# Patient Record
Sex: FEMALE | Race: WHITE | NOT HISPANIC OR LATINO | Employment: OTHER | URBAN - METROPOLITAN AREA
[De-identification: names, ages, dates, MRNs, and addresses within clinical notes are randomized per-mention and may not be internally consistent; named-entity substitution may affect disease eponyms.]

---

## 2021-10-29 ENCOUNTER — TELEPHONE (OUTPATIENT)
Dept: HEMATOLOGY/ONCOLOGY | Facility: CLINIC | Age: 66
End: 2021-10-29
Payer: COMMERCIAL

## 2021-11-02 ENCOUNTER — TELEPHONE (OUTPATIENT)
Dept: HEMATOLOGY/ONCOLOGY | Facility: CLINIC | Age: 66
End: 2021-11-02
Payer: COMMERCIAL

## 2021-11-03 ENCOUNTER — TELEPHONE (OUTPATIENT)
Dept: HEMATOLOGY/ONCOLOGY | Facility: CLINIC | Age: 66
End: 2021-11-03
Payer: COMMERCIAL

## 2021-11-30 ENCOUNTER — TELEPHONE (OUTPATIENT)
Dept: HEMATOLOGY/ONCOLOGY | Facility: CLINIC | Age: 66
End: 2021-11-30
Payer: COMMERCIAL

## 2021-11-30 DIAGNOSIS — C90.00 MULTIPLE MYELOMA, REMISSION STATUS UNSPECIFIED: Primary | ICD-10-CM

## 2021-12-02 ENCOUNTER — EDUCATION (OUTPATIENT)
Dept: HEMATOLOGY/ONCOLOGY | Facility: CLINIC | Age: 66
End: 2021-12-02

## 2021-12-02 ENCOUNTER — OFFICE VISIT (OUTPATIENT)
Dept: HEMATOLOGY/ONCOLOGY | Facility: CLINIC | Age: 66
End: 2021-12-02
Payer: COMMERCIAL

## 2021-12-02 VITALS
HEART RATE: 66 BPM | OXYGEN SATURATION: 99 % | RESPIRATION RATE: 16 BRPM | DIASTOLIC BLOOD PRESSURE: 69 MMHG | SYSTOLIC BLOOD PRESSURE: 144 MMHG | HEIGHT: 67 IN | WEIGHT: 135.69 LBS | BODY MASS INDEX: 21.3 KG/M2

## 2021-12-02 DIAGNOSIS — D63.0 ANEMIA IN NEOPLASTIC DISEASE: ICD-10-CM

## 2021-12-02 DIAGNOSIS — R53.82 CHRONIC FATIGUE: ICD-10-CM

## 2021-12-02 DIAGNOSIS — Z76.82 STEM CELL TRANSPLANT CANDIDATE: ICD-10-CM

## 2021-12-02 PROCEDURE — 99205 PR OFFICE/OUTPT VISIT, NEW, LEVL V, 60-74 MIN: ICD-10-PCS | Mod: S$GLB,,, | Performed by: INTERNAL MEDICINE

## 2021-12-02 PROCEDURE — 99205 OFFICE O/P NEW HI 60 MIN: CPT | Mod: S$GLB,,, | Performed by: INTERNAL MEDICINE

## 2021-12-02 PROCEDURE — 99999 PR PBB SHADOW E&M-EST. PATIENT-LVL III: CPT | Mod: PBBFAC,,, | Performed by: INTERNAL MEDICINE

## 2021-12-02 PROCEDURE — 99999 PR PBB SHADOW E&M-EST. PATIENT-LVL III: ICD-10-PCS | Mod: PBBFAC,,, | Performed by: INTERNAL MEDICINE

## 2021-12-02 RX ORDER — ONDANSETRON HYDROCHLORIDE 8 MG/1
8 TABLET, FILM COATED ORAL EVERY 8 HOURS PRN
COMMUNITY
Start: 2021-10-27

## 2021-12-02 RX ORDER — BUTALBITAL, ASPIRIN, CAFFEINE AND CODEINE PHOSPHATE 50; 325; 40; 30 MG/1; MG/1; MG/1; MG/1
1 CAPSULE ORAL EVERY 4 HOURS PRN
Status: ON HOLD | COMMUNITY
Start: 2021-09-07 | End: 2022-05-08 | Stop reason: HOSPADM

## 2021-12-02 RX ORDER — MULTIVITAMIN
1 TABLET ORAL DAILY
COMMUNITY

## 2021-12-02 RX ORDER — PROMETHAZINE HYDROCHLORIDE 25 MG/1
25 TABLET ORAL EVERY 6 HOURS PRN
COMMUNITY
Start: 2021-10-11

## 2021-12-02 RX ORDER — ASPIRIN 81 MG/1
81 TABLET ORAL DAILY
COMMUNITY
Start: 2021-10-27 | End: 2022-04-22 | Stop reason: ALTCHOICE

## 2021-12-02 RX ORDER — CLONAZEPAM 0.5 MG/1
0.5 TABLET ORAL 2 TIMES DAILY PRN
COMMUNITY
Start: 2021-11-16

## 2021-12-02 RX ORDER — ACYCLOVIR 400 MG/1
400 TABLET ORAL 2 TIMES DAILY
Status: ON HOLD | COMMUNITY
Start: 2021-11-04 | End: 2022-05-08 | Stop reason: HOSPADM

## 2021-12-02 RX ORDER — CITALOPRAM 10 MG/1
10 TABLET ORAL EVERY MORNING
COMMUNITY
Start: 2021-11-07

## 2021-12-02 RX ORDER — DOCUSATE SODIUM 100 MG/1
100 CAPSULE, LIQUID FILLED ORAL 2 TIMES DAILY
Status: ON HOLD | COMMUNITY
Start: 2021-07-15 | End: 2022-05-08 | Stop reason: HOSPADM

## 2021-12-02 RX ORDER — DEXAMETHASONE 4 MG/1
20 TABLET ORAL DAILY
COMMUNITY
Start: 2021-10-27 | End: 2022-04-22 | Stop reason: ALTCHOICE

## 2021-12-02 RX ORDER — ZINC GLUCONATE 50 MG
50 TABLET ORAL DAILY
COMMUNITY
Start: 2021-10-06

## 2021-12-02 RX ORDER — PANTOPRAZOLE SODIUM 40 MG/1
40 TABLET, DELAYED RELEASE ORAL NIGHTLY
Status: ON HOLD | COMMUNITY
Start: 2021-11-24 | End: 2022-05-08 | Stop reason: HOSPADM

## 2021-12-02 RX ORDER — LENALIDOMIDE 25 MG/1
25 CAPSULE ORAL DAILY
Status: ON HOLD | COMMUNITY
Start: 2021-11-29 | End: 2022-05-08 | Stop reason: HOSPADM

## 2021-12-06 ENCOUNTER — LAB VISIT (OUTPATIENT)
Dept: LAB | Facility: HOSPITAL | Age: 66
End: 2021-12-06
Attending: INTERNAL MEDICINE
Payer: COMMERCIAL

## 2021-12-06 DIAGNOSIS — C90.00 MULTIPLE MYELOMA, REMISSION STATUS UNSPECIFIED: ICD-10-CM

## 2021-12-06 PROCEDURE — 88325 PR  COMPREHENSIVE REVIEW OF DATA: ICD-10-PCS | Mod: ,,, | Performed by: PATHOLOGY

## 2021-12-06 PROCEDURE — 88325 CONSLTJ COMPRE RVW REC REPRT: CPT | Performed by: PATHOLOGY

## 2021-12-06 PROCEDURE — 88325 CONSLTJ COMPRE RVW REC REPRT: CPT | Mod: ,,, | Performed by: PATHOLOGY

## 2021-12-06 PROCEDURE — 88321 CONSLTJ&REPRT SLD PREP ELSWR: CPT | Performed by: PATHOLOGY

## 2021-12-10 LAB
FINAL PATHOLOGIC DIAGNOSIS: NORMAL
GROSS: NORMAL
Lab: NORMAL
MICROSCOPIC EXAM: NORMAL

## 2022-02-08 DIAGNOSIS — C90.00 MULTIPLE MYELOMA, REMISSION STATUS UNSPECIFIED: ICD-10-CM

## 2022-02-08 DIAGNOSIS — Z76.82 STEM CELL TRANSPLANT CANDIDATE: Primary | ICD-10-CM

## 2022-02-25 DIAGNOSIS — Z76.82 STEM CELL TRANSPLANT CANDIDATE: Primary | ICD-10-CM

## 2022-02-25 DIAGNOSIS — C90.00 MULTIPLE MYELOMA, REMISSION STATUS UNSPECIFIED: ICD-10-CM

## 2022-03-14 ENCOUNTER — OFFICE VISIT (OUTPATIENT)
Dept: HEMATOLOGY/ONCOLOGY | Facility: CLINIC | Age: 67
End: 2022-03-14
Payer: COMMERCIAL

## 2022-03-14 ENCOUNTER — LAB VISIT (OUTPATIENT)
Dept: LAB | Facility: HOSPITAL | Age: 67
End: 2022-03-14
Payer: COMMERCIAL

## 2022-03-14 VITALS
OXYGEN SATURATION: 99 % | WEIGHT: 149.5 LBS | RESPIRATION RATE: 16 BRPM | DIASTOLIC BLOOD PRESSURE: 70 MMHG | TEMPERATURE: 98 F | BODY MASS INDEX: 23.46 KG/M2 | HEART RATE: 73 BPM | HEIGHT: 67 IN | SYSTOLIC BLOOD PRESSURE: 160 MMHG

## 2022-03-14 DIAGNOSIS — C90.00 MULTIPLE MYELOMA, REMISSION STATUS UNSPECIFIED: ICD-10-CM

## 2022-03-14 DIAGNOSIS — Z76.82 STEM CELL TRANSPLANT CANDIDATE: Primary | ICD-10-CM

## 2022-03-14 DIAGNOSIS — D63.0 ANEMIA IN NEOPLASTIC DISEASE: ICD-10-CM

## 2022-03-14 DIAGNOSIS — R53.82 CHRONIC FATIGUE: ICD-10-CM

## 2022-03-14 DIAGNOSIS — Z76.82 STEM CELL TRANSPLANT CANDIDATE: ICD-10-CM

## 2022-03-14 DIAGNOSIS — F41.9 ANXIETY: ICD-10-CM

## 2022-03-14 LAB
ABO + RH BLD: NORMAL
ALBUMIN SERPL BCP-MCNC: 3.5 G/DL (ref 3.5–5.2)
ALP SERPL-CCNC: 55 U/L (ref 55–135)
ALT SERPL W/O P-5'-P-CCNC: 45 U/L (ref 10–44)
ANION GAP SERPL CALC-SCNC: 9 MMOL/L (ref 8–16)
AST SERPL-CCNC: 24 U/L (ref 10–40)
B2 MICROGLOB SERPL-MCNC: 2.1 UG/ML (ref 0–2.5)
BASOPHILS # BLD AUTO: 0.04 K/UL (ref 0–0.2)
BASOPHILS NFR BLD: 0.9 % (ref 0–1.9)
BILIRUB SERPL-MCNC: 0.5 MG/DL (ref 0.1–1)
BLD GP AB SCN CELLS X3 SERPL QL: NORMAL
BUN SERPL-MCNC: 12 MG/DL (ref 8–23)
CALCIUM SERPL-MCNC: 9.3 MG/DL (ref 8.7–10.5)
CHLORIDE SERPL-SCNC: 105 MMOL/L (ref 95–110)
CO2 SERPL-SCNC: 26 MMOL/L (ref 23–29)
CREAT SERPL-MCNC: 0.8 MG/DL (ref 0.5–1.4)
DIFFERENTIAL METHOD: ABNORMAL
EOSINOPHIL # BLD AUTO: 0.3 K/UL (ref 0–0.5)
EOSINOPHIL NFR BLD: 6.9 % (ref 0–8)
ERYTHROCYTE [DISTWIDTH] IN BLOOD BY AUTOMATED COUNT: 13.5 % (ref 11.5–14.5)
EST. GFR  (AFRICAN AMERICAN): >60 ML/MIN/1.73 M^2
EST. GFR  (NON AFRICAN AMERICAN): >60 ML/MIN/1.73 M^2
GLUCOSE SERPL-MCNC: 80 MG/DL (ref 70–110)
HCT VFR BLD AUTO: 38.1 % (ref 37–48.5)
HGB BLD-MCNC: 12.1 G/DL (ref 12–16)
IGA SERPL-MCNC: 96 MG/DL (ref 40–350)
IGG SERPL-MCNC: 1083 MG/DL (ref 650–1600)
IGM SERPL-MCNC: 30 MG/DL (ref 50–300)
IMM GRANULOCYTES # BLD AUTO: 0.01 K/UL (ref 0–0.04)
IMM GRANULOCYTES NFR BLD AUTO: 0.2 % (ref 0–0.5)
LYMPHOCYTES # BLD AUTO: 1.2 K/UL (ref 1–4.8)
LYMPHOCYTES NFR BLD: 28.4 % (ref 18–48)
MAGNESIUM SERPL-MCNC: 2 MG/DL (ref 1.6–2.6)
MCH RBC QN AUTO: 31.8 PG (ref 27–31)
MCHC RBC AUTO-ENTMCNC: 31.8 G/DL (ref 32–36)
MCV RBC AUTO: 100 FL (ref 82–98)
MONOCYTES # BLD AUTO: 0.6 K/UL (ref 0.3–1)
MONOCYTES NFR BLD: 14.9 % (ref 4–15)
NEUTROPHILS # BLD AUTO: 2.1 K/UL (ref 1.8–7.7)
NEUTROPHILS NFR BLD: 48.7 % (ref 38–73)
NRBC BLD-RTO: 0 /100 WBC
PHOSPHATE SERPL-MCNC: 2.8 MG/DL (ref 2.7–4.5)
PLATELET # BLD AUTO: 118 K/UL (ref 150–450)
PMV BLD AUTO: 12.3 FL (ref 9.2–12.9)
POTASSIUM SERPL-SCNC: 3.8 MMOL/L (ref 3.5–5.1)
PROT SERPL-MCNC: 6.6 G/DL (ref 6–8.4)
RBC # BLD AUTO: 3.8 M/UL (ref 4–5.4)
SODIUM SERPL-SCNC: 140 MMOL/L (ref 136–145)
WBC # BLD AUTO: 4.23 K/UL (ref 3.9–12.7)

## 2022-03-14 PROCEDURE — 99215 OFFICE O/P EST HI 40 MIN: CPT | Mod: S$GLB,,, | Performed by: INTERNAL MEDICINE

## 2022-03-14 PROCEDURE — 83520 IMMUNOASSAY QUANT NOS NONAB: CPT | Performed by: INTERNAL MEDICINE

## 2022-03-14 PROCEDURE — 80053 COMPREHEN METABOLIC PANEL: CPT | Performed by: INTERNAL MEDICINE

## 2022-03-14 PROCEDURE — 86334 IMMUNOFIX E-PHORESIS SERUM: CPT | Mod: 26,,, | Performed by: PATHOLOGY

## 2022-03-14 PROCEDURE — 99999 PR PBB SHADOW E&M-EST. PATIENT-LVL IV: ICD-10-PCS | Mod: PBBFAC,,, | Performed by: INTERNAL MEDICINE

## 2022-03-14 PROCEDURE — 86850 RBC ANTIBODY SCREEN: CPT | Performed by: INTERNAL MEDICINE

## 2022-03-14 PROCEDURE — 86334 IMMUNOFIX E-PHORESIS SERUM: CPT | Performed by: INTERNAL MEDICINE

## 2022-03-14 PROCEDURE — 84165 PROTEIN E-PHORESIS SERUM: CPT | Mod: 26,,, | Performed by: PATHOLOGY

## 2022-03-14 PROCEDURE — 84165 PATHOLOGIST INTERPRETATION SPE: ICD-10-PCS | Mod: 26,,, | Performed by: PATHOLOGY

## 2022-03-14 PROCEDURE — 86334 PATHOLOGIST INTERPRETATION IFE: ICD-10-PCS | Mod: 26,,, | Performed by: PATHOLOGY

## 2022-03-14 PROCEDURE — 99999 PR PBB SHADOW E&M-EST. PATIENT-LVL IV: CPT | Mod: PBBFAC,,, | Performed by: INTERNAL MEDICINE

## 2022-03-14 PROCEDURE — 99215 PR OFFICE/OUTPT VISIT, EST, LEVL V, 40-54 MIN: ICD-10-PCS | Mod: S$GLB,,, | Performed by: INTERNAL MEDICINE

## 2022-03-14 PROCEDURE — 84100 ASSAY OF PHOSPHORUS: CPT | Performed by: INTERNAL MEDICINE

## 2022-03-14 PROCEDURE — 82232 ASSAY OF BETA-2 PROTEIN: CPT | Performed by: INTERNAL MEDICINE

## 2022-03-14 PROCEDURE — 83735 ASSAY OF MAGNESIUM: CPT | Performed by: INTERNAL MEDICINE

## 2022-03-14 PROCEDURE — 84165 PROTEIN E-PHORESIS SERUM: CPT | Performed by: INTERNAL MEDICINE

## 2022-03-14 PROCEDURE — 85025 COMPLETE CBC W/AUTO DIFF WBC: CPT | Performed by: INTERNAL MEDICINE

## 2022-03-14 PROCEDURE — 36415 COLL VENOUS BLD VENIPUNCTURE: CPT | Performed by: INTERNAL MEDICINE

## 2022-03-14 PROCEDURE — 82784 ASSAY IGA/IGD/IGG/IGM EACH: CPT | Mod: 59 | Performed by: INTERNAL MEDICINE

## 2022-03-14 RX ORDER — LORAZEPAM 1 MG/1
1 TABLET ORAL ONCE
Qty: 6 TABLET | Refills: 0 | Status: SHIPPED | OUTPATIENT
Start: 2022-03-14 | End: 2022-03-31 | Stop reason: ALTCHOICE

## 2022-03-14 NOTE — PROGRESS NOTES
CC: IgG lambda multiple myeloma, stem cell transplant consultation     HPI : , 66, is here for hematology/ stem cell transplant follow up . She is a stem cell transplant candidate. She has IgG lambda multiple myeloma. Multiple myeloma was diagnosed after work up for anemia showed a paraprotein  She has anemia, GERD, esophageal erosions. On 10/8/21 she had a bone marrow biopsy. It showed nearly 50%  positive plasma cells on core biopsy by IHC.  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  [place] red on bone marrow negative.She has chronic anemia. Cytogenetics was normal. FISH showed increase in 1q copies, t(4,14) and monosomy 13.  She had no lytic lesions on skeletal survey. She has been started on RVd.         Review of patient's allergies indicates:   Allergen Reactions    Garlic     Milk Hives, Itching and Nausea Only       Current Outpatient Medications   Medication Sig    acyclovir (ZOVIRAX) 400 MG tablet Take 400 mg by mouth 2 (two) times daily.    aspirin (ECOTRIN) 81 MG EC tablet Take 81 mg by mouth.    citalopram (CELEXA) 10 MG tablet Take 10 mg by mouth once daily.    clonazePAM (KLONOPIN) 0.5 MG tablet Take 0.5 mg by mouth 2 (two) times daily as needed.    codeine-butalbital-ASA-caffeine (BUTALBITAL COMPOUND W/CODEINE) 53--40 mg Cap Take 1 capsule by mouth every 4 (four) hours as needed.    dexAMETHasone (DECADRON) 4 MG Tab Take 40 mg by mouth.    docusate sodium (COLACE) 100 MG capsule Take 100 mg by mouth.    multivitamin (THERAGRAN) per tablet Take 1 tablet by mouth once daily.    ondansetron (ZOFRAN) 8 MG tablet Take 8 mg by mouth every 8 (eight) hours as needed.    pantoprazole (PROTONIX) 40 MG tablet Take 40 mg by mouth once daily.    promethazine (PHENERGAN) 12.5 MG Tab Take 12.5 mg by mouth.    REVLIMID 25 mg Cap Take by mouth.    zinc gluconate 50 mg tablet Take 50 mg by mouth.     No current facility-administered medications for this visit.         Review of  Systems   Constitutional: Positive for malaise/fatigue. Negative for chills, fever and weight loss.   HENT: Negative for congestion, ear discharge, ear pain, hearing loss and nosebleeds.    Eyes: Negative for blurred vision, double vision, photophobia and pain.   Respiratory: Negative for hemoptysis, sputum production and shortness of breath.    Cardiovascular: Negative for orthopnea, leg swelling and PND.   Gastrointestinal: Negative for abdominal pain, blood in stool, diarrhea, heartburn, melena and vomiting.   Genitourinary: Negative for dysuria and frequency.   Musculoskeletal: Negative for back pain, myalgias and neck pain.   Neurological: Negative for dizziness, tingling, tremors, sensory change, focal weakness, weakness and headaches.   Endo/Heme/Allergies: Negative for environmental allergies. Does not bruise/bleed easily.   Psychiatric/Behavioral: Negative for depression, hallucinations and substance abuse.       Vitals:    03/14/22 0953   BP: (!) 160/70   Pulse: 73   Resp: 16   Temp: 98.3 °F (36.8 °C)     PS: ECOG 1    Physical Exam  HENT:      Head: Normocephalic and atraumatic.   Eyes:      General: No scleral icterus.  Cardiovascular:      Rate and Rhythm: Normal rate and regular rhythm.      Pulses: Normal pulses.      Heart sounds: Normal heart sounds. No murmur heard.  Pulmonary:      Effort: Pulmonary effort is normal. No respiratory distress.      Breath sounds: Normal breath sounds.   Abdominal:      General: There is no distension.   Musculoskeletal:         General: No swelling or deformity.   Skin:     Coloration: Skin is not jaundiced.   Neurological:      General: No focal deficit present.      Mental Status: She is alert and oriented to person, place, and time.       Component      Latest Ref Rng & Units 3/14/2022   WBC      3.90 - 12.70 K/uL 4.23   RBC      4.00 - 5.40 M/uL 3.80 (L)   Hemoglobin      12.0 - 16.0 g/dL 12.1   Hematocrit      37.0 - 48.5 % 38.1   MCV      82 - 98 fL 100 (H)    MCH      27.0 - 31.0 pg 31.8 (H)   MCHC      32.0 - 36.0 g/dL 31.8 (L)   RDW      11.5 - 14.5 % 13.5   Platelets      150 - 450 K/uL 118 (L)   MPV      9.2 - 12.9 fL 12.3   Immature Granulocytes      0.0 - 0.5 % 0.2   Gran # (ANC)      1.8 - 7.7 K/uL 2.1   Immature Grans (Abs)      0.00 - 0.04 K/uL 0.01   Lymph #      1.0 - 4.8 K/uL 1.2   Mono #      0.3 - 1.0 K/uL 0.6   Eos #      0.0 - 0.5 K/uL 0.3   Baso #      0.00 - 0.20 K/uL 0.04   nRBC      0 /100 WBC 0   Gran %      38.0 - 73.0 % 48.7   Lymph %      18.0 - 48.0 % 28.4   Mono %      4.0 - 15.0 % 14.9   Eosinophil %      0.0 - 8.0 % 6.9   Basophil %      0.0 - 1.9 % 0.9   Differential Method       Automated   Sodium      136 - 145 mmol/L 140   Potassium      3.5 - 5.1 mmol/L 3.8   Chloride      95 - 110 mmol/L 105   CO2      23 - 29 mmol/L 26   Glucose      70 - 110 mg/dL 80   BUN      8 - 23 mg/dL 12   Creatinine      0.5 - 1.4 mg/dL 0.8   Calcium      8.7 - 10.5 mg/dL 9.3   PROTEIN TOTAL      6.0 - 8.4 g/dL 6.6   Albumin      3.5 - 5.2 g/dL 3.5   BILIRUBIN TOTAL      0.1 - 1.0 mg/dL 0.5   Alkaline Phosphatase      55 - 135 U/L 55   AST      10 - 40 U/L 24   ALT      10 - 44 U/L 45 (H)   Anion Gap      8 - 16 mmol/L 9   eGFR if African American      >60 mL/min/1.73 m:2 >60.0   eGFR if non African American      >60 mL/min/1.73 m:2 >60.0   IgG      650 - 1600 mg/dL 1083   IgA      40 - 350 mg/dL 96   IgM      50 - 300 mg/dL 30 (L)   Phosphorus      2.7 - 4.5 mg/dL 2.8   Magnesium      1.6 - 2.6 mg/dL 2.0   Beta-2 Microglobulin      0.0 - 2.5 ug/mL 2.1       Assessment:    1. IgG lambda multiple myeloma not in remission  2. Stem cell transplant candidate  3. 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 immunization    Plan:    1. R-ISS II IgG lambda multiple myeloma, currently on RVd, cycle 5. Estimated median PFS is 42 months.    She had increased 1q21 copy number and has t(4,14), both high risk markers in multiple myeloma. She also has monosomy 13 on FISH. She  has not had PET /MRI and recommend either of these imaging to evaluate for skeletal lesions.   She is currently on RVd , cycle 5. She has tolerated the treatments well. She has responded very well so far. Prior to C5 her M spike on SPEP was 0.52g/dl( was 5.36g/sdl at diagnosis). She is in [facility]. She has met Vidya Cote RN, transplant co- ordinator. She will be re-staged after C6.        3. She is on PPI    5. She has received COVID 19 vaccine.

## 2022-03-15 LAB
ALBUMIN SERPL ELPH-MCNC: 3.72 G/DL (ref 3.35–5.55)
ALPHA1 GLOB SERPL ELPH-MCNC: 0.27 G/DL (ref 0.17–0.41)
ALPHA2 GLOB SERPL ELPH-MCNC: 0.62 G/DL (ref 0.43–0.99)
B-GLOBULIN SERPL ELPH-MCNC: 0.64 G/DL (ref 0.5–1.1)
GAMMA GLOB SERPL ELPH-MCNC: 1.05 G/DL (ref 0.67–1.58)
INTERPRETATION SERPL IFE-IMP: NORMAL
KAPPA LC SER QL IA: 1.27 MG/DL (ref 0.33–1.94)
KAPPA LC/LAMBDA SER IA: 0.93 (ref 0.26–1.65)
LAMBDA LC SER QL IA: 1.36 MG/DL (ref 0.57–2.63)
PATHOLOGIST INTERPRETATION IFE: NORMAL
PATHOLOGIST INTERPRETATION SPE: NORMAL
PROT SERPL-MCNC: 6.3 G/DL (ref 6–8.4)

## 2022-03-18 DIAGNOSIS — C90.00 MULTIPLE MYELOMA, REMISSION STATUS UNSPECIFIED: ICD-10-CM

## 2022-03-18 DIAGNOSIS — Z76.82 STEM CELL TRANSPLANT CANDIDATE: Primary | ICD-10-CM

## 2022-03-21 ENCOUNTER — CLINICAL SUPPORT (OUTPATIENT)
Dept: HEMATOLOGY/ONCOLOGY | Facility: CLINIC | Age: 67
End: 2022-03-21
Payer: COMMERCIAL

## 2022-03-21 ENCOUNTER — HOSPITAL ENCOUNTER (OUTPATIENT)
Dept: RADIOLOGY | Facility: HOSPITAL | Age: 67
Discharge: HOME | End: 2022-03-21
Attending: INTERNAL MEDICINE
Payer: COMMERCIAL

## 2022-03-21 ENCOUNTER — PROCEDURE VISIT (OUTPATIENT)
Dept: HEMATOLOGY/ONCOLOGY | Facility: CLINIC | Age: 67
End: 2022-03-21
Payer: COMMERCIAL

## 2022-03-21 VITALS
SYSTOLIC BLOOD PRESSURE: 127 MMHG | HEART RATE: 86 BPM | DIASTOLIC BLOOD PRESSURE: 62 MMHG | TEMPERATURE: 98 F | RESPIRATION RATE: 16 BRPM | OXYGEN SATURATION: 99 %

## 2022-03-21 DIAGNOSIS — C90.00 MULTIPLE MYELOMA, REMISSION STATUS UNSPECIFIED: ICD-10-CM

## 2022-03-21 DIAGNOSIS — Z76.82 STEM CELL TRANSPLANT CANDIDATE: ICD-10-CM

## 2022-03-21 DIAGNOSIS — C90.00 MULTIPLE MYELOMA, REMISSION STATUS UNSPECIFIED: Primary | ICD-10-CM

## 2022-03-21 DIAGNOSIS — Z76.82 STEM CELL TRANSPLANT CANDIDATE: Primary | ICD-10-CM

## 2022-03-21 DIAGNOSIS — Z11.52 ENCOUNTER FOR SCREENING FOR COVID-19: ICD-10-CM

## 2022-03-21 LAB
POCT GLUCOSE: 108 MG/DL (ref 70–110)
SARS-COV-2 RDRP RESP QL NAA+PROBE: NEGATIVE

## 2022-03-21 PROCEDURE — 88305 TISSUE EXAM BY PATHOLOGIST: CPT | Mod: 59 | Performed by: PATHOLOGY

## 2022-03-21 PROCEDURE — 78816 PET IMAGE W/CT FULL BODY: CPT | Mod: 26,PS,, | Performed by: RADIOLOGY

## 2022-03-21 PROCEDURE — 88185 FLOWCYTOMETRY/TC ADD-ON: CPT | Mod: 59 | Performed by: PATHOLOGY

## 2022-03-21 PROCEDURE — 88184 FLOWCYTOMETRY/ TC 1 MARKER: CPT | Mod: 59 | Performed by: PATHOLOGY

## 2022-03-21 PROCEDURE — 88313 SPECIAL STAINS GROUP 2: CPT | Mod: 59 | Performed by: PATHOLOGY

## 2022-03-21 PROCEDURE — 88342 IMHCHEM/IMCYTCHM 1ST ANTB: CPT | Mod: 26,59,, | Performed by: PATHOLOGY

## 2022-03-21 PROCEDURE — 88313 PR  SPECIAL STAINS,GROUP II: ICD-10-PCS | Mod: 26,,, | Performed by: PATHOLOGY

## 2022-03-21 PROCEDURE — 88311 DECALCIFY TISSUE: CPT | Performed by: PATHOLOGY

## 2022-03-21 PROCEDURE — 88274 CYTOGENETICS 25-99: CPT | Performed by: PHYSICIAN ASSISTANT

## 2022-03-21 PROCEDURE — 88185 FLOWCYTOMETRY/TC ADD-ON: CPT | Mod: 91 | Performed by: PHYSICIAN ASSISTANT

## 2022-03-21 PROCEDURE — 88189 PR  FLOWCYTOMETRY/READ, 16 & > MARKERS: ICD-10-PCS | Mod: ,,, | Performed by: PATHOLOGY

## 2022-03-21 PROCEDURE — 88305 TISSUE EXAM BY PATHOLOGIST: CPT | Mod: 26,,, | Performed by: PATHOLOGY

## 2022-03-21 PROCEDURE — 85097 BONE MARROW INTERPRETATION: CPT | Mod: ,,, | Performed by: PATHOLOGY

## 2022-03-21 PROCEDURE — 88311 DECALCIFY TISSUE: CPT | Mod: 26,,, | Performed by: PATHOLOGY

## 2022-03-21 PROCEDURE — 88189 FLOWCYTOMETRY/READ 16 & >: CPT | Mod: ,,, | Performed by: PATHOLOGY

## 2022-03-21 PROCEDURE — 25500020 PHARM REV CODE 255: Performed by: INTERNAL MEDICINE

## 2022-03-21 PROCEDURE — 85097 PR  BONE MARROW,SMEAR INTERPRETATION: ICD-10-PCS | Mod: ,,, | Performed by: PATHOLOGY

## 2022-03-21 PROCEDURE — U0002 COVID-19 LAB TEST NON-CDC: HCPCS | Performed by: INTERNAL MEDICINE

## 2022-03-21 PROCEDURE — 88311 PR  DECALCIFY TISSUE: ICD-10-PCS | Mod: 26,,, | Performed by: PATHOLOGY

## 2022-03-21 PROCEDURE — 88313 SPECIAL STAINS GROUP 2: CPT | Mod: 26,,, | Performed by: PATHOLOGY

## 2022-03-21 PROCEDURE — 78816 NM PET CT WHOLE BODY: ICD-10-PCS | Mod: 26,PS,, | Performed by: RADIOLOGY

## 2022-03-21 PROCEDURE — A9698 NON-RAD CONTRAST MATERIALNOC: HCPCS | Performed by: INTERNAL MEDICINE

## 2022-03-21 PROCEDURE — 88305 TISSUE EXAM BY PATHOLOGIST: ICD-10-PCS | Mod: 26,,, | Performed by: PATHOLOGY

## 2022-03-21 PROCEDURE — 78816 PET IMAGE W/CT FULL BODY: CPT | Mod: PI,TC

## 2022-03-21 PROCEDURE — 88342 CHG IMMUNOCYTOCHEMISTRY: ICD-10-PCS | Mod: 26,59,, | Performed by: PATHOLOGY

## 2022-03-21 PROCEDURE — 38222 DX BONE MARROW BX & ASPIR: CPT | Mod: LT,S$GLB,, | Performed by: PHYSICIAN ASSISTANT

## 2022-03-21 PROCEDURE — 88237 TISSUE CULTURE BONE MARROW: CPT | Performed by: PHYSICIAN ASSISTANT

## 2022-03-21 PROCEDURE — 88342 IMHCHEM/IMCYTCHM 1ST ANTB: CPT | Performed by: PATHOLOGY

## 2022-03-21 PROCEDURE — 38222 PR BONE MARROW BIOPSY(IES) W/ASPIRATION(S); DIAGNOSTIC: ICD-10-PCS | Mod: LT,S$GLB,, | Performed by: PHYSICIAN ASSISTANT

## 2022-03-21 PROCEDURE — 88264 CHROMOSOME ANALYSIS 20-25: CPT | Performed by: PHYSICIAN ASSISTANT

## 2022-03-21 RX ORDER — LIDOCAINE HYDROCHLORIDE 20 MG/ML
10 INJECTION, SOLUTION EPIDURAL; INFILTRATION; INTRACAUDAL; PERINEURAL ONCE
Status: COMPLETED | OUTPATIENT
Start: 2022-03-21 | End: 2022-03-21

## 2022-03-21 RX ADMIN — LIDOCAINE HYDROCHLORIDE 200 MG: 20 INJECTION, SOLUTION EPIDURAL; INFILTRATION; INTRACAUDAL; PERINEURAL at 01:03

## 2022-03-21 RX ADMIN — IOHEXOL 1000 ML: 9 SOLUTION ORAL at 11:03

## 2022-03-21 NOTE — PROGRESS NOTES
Caty Diaz is a 66 y.o. female with history of IgG Lambda MM undergoing transplant evaluation who presented to clinic for bone marrow biopsy and aspiration. See Dr. Tabor's recent clinic note for full history. I have reviewed medications, allergies and labs. Procedure explained and informed consent obtained. The patient tolerated procedure well and hemostasis was achieved, no signs of distress upon completion. Patient instructed to keep bandaid intact and dry for 24 hours. Please see procedure note for full details.    Angelito Freedman, PA-C  Malignant Hematology & Bone Marrow Transplant

## 2022-03-22 LAB
CHROM BANDING METHOD: NORMAL
CHROMOSOME ANALYSIS BM ADDITIONAL INFORMATION: NORMAL
CHROMOSOME ANALYSIS BM RELEASED BY: NORMAL
CHROMOSOME ANALYSIS BM RESULT SUMMARY: NORMAL
CLINICAL CYTOGENETICIST REVIEW: NORMAL
KARYOTYP MAR: NORMAL
REASON FOR REFERRAL (NARRATIVE): NORMAL
REF LAB TEST METHOD: NORMAL
SPECIMEN SOURCE: NORMAL
SPECIMEN: NORMAL

## 2022-03-23 LAB
BODY SITE - BONE MARROW: NORMAL
CLINICAL DIAGNOSIS - BONE MARROW: NORMAL
FLOW CYTOMETRY ANTIBODIES ANALYZED - BONE MARROW: NORMAL
FLOW CYTOMETRY COMMENT - BONE MARROW: NORMAL
FLOW CYTOMETRY INTERPRETATION - BONE MARROW: NORMAL
PCPDS FINAL DIAGNOSIS: NORMAL
PCPDS PRE-ANALYSIS PRE-SORT: NORMAL

## 2022-03-31 ENCOUNTER — LAB VISIT (OUTPATIENT)
Dept: LAB | Facility: HOSPITAL | Age: 67
End: 2022-03-31
Payer: COMMERCIAL

## 2022-03-31 ENCOUNTER — OFFICE VISIT (OUTPATIENT)
Dept: PSYCHIATRY | Facility: CLINIC | Age: 67
End: 2022-03-31
Payer: COMMERCIAL

## 2022-03-31 ENCOUNTER — CLINICAL SUPPORT (OUTPATIENT)
Dept: HEMATOLOGY/ONCOLOGY | Facility: CLINIC | Age: 67
End: 2022-03-31
Payer: COMMERCIAL

## 2022-03-31 ENCOUNTER — SOCIAL WORK (OUTPATIENT)
Dept: HEMATOLOGY/ONCOLOGY | Facility: CLINIC | Age: 67
End: 2022-03-31

## 2022-03-31 DIAGNOSIS — Z76.82 STEM CELL TRANSPLANT CANDIDATE: ICD-10-CM

## 2022-03-31 DIAGNOSIS — Z01.818 PRE-TRANSPLANT EVALUATION FOR STEM CELL TRANSPLANT: Primary | ICD-10-CM

## 2022-03-31 DIAGNOSIS — C90.00 MULTIPLE MYELOMA, REMISSION STATUS UNSPECIFIED: ICD-10-CM

## 2022-03-31 DIAGNOSIS — C90.00 MULTIPLE MYELOMA, REMISSION STATUS UNSPECIFIED: Primary | ICD-10-CM

## 2022-03-31 DIAGNOSIS — Z71.3 NUTRITIONAL COUNSELING: ICD-10-CM

## 2022-03-31 DIAGNOSIS — Z76.82 STEM CELL TRANSPLANT CANDIDATE: Primary | ICD-10-CM

## 2022-03-31 LAB
ABO + RH BLD: NORMAL
ALBUMIN SERPL BCP-MCNC: 3.8 G/DL (ref 3.5–5.2)
ALP SERPL-CCNC: 54 U/L (ref 55–135)
ALT SERPL W/O P-5'-P-CCNC: 53 U/L (ref 10–44)
ANION GAP SERPL CALC-SCNC: 7 MMOL/L (ref 8–16)
APTT BLDCRRT: 21.3 SEC (ref 21–32)
AST SERPL-CCNC: 29 U/L (ref 10–40)
B2 MICROGLOB SERPL-MCNC: 1.7 UG/ML (ref 0–2.5)
BASOPHILS # BLD AUTO: 0.03 K/UL (ref 0–0.2)
BASOPHILS NFR BLD: 0.6 % (ref 0–1.9)
BILIRUB SERPL-MCNC: 0.5 MG/DL (ref 0.1–1)
BLD GP AB SCN CELLS X3 SERPL QL: NORMAL
BUN SERPL-MCNC: 13 MG/DL (ref 8–23)
CALCIUM SERPL-MCNC: 9.5 MG/DL (ref 8.7–10.5)
CHLORIDE SERPL-SCNC: 102 MMOL/L (ref 95–110)
CO2 SERPL-SCNC: 30 MMOL/L (ref 23–29)
CREAT SERPL-MCNC: 0.9 MG/DL (ref 0.5–1.4)
DIFFERENTIAL METHOD: ABNORMAL
EOSINOPHIL # BLD AUTO: 0 K/UL (ref 0–0.5)
EOSINOPHIL NFR BLD: 0.8 % (ref 0–8)
ERYTHROCYTE [DISTWIDTH] IN BLOOD BY AUTOMATED COUNT: 14.1 % (ref 11.5–14.5)
EST. GFR  (AFRICAN AMERICAN): >60 ML/MIN/1.73 M^2
EST. GFR  (NON AFRICAN AMERICAN): >60 ML/MIN/1.73 M^2
GLUCOSE SERPL-MCNC: 77 MG/DL (ref 70–110)
HCT VFR BLD AUTO: 38.1 % (ref 37–48.5)
HGB BLD-MCNC: 12.3 G/DL (ref 12–16)
HGB S BLD QL SOLY: NEGATIVE
IGA SERPL-MCNC: 109 MG/DL (ref 40–350)
IGG SERPL-MCNC: 1170 MG/DL (ref 650–1600)
IGM SERPL-MCNC: 25 MG/DL (ref 50–300)
IMM GRANULOCYTES # BLD AUTO: 0.02 K/UL (ref 0–0.04)
IMM GRANULOCYTES NFR BLD AUTO: 0.4 % (ref 0–0.5)
INR PPP: 1 (ref 0.8–1.2)
LYMPHOCYTES # BLD AUTO: 1.5 K/UL (ref 1–4.8)
LYMPHOCYTES NFR BLD: 31.3 % (ref 18–48)
MAGNESIUM SERPL-MCNC: 1.9 MG/DL (ref 1.6–2.6)
MCH RBC QN AUTO: 31.5 PG (ref 27–31)
MCHC RBC AUTO-ENTMCNC: 32.3 G/DL (ref 32–36)
MCV RBC AUTO: 97 FL (ref 82–98)
MONOCYTES # BLD AUTO: 0.9 K/UL (ref 0.3–1)
MONOCYTES NFR BLD: 18.6 % (ref 4–15)
NEUTROPHILS # BLD AUTO: 2.4 K/UL (ref 1.8–7.7)
NEUTROPHILS NFR BLD: 48.3 % (ref 38–73)
NRBC BLD-RTO: 0 /100 WBC
PHOSPHATE SERPL-MCNC: 2.1 MG/DL (ref 2.7–4.5)
PLATELET # BLD AUTO: 133 K/UL (ref 150–450)
PMV BLD AUTO: 11.9 FL (ref 9.2–12.9)
POTASSIUM SERPL-SCNC: 3.2 MMOL/L (ref 3.5–5.1)
PROT SERPL-MCNC: 7.2 G/DL (ref 6–8.4)
PROTHROMBIN TIME: 10 SEC (ref 9–12.5)
RBC # BLD AUTO: 3.91 M/UL (ref 4–5.4)
SODIUM SERPL-SCNC: 139 MMOL/L (ref 136–145)
WBC # BLD AUTO: 4.89 K/UL (ref 3.9–12.7)

## 2022-03-31 PROCEDURE — 86696 HERPES SIMPLEX TYPE 2 TEST: CPT | Performed by: INTERNAL MEDICINE

## 2022-03-31 PROCEDURE — 83520 IMMUNOASSAY QUANT NOS NONAB: CPT | Mod: 59 | Performed by: INTERNAL MEDICINE

## 2022-03-31 PROCEDURE — 86704 HEP B CORE ANTIBODY TOTAL: CPT | Performed by: INTERNAL MEDICINE

## 2022-03-31 PROCEDURE — 99999 PR PBB SHADOW E&M-EST. PATIENT-LVL III: ICD-10-PCS | Mod: PBBFAC,,,

## 2022-03-31 PROCEDURE — 80307 DRUG TEST PRSMV CHEM ANLYZR: CPT | Performed by: INTERNAL MEDICINE

## 2022-03-31 PROCEDURE — 82784 ASSAY IGA/IGD/IGG/IGM EACH: CPT | Performed by: INTERNAL MEDICINE

## 2022-03-31 PROCEDURE — 86695 HERPES SIMPLEX TYPE 1 TEST: CPT | Performed by: INTERNAL MEDICINE

## 2022-03-31 PROCEDURE — 86592 SYPHILIS TEST NON-TREP QUAL: CPT | Performed by: INTERNAL MEDICINE

## 2022-03-31 PROCEDURE — 85610 PROTHROMBIN TIME: CPT | Performed by: INTERNAL MEDICINE

## 2022-03-31 PROCEDURE — 86803 HEPATITIS C AB TEST: CPT | Performed by: INTERNAL MEDICINE

## 2022-03-31 PROCEDURE — 86644 CMV ANTIBODY: CPT | Performed by: INTERNAL MEDICINE

## 2022-03-31 PROCEDURE — 86334 IMMUNOFIX E-PHORESIS SERUM: CPT | Performed by: INTERNAL MEDICINE

## 2022-03-31 PROCEDURE — 87340 HEPATITIS B SURFACE AG IA: CPT | Performed by: INTERNAL MEDICINE

## 2022-03-31 PROCEDURE — 82955 ASSAY OF G6PD ENZYME: CPT | Performed by: INTERNAL MEDICINE

## 2022-03-31 PROCEDURE — 84165 PATHOLOGIST INTERPRETATION SPE: ICD-10-PCS | Mod: 26,,, | Performed by: PATHOLOGY

## 2022-03-31 PROCEDURE — 98968 PH1 ASSMT&MGMT NQHP 21-30: CPT | Mod: 95,,, | Performed by: DIETITIAN, REGISTERED

## 2022-03-31 PROCEDURE — 96146 PSYCL/NRPSYC TST AUTO RESULT: CPT | Mod: S$GLB,,, | Performed by: PSYCHOLOGIST

## 2022-03-31 PROCEDURE — 85660 RBC SICKLE CELL TEST: CPT | Performed by: INTERNAL MEDICINE

## 2022-03-31 PROCEDURE — 86334 IMMUNOFIX E-PHORESIS SERUM: CPT | Mod: 26,,, | Performed by: PATHOLOGY

## 2022-03-31 PROCEDURE — 99999 PR PBB SHADOW E&M-EST. PATIENT-LVL III: CPT | Mod: PBBFAC,,,

## 2022-03-31 PROCEDURE — 80053 COMPREHEN METABOLIC PANEL: CPT | Performed by: INTERNAL MEDICINE

## 2022-03-31 PROCEDURE — 86703 HIV-1/HIV-2 1 RESULT ANTBDY: CPT | Performed by: INTERNAL MEDICINE

## 2022-03-31 PROCEDURE — 98968 PR NONPHYSICIAN TELEPHONE ASSESSMENT 21-30 MIN: ICD-10-PCS | Mod: 95,,, | Performed by: DIETITIAN, REGISTERED

## 2022-03-31 PROCEDURE — 86901 BLOOD TYPING SEROLOGIC RH(D): CPT | Performed by: INTERNAL MEDICINE

## 2022-03-31 PROCEDURE — 85730 THROMBOPLASTIN TIME PARTIAL: CPT | Performed by: INTERNAL MEDICINE

## 2022-03-31 PROCEDURE — 84165 PROTEIN E-PHORESIS SERUM: CPT | Mod: 26,,, | Performed by: PATHOLOGY

## 2022-03-31 PROCEDURE — 83735 ASSAY OF MAGNESIUM: CPT | Performed by: INTERNAL MEDICINE

## 2022-03-31 PROCEDURE — 82232 ASSAY OF BETA-2 PROTEIN: CPT | Performed by: INTERNAL MEDICINE

## 2022-03-31 PROCEDURE — 84165 PROTEIN E-PHORESIS SERUM: CPT | Performed by: INTERNAL MEDICINE

## 2022-03-31 PROCEDURE — 86787 VARICELLA-ZOSTER ANTIBODY: CPT | Performed by: INTERNAL MEDICINE

## 2022-03-31 PROCEDURE — 86753 PROTOZOA ANTIBODY NOS: CPT | Performed by: INTERNAL MEDICINE

## 2022-03-31 PROCEDURE — 87521 HEPATITIS C PROBE&RVRS TRNSC: CPT | Performed by: INTERNAL MEDICINE

## 2022-03-31 PROCEDURE — 96146 PR PSYCH/NEUROPSYCH TEST ADMIN, ELEC PLATFORM, AUTO RESULT ONLY: ICD-10-PCS | Mod: S$GLB,,, | Performed by: PSYCHOLOGIST

## 2022-03-31 PROCEDURE — 86687 HTLV-I ANTIBODY: CPT | Performed by: INTERNAL MEDICINE

## 2022-03-31 PROCEDURE — 84100 ASSAY OF PHOSPHORUS: CPT | Performed by: INTERNAL MEDICINE

## 2022-03-31 PROCEDURE — 86334 PATHOLOGIST INTERPRETATION IFE: ICD-10-PCS | Mod: 26,,, | Performed by: PATHOLOGY

## 2022-03-31 PROCEDURE — 85025 COMPLETE CBC W/AUTO DIFF WBC: CPT | Performed by: INTERNAL MEDICINE

## 2022-03-31 RX ORDER — ACETAMINOPHEN 500 MG
500 TABLET ORAL EVERY 6 HOURS PRN
COMMUNITY
End: 2022-04-22

## 2022-03-31 RX ORDER — POLYETHYLENE GLYCOL 3350 17 G/17G
17 POWDER, FOR SOLUTION ORAL DAILY
Status: ON HOLD | COMMUNITY
End: 2022-05-08 | Stop reason: HOSPADM

## 2022-03-31 RX ORDER — ASCORBIC ACID 250 MG
250 TABLET ORAL DAILY
Status: ON HOLD | COMMUNITY
End: 2022-05-08 | Stop reason: HOSPADM

## 2022-03-31 RX ORDER — FLUTICASONE PROPIONATE 50 MCG
1 SPRAY, SUSPENSION (ML) NASAL NIGHTLY
Status: ON HOLD | COMMUNITY
End: 2022-05-06 | Stop reason: SDUPTHER

## 2022-03-31 NOTE — PROGRESS NOTES
Oncology Nutrition Assessment for Medical Nutrition Therapy  Initial Visit    Caty Diaz   1955    Referring Provider: No ref. provider found      Reason for Visit: nutrition counseling and education    The patient location is: [facility]  The chief complaint leading to consultation is: MM, SCT eval    Visit type: audio only    Face to Face time with patient: 28 minutes  35 minutes of total time spent on the encounter, which includes face to face time and non-face to face time preparing to see the patient (eg, review of tests), Obtaining and/or reviewing separately obtained history, Documenting clinical information in the electronic or other health record, Independently interpreting results (not separately reported) and communicating results to the patient/family/caregiver, or Care coordination (not separately reported).     Each patient to whom he or she provides medical services by telemedicine is:  (1) informed of the relationship between the physician and patient and the respective role of any other health care provider with respect to management of the patient; and (2) notified that he or she may decline to receive medical services by telemedicine and may withdraw from such care at any time.    PMHx:   Past Medical History:   Diagnosis Date    Multiple myeloma        Nutrition Assessment    This is a 66 y.o.female with a medical diagnosis of multiple myeloma, plan for auto SCT. She reports her appetite is good, eating 3 meals/day and snacks. She reports that she had previously lost a lot of weight prior to diagnosis but has since gained it back. She is eating protein with all meals, seafood, fish, chicken, turkey, pork, and some beef. She likes to snack on fruit and sometimes potato chips. She is allergic to all dairy products and even some dairy alternatives, so she makes her own rice milk. She drinks plenty of water and also likes Gatorade.    Weight: 67.8 kg (149 lb 7.6 oz) -  "3/14 clinic weight  Height: 5' 7" (1.702 m)  BMI: 23.41    Usual BW: 140lb  Weight Change: recent weight gain    Allergies: Garlic and Milk    Current Medications:  Current Outpatient Medications:     acyclovir (ZOVIRAX) 400 MG tablet, Take 400 mg by mouth 2 (two) times daily., Disp: , Rfl:     aspirin (ECOTRIN) 81 MG EC tablet, Take 81 mg by mouth., Disp: , Rfl:     citalopram (CELEXA) 10 MG tablet, Take 10 mg by mouth once daily., Disp: , Rfl:     clonazePAM (KLONOPIN) 0.5 MG tablet, Take 0.5 mg by mouth 2 (two) times daily as needed., Disp: , Rfl:     codeine-butalbital-ASA-caffeine (BUTALBITAL COMPOUND W/CODEINE) 43--40 mg Cap, Take 1 capsule by mouth every 4 (four) hours as needed., Disp: , Rfl:     dexAMETHasone (DECADRON) 4 MG Tab, Take 40 mg by mouth., Disp: , Rfl:     docusate sodium (COLACE) 100 MG capsule, Take 100 mg by mouth., Disp: , Rfl:     LORazepam (ATIVAN) 1 MG tablet, Take 1 tablet (1 mg total) by mouth once. for 1 dose, Disp: 6 tablet, Rfl: 0    multivitamin (THERAGRAN) per tablet, Take 1 tablet by mouth once daily., Disp: , Rfl:     ondansetron (ZOFRAN) 8 MG tablet, Take 8 mg by mouth every 8 (eight) hours as needed., Disp: , Rfl:     pantoprazole (PROTONIX) 40 MG tablet, Take 40 mg by mouth once daily., Disp: , Rfl:     promethazine (PHENERGAN) 12.5 MG Tab, Take 12.5 mg by mouth., Disp: , Rfl:     REVLIMID 25 mg Cap, Take by mouth., Disp: , Rfl:     zinc gluconate 50 mg tablet, Take 50 mg by mouth., Disp: , Rfl:     Food/medication interactions noted: avoid grapefruit    Vitamins/Supplements: MVI, calcium-D3    Labs: Reviewed    Nutrition Diagnosis    Problem: nutrition-related knowledge deficit  Etiology (related to): lack of prior need for nutrition education  Signs/Symptoms (as evidenced by): referral for SCT evaluation    Nutrition Intervention    Nutrition Prescription   1695 kcals (25kcal/kg)  68g protein (1g/kg)   1695mL fluid (25mL/kg)    Recommendations:  Continue " to eat 3 meals/day and snacks  Include protein at all meals/snacks  Consider trying dairy free protein drinks/powders for after transplant if appetite decreases - Orgain and Owyn discussed  Continue to drink at least 64oz fluid/day  Educated patient on food safety and healthy diet pre- and post-transplant. Answered all nutrition related questions.    Materials Provided/Reviewed: Nutrition Therapy for Bone Marrow Transplant Patients booklet (emailed)    Nutrition Monitoring and Evaluation    Monitor: diet education needs, energy intake and weight status    Goals: weight maintenance    Follow up: Patient provided with dietitian contact information and advised to call/message with questions or to make future appointment if further intervention is needed.    Communication to referring provider/care team: note available in chart    Roxy Hester MS, RD, LDN  [phone]

## 2022-03-31 NOTE — PROGRESS NOTES
BMT Pharmacist Evaluation      Current Outpatient Medications:     acetaminophen (TYLENOL) 500 MG tablet, Take 500 mg by mouth every 6 (six) hours as needed for Pain., Disp: , Rfl:     acyclovir (ZOVIRAX) 400 MG tablet, Take 400 mg by mouth 2 (two) times daily., Disp: , Rfl:     ascorbic acid, vitamin C, (VITAMIN C) 250 MG tablet, Take 250 mg by mouth once daily., Disp: , Rfl:     aspirin (ECOTRIN) 81 MG EC tablet, Take 81 mg by mouth once daily., Disp: , Rfl:     calcium carbonate (TUMS ULTRA ORAL), Take 500 mg by mouth daily as needed (heart burn)., Disp: , Rfl:     citalopram (CELEXA) 10 MG tablet, Take 10 mg by mouth once daily., Disp: , Rfl:     clonazePAM (KLONOPIN) 0.5 MG tablet, Take 0.5 mg by mouth 2 (two) times daily as needed for Anxiety., Disp: , Rfl:     codeine-butalbital-ASA-caffeine (BUTALBITAL COMPOUND W/CODEINE) 55--40 mg Cap, Take 1 capsule by mouth every 4 (four) hours as needed (migraine headaches)., Disp: , Rfl:     dexAMETHasone (DECADRON) 4 MG Tab, Take 20 mg by mouth once daily. On Tuesdays and Wednesdays, Disp: , Rfl:     docusate sodium (COLACE) 100 MG capsule, Take 100 mg by mouth 2 (two) times daily., Disp: , Rfl:     fluticasone propionate (FLONASE) 50 mcg/actuation nasal spray, 1 spray by Each Nostril route every evening., Disp: , Rfl:     GI cocktail antac/dicyc/lidoc, Take 5 mLs by mouth daily as needed (nausea)., Disp: , Rfl:     multivitamin (THERAGRAN) per tablet, Take 1 tablet by mouth once daily., Disp: , Rfl:     ondansetron (ZOFRAN) 8 MG tablet, Take 8 mg by mouth every 8 (eight) hours as needed for Nausea., Disp: , Rfl:     pantoprazole (PROTONIX) 40 MG tablet, Take 40 mg by mouth once daily., Disp: , Rfl:     polyethylene glycol (GLYCOLAX) 17 gram PwPk, Take 17 g by mouth once daily., Disp: , Rfl:     promethazine (PHENERGAN) 25 MG tablet, Take 25 mg by mouth every 6 (six) hours as needed (nuasea)., Disp: , Rfl:     REVLIMID 25 mg Cap, Take 25 mg by  mouth once daily. For 21 days on and 7 days off, Disp: , Rfl:     zinc gluconate 50 mg tablet, Take 50 mg by mouth once daily., Disp: , Rfl:       Review of patient's allergies indicates:   Allergen Reactions    Garlic     Milk Hives, Itching and Nausea Only         Estimated Creatinine Clearance: 60.6 mL/min (based on SCr of 0.9 mg/dL).       Medication adherence: uses a log with date/time/amount and she checks it off as she takes them  Medication-related problems: dexamethasone makes her stay up at night.2     Planned conditioning regimen:  Melphalan on Day -1    Antimicrobial Prophylaxis:  Acyclovir starting on Day -1  Levofloxacin starting on Day -1  Fluconazole starting on Day -1    Growth Factor Support:  Neupogen starting on Day +7      Caregiver:   Post-transplant discharge plans: [facility]     Notes:  Reviewed and reconciled the medication list with the patient and her . Patient was able to confirm all medications she currently takes including schedule and indication. Patient demonstrates good medication adherence and understands the importance of this through the transplant process.     Reviewed the planned high-dose chemotherapy regimen, including schedule and possible side effects. Provided the patient with drug information handouts for chemotherapy. Reviewed possible side effects of transplant including: neutropenia, thrombocytopenia, anemia, infection, infusion reactions, nausea/vomiting, mucositis, loss of appetite, taste changes, diarrhea,hair loss, liver and/or renal dysfunction. Reviewed prophylactic antimicrobials, as well as prophylactic and as needed antiemetics. Encouraged the patient to report all possible side effects/new symptoms and to ask for supportive care medications if needed. Patient verbalized understanding and all questions were answered.     Proposed recommendations:  - She uses Florinol for migrane headaches and states that she only takes it 2-3 times per  year  - She was started on aspirin with her revlimid therapy. This can be discontinued on admission if no other indication is identified.  - The patient states that she has a lot of stomach issues and heartburn. She should continue protonix during admission and at discharge.  - We discussed that supplements and vitamins are often held during admission. She states that she is ok with that but would like to restart supplements at discharge; specifically the zinc as that was started by her home physician.  - The patient states that she takes half of a clonazepam pill at night to help her sleep but would like to consider stopping this medication. Would recommend ordering prn while admitted.    The patient demonstrates good medication adherence and understanding of the chemotherapy and transplant plan. BMT/Hematology Oncology PharmD will continue to follow the patient while admitted to the inpatient unit.     Brianna Mccord, PharmD, BCPS, BCOP  Clinical Pharmacy Specialist   BMT/Hematology Oncology  SpectraLink: [id number]

## 2022-04-01 ENCOUNTER — OFFICE VISIT (OUTPATIENT)
Dept: PSYCHIATRY | Facility: CLINIC | Age: 67
End: 2022-04-01
Payer: COMMERCIAL

## 2022-04-01 ENCOUNTER — HOSPITAL ENCOUNTER (OUTPATIENT)
Dept: PULMONOLOGY | Facility: CLINIC | Age: 67
Discharge: HOME | End: 2022-04-01
Payer: COMMERCIAL

## 2022-04-01 ENCOUNTER — HOSPITAL ENCOUNTER (OUTPATIENT)
Dept: RADIOLOGY | Facility: HOSPITAL | Age: 67
Discharge: HOME | End: 2022-04-01
Attending: INTERNAL MEDICINE
Payer: COMMERCIAL

## 2022-04-01 ENCOUNTER — HOSPITAL ENCOUNTER (OUTPATIENT)
Dept: CARDIOLOGY | Facility: CLINIC | Age: 67
Discharge: HOME | End: 2022-04-01
Payer: COMMERCIAL

## 2022-04-01 ENCOUNTER — HOSPITAL ENCOUNTER (OUTPATIENT)
Dept: CARDIOLOGY | Facility: HOSPITAL | Age: 67
Discharge: HOME | End: 2022-04-01
Attending: INTERNAL MEDICINE
Payer: COMMERCIAL

## 2022-04-01 VITALS
BODY MASS INDEX: 23.39 KG/M2 | HEART RATE: 66 BPM | WEIGHT: 149 LBS | HEIGHT: 67 IN | DIASTOLIC BLOOD PRESSURE: 80 MMHG | SYSTOLIC BLOOD PRESSURE: 116 MMHG

## 2022-04-01 DIAGNOSIS — C90.00 MULTIPLE MYELOMA, REMISSION STATUS UNSPECIFIED: ICD-10-CM

## 2022-04-01 DIAGNOSIS — Z76.82 STEM CELL TRANSPLANT CANDIDATE: ICD-10-CM

## 2022-04-01 DIAGNOSIS — F41.9 ANXIETY: ICD-10-CM

## 2022-04-01 DIAGNOSIS — Z76.82 STEM CELL TRANSPLANT CANDIDATE: Primary | ICD-10-CM

## 2022-04-01 DIAGNOSIS — Z01.818 PRE-TRANSPLANT EVALUATION FOR STEM CELL TRANSPLANT: Primary | ICD-10-CM

## 2022-04-01 DIAGNOSIS — D63.0 ANEMIA IN NEOPLASTIC DISEASE: ICD-10-CM

## 2022-04-01 LAB
ALBUMIN SERPL ELPH-MCNC: 4.14 G/DL (ref 3.35–5.55)
ALPHA1 GLOB SERPL ELPH-MCNC: 0.34 G/DL (ref 0.17–0.41)
ALPHA2 GLOB SERPL ELPH-MCNC: 0.69 G/DL (ref 0.43–0.99)
ASCENDING AORTA: 3.16 CM
AV INDEX (PROSTH): 1.02
AV MEAN GRADIENT: 2 MMHG
AV PEAK GRADIENT: 5 MMHG
AV VALVE AREA: 3.09 CM2
AV VELOCITY RATIO: 1.03
B-GLOBULIN SERPL ELPH-MCNC: 0.73 G/DL (ref 0.5–1.1)
BSA FOR ECHO PROCEDURE: 1.79 M2
CV ECHO LV RWT: 0.42 CM
DLCO ADJ PRE: 16.68 ML/(MIN*MMHG) (ref 18.14–29.61)
DLCO SINGLE BREATH LLN: 18.14
DLCO SINGLE BREATH PRE REF: 67.4 %
DLCO SINGLE BREATH REF: 23.88
DLCOC SBVA LLN: 3.07
DLCOC SBVA PRE REF: 76.6 %
DLCOC SBVA REF: 4.39
DLCOC SINGLE BREATH LLN: 18.14
DLCOC SINGLE BREATH PRE REF: 69.9 %
DLCOC SINGLE BREATH REF: 23.88
DLCOCSBVAULN: 5.71
DLCOCSINGLEBREATHULN: 29.61
DLCOSINGLEBREATHULN: 29.61
DLCOVA LLN: 3.07
DLCOVA PRE REF: 73.8 %
DLCOVA PRE: 3.24 ML/(MIN*MMHG*L) (ref 3.07–5.71)
DLCOVA REF: 4.39
DLCOVAULN: 5.71
DLVAADJ PRE: 3.36 ML/(MIN*MMHG*L) (ref 3.07–5.71)
DOP CALC AO PEAK VEL: 1.11 M/S
DOP CALC AO VTI: 23.29 CM
DOP CALC LVOT AREA: 3 CM2
DOP CALC LVOT DIAMETER: 1.97 CM
DOP CALC LVOT PEAK VEL: 1.14 M/S
DOP CALC LVOT STROKE VOLUME: 72.08 CM3
DOP CALCLVOT PEAK VEL VTI: 23.66 CM
E WAVE DECELERATION TIME: 166.84 MSEC
E/A RATIO: 1.31
E/E' RATIO: 10.5 M/S
ECHO LV POSTERIOR WALL: 0.98 CM (ref 0.6–1.1)
EJECTION FRACTION: 60 %
FEF 25 75 LLN: 1.05
FEF 25 75 PRE REF: 150.2 %
FEF 25 75 REF: 2.17
FEV05 LLN: 1.06
FEV05 REF: 1.92
FEV1 FVC LLN: 66
FEV1 FVC PRE REF: 107.7 %
FEV1 FVC REF: 78
FEV1 LLN: 1.91
FEV1 PRE REF: 115.2 %
FEV1 REF: 2.57
FRACTIONAL SHORTENING: 45 % (ref 28–44)
FVC LLN: 2.46
FVC PRE REF: 106.1 %
FVC REF: 3.32
G6PD RBC-CCNT: 8.8 U/G HB (ref 8–11.9)
GAMMA GLOB SERPL ELPH-MCNC: 1.11 G/DL (ref 0.67–1.58)
INTERPRETATION SERPL IFE-IMP: NORMAL
INTERVENTRICULAR SEPTUM: 0.9 CM (ref 0.6–1.1)
IVC PRE: 3.48 L (ref 2.46–4.21)
IVC SINGLE BREATH LLN: 2.46
IVC SINGLE BREATH PRE REF: 104.9 %
IVC SINGLE BREATH REF: 3.32
IVCSINGLEBREATHULN: 4.21
KAPPA LC SER QL IA: 0.93 MG/DL (ref 0.33–1.94)
KAPPA LC/LAMBDA SER IA: 0.84 (ref 0.26–1.65)
LA MAJOR: 4.75 CM
LA MINOR: 4.87 CM
LA WIDTH: 4.75 CM
LAMBDA LC SER QL IA: 1.11 MG/DL (ref 0.57–2.63)
LEFT ATRIUM SIZE: 4.06 CM
LEFT ATRIUM VOLUME INDEX MOD: 44.7 ML/M2
LEFT ATRIUM VOLUME INDEX: 44.3 ML/M2
LEFT ATRIUM VOLUME MOD: 79.48 CM3
LEFT ATRIUM VOLUME: 78.83 CM3
LEFT INTERNAL DIMENSION IN SYSTOLE: 2.58 CM (ref 2.1–4)
LEFT VENTRICLE DIASTOLIC VOLUME INDEX: 69.47 ML/M2
LEFT VENTRICLE DIASTOLIC VOLUME: 123.66 ML
LEFT VENTRICLE MASS INDEX: 85 G/M2
LEFT VENTRICLE SYSTOLIC VOLUME INDEX: 13.6 ML/M2
LEFT VENTRICLE SYSTOLIC VOLUME: 24.25 ML
LEFT VENTRICULAR INTERNAL DIMENSION IN DIASTOLE: 4.7 CM (ref 3.5–6)
LEFT VENTRICULAR MASS: 151.25 G
LV LATERAL E/E' RATIO: 10.5 M/S
LV SEPTAL E/E' RATIO: 10.5 M/S
MV PEAK A VEL: 0.48 M/S
MV PEAK E VEL: 0.63 M/S
MV STENOSIS PRESSURE HALF TIME: 48.38 MS
MV VALVE AREA P 1/2 METHOD: 4.55 CM2
PEF LLN: 4.59
PEF PRE REF: 119.8 %
PEF REF: 6.47
PHYSICIAN COMMENT: ABNORMAL
PISA TR MAX VEL: 2.49 M/S
PRE DLCO: 16.09 ML/(MIN*MMHG) (ref 18.14–29.61)
PRE FEF 25 75: 3.26 L/S (ref 1.05–3.72)
PRE FET 100: 7.6 SEC
PRE FEV05 REF: 125.9 %
PRE FEV1 FVC: 84.28 % (ref 65.64–89.11)
PRE FEV1: 2.96 L (ref 1.91–3.21)
PRE FEV5: 2.41 L (ref 1.06–2.77)
PRE FVC: 3.52 L (ref 2.46–4.21)
PRE PEF: 7.75 L/S (ref 4.59–8.34)
PROT SERPL-MCNC: 7 G/DL (ref 6–8.4)
RA MAJOR: 4.35 CM
RA PRESSURE: 3 MMHG
RA WIDTH: 3.46 CM
RIGHT VENTRICULAR END-DIASTOLIC DIMENSION: 3.46 CM
RV TISSUE DOPPLER FREE WALL SYSTOLIC VELOCITY 1 (APICAL 4 CHAMBER VIEW): 14.71 CM/S
SINUS: 3.04 CM
STJ: 2.56 CM
TDI LATERAL: 0.06 M/S
TDI SEPTAL: 0.06 M/S
TDI: 0.06 M/S
TR MAX PG: 25 MMHG
TRICUSPID ANNULAR PLANE SYSTOLIC EXCURSION: 1.87 CM
TV REST PULMONARY ARTERY PRESSURE: 28 MMHG
VA PRE: 4.97 L (ref 5.29–5.29)
VA SINGLE BREATH PRE REF: 93.9 %
VA SINGLE BREATH REF: 5.29

## 2022-04-01 PROCEDURE — 96130 PR PSYCHOLOGIC TEST EVAL SVCS, 1ST HR: ICD-10-PCS | Mod: S$GLB,,, | Performed by: PSYCHOLOGIST

## 2022-04-01 PROCEDURE — 93010 ELECTROCARDIOGRAM REPORT: CPT | Mod: S$GLB,,, | Performed by: INTERNAL MEDICINE

## 2022-04-01 PROCEDURE — 93005 ELECTROCARDIOGRAM TRACING: CPT | Mod: S$GLB,,, | Performed by: INTERNAL MEDICINE

## 2022-04-01 PROCEDURE — 96137 PR PSYCH/NEUROPSYCH TEST ADMIN/SCORING, 2+ TESTS, EA ADDTL 30 MIN: ICD-10-PCS | Mod: S$GLB,,, | Performed by: PSYCHOLOGIST

## 2022-04-01 PROCEDURE — 99999 PR PBB SHADOW E&M-EST. PATIENT-LVL II: CPT | Mod: PBBFAC,,, | Performed by: PSYCHOLOGIST

## 2022-04-01 PROCEDURE — 96130 PSYCL TST EVAL PHYS/QHP 1ST: CPT | Mod: S$GLB,,, | Performed by: PSYCHOLOGIST

## 2022-04-01 PROCEDURE — 94729 PR C02/MEMBANE DIFFUSE CAPACITY: ICD-10-PCS | Mod: S$GLB,,, | Performed by: INTERNAL MEDICINE

## 2022-04-01 PROCEDURE — 93306 TTE W/DOPPLER COMPLETE: CPT | Mod: 26,,, | Performed by: INTERNAL MEDICINE

## 2022-04-01 PROCEDURE — 71046 XR CHEST PA AND LATERAL: ICD-10-PCS | Mod: 26,,, | Performed by: RADIOLOGY

## 2022-04-01 PROCEDURE — 94010 BREATHING CAPACITY TEST: ICD-10-PCS | Mod: S$GLB,,, | Performed by: INTERNAL MEDICINE

## 2022-04-01 PROCEDURE — 96137 PSYCL/NRPSYC TST PHY/QHP EA: CPT | Mod: S$GLB,,, | Performed by: PSYCHOLOGIST

## 2022-04-01 PROCEDURE — 71046 X-RAY EXAM CHEST 2 VIEWS: CPT | Mod: 26,,, | Performed by: RADIOLOGY

## 2022-04-01 PROCEDURE — 93005 EKG 12-LEAD: ICD-10-PCS | Mod: S$GLB,,, | Performed by: INTERNAL MEDICINE

## 2022-04-01 PROCEDURE — 94729 DIFFUSING CAPACITY: CPT | Mod: S$GLB,,, | Performed by: INTERNAL MEDICINE

## 2022-04-01 PROCEDURE — 99999 PR PBB SHADOW E&M-EST. PATIENT-LVL II: ICD-10-PCS | Mod: PBBFAC,,, | Performed by: PSYCHOLOGIST

## 2022-04-01 PROCEDURE — 93306 TTE W/DOPPLER COMPLETE: CPT

## 2022-04-01 PROCEDURE — 93010 EKG 12-LEAD: ICD-10-PCS | Mod: S$GLB,,, | Performed by: INTERNAL MEDICINE

## 2022-04-01 PROCEDURE — 93306 ECHO (CUPID ONLY): ICD-10-PCS | Mod: 26,,, | Performed by: INTERNAL MEDICINE

## 2022-04-01 PROCEDURE — 94010 BREATHING CAPACITY TEST: CPT | Mod: S$GLB,,, | Performed by: INTERNAL MEDICINE

## 2022-04-01 PROCEDURE — 71046 X-RAY EXAM CHEST 2 VIEWS: CPT | Mod: TC,FY

## 2022-04-01 PROCEDURE — 90791 PR PSYCHIATRIC DIAGNOSTIC EVALUATION: ICD-10-PCS | Mod: S$GLB,,, | Performed by: PSYCHOLOGIST

## 2022-04-01 PROCEDURE — 96136 PSYCL/NRPSYC TST PHY/QHP 1ST: CPT | Mod: S$GLB,,, | Performed by: PSYCHOLOGIST

## 2022-04-01 PROCEDURE — 96136 PR PSYCH/NEUROPSYCH TEST ADMIN/SCORING, 2+ TESTS, 1ST 30 MIN: ICD-10-PCS | Mod: S$GLB,,, | Performed by: PSYCHOLOGIST

## 2022-04-01 PROCEDURE — 90791 PSYCH DIAGNOSTIC EVALUATION: CPT | Mod: S$GLB,,, | Performed by: PSYCHOLOGIST

## 2022-04-01 NOTE — PROGRESS NOTES
INFORMED CONSENT/ LIMITS of CONFIDENTIALITY: Prior to beginning the interview, the patient's identification was confirmed via name and date of birth. Caty Diaz  was informed of the possible risks and benefits of psychological interventions (e.g., counseling, psychotherapy, testing) and provided information regarding the handling of protected health records and   the limits of confidentiality, including the importance of reporting any suicidal or homicidal ideation to ensure safety of all parties. This provider explained the purpose of today's appointment and the patient was provided with time to ask questions regarding this information.  Acceptance and understanding of these conditions was expressed, and Caty Diaz freely consented to this evaluation.     Psycho-Oncology Pre-Transplant Evaluation  Psychiatry Initial Visit (PhD)  Psychological Intake and Assessment    Date:  4/1/2022     CPT Code: 88044 (1hr) , 92221 (1hr), 95807 (1hr) Evaluation Length (direct face-to-face time):  1.5 hours   Total Time including report writing, chart review, integration of data and feedback: 3 hours       Referred by:  BMT Team/ Oncologist: TRAMAINE Tabor MD.     Chief complaint/reason for encounter:  Psychological Evaluation prior to stem cell transplantation    Clinical status of patient: Outpatient    Caty Diaz, a 66 y.o. female, was seen for initial evaluation visit.  Met with patient and spouse. Her primary care physician is Ming Beck.       Psychological Intake  Medical/Surgical History:   Patient Active Problem List   Diagnosis    Stem cell transplant candidate    Chronic fatigue    Anemia in neoplastic disease    Anxiety        Health Behaviors:       ETOH Use: No (rare)      Tobacco Use: No   Illicit Drug Use:  No     Prescription Misuse:No   Caffeine: 1 cup.    Exercise:The patient maintains a regular, healthy exercise program. The patient engaged in regular activity prior to  illness   Firearms:  Yes, Stored Safely   Advanced directives:No Discussed    Family History:   Psychiatric illness: No     Alcohol/Drug Abuse: No     Suicide: No      Past Psychiatric History:   Inpatient treatment: No     Outpatient treatment: No     Prior substance abuse treatment: No     Suicide Attempts: No      Psychotropic Medications:  Current: Celexa and Klonopin       Past: Ativan    Current medications as per below, allergies reviewed in chart.  Current Outpatient Medications   Medication    acetaminophen (TYLENOL) 500 MG tablet    acyclovir (ZOVIRAX) 400 MG tablet    ascorbic acid, vitamin C, (VITAMIN C) 250 MG tablet    aspirin (ECOTRIN) 81 MG EC tablet    calcium carbonate (TUMS ULTRA ORAL)    citalopram (CELEXA) 10 MG tablet    clonazePAM (KLONOPIN) 0.5 MG tablet    codeine-butalbital-ASA-caffeine (BUTALBITAL COMPOUND W/CODEINE) 92--40 mg Cap    dexAMETHasone (DECADRON) 4 MG Tab    docusate sodium (COLACE) 100 MG capsule    fluticasone propionate (FLONASE) 50 mcg/actuation nasal spray    GI cocktail antac/dicyc/lidoc    multivitamin (THERAGRAN) per tablet    ondansetron (ZOFRAN) 8 MG tablet    pantoprazole (PROTONIX) 40 MG tablet    polyethylene glycol (GLYCOLAX) 17 gram PwPk    promethazine (PHENERGAN) 25 MG tablet    REVLIMID 25 mg Cap    zinc gluconate 50 mg tablet     No current facility-administered medications for this visit.         Social situation/Stressors:Caty Diaz is an 66 y.o. female referred by TRAMAINE Tabor MD for pre-transplant evaluation.  Caty Diaz lives with  in [place], [place]. She is retired.  She has been a business owner previous. Her prior work history is stable.  The patient reports that she does have adequate availability of time off for the procedure and recovery.  Caty Diaz has been  49 and has 3 children.  Her spouse is retired.   The patient reports good social support.  Junior will be present and available to assist  the patient during her recovery period.   Caty Diaz is an active member of a [employer]. Caty Diaz's hobbies include gardening, grandchildren.   The patient has no  history.      Additional stressors:  None   Strengths: Able to vocalize needs, Values and traditions, Motivation, readiness for change, Setting and pursuing goals, hopes, dreams, aspirations, Resources - social, interpersonal, monetary, Vocational interests, hobbies and/or talents, Interpersonal relationships and supports available - family, relatives, friends and Cultural/spiritual/Anglican and community involvement   Liabilities: Complicated medical illness    History of present illness:   Patient with MM, Adjusting well, was shocked before. Moderate     Caty Diaz has adjusted to illness fairly well primarily through active coping strategies, focus on alternative activities, focus on family, prayer and exercise. She has engaged in appropriate information gathering.  The patient has good family support.  Her family is coping well with the diagnosis/treatment/prognosis.    Caty Diaz reports using exercise and time with family and friends  as her primary methods of coping with general stressors.  Illness-related psychosocial stressors include difficulty meeting family responsibilities, changes in ability to engage in leisure activities and absence from home. These stressors will not prevent patient from adhering to post-transplant requirements.  The patient has an good partnership with her [facility] oncology treatment team. The patient reports the following barriers to cancer care:distance from the hospital.    Stem Cell Transplantation (SCT):  Caty Diaz possesses a good level of knowledge about SCT gleaned from materials provided by her clinicians, discussions with her clinical team and the internet .  Caty Diaz is knowledgeable about the possible costs, risks, and complications of the  procedure and the behavioral changes which will be required of her.  She has anticipated her recovery needs and has planned adequate time away from work, assistance from , and residence at a nearby hotel to facilitate healing. Caty Diaz is aware of the requirement that HSCT patients must stay within 1 hour of the hospital for their first 30 days post-transplant.  Caty Diaz knows she must commit to careful monitoring of symptoms, the possibility of a complex long-term multiple drug treatment regimen, and long term follow-up visits with her oncologist (as required) following the procedure.  She is aware of the following necessary behavioral changes:changes in food selection, preparation, and storage, increased vigilance with home cleanliness , careful personal and dental hygiene , changes to modes of pet care  and rapid return to physical activity. The patient reports good compliance with medical treatment in the past, which is supported by review of her medical chart.  Caty Diaz has realistic expectations of health and illness possibilities following SCT. She is aware of possible medical side effects including infection, hair loss, GI difficulties , loss of appetite, fatigue  and mucositis  during/following treatment. She is aware of the risk of mortality. She also anticipates social strains including decreased ability to participate in some leisure and social activities for some time and the strains of care-giving demands on friends/family.      Collateral Information:    Current Symptoms:  · Mood: denied;  no prior and no SI/HI;   · Viviana: Denies   · Psychosis: Denies  · Anxiety: Feeling nervous, anxious, or on edge, Uncontrollable worry (about helath, general things), Excessive worry (interfering with nothing) and denied; prior anxiety:in adulthood;   · Generalized anxiety: Denies       · Panic Disorder: Denies  · Social/specific phobia: Denies   · OCD: Denies  · Substance abuse:  denied  · Is the patient willing to submit to a random drug screen?  Yes  · Cognitive functioning: denied  · Health behaviors: noncontributory  · Can the patient identify own medications and describe purpose and proper dosing? Yes  · Does the patient appropriately manage chronic conditions which need close monitoring (such as diabetes)? Yes  · Does the patient use complementary or alternative medications, remedies, procedures, or interventions? No   · Sleep: Some insomnia with steroids No concerns otherwise.   · Pain: Ms. Diaz reports pain.   Symptoms interfere with daily activity, sleeping and work.   · Trauma: Denies  · Sexual Dysfunction:  Denies   · Head Injury History: Denies  · Personality Functioning: The patient does not display any personality characteristics which would be an impediment to receiving BMT.    Patient Reported Cancer Treatment Symptoms:  fatigue    Psychological Assessment/Testing:     Distress thermometer:   Distress Score    Distress Score: 0        Practical Problems Physical Problems   : No Appearance: No   Housing: No Bathing / Dressing: No   Insurance / Financial: No Breathing: No    Transportation: No  Changes in Urination: No    Work / School: No  Constipation: No   Treatment Decisions: No  Diarrhea: No     Eating: No    Family Problems Fatigue: No    Dealing with Children: No Feeling Swollen: No    Dealing with Partner: No Fevers: No    Ability to Have Children: No  Getting Around: No       Indigestion: No     Memory / Concentration: No   Emotional Problems Mouth Sores: No    Depression: No  Nausea: No    Fears: No  Nose Dry / Congested: No    Nervousness: No  Pain: No    Sadness: No Sexual: No    Worry: No Skin Dry / Itchy: No    Loss of Interest in Usual Activities: No Sleep: No     Substance Abuse: No    Spiritual/Religions Concerns Tingling in Hands / Feet: No   Spritual / Islam Concerns: No         Other Problems              PHQ ANSWERS    Q1. Little interest  or pleasure in doing things: (P) Not at all (03/29/22 0945)  Q2. Feeling down, depressed, or hopeless: (P) Not at all (03/29/22 0945)  Q3. Trouble falling or staying asleep, or sleeping too much: (P) Not at all (03/29/22 0945)  Q4. Feeling tired or having little energy: (P) Not at all (03/29/22 0945)  Q5. Poor appetite or overeating: (P) Not at all (03/29/22 0945)  Q6. Feeling bad about yourself - or that you are a failure or have let yourself or your family down: (P) Not at all (03/29/22 0945)  Q7. Trouble concentrating on things, such as reading the newspaper or watching television: (P) Not at all (03/29/22 0945)  Q8. Moving or speaking so slowly that other people could have noticed. Or the opposite - being so fidgety or restless that you have been moving around a lot more than usual: (P) Not at all (03/29/22 0945)  Q9.   No SI    PHQ8 Score : (P) 0 (03/29/22 0945)  PHQ-9 Total Score: (P) 0 (03/29/22 0945)     The patient completed the Patient Health Questionnaire-9 (PHQ-9) Depression Screen and received a score of 0, indicating none depression symptoms presently impacting current functioning.       SUDHA-7 Answers    GAD7 3/29/2022   1. Feeling nervous, anxious, or on edge? 0   2. Not being able to stop or control worrying? 0   3. Worrying too much about different things? 0   4. Trouble relaxing? 0   5. Being so restless that it is hard to sit still? 0   6. Becoming easily annoyed or irritable? 0   7. Feeling afraid as if something awful might happen? 0   SUDHA-7 Score 0     SUDHA-7 Score: (P) 0  Interpretation: (P) Normal     The patient completed the General Anxiety Disorder, 7 Items (GAD7) and received a score of 0, indicating none anxiety symptoms presently impacting current functioning.    AUDIT-C  The AUDIT-C is a 3-item alcohol screen that can help identify individuals who are hazardous drinkers or who have alcohol use disorders (including alcohol abuse or dependence). Negative; Score:  0    PCS: Pain  catastrophizing:There were no elevations     SLUMS  The [employer] Mental Status Examination (SLUMS), a cognitive screener, was administered to assess the Patient's current mental status and cognitive capacity. Patient obtained a score of 28/30. This score does fall within normal limits as compared to those within similar age and level of education, and suggests no impairments in neurocognitive functioning.   REALM-R:   The Rapid Estimate of Adult Literacy in Medicine, Revised (REALM-R) is a brief screening instrument used to assess an adult patient's ability to read common medical words. It is designed to assist medical professionals in identifying patients at risk for poor literacy skills.  Results:  Sufficient health literacy      [facility] BEHAVIORAL MEDICINE DIAGNOSTIC (MBMD)                                    The MBMD provides an assessment of the potential role of psychiatric factors in a patient's disease and treatment. Caty Diaz produced a valid MBMD profile. Validity indices suggested some concerns in the areas of desirability  though not enough to invalidate results. The elevation of the desirability scale suggests that Caty Diaz may be reporting to ensure that the patient is not presented in a negative light. Elevation in the area of disclosure may indicate that the patient is hesitant to share some information.       The patient's profile suggests the presence of minimal depression,  minimal anxiety, minimal cognitive issues, minimal difficulty with moodiness or mood swings, and mild apprehension to being open with others about these issues.      In regard to COPING STYLES, several key scales show significant elevations that would be expected to POSITIVELY influence kurt-procedural course (Sociable, Confident, and Respectful). Catyjeri Conners responses indicate that  She is outgoing and cooperative following treatment, which suggests that  She will be compliant with  recommendations by providers.      Caty Diaz obtained no elevations on the STRESS MODERATORS  Scales. The Stress Moderators scales assess factors which have the potential to influence patient responses to treatment.    Scores on the TREATMENT PROGNOSTIC scales reveal elevations in informational discomfort, which indicates patient may not be open to specific and an abundance of details about her diagnosis, treatment procedures, and outcome as this may exacerbate her anxiety.         Mental Status Exam:    General appearance:  appears stated age, neatly dressed, well groomed  Level of cooperation:  cooperative  Thought processes:  logical, goal-directed   Speech: normal in rate, volume, and tone    Mood: anxious  Affect: mood congruent  Thought content:  no illusions, no visual hallucinations, no auditory hallucinations, no delusions, no active or passive homicidal thoughts, no active or passive suicidal ideation, no obsessions, no compulsions, no violence  Orientation:  oriented to person, place, and time  Memory:  Recent memory:  3 of 5 objects after brief delay.    Remote memory - intact  Attention span and concentration:  spelled WORLD forwards and backwards; SAVEAHAART without difficulty  Abstract reasoning:    Similarities: abstract.    Proverbs: abstract.  Judgment and insight: good   Language:  Intact      SUMMARY:  Caty Diaz is a  66 y.o. female referred by Dr. TRAMAINE Tabor MD. for psychological evaluation prior to stem cell transplantation.   The patient appears absent of disabling psychopathology or disabilities which would prevent understanding and compliance with medical treatment.  There is no evidence of suicidality.    The patient has good knowledge about HSCT, appropriate expectations for health and illness following transplantation, adequate  understanding of the possible risks and complications of this treatment option, and a high willingness to sustain effort for lifestyle changes  and health adaptations which will be required of her. She is aware of the 30 day 1 hour residence requirement.   She reports adequate compliance with previous medical treatment.   Caty Diaz has good social support from family. Caregivers are engaged and aware of post-HSCT demands.   The patient exhibits a high degree of social stability.   The patient has experience using coping mechanisms to deal with stress. and The patient acknowledges no stressors expected to limit her ability to cope with the demands of HSCT and recovery.   The patient reports limited caffien consumption, no tobacco use, no alcohol use and no illicit drug use   She demonstrates adequate health literacy.          IMPRESSIONS AND RECOMMENDATIONS  Caty Diaz is an acceptable HSCT candidate from a psychological perspective, with minimal risk. Team should consider inquiring about patient openness to receive specifics about procedures, outcomes, and current medical status (specifically during rounding), as increased information may overwhelm and distress patient.   There are no overt psychological contraindications for proceeding with the procedure.Her psychological symptoms are well-managed on his current psychotropic regimen.  The patient has reasonable expectations for the procedure, good social support, and has already begun making appropriate life plans in anticipation of the procedure. The patient has verbalized appropriate awareness and commitment to the necessary behavioral changes associated with HSCTand appears willing to adjust to long-term lifestyle challenges and medical follow-up. The patient will be seen by me while inpatient to facilitate adjustment as needed      ICD-10-CM ICD-9-CM   1. Pre-transplant evaluation for stem cell transplant  Z01.818 V72.83   2. Multiple myeloma, remission status unspecified  C90.00 203.00   3. Stem cell transplant candidate  Z76.82 V49.83   4. Anemia in neoplastic disease  D63.0 285.22    5. Anxiety  F41.9 300.00     Shila Kern, PhD  Clinical Psychologist  [place] License #[id number]  [place] License #[id number]

## 2022-04-01 NOTE — LETTER
April 8, 2022        Chad Tabor MD  [address] [facility] [address] [address]             [facility] - Psychiatry  [address] [facility] [address]  Phone: [phone]  Fax: [phone]   Patient: Caty Diaz   MR Number: [id number]   YOB: 1955   Date of Visit: 4/1/2022       Dear Dr. Tabor:    Thank you for referring Caty Diaz to me for evaluation. Below are the relevant portions of my assessment and plan of care.            If you have questions, please do not hesitate to call me. I look forward to following Caty along with you.    Sincerely,      Shila Kern, PhD           CC  No Recipients

## 2022-04-01 NOTE — LETTER
April 8, 2022        Chad Tabor MD  [address] [facility] [address]             [facility] - Psychiatry  [address] [facility] [address]  Phone: [phone]  Fax: [phone]   Patient: aCty Diaz   MR Number: [id number]   YOB: 1955   Date of Visit: 4/1/2022       Dear Dr. Tabor:    Thank you for referring Caty Diaz to me for evaluation. Below are the relevant portions of my assessment and plan of care.    SUMMARY:  Caty Diaz is a  66 y.o. female referred by Dr. TRAMAINE Tabor MD. for psychological evaluation prior to stem cell transplantation.   The patient appears absent of disabling psychopathology or disabilities which would prevent understanding and compliance with medical treatment.  There is no evidence of suicidality.    The patient has good knowledge about HSCT, appropriate expectations for health and illness following transplantation, adequate  understanding of the possible risks and complications of this treatment option, and a high willingness to sustain effort for lifestyle changes and health adaptations which will be required of her. She is aware of the 30 day 1 hour residence requirement.   She reports adequate compliance with previous medical treatment.   Caty Diaz has good social support from family. Caregivers are engaged and aware of post-HSCT demands.   The patient exhibits a high degree of social stability.   The patient has experience using coping mechanisms to deal with stress. and The patient acknowledges no stressors expected to limit her ability to cope with the demands of HSCT and recovery.   The patient reports limited caffien consumption, no tobacco use, no alcohol use and no illicit drug use   She demonstrates adequate health literacy.          IMPRESSIONS AND RECOMMENDATIONS  Caty Diaz is an acceptable HSCT candidate from a psychological perspective, with minimal risk. Team  should consider inquiring about patient openness to receive specifics about procedures, outcomes, and current medical status (specifically during rounding), as increased information may overwhelm and distress patient.   There are no overt psychological contraindications for proceeding with the procedure.Her psychological symptoms are well-managed on his current psychotropic regimen.  The patient has reasonable expectations for the procedure, good social support, and has already begun making appropriate life plans in anticipation of the procedure. The patient has verbalized appropriate awareness and commitment to the necessary behavioral changes associated with HSCTand appears willing to adjust to long-term lifestyle challenges and medical follow-up. The patient will be seen by me while inpatient to facilitate adjustment as needed          If you have questions, please do not hesitate to call me. I look forward to following Caty along with you.    Sincerely,      Shila Kern, PhD           CC  Vidya Cote RN

## 2022-04-04 ENCOUNTER — LAB VISIT (OUTPATIENT)
Dept: LAB | Facility: HOSPITAL | Age: 67
End: 2022-04-04
Payer: COMMERCIAL

## 2022-04-04 DIAGNOSIS — Z76.82 STEM CELL TRANSPLANT CANDIDATE: ICD-10-CM

## 2022-04-04 DIAGNOSIS — C90.00 MULTIPLE MYELOMA, REMISSION STATUS UNSPECIFIED: ICD-10-CM

## 2022-04-04 LAB
ALBUMIN SERPL BCP-MCNC: 3.6 G/DL (ref 3.5–5.2)
ALP SERPL-CCNC: 58 U/L (ref 55–135)
ALT SERPL W/O P-5'-P-CCNC: 31 U/L (ref 10–44)
AMPHETAMINES SERPL QL: NEGATIVE
ANION GAP SERPL CALC-SCNC: 9 MMOL/L (ref 8–16)
AST SERPL-CCNC: 20 U/L (ref 10–40)
BARBITURATES SERPL QL SCN: NEGATIVE
BENZODIAZ SERPL QL SCN: NEGATIVE
BILIRUB SERPL-MCNC: 0.6 MG/DL (ref 0.1–1)
BUN SERPL-MCNC: 14 MG/DL (ref 8–23)
BZE SERPL QL: NEGATIVE
CALCIUM SERPL-MCNC: 9.2 MG/DL (ref 8.7–10.5)
CARBOXYTHC SERPL QL SCN: NEGATIVE
CHAGAS AB, DONOR EVAL (BLOOD CENTER): NORMAL
CHLORIDE SERPL-SCNC: 100 MMOL/L (ref 95–110)
CMV AB TOTAL, DONOR EVAL (BLOOD CENTER): REACTIVE
CO2 SERPL-SCNC: 28 MMOL/L (ref 23–29)
CREAT SERPL-MCNC: 0.9 MG/DL (ref 0.5–1.4)
EST. GFR  (AFRICAN AMERICAN): >60 ML/MIN/1.73 M^2
EST. GFR  (NON AFRICAN AMERICAN): >60 ML/MIN/1.73 M^2
ETHANOL SERPL QL SCN: NEGATIVE
GLUCOSE SERPL-MCNC: 97 MG/DL (ref 70–110)
HEPATITIS B CORE  AB, DONOR EVAL, (BLOOD CENTER): NORMAL
HEPATITIS B SURFACE AG, DONOR EVAL, (BLOOD CENTER): NORMAL
HEPATITIS C ANTIBODY,DONOR EVAL (BLOOD CENTER): NORMAL
HIV1/2 AG/AB, DONOR EVAL (BLOOD CENTER): NORMAL
HTLV I/II ANTIBODY, DONOR EVAL (BLOOD EVAL): NORMAL
METHADONE SERPL QL SCN: NEGATIVE
OPIATES SERPL QL SCN: NEGATIVE
PATHOLOGIST INTERPRETATION IFE: NORMAL
PATHOLOGIST INTERPRETATION SPE: NORMAL
PCP SERPL QL SCN: NEGATIVE
POTASSIUM SERPL-SCNC: 4.3 MMOL/L (ref 3.5–5.1)
PROPOXYPH SERPL QL: NEGATIVE
PROT SERPL-MCNC: 6.9 G/DL (ref 6–8.4)
RPR, DONOR EVAL (BLOOD CENTER): NORMAL
SODIUM SERPL-SCNC: 137 MMOL/L (ref 136–145)

## 2022-04-04 PROCEDURE — 80053 COMPREHEN METABOLIC PANEL: CPT | Performed by: INTERNAL MEDICINE

## 2022-04-04 PROCEDURE — 36415 COLL VENOUS BLD VENIPUNCTURE: CPT | Performed by: INTERNAL MEDICINE

## 2022-04-05 ENCOUNTER — TELEPHONE (OUTPATIENT)
Dept: HEMATOLOGY/ONCOLOGY | Facility: CLINIC | Age: 67
End: 2022-04-05
Payer: COMMERCIAL

## 2022-04-05 LAB
NAT PANEL, DONOR EVAL (BLOOD CENTER): NORMAL
VARICELLA INTERPRETATION: NEGATIVE
VARICELLA ZOSTER IGG: 0.27 ISR (ref 0–0.9)

## 2022-04-05 NOTE — PROGRESS NOTES
"  [facility]   Bone Marrow Transplant Psychosocial Assessment   Date: 2022       Demographic Information     Name: Caty Diaz    : 1955    Age: 66 y.o.    Sex: female    Race: White    Marital Status:    SS #:     Phone Number(s): [phone] (home)     Home Address: [address] [place], [place] [address]  [place] [address]    Mailing Address: [address] [place], [place] [address]  [place] [id number]    Are you a [place] Citizen? Yes   If no, please explain:        Contact Information     Next of Kin: Junior Diaz   Relationship: Spouse   Phone Number(s): [phone]   Emergency Contact: Extended Emergency Contact Information  Primary Emergency Contact: Junior Diaz  Mobile Phone: [phone]  Relation: Spouse        Living Arrangements   Household Composition:  Patient currently resides with: Spouse   If patient resides with spouse, please explain the marital relationship: [place], supportive.   Does the patient currently own his/her own home? Yes   Does the patient currently rent home/apartment: No   Are current living arrangements permanent? Yes   If no, please explain:        Children's Names     Name Sex Age   1. Paola Goldman female 47   2. Nancy Mendoza female 44   3. Doyle Diaz male 40   4.     6.       Who will be the primary caregiver for the children when patient is admitted to the hospital? N/A   Name:    Phone Number:         Support System   Primary Caregiver:     Fiona: Junior   Relationship: Spouse   Cell #: [phone]   Address: [address] [place], [place] [address]   Home #: [phone]   City: [place]   Street: [place]   ZIP: [address]       Secondary Caregiver:     Name:    Relationship: Daughter-in-law   Cell #:    Address:    Home #:    City:    Street:    ZIP:      Will patient's caregiver be available full-time? Yes   What is the patient's Congregational? Congregation "non-Amish"   Is patient currently practicing or non-practicing? No      Does patient have any other sources for support? " Yes      If yes, please explain: Friends, children, grandchildren       Post BMT Plans      Does patient have full understanding of recovery from BMT? Yes   Does patient understand risk associated with BMT? Yes   What are patient's housing plans post BMT? [place]   Does patient have a Living Will? No   Does patient have a Power of ?  No   If yes, please give name of POA:  Name:     Phone #:        Employment Information     Is patient currently employed? No   Employer: [employer] reference to record EEP 1000[Retired   Phone #: Data Unavailable   Position: Retired 2019   Full Time/Part Time?    YRS:        Secondary Employment Information     Employer:    Phone #:    Position:    Full Time/Part Time?    YRS:        Significant Other Employment Information     Employer:    Phone #:    Position: Retired 2017   Full Time/Part Time?    YRS:          Financial Information     Monthly Income: $2,354 (his SS $1817 + her SS $537)   Yearly Income: $28,248   Source of Income:  social security   Do you have any financial concerns? No   If yes, please explain:         Insurance Information      Do you have health insurance?  Yes   Insurance [employer]   Policy #: [id number]   Group #:  [id number]   Policy [place]: self   Medicare: No   Medicare Part D: No   Medicaid: No   Do you have Disability Insurance? No      Do you have a Cancer Policy? No   Do you have medication/prescription coverage? Yes   Are you a ? No       Medical Information     Diagnosis: No diagnosis found.   Date of Diagnosis: 10/2021   Is this a new diagnosis? Yes         If no, please explain:    Past Medical History: Past Medical History:   Diagnosis Date    Anxiety     Fatigue     Multiple myeloma       Infusion Services: none   Home Health: none   Durable Medical Equipment: none   Activities of Daily Living: independent   Patient's Family Cancer History: Cancer-related family history is not on file. None  "      Cognitive Functioning     Cognitive State: alert   Does patient have any concerns that may affect medical follow up and full understanding of treatment? No   Does patient have any concerns that may impact medication compliance? No   Education Level: high school diploma/GED   Does the patient have any learning disabilities? No   If yes, please explain:    Can the patient read English? Yes   Can the patient write in English? Yes      Is the patient Literate? Yes   What is the patient's primary language? English   Does the patient need interpretation services? No        Psychosocial History     Does patient have any emotional issues? Yes   If, yes please explain:  Anxiety/adjustment to diagnosis   Does patient have a psychiatric history? No   If, yes please explain:  Managed by oncologist and PCP   Is patient currently taking any psychiatric medication? Yes   If yes, please list medications: Celexa 10mg daily, Clonazepam 0.25 HS PRN   Is patient currently in therapy or attending support groups? No   Where is the patient currently in therapy?    Therapist/Counselor Name:    Therapist/Counselor Phone #:        Alcohol/Drug Use/Abuse History     Alcohol Use: Social History     Substance and Sexual Activity   Alcohol Use Not Currently      Tobacco Use: Social History     Tobacco Use   Smoking Status Never Smoker   Smokeless Tobacco Never Used      Drug Use: Social History     Substance and Sexual Activity   Drug Use Never          Coping Skills     How is the patient currently coping with their diagnosis? Staying active by gardening, walking, cleaning   Is the patient open/receptive to psychosocial intervention? Yes   Has the patient experienced any significant losses in his/her life? No      If yes, please explain:    What are the patient's identified needs? Assistance with food costs through [employer] fund   Goals: "get through, get better" resume tennis walking, fishing   Interview Behavior: Open, cooperative "   Suitability for Transplant: Suitable   Additional Comments:  And Mrs. Diaz were open and cooperative throughout assessment for auto SCT on 3/31.  He will be her primary caregiver at the the [facility], with daughter-in-law available to assist if needed.  Her insurance benefit includes lodging and gas expenses, but they requested assistance through the [employer] fund to help with food costs; reimbursement for $500 of utilities expenses was submitted to [employer] for check processing.  Her anxiety and adjustment issues are currently well managed, but Psych Onc may be helpful during admission, when usual coping skills are unavailable.  They both had a good understanding of risks and benefits of transplant and the recovery process.  Both signed the caregiver contract without reservation.  They were provided a packet of information on advanced directives.

## 2022-04-05 NOTE — PROGRESS NOTES
INFORMED CONSENT/ LIMITS of CONFIDENTIALITY: Prior to beginning the interview, the patient's identification was confirmed via name and date of birth. Caty Diaz  was informed of the possible risks and benefits of psychological interventions (e.g., counseling, psychotherapy, testing) and provided information regarding the handling of protected health records and   the limits of confidentiality, including the importance of reporting any suicidal or homicidal ideation to ensure safety of all parties. This provider explained the purpose of today's appointment and the patient was provided with time to ask questions regarding this information.  Acceptance and understanding of these conditions was expressed, and Caty Diaz freely consented to this evaluation.     Computer Administered Psychological Testing (30765)  Date:  3/31/2022     CPT Code: 00153 Evaluation Length:  1 hour      Referred by:  BMT Team/ Oncologist: TRAMAINE Tabor MD.     Chief complaint/reason for encounter:  Psychological Evaluation prior to stem cell transplantation      Caty Diaz is an 66 y.o. female referred by TRAMAINE Tabor MD for pre-transplant evaluation.     Caty Diaz arrived promptly for the scheduled appointment during which computer-administered psychological testing of the [facility] Diagnostic (MBMD) was conducted. Patient informed of the risks and benefits of testing, was instructed how to navigate the computer program, and was informed of the importance of accurate responding.  Patient expressed understanding and willingly engaged in the assessment.       Caty Diaz 's complete assessment report will be included in the medical record with any additional testing instruments  compiled with appropriate   interview data, summary, and recommendations.  Patient will be provided feedback on the assessment results at BMT Evaluation visit with the psychologist.       ICD-10-CM ICD-9-CM   1. Pre-transplant  evaluation for stem cell transplant  Z01.818 V72.83   2. Stem cell transplant candidate  Z76.82 V49.83   3. Multiple myeloma, remission status unspecified  C90.00 203.00       Shila Kern, PhD  Clinical Psychologist  [place] License #[id number]  [place] License #[id number]

## 2022-04-06 ENCOUNTER — OFFICE VISIT (OUTPATIENT)
Dept: HEMATOLOGY/ONCOLOGY | Facility: CLINIC | Age: 67
End: 2022-04-06
Payer: COMMERCIAL

## 2022-04-06 DIAGNOSIS — Z76.82 STEM CELL TRANSPLANT CANDIDATE: ICD-10-CM

## 2022-04-06 DIAGNOSIS — R53.82 CHRONIC FATIGUE: ICD-10-CM

## 2022-04-06 DIAGNOSIS — C90.01 MULTIPLE MYELOMA IN REMISSION: Primary | ICD-10-CM

## 2022-04-06 LAB
GENETICIST REVIEW: NORMAL
PLASMA CELL PROLIF RELEASED BY: NORMAL
PLASMA CELL PROLIF RESULT SUMMARY: NORMAL
PLASMA CELL PROLIF RESULT TABLE: NORMAL
REASON FOR REFERRAL, PLASMA CELL PROLIF (PCPD), FISH: NORMAL
REF LAB TEST METHOD: NORMAL
RESULTS, PLASMA CELL PROLIF (PCPD), FISH: NORMAL
SERVICE CMNT-IMP: NORMAL
SERVICE CMNT-IMP: NORMAL
SPECIMEN SOURCE: NORMAL
SPECIMEN, PLASMA CELL PROLIF (PCPD), FISH: NORMAL

## 2022-04-06 PROCEDURE — 99214 PR OFFICE/OUTPT VISIT, EST, LEVL IV, 30-39 MIN: ICD-10-PCS | Mod: 95,,, | Performed by: INTERNAL MEDICINE

## 2022-04-06 PROCEDURE — 99214 OFFICE O/P EST MOD 30 MIN: CPT | Mod: 95,,, | Performed by: INTERNAL MEDICINE

## 2022-04-06 NOTE — PROGRESS NOTES
The patient location is: home   The chief complaint leading to consultation is: multiple myeloma, follow up    Visit type: audiovisual    Face to Face time with patient:20 minutes  30 minutes of total time spent on the encounter, which includes face to face time and non-face to face time preparing to see the patient (eg, review of tests), Obtaining and/or reviewing separately obtained history, Documenting clinical information in the electronic or other health record, Independently interpreting results (not separately reported) and communicating results to the patient/family/caregiver, or Care coordination (not separately reported).         Each patient to whom he or she provides medical services by telemedicine is:  (1) informed of the relationship between the physician and patient and the respective role of any other health care provider with respect to management of the patient; and (2) notified that he or she may decline to receive medical services by telemedicine and may withdraw from such care at any time.    Notes:        CC: IgG lambda multiple myeloma, stem cell transplant candidate, follow up    HPI : , 66, is here for hematology/ stem cell transplant follow up . She is a stem cell transplant candidate. She has IgG lambda multiple myeloma. Multiple myeloma was diagnosed after work up for anemia showed a paraprotein  She has anemia, GERD, esophageal erosions. On 10/8/21 she had a bone marrow biopsy. It showed nearly 50%  positive plasma cells on core biopsy by IHC.  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  [place] red on bone marrow negative.She has chronic anemia. Cytogenetics was normal. FISH showed increase in 1q copies, t(4,14) and monosomy 13.  She had no lytic lesions on skeletal survey. She was treated with RVd.         Review of patient's allergies indicates:   Allergen Reactions    Garlic     Milk Hives, Itching and Nausea Only       Current Outpatient Medications   Medication Sig     acetaminophen (TYLENOL) 500 MG tablet Take 500 mg by mouth every 6 (six) hours as needed for Pain.    acyclovir (ZOVIRAX) 400 MG tablet Take 400 mg by mouth 2 (two) times daily.    ascorbic acid, vitamin C, (VITAMIN C) 250 MG tablet Take 250 mg by mouth once daily.    aspirin (ECOTRIN) 81 MG EC tablet Take 81 mg by mouth once daily.    calcium carbonate (TUMS ULTRA ORAL) Take 500 mg by mouth daily as needed (heart burn).    citalopram (CELEXA) 10 MG tablet Take 10 mg by mouth once daily.    clonazePAM (KLONOPIN) 0.5 MG tablet Take 0.5 mg by mouth 2 (two) times daily as needed for Anxiety.    codeine-butalbital-ASA-caffeine (BUTALBITAL COMPOUND W/CODEINE) 05--40 mg Cap Take 1 capsule by mouth every 4 (four) hours as needed (migraine headaches).    dexAMETHasone (DECADRON) 4 MG Tab Take 20 mg by mouth once daily. On Tuesdays and Wednesdays    docusate sodium (COLACE) 100 MG capsule Take 100 mg by mouth 2 (two) times daily.    fluticasone propionate (FLONASE) 50 mcg/actuation nasal spray 1 spray by Each Nostril route every evening.    GI cocktail antac/dicyc/lidoc Take 5 mLs by mouth daily as needed (nausea).    multivitamin (THERAGRAN) per tablet Take 1 tablet by mouth once daily.    ondansetron (ZOFRAN) 8 MG tablet Take 8 mg by mouth every 8 (eight) hours as needed for Nausea.    pantoprazole (PROTONIX) 40 MG tablet Take 40 mg by mouth once daily.    polyethylene glycol (GLYCOLAX) 17 gram PwPk Take 17 g by mouth once daily.    promethazine (PHENERGAN) 25 MG tablet Take 25 mg by mouth every 6 (six) hours as needed (nuasea).    REVLIMID 25 mg Cap Take 25 mg by mouth once daily. For 21 days on and 7 days off    zinc gluconate 50 mg tablet Take 50 mg by mouth once daily.     No current facility-administered medications for this visit.         Review of Systems   Constitutional: Positive for malaise/fatigue. Negative for chills, fever and weight loss.   HENT: Negative for congestion, ear discharge,  ear pain, hearing loss and nosebleeds.    Eyes: Negative for blurred vision, double vision, photophobia and pain.   Respiratory: Negative for hemoptysis, sputum production and shortness of breath.    Cardiovascular: Negative for orthopnea, leg swelling and PND.   Gastrointestinal: Negative for abdominal pain, blood in stool, diarrhea, heartburn, melena and vomiting.   Genitourinary: Negative for dysuria and frequency.   Musculoskeletal: Negative for back pain, myalgias and neck pain.   Neurological: Negative for dizziness, tingling, tremors, sensory change, focal weakness, weakness and headaches.   Endo/Heme/Allergies: Negative for environmental allergies. Does not bruise/bleed easily.   Psychiatric/Behavioral: Negative for depression, hallucinations and substance abuse.       There were no vitals filed for this visit.  PS: ECOG 1        3/21/22 BONE MARROW ASPIRATE, TOUCH PREP, CLOT, AND DECALCIFIED NEEDLE CORE BIOPSY: LEFT POSTEROSUPERIOR ILIAC CREST     -tab PERSISTENT LAMDBA LIGHT CHAIN RESTRICTED PLASMA CELL NEOPLASM     -tab Normocellular marrow (30-35% total cellularity) with 10% involvement by plasma cell neoplasm     -tab Background trilineage hematopoiesis with mild megakaryocytic hypoplasia     -tab Rare well-defined, small lymphoid aggregate (favor benign/reactive)     -tab No stainable histiocytic iron stores    3/21/22 PET CT     COMPARISON:  None     FINDINGS:  Quality of the study: Adequate.     In the head and neck, there are no hypermetabolic lesions worrisome for malignancy. There are no hypermetabolic mucosal lesions, and there are no pathologically enlarged or hypermetabolic lymph nodes.     In the chest, there are no hypermetabolic lesions worrisome for malignancy.  There are no concerning pulmonary nodules or masses, and there are no pathologically enlarged or hypermetabolic lymph nodes.     In the abdomen and pelvis, there is physiologic tracer distribution within the abdominal organs and  excretion into the genitourinary system.     In the bones, there are no hypermetabolic lesions worrisome for malignancy.  There are no hypermetabolic lytic/lucent osseous lesions.     In the extremities, there are no hypermetabolic lesions worrisome for malignancy.     CT findings: Bilateral breast implants are present.  Gallbladder is surgically absent.  Scattered diverticuli without findings of acute diverticulitis.  Significant stool burden throughout the colon.  Scattered calcifications abdominal aorta and iliacs.  Degenerative changes of the spine.     Impression:     No findings of hypermetabolic foci to indicate malignancy.    Component      Latest Ref Rng & Units 3/21/2022 3/14/2022   WBC      3.90 - 12.70 K/uL 3.36 (L)    RBC      4.00 - 5.40 M/uL 4.09    Hemoglobin      12.0 - 16.0 g/dL 13.0    Hematocrit      37.0 - 48.5 % 40.3    MCV      82 - 98 fL 99 (H)    MCH      27.0 - 31.0 pg 31.8 (H)    MCHC      32.0 - 36.0 g/dL 32.3    RDW      11.5 - 14.5 % 13.7    Platelets      150 - 450 K/uL 104 (L)    MPV      9.2 - 12.9 fL 12.8    Immature Granulocytes      0.0 - 0.5 % 0.3    Gran # (ANC)      1.8 - 7.7 K/uL 1.4 (L)    Immature Grans (Abs)      0.00 - 0.04 K/uL 0.01    Lymph #      1.0 - 4.8 K/uL 1.1    Mono #      0.3 - 1.0 K/uL 0.5    Eos #      0.0 - 0.5 K/uL 0.4    Baso #      0.00 - 0.20 K/uL 0.04    nRBC      0 /100 WBC 0    Gran %      38.0 - 73.0 % 41.3    Lymph %      18.0 - 48.0 % 31.3    Mono %      4.0 - 15.0 % 14.0    Eosinophil %      0.0 - 8.0 % 11.9 (H)    Basophil %      0.0 - 1.9 % 1.2    Differential Method       Automated    Sodium      136 - 145 mmol/L 141    Potassium      3.5 - 5.1 mmol/L 3.8    Chloride      95 - 110 mmol/L 104    CO2      23 - 29 mmol/L 29    Glucose      70 - 110 mg/dL 95    BUN      8 - 23 mg/dL 16    Creatinine      0.5 - 1.4 mg/dL 1.0    Calcium      8.7 - 10.5 mg/dL 9.6    PROTEIN TOTAL      6.0 - 8.4 g/dL 7.0    Albumin      3.5 - 5.2 g/dL 3.8    BILIRUBIN  TOTAL      0.1 - 1.0 mg/dL 0.6    Alkaline Phosphatase      55 - 135 U/L 56    AST      10 - 40 U/L 22    ALT      10 - 44 U/L 42    Anion Gap      8 - 16 mmol/L 8    eGFR if African American      >60 mL/min/1.73 m:2 >60.0    eGFR if non African American      >60 mL/min/1.73 m:2 59.3 (A)    PCPDS Pre-Analysis Pre-Sort       Performed    PCPDS Final Diagnosis       Test Not Performed    Phosphorus      2.7 - 4.5 mg/dL  2.8   Magnesium      1.6 - 2.6 mg/dL  2.0   Beta-2 Microglobulin      0.0 - 2.5 ug/mL 2.2      Component      Latest Ref Rng & Units 3/31/2022   G6PD Quant      8.0 - 11.9 U/g Hb 8.8   Sickle Cell Screen      Negative Negative   SAMI 24, Ur            Component      Latest Ref Rng & Units 3/14/2022   [place] Free Light Chains      0.33 - 1.94 mg/dL 1.27   Lambda Free Light Chains      0.57 - 2.63 mg/dL 1.36   Kappa/Lambda FLC Ratio      0.26 - 1.65 0.93     Component      Latest Ref Rng & Units 4/4/2022 3/31/2022   Urine Total Volume      mL 5300    Urine Collection Duration      Hr 24    CREATININE, URINE (SEND OUT)      15.0 - 325.0 mg/dL 20.0    Creatinine Clearance      70 - 110 mL/min 82    Creatinine, Urinr (mg/spec)      mg/Spec 1060.0    Creatinine      0.5 - 1.4 mg/dL 0.9    Kappa Free Light Chains      0.33 - 1.94 mg/dL  0.93   Lambda Free Light Chains      0.57 - 2.63 mg/dL  1.11   Kappa/Lambda FLC Ratio      0.26 - 1.65  0.84   Varicella IgG      0.00 - 0.90 ISR  0.27   Varicella Interpretation      Negative  Negative   Hepatitis B Surface Ag, Donor Eval ([facility])      Non-reactive  Non-reactive   Hepatitis B Core Antibody, Donor Eval ([facility])      Non reactive  Non-reactive   Hepatitis C Antibody, Donor Eval ([facility])      Non-reactive  Non-reactive   HIV  1/2 Ag/Ab, Donor Eval ([facility])      Non-reactive  Non-reactive   HTLV I/II Antibody, Donor Eval ([facility])      Non-reactive  Non-reactive   CATERINA Panel, Donor Eval ([facility])        See  image under hyperlink    [place] Ab, Donor Eval ([facility])      Non reactive  Non-reactive   RPR, Donor Eval ([facility])      Non-reactive  Non-reactive   CMV Ab Total, Donor Eval ([facility])      Non-reactive  Reactive (A)     Component      Latest Ref Rng & Units 3/14/2022   Urine Total Volume      mL    Urine Collection Duration      Hr    CREATININE, URINE (SEND OUT)      15.0 - 325.0 mg/dL    Creatinine Clearance      70 - 110 mL/min    Creatinine, Urinr (mg/spec)      mg/Spec    Creatinine      0.5 - 1.4 mg/dL    Kappa Free Light Chains      0.33 - 1.94 mg/dL 1.27   Lambda Free Light Chains      0.57 - 2.63 mg/dL 1.36   Kappa/Lambda FLC Ratio      0.26 - 1.65 0.93   Varicella IgG      0.00 - 0.90 ISR    Varicella Interpretation      Negative    Hepatitis B Surface Ag, Donor Eval ([facility])      Non-reactive    Hepatitis B Core Antibody, Donor Eval ([facility])      Non reactive    Hepatitis C Antibody, Donor Eval ([facility])      Non-reactive    HIV  1/2 Ag/Ab, Donor Eval ([facility])      Non-reactive    HTLV I/II Antibody, Donor Eval ([facility])      Non-reactive    CATERIAN Panel, Donor Eval ([facility])          [place] Ab, Donor Eval ([facility])      Non reactive    RPR, Donor Eval ([facility])      Non-reactive    CMV Ab Total, Donor Eval ([facility])      Non-reactive        Component      Latest Ref Rng & Units 3/31/2022   Specimen UA       Urine, Unspecified   Color, UA      Yellow, Straw, [place] Colorless (A)   Appearance, UA      Clear Clear   pH, UA      5.0 - 8.0 7.0   Specific Gravity, UA      1.005 - 1.030 1.005   Protein, UA      Negative Negative   Glucose, UA      Negative Negative   Ketones, UA      Negative Negative   Bilirubin (UA)      Negative Negative   Occult Blood UA      Negative Negative   NITRITE UA      Negative Negative   Leukocytes, UA      Negative Trace (A)   IgG      650 - 1600 mg/dL 1170   IgA      40 - 350 mg/dL 109   IgM      50 - 300 mg/dL 25 (L)   [place] Free Light  Chains      0.33 - 1.94 mg/dL 0.93   Lambda Free Light Chains      0.57 - 2.63 mg/dL 1.11   Kappa/Lambda FLC Ratio      0.26 - 1.65 0.84   Beta-2 Microglobulin      0.0 - 2.5 ug/mL 1.7       3/31/22 SPEP: Normal total protein. Normal gamma globulins are decreased. Paraprotein band in near-gamma = 0.85 g/dL, previously 0.76 g/dL.   3/31/22 serum SAMI: IgG lambda specific monoclonal band present    4/1/22 PFT    Spirometry is normal. DLCO is mildly decreased.     4/1/22 2D ECHO    ·  The left ventricle is normal in size with normal systolic function. The estimated ejection fraction is 60%.  · Normal right ventricular size with normal right ventricular systolic function.  · Indeterminate left ventricular diastolic function.  · Moderate left atrial enlargement.  · Mild mitral regurgitation.  · The estimated PA systolic pressure is 28 mmHg.  · Normal central venous pressure (3 mmHg).    4/1/22 EKG    Sinus rhythm with short MI   Otherwise normal ECG   No previous ECGs available     Assessment:    1. IgG lambda multiple myeloma not in remission  2. Stem cell transplant candidate  3. 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 immunization    Plan:    1. R-ISS II IgG lambda multiple myeloma, currently on RVd, cycle 5. Estimated median PFS is 42 months.    She had increased 1q21 copy number and has t(4,14), both high risk markers in multiple myeloma. She also has monosomy 13 on FISH. She did not have baseline PET CT.   Prior to C5 her M spike on SPEP was 0.52g/dl( was 5.36g/dl at diagnosis). She is currently on RVd , cycle 7. She has tolerated the treatments well.    PET CT on 3/31/22 did not demonstrate any hypermetabolic lytic or soft tissue lesions. BM biopsy on 3/21/22 showed 10% plasma cells ( versus 50% at diagnosis).  M spike on 3/3/122 SPEP was 0.85g/dl, suggesting she is in partial response. She will proceed to high dose chemotherapy and stem cell transplantation after clearance by the transplant group.           3.  She is on PPI    5. She has received COVID 19 vaccine.

## 2022-04-07 ENCOUNTER — TELEPHONE (OUTPATIENT)
Dept: SURGERY | Facility: CLINIC | Age: 67
End: 2022-04-07
Payer: COMMERCIAL

## 2022-04-07 ENCOUNTER — PATIENT MESSAGE (OUTPATIENT)
Dept: SURGERY | Facility: CLINIC | Age: 67
End: 2022-04-07
Payer: COMMERCIAL

## 2022-04-07 DIAGNOSIS — C90.00 MULTIPLE MYELOMA NOT HAVING ACHIEVED REMISSION: Primary | ICD-10-CM

## 2022-04-07 LAB
HSV1 IGG SERPL QL IA: POSITIVE
HSV2 IGG SERPL QL IA: NEGATIVE

## 2022-04-07 NOTE — TELEPHONE ENCOUNTER
----- Message from Vidya Cote RN sent at 4/7/2022  2:57 PM CDT -----  Regarding: BMT line  Sorry for the late notice! Physician recently decided patient is more urgent for transplant following bone marrow biopsy results.     Case Request    Case Request- (Auto Donor) Double-lumen Dialysis grade tunneled catheter without port    Requested Date- 4/19/2022 (this is preference) could also do 4/18  Requested Time- AM (please allow patient to be in our clinic for 5PM)    Diagnoses    Encounter for Autologous Stem Cell Transplant      Other considerations    Has patient had a prior iodine/contrast reaction? No    Is patient currently on a anticoagulant therapy? No    Is patient diabetic? no    Laterality  N/A- As per surgeon's discretion    Special Needs none    Any issues day of surgery [facility] can contact: BMT transplant coordinator: Vidya Cote Ext: [phone] and BMT transplant physician: Chad Tabor MD.    Thank you, Olivia!

## 2022-04-07 NOTE — TELEPHONE ENCOUNTER
Double Lumen BMT Line Placement scheduled for 4/18/22 with Dr. Plascencia.  Pre-Op instructions sent to patient via My Chart.  She verbalized understanding and is agreeable to this plan of care.

## 2022-04-08 DIAGNOSIS — C90.01 MULTIPLE MYELOMA IN REMISSION: ICD-10-CM

## 2022-04-08 DIAGNOSIS — Z76.82 STEM CELL TRANSPLANT CANDIDATE: Primary | ICD-10-CM

## 2022-04-08 NOTE — TELEPHONE ENCOUNTER
Spoke with patient to discuss alteration to transplant calendar; this is an expedited plan recommended by physician. Will follow up with calendar as well. Allowed time for questions. Instructed to reach out with concerns.

## 2022-04-12 LAB
COMMENT: NORMAL
FINAL PATHOLOGIC DIAGNOSIS: NORMAL
GROSS: NORMAL
Lab: NORMAL
MICROSCOPIC EXAM: NORMAL
SUPPLEMENTAL DIAGNOSIS: NORMAL

## 2022-04-13 ENCOUNTER — TELEPHONE (OUTPATIENT)
Dept: HEMATOLOGY/ONCOLOGY | Facility: CLINIC | Age: 67
End: 2022-04-13
Payer: COMMERCIAL

## 2022-04-13 ENCOUNTER — OFFICE VISIT (OUTPATIENT)
Dept: HEMATOLOGY/ONCOLOGY | Facility: CLINIC | Age: 67
End: 2022-04-13
Payer: COMMERCIAL

## 2022-04-13 VITALS
BODY MASS INDEX: 23.36 KG/M2 | TEMPERATURE: 98 F | HEIGHT: 67 IN | WEIGHT: 148.81 LBS | SYSTOLIC BLOOD PRESSURE: 138 MMHG | OXYGEN SATURATION: 98 % | RESPIRATION RATE: 16 BRPM | DIASTOLIC BLOOD PRESSURE: 67 MMHG | HEART RATE: 75 BPM

## 2022-04-13 DIAGNOSIS — C90.01 MULTIPLE MYELOMA IN REMISSION: ICD-10-CM

## 2022-04-13 DIAGNOSIS — Z76.82 STEM CELL TRANSPLANT CANDIDATE: ICD-10-CM

## 2022-04-13 DIAGNOSIS — R53.82 CHRONIC FATIGUE: ICD-10-CM

## 2022-04-13 DIAGNOSIS — D63.0 ANEMIA IN NEOPLASTIC DISEASE: Primary | ICD-10-CM

## 2022-04-13 PROCEDURE — 99999 PR PBB SHADOW E&M-EST. PATIENT-LVL IV: CPT | Mod: PBBFAC,,, | Performed by: INTERNAL MEDICINE

## 2022-04-13 PROCEDURE — 99215 PR OFFICE/OUTPT VISIT, EST, LEVL V, 40-54 MIN: ICD-10-PCS | Mod: S$GLB,,, | Performed by: INTERNAL MEDICINE

## 2022-04-13 PROCEDURE — 99215 OFFICE O/P EST HI 40 MIN: CPT | Mod: S$GLB,,, | Performed by: INTERNAL MEDICINE

## 2022-04-13 PROCEDURE — 99999 PR PBB SHADOW E&M-EST. PATIENT-LVL IV: ICD-10-PCS | Mod: PBBFAC,,, | Performed by: INTERNAL MEDICINE

## 2022-04-13 RX ORDER — ACETAMINOPHEN 325 MG/1
650 TABLET ORAL ONCE AS NEEDED
Status: CANCELLED | OUTPATIENT
Start: 2022-04-28

## 2022-04-13 RX ORDER — LANOLIN ALCOHOL/MO/W.PET/CERES
800 CREAM (GRAM) TOPICAL ONCE AS NEEDED
Status: CANCELLED | OUTPATIENT
Start: 2022-04-25

## 2022-04-13 RX ORDER — CALCIUM CARBONATE 600 MG
1 TABLET ORAL DAILY
COMMUNITY

## 2022-04-13 RX ORDER — SODIUM,POTASSIUM PHOSPHATES 280-250MG
2 POWDER IN PACKET (EA) ORAL ONCE AS NEEDED
Status: CANCELLED | OUTPATIENT
Start: 2022-04-26

## 2022-04-13 RX ORDER — ACETAMINOPHEN 325 MG/1
650 TABLET ORAL ONCE AS NEEDED
Status: CANCELLED | OUTPATIENT
Start: 2022-04-27

## 2022-04-13 RX ORDER — POTASSIUM CHLORIDE 20 MEQ/1
40 TABLET, EXTENDED RELEASE ORAL ONCE AS NEEDED
Status: CANCELLED | OUTPATIENT
Start: 2022-04-28

## 2022-04-13 RX ORDER — POTASSIUM CHLORIDE 20 MEQ/1
40 TABLET, EXTENDED RELEASE ORAL ONCE AS NEEDED
Status: CANCELLED | OUTPATIENT
Start: 2022-04-24

## 2022-04-13 RX ORDER — DIPHENHYDRAMINE HCL 25 MG
25 CAPSULE ORAL ONCE AS NEEDED
Status: CANCELLED | OUTPATIENT
Start: 2022-04-21

## 2022-04-13 RX ORDER — POTASSIUM CHLORIDE 20 MEQ/1
40 TABLET, EXTENDED RELEASE ORAL ONCE AS NEEDED
Status: CANCELLED | OUTPATIENT
Start: 2022-04-20

## 2022-04-13 RX ORDER — LANOLIN ALCOHOL/MO/W.PET/CERES
800 CREAM (GRAM) TOPICAL ONCE AS NEEDED
Status: CANCELLED | OUTPATIENT
Start: 2022-04-27

## 2022-04-13 RX ORDER — ACETAMINOPHEN 325 MG/1
650 TABLET ORAL ONCE AS NEEDED
Status: CANCELLED | OUTPATIENT
Start: 2022-04-25

## 2022-04-13 RX ORDER — SODIUM,POTASSIUM PHOSPHATES 280-250MG
2 POWDER IN PACKET (EA) ORAL ONCE AS NEEDED
Status: CANCELLED | OUTPATIENT
Start: 2022-04-28

## 2022-04-13 RX ORDER — PLERIXAFOR 24 MG/1.2ML
0.24 SOLUTION SUBCUTANEOUS ONCE AS NEEDED
Status: CANCELLED | OUTPATIENT
Start: 2022-04-30 | End: 2033-09-26

## 2022-04-13 RX ORDER — POTASSIUM CHLORIDE 20 MEQ/1
40 TABLET, EXTENDED RELEASE ORAL ONCE AS NEEDED
Status: CANCELLED | OUTPATIENT
Start: 2022-04-21

## 2022-04-13 RX ORDER — SODIUM,POTASSIUM PHOSPHATES 280-250MG
2 POWDER IN PACKET (EA) ORAL ONCE AS NEEDED
Status: CANCELLED | OUTPATIENT
Start: 2022-04-25

## 2022-04-13 RX ORDER — ACETAMINOPHEN 325 MG/1
650 TABLET ORAL ONCE AS NEEDED
Status: CANCELLED | OUTPATIENT
Start: 2022-04-20

## 2022-04-13 RX ORDER — DIPHENHYDRAMINE HCL 25 MG
25 CAPSULE ORAL ONCE AS NEEDED
Status: CANCELLED | OUTPATIENT
Start: 2022-04-25

## 2022-04-13 RX ORDER — DIPHENHYDRAMINE HCL 25 MG
25 CAPSULE ORAL ONCE AS NEEDED
Status: CANCELLED | OUTPATIENT
Start: 2022-04-24

## 2022-04-13 RX ORDER — DIPHENHYDRAMINE HCL 25 MG
25 CAPSULE ORAL ONCE AS NEEDED
Status: CANCELLED | OUTPATIENT
Start: 2022-04-28

## 2022-04-13 RX ORDER — SODIUM,POTASSIUM PHOSPHATES 280-250MG
2 POWDER IN PACKET (EA) ORAL ONCE AS NEEDED
Status: CANCELLED | OUTPATIENT
Start: 2022-04-20

## 2022-04-13 RX ORDER — POTASSIUM CHLORIDE 20 MEQ/1
40 TABLET, EXTENDED RELEASE ORAL ONCE AS NEEDED
Status: CANCELLED | OUTPATIENT
Start: 2022-04-27

## 2022-04-13 RX ORDER — LANOLIN ALCOHOL/MO/W.PET/CERES
800 CREAM (GRAM) TOPICAL ONCE AS NEEDED
Status: CANCELLED | OUTPATIENT
Start: 2022-04-21

## 2022-04-13 RX ORDER — SODIUM,POTASSIUM PHOSPHATES 280-250MG
2 POWDER IN PACKET (EA) ORAL ONCE AS NEEDED
Status: CANCELLED | OUTPATIENT
Start: 2022-04-24

## 2022-04-13 RX ORDER — DIPHENHYDRAMINE HCL 25 MG
25 CAPSULE ORAL ONCE AS NEEDED
Status: CANCELLED | OUTPATIENT
Start: 2022-04-20

## 2022-04-13 RX ORDER — DIPHENHYDRAMINE HCL 25 MG
25 CAPSULE ORAL ONCE AS NEEDED
Status: CANCELLED | OUTPATIENT
Start: 2022-04-26

## 2022-04-13 RX ORDER — ACETAMINOPHEN 325 MG/1
650 TABLET ORAL ONCE AS NEEDED
Status: CANCELLED | OUTPATIENT
Start: 2022-04-26

## 2022-04-13 RX ORDER — SODIUM,POTASSIUM PHOSPHATES 280-250MG
2 POWDER IN PACKET (EA) ORAL ONCE AS NEEDED
Status: CANCELLED | OUTPATIENT
Start: 2022-04-21

## 2022-04-13 RX ORDER — LANOLIN ALCOHOL/MO/W.PET/CERES
800 CREAM (GRAM) TOPICAL ONCE AS NEEDED
Status: CANCELLED | OUTPATIENT
Start: 2022-04-24

## 2022-04-13 RX ORDER — SODIUM,POTASSIUM PHOSPHATES 280-250MG
2 POWDER IN PACKET (EA) ORAL ONCE AS NEEDED
Status: CANCELLED | OUTPATIENT
Start: 2022-04-27

## 2022-04-13 RX ORDER — POTASSIUM CHLORIDE 20 MEQ/1
40 TABLET, EXTENDED RELEASE ORAL ONCE AS NEEDED
Status: CANCELLED | OUTPATIENT
Start: 2022-04-26

## 2022-04-13 RX ORDER — DIPHENHYDRAMINE HCL 25 MG
25 CAPSULE ORAL ONCE AS NEEDED
Status: CANCELLED | OUTPATIENT
Start: 2022-04-27

## 2022-04-13 RX ORDER — PLERIXAFOR 24 MG/1.2ML
0.24 SOLUTION SUBCUTANEOUS ONCE AS NEEDED
Status: CANCELLED | OUTPATIENT
Start: 2022-04-29 | End: 2033-09-25

## 2022-04-13 RX ORDER — PLERIXAFOR 24 MG/1.2ML
0.24 SOLUTION SUBCUTANEOUS ONCE AS NEEDED
Status: CANCELLED | OUTPATIENT
Start: 2022-04-27 | End: 2033-09-23

## 2022-04-13 RX ORDER — POTASSIUM CHLORIDE 20 MEQ/1
40 TABLET, EXTENDED RELEASE ORAL ONCE AS NEEDED
Status: CANCELLED | OUTPATIENT
Start: 2022-04-25

## 2022-04-13 RX ORDER — LANOLIN ALCOHOL/MO/W.PET/CERES
800 CREAM (GRAM) TOPICAL ONCE AS NEEDED
Status: CANCELLED | OUTPATIENT
Start: 2022-04-28

## 2022-04-13 RX ORDER — PLERIXAFOR 24 MG/1.2ML
0.24 SOLUTION SUBCUTANEOUS ONCE AS NEEDED
Status: CANCELLED | OUTPATIENT
Start: 2022-04-28 | End: 2033-09-24

## 2022-04-13 RX ORDER — ACETAMINOPHEN 325 MG/1
650 TABLET ORAL ONCE AS NEEDED
Status: CANCELLED | OUTPATIENT
Start: 2022-04-21

## 2022-04-13 RX ORDER — ACETAMINOPHEN 325 MG/1
650 TABLET ORAL ONCE AS NEEDED
Status: CANCELLED | OUTPATIENT
Start: 2022-04-24

## 2022-04-13 RX ORDER — LANOLIN ALCOHOL/MO/W.PET/CERES
800 CREAM (GRAM) TOPICAL ONCE AS NEEDED
Status: CANCELLED | OUTPATIENT
Start: 2022-04-20

## 2022-04-13 RX ORDER — LANOLIN ALCOHOL/MO/W.PET/CERES
800 CREAM (GRAM) TOPICAL ONCE AS NEEDED
Status: CANCELLED | OUTPATIENT
Start: 2022-04-26

## 2022-04-13 NOTE — PROGRESS NOTES
CC: IgG lambda multiple myeloma, stem cell transplant candidate, follow up    HPI : , 66, is here for hematology/ stem cell transplant follow up and signing consent . She is a stem cell transplant candidate. She has IgG lambda multiple myeloma. Multiple myeloma was diagnosed after work up for anemia showed a paraprotein  She has anemia, GERD, esophageal erosions. On 10/8/21 she had a bone marrow biopsy. It showed nearly 50%  positive plasma cells on core biopsy by IHC.  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  [place] red on bone marrow negative.She has chronic anemia. Cytogenetics was normal. FISH showed increase in 1q copies, t(4,14) and monosomy 13.  She had no lytic lesions on skeletal survey. She was treated with RVd.   She is here to sign consent for high dose melphalan chemotherapy followed by autologous stem cell transplant.          Review of patient's allergies indicates:   Allergen Reactions    Garlic     Milk Hives, Itching and Nausea Only       Current Outpatient Medications   Medication Sig    acetaminophen (TYLENOL) 500 MG tablet Take 500 mg by mouth every 6 (six) hours as needed for Pain.    acyclovir (ZOVIRAX) 400 MG tablet Take 400 mg by mouth 2 (two) times daily.    ascorbic acid, vitamin C, (VITAMIN C) 250 MG tablet Take 250 mg by mouth once daily.    aspirin (ECOTRIN) 81 MG EC tablet Take 81 mg by mouth once daily.    calcium carbonate (OS-LAILA) 600 mg calcium (1,500 mg) Tab Take 1 tablet by mouth once daily.    calcium carbonate (TUMS ULTRA ORAL) Take 500 mg by mouth daily as needed (heart burn).    citalopram (CELEXA) 10 MG tablet Take 10 mg by mouth once daily.    clonazePAM (KLONOPIN) 0.5 MG tablet Take 0.5 mg by mouth 2 (two) times daily as needed for Anxiety.    codeine-butalbital-ASA-caffeine (BUTALBITAL COMPOUND W/CODEINE) 46--40 mg Cap Take 1 capsule by mouth every 4 (four) hours as needed (migraine headaches).    dexAMETHasone (DECADRON) 4 MG Tab Take 20 mg  by mouth once daily. On Tuesdays and Wednesdays    docusate sodium (COLACE) 100 MG capsule Take 100 mg by mouth 2 (two) times daily.    fluticasone propionate (FLONASE) 50 mcg/actuation nasal spray 1 spray by Each Nostril route every evening.    GI cocktail antac/dicyc/lidoc Take 5 mLs by mouth daily as needed (nausea).    multivitamin (THERAGRAN) per tablet Take 1 tablet by mouth once daily.    ondansetron (ZOFRAN) 8 MG tablet Take 8 mg by mouth every 8 (eight) hours as needed for Nausea.    pantoprazole (PROTONIX) 40 MG tablet Take 40 mg by mouth once daily.    polyethylene glycol (GLYCOLAX) 17 gram PwPk Take 17 g by mouth once daily.    promethazine (PHENERGAN) 25 MG tablet Take 25 mg by mouth every 6 (six) hours as needed ([employer]a).    REVLIMID 25 mg Cap Take 25 mg by mouth once daily. For 21 days on and 7 days off    zinc gluconate 50 mg tablet Take 50 mg by mouth once daily.     No current facility-administered medications for this visit.         Review of Systems   Constitutional: Positive for malaise/fatigue. Negative for chills, fever and weight loss.   HENT: Negative for congestion, ear discharge, ear pain, hearing loss and nosebleeds.    Eyes: Negative for blurred vision, double vision, photophobia and pain.   Respiratory: Negative for hemoptysis, sputum production and shortness of breath.    Cardiovascular: Negative for orthopnea, leg swelling and PND.   Gastrointestinal: Negative for abdominal pain, blood in stool, diarrhea, heartburn, melena and vomiting.   Genitourinary: Negative for dysuria and frequency.   Musculoskeletal: Negative for back pain, myalgias and neck pain.   Neurological: Negative for dizziness, tingling, tremors, sensory change, focal weakness, weakness and headaches.   Endo/Heme/Allergies: Negative for environmental allergies. Does not bruise/bleed easily.   Psychiatric/Behavioral: Negative for depression, hallucinations and substance abuse.       Vitals:    04/13/22 1327    BP: 138/67   Pulse: 75   Resp: 16   Temp: 97.9 °F (36.6 °C)     PS: ECOG 1    Physical Exam  Constitutional:       Appearance: Normal appearance.   HENT:      Head: Normocephalic and atraumatic.      Mouth/Throat:      Pharynx: No posterior oropharyngeal erythema.   Eyes:      General: No scleral icterus.  Cardiovascular:      Rate and Rhythm: Normal rate and regular rhythm.      Pulses: Normal pulses.      Heart sounds: Normal heart sounds. No murmur heard.  Pulmonary:      Effort: Pulmonary effort is normal. No respiratory distress.      Breath sounds: Normal breath sounds.   Abdominal:      General: There is no distension.      Palpations: There is no mass.      Tenderness: There is no abdominal tenderness.   Musculoskeletal:         General: No swelling.   Lymphadenopathy:      Cervical: No cervical adenopathy.   Skin:     Coloration: Skin is not jaundiced or pale.   Neurological:      General: No focal deficit present.      Mental Status: She is alert and oriented to person, place, and time.      Cranial Nerves: No cranial nerve deficit.   Psychiatric:         Mood and Affect: Mood normal.         3/21/22 BONE MARROW ASPIRATE, TOUCH PREP, CLOT, AND DECALCIFIED NEEDLE CORE BIOPSY: LEFT POSTEROSUPERIOR ILIAC CREST     -tab PERSISTENT LAMDBA LIGHT CHAIN RESTRICTED PLASMA CELL NEOPLASM     -tab Normocellular marrow (30-35% total cellularity) with 10% involvement by plasma cell neoplasm     -tab Background trilineage hematopoiesis with mild megakaryocytic hypoplasia     -tab Rare well-defined, small lymphoid aggregate (favor benign/reactive)     -tab No stainable histiocytic iron stores    3/21/22 PET CT     COMPARISON:  None     FINDINGS:  Quality of the study: Adequate.     In the head and neck, there are no hypermetabolic lesions worrisome for malignancy. There are no hypermetabolic mucosal lesions, and there are no pathologically enlarged or hypermetabolic lymph nodes.     In the chest, there are no  hypermetabolic lesions worrisome for malignancy.  There are no concerning pulmonary nodules or masses, and there are no pathologically enlarged or hypermetabolic lymph nodes.     In the abdomen and pelvis, there is physiologic tracer distribution within the abdominal organs and excretion into the genitourinary system.     In the bones, there are no hypermetabolic lesions worrisome for malignancy.  There are no hypermetabolic lytic/lucent osseous lesions.     In the extremities, there are no hypermetabolic lesions worrisome for malignancy.     CT findings: Bilateral breast implants are present.  Gallbladder is surgically absent.  Scattered diverticuli without findings of acute diverticulitis.  Significant stool burden throughout the colon.  Scattered calcifications abdominal aorta and iliacs.  Degenerative changes of the spine.     Impression:     No findings of hypermetabolic foci to indicate malignancy.    Component      Latest Ref Rng & Units 3/21/2022 3/14/2022   WBC      3.90 - 12.70 K/uL 3.36 (L)    RBC      4.00 - 5.40 M/uL 4.09    Hemoglobin      12.0 - 16.0 g/dL 13.0    Hematocrit      37.0 - 48.5 % 40.3    MCV      82 - 98 fL 99 (H)    MCH      27.0 - 31.0 pg 31.8 (H)    MCHC      32.0 - 36.0 g/dL 32.3    RDW      11.5 - 14.5 % 13.7    Platelets      150 - 450 K/uL 104 (L)    MPV      9.2 - 12.9 fL 12.8    Immature Granulocytes      0.0 - 0.5 % 0.3    Gran # (ANC)      1.8 - 7.7 K/uL 1.4 (L)    Immature Grans (Abs)      0.00 - 0.04 K/uL 0.01    Lymph #      1.0 - 4.8 K/uL 1.1    Mono #      0.3 - 1.0 K/uL 0.5    Eos #      0.0 - 0.5 K/uL 0.4    Baso #      0.00 - 0.20 K/uL 0.04    nRBC      0 /100 WBC 0    Gran %      38.0 - 73.0 % 41.3    Lymph %      18.0 - 48.0 % 31.3    Mono %      4.0 - 15.0 % 14.0    Eosinophil %      0.0 - 8.0 % 11.9 (H)    Basophil %      0.0 - 1.9 % 1.2    Differential Method       Automated    Sodium      136 - 145 mmol/L 141    Potassium      3.5 - 5.1 mmol/L 3.8    Chloride       95 - 110 mmol/L 104    CO2      23 - 29 mmol/L 29    Glucose      70 - 110 mg/dL 95    BUN      8 - 23 mg/dL 16    Creatinine      0.5 - 1.4 mg/dL 1.0    Calcium      8.7 - 10.5 mg/dL 9.6    PROTEIN TOTAL      6.0 - 8.4 g/dL 7.0    Albumin      3.5 - 5.2 g/dL 3.8    BILIRUBIN TOTAL      0.1 - 1.0 mg/dL 0.6    Alkaline Phosphatase      55 - 135 U/L 56    AST      10 - 40 U/L 22    ALT      10 - 44 U/L 42    Anion Gap      8 - 16 mmol/L 8    eGFR if African American      >60 mL/min/1.73 m:2 >60.0    eGFR if non African American      >60 mL/min/1.73 m:2 59.3 (A)    PCPDS Pre-Analysis Pre-Sort       Performed    PCPDS Final Diagnosis       Test Not Performed    Phosphorus      2.7 - 4.5 mg/dL  2.8   Magnesium      1.6 - 2.6 mg/dL  2.0   Beta-2 Microglobulin      0.0 - 2.5 ug/mL 2.2      Component      Latest Ref Rng & Units 3/31/2022   G6PD Quant      8.0 - 11.9 U/g Hb 8.8   Sickle Cell Screen      Negative Negative   SAMI 24, Ur            Component      Latest Ref Rng & Units 3/14/2022   [place] Free Light Chains      0.33 - 1.94 mg/dL 1.27   Lambda Free Light Chains      0.57 - 2.63 mg/dL 1.36   Kappa/Lambda FLC Ratio      0.26 - 1.65 0.93     Component      Latest Ref Rng & Units 4/4/2022 3/31/2022   Urine Total Volume      mL 5300    Urine Collection Duration      Hr 24    CREATININE, URINE (SEND OUT)      15.0 - 325.0 mg/dL 20.0    Creatinine Clearance      70 - 110 mL/min 82    Creatinine, Urinr (mg/spec)      mg/Spec 1060.0    Creatinine      0.5 - 1.4 mg/dL 0.9    Kappa Free Light Chains      0.33 - 1.94 mg/dL  0.93   Lambda Free Light Chains      0.57 - 2.63 mg/dL  1.11   Kappa/Lambda FLC Ratio      0.26 - 1.65  0.84   Varicella IgG      0.00 - 0.90 ISR  0.27   Varicella Interpretation      Negative  Negative   Hepatitis B Surface Ag, Donor Eval ([facility])      Non-reactive  Non-reactive   Hepatitis B Core Antibody, Donor Eval ([facility])      Non reactive  Non-reactive   Hepatitis C Antibody, Donor Eval  ([facility])      Non-reactive  Non-reactive   HIV  1/2 Ag/Ab, Donor Eval ([facility])      Non-reactive  Non-reactive   HTLV I/II Antibody, Donor Eval ([facility])      Non-reactive  Non-reactive   CATERINA Panel, Donor Eval ([facility])        See  image under hyperlink   [place] Ab, Donor Eval ([facility])      Non reactive  Non-reactive   RPR, Donor Eval ([facility])      Non-reactive  Non-reactive   CMV Ab Total, Donor Eval ([facility])      Non-reactive  Reactive (A)     Component      Latest Ref Rng & Units 3/14/2022   Urine Total Volume      mL    Urine Collection Duration      Hr    CREATININE, URINE (SEND OUT)      15.0 - 325.0 mg/dL    Creatinine Clearance      70 - 110 mL/min    Creatinine, Urinr (mg/spec)      mg/Spec    Creatinine      0.5 - 1.4 mg/dL    Kappa Free Light Chains      0.33 - 1.94 mg/dL 1.27   Lambda Free Light Chains      0.57 - 2.63 mg/dL 1.36   Kappa/Lambda FLC Ratio      0.26 - 1.65 0.93   Varicella IgG      0.00 - 0.90 ISR    Varicella Interpretation      Negative    Hepatitis B Surface Ag, Donor Eval ([facility])      Non-reactive    Hepatitis B Core Antibody, Donor Eval ([facility])      Non reactive    Hepatitis C Antibody, Donor Eval ([facility])      Non-reactive    HIV  1/2 Ag/Ab, Donor Eval ([facility])      Non-reactive    HTLV I/II Antibody, Donor Eval ([facility])      Non-reactive    CATERINA Panel, Donor Eval ([facility])          [place] Ab, Donor Eval ([facility])      Non reactive    RPR, Donor Eval ([facility])      Non-reactive    CMV Ab Total, Donor Eval ([facility])      Non-reactive        Component      Latest Ref Rng & Units 3/31/2022   Specimen UA       Urine, Unspecified   Color, UA      Yellow, Straw, [place] Colorless (A)   Appearance, UA      Clear Clear   pH, UA      5.0 - 8.0 7.0   Specific Gravity, UA      1.005 - 1.030 1.005   Protein, UA      Negative Negative   Glucose, UA      Negative Negative   Ketones, UA      Negative Negative    Bilirubin (UA)      Negative Negative   Occult Blood UA      Negative Negative   NITRITE UA      Negative Negative   Leukocytes, UA      Negative Trace (A)   IgG      650 - 1600 mg/dL 1170   IgA      40 - 350 mg/dL 109   IgM      50 - 300 mg/dL 25 (L)   Kappa Free Light Chains      0.33 - 1.94 mg/dL 0.93   Lambda Free Light Chains      0.57 - 2.63 mg/dL 1.11   Kappa/Lambda FLC Ratio      0.26 - 1.65 0.84   Beta-2 Microglobulin      0.0 - 2.5 ug/mL 1.7     Component      Latest Ref Rng & Units 3/31/2022   HSV 1 IgG      Negative Positive (A)   HSV 2 IgG      Negative Negative       3/31/22 SPEP: Normal total protein. Normal gamma globulins are decreased. Paraprotein band in near-gamma = 0.85 g/dL, previously 0.76 g/dL.   3/31/22 serum SAMI: IgG lambda specific monoclonal band present    4/1/22 PFT    Spirometry is normal. DLCO is mildly decreased.     4/1/22 2D ECHO    ·  The left ventricle is normal in size with normal systolic function. The estimated ejection fraction is 60%.  · Normal right ventricular size with normal right ventricular systolic function.  · Indeterminate left ventricular diastolic function.  · Moderate left atrial enlargement.  · Mild mitral regurgitation.  · The estimated PA systolic pressure is 28 mmHg.  · Normal central venous pressure (3 mmHg).    4/1/22 EKG    Sinus rhythm with short IL   Otherwise normal ECG   No previous ECGs available     Assessment:    1. IgG lambda multiple myeloma not in remission  2. Stem cell transplant candidate  3. 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 immunization    Plan:    1-2. R-ISS II IgG lambda multiple myeloma, treated with  RVd .  She had increased 1q21 copy number and has t(4,14), both high risk markers in multiple myeloma. She also has monosomy 13 on FISH. She did not have baseline PET CT.   Prior to C5 her M spike on SPEP was 0.52g/dl( was 5.36g/dl at diagnosis). She has received 6 cycles of RVd, followed by 2 cycles of velcade-dexamethasone. She  has tolerated the treatments well.    PET CT on 3/31/22 did not demonstrate any hypermetabolic lytic or soft tissue lesions. BM biopsy on 3/21/22 showed 10% plasma cells ( versus 50% at diagnosis).  M spike on 3/3/22 SPEP was 0.85g/dl, suggesting she is in partial response. 4/1/22 PFTs showed mild decrease in DLCO. EKG did not show any abnormality. 2D ECHO on 4/1/22 showed moderate left atrial enlargement, mild mitral regurgitation but normal LVEF. HBV, HIV, HCV, [place] Ab, RPR, HTLV I/II all non-reactive. Varicella negative. CMV Ab was reactive. HSV 1 IgG was reactive. HSV 2 IgG negative.   Colonoscopy normal. No abnormality of mammogram or PAP smear. Dental clearance, social work, onco-psychology evaluation all appropriate to proceed to auto SCT.  Explained the risks and benefits of auto SCT following high dose melphalan. Emphasized that this is NOT a curative treatment.     Benefits : longer duration of disease free survival; improved overall survival with maintenance chemotherapy    Risks:      * Bone Marrow Suppression    High-dose chemotherapy directly destroys the bone marrow's ability to produce white blood cells, red blood cells and platelets. Patients experience side effects caused by neutropenia, anemia and thrombocytopenia. Patients usually need blood and platelet transfusions to treat anemia and thrombocytopenia until the new graft beings producing blood cells. The duration of bone marrow suppression is shortened by infusing an optimal number of stem cells and administering growth factors that hasten the recovery of blood cell production.    * Infections    During the two to three weeks it takes the new bone marrow to grow and produce white blood cells, patients are susceptible to infection and require the administration of antibiotics to prevent bacterial and fungal infections. Bacterial infections are the most common during this initial period of neutropenia. Stem cells collected from peripheral blood  tend to engraft faster than bone marrow and may reduce the risk of infection by shortening the period of neutropenia. The growth factor filgrastim also increases the rate of white blood cell recovery and has been approved by the Food and Drug Administration for use during autologous stem cell transplant.    The immune system takes even longer to recover than white blood cell production, with a resulting susceptibility to some bacterial, fungal and viral infections for weeks to months. After initial recovery from autologous stem cell transplant, patients are often required to take antibiotics for weeks to months to prevent infections from occurring. Prophylactic antibiotic administration can prevent Pneumocystis carinii pneumonia and some bacterial and fungal infections. Prophylactic antibiotics can also decrease the incidence of herpes zoster infection, which commonly occurs after high-dose chemotherapy and autologous stem cell transplant.    * Veno-Occlusive Disease of the Liver (VOD)    High-dose chemotherapy can result in damage to the liver, which can be serious and even fatal. This complication is increased in patients who have substantial amounts of previous chemotherapy and/or radiation therapy, a history of liver damage or hepatitis. Veno-occlusive disease (VOD) of the liver typically occurs in the first two weeks after high-dose chemotherapy treatment. Patients typically experience symptoms of abdominal fullness or swelling, liver tenderness and weight gain from fluid retention.     * Interstitial Pneumonia Syndrome (IPS)    High-dose chemotherapy can directly damage the cells of the lungs. This may be more frequent in patients treated with certain types of chemotherapy and/or radiation therapy given prior to the transplant. This complication of transplant may occur anytime, from a few days after high-dose chemotherapy to several months after treatment. This often occurs after a patient has returned home  from a transplant center and is being seen by a local oncologist.  Patients typically experience a dry non-productive cough or shortness of breath. Patients experiencing shortness of breath or a new cough after autologous transplant should bring this to the immediate attention of their doctor since this can be a serious and even fatal complication.    * Graft Failure    Graft failure is extremely unusual in autologous stem cell transplantation. Graft failure occurs when bone marrow function does not return. The graft may fail to grow in the patient--resulting in bone marrow failure--with the absence of red blood cells, white blood cells and platelet production. This results in infection, anemia and bleeding. Graft failure may also occur in patients with extensive marrow fibrosis before transplantation, a viral illness or from the use of some drugs (such as methotrexate). In leukemia patients, graft failure often is associated with a recurrence of cancer; the leukemic cells may inhibit the growth of the transplanted cells. In some cases, the reasons for graft failure are unknown.    * Risks associated with central iv line placement (for leukapheresis)    --Bleeding   --Pain  --Infections  --Pneumothorax( rare)    *Risks associated with use of neupogen    >10%:    Cardiovascular: Chest pain (5% to 13%)  Central nervous system: Fatigue (20%), dizziness (14%), pain (12%)  Dermatologic: Skin rash (2% to 14%)  Gastrointestinal: Nausea (43%)  Hematologic & oncologic: Thrombocytopenia (5% to 38%), splenomegaly (?5%; severe chronic neutropenia: 30%)  Hepatic: Increased serum alkaline phosphatase (6% to 11%)  Neuromuscular & skeletal: Ostealgia (11% to 30%), back pain (2% to 15%)  Respiratory: Epistaxis (?5%), cough (14%), dyspnea (13%)  Miscellaneous: Fever (8% to 48%)    1% to 10%:    Cardiovascular: Peripheral edema (?5%), hypertension (?5%)  Central nervous system: Headache (6% to 10%), hypoesthesia (?5%), insomnia (?5%),  malaise (?5%), mouth pain (?5%)  Dermatologic: Alopecia (?5%), erythema (?2%), maculopapular rash (?2%)  Endocrine & metabolic: Increased lactate dehydrogenase (6%)  Gastrointestinal: Vomiting (?5%), decreased appetite (?5%), constipation (?2%), diarrhea (?2%)  Genitourinary: Urinary tract infection (?5%)  Hematologic & oncologic: Anemia (?5%), decreased hemoglobin (?5%), leukocytosis (?2%)  Hypersensitivity: Transfusion reaction (?2%), hypersensitivity reaction (?5%)  Immunologic: Antibody development (2% to 3%; no evidence of neutralizing response)  Infection: Sepsis (?5%)  Neuromuscular & skeletal: Arthralgia (5% to 9%), limb pain (2% to 7%), muscle spasm (?5%), musculoskeletal pain (?5%) asthenia (?5%)  Respiratory: Bronchitis (?5%), oropharyngeal pain (?5%), upper respiratory tract infection (?5%)    Adverse effects due to high dose melphalan     >10%:  Cardiovascular: Peripheral edema (conditionin%)  Central nervous system: Fatigue (?50%; conditionin%), dizziness (conditionin%)  Endocrine & metabolic: Hypokalemia (?50% conditionin%), hypophosphatemia (conditionin%)  Gastrointestinal: Diarrhea (?50%; conditionin%), nausea (?50%; conditionin%), vomiting (?50%; conditionin%), decreased appetite (conditionin%), constipation (conditionin%), mucositis (conditionin%), abdominal pain (conditionin%), dysgeusia (conditionin%), stomatitis (conditionin%), dyspepsia (conditionin%)  Hematologic & oncologic: Anemia (?50%), decreased absolute lymphocyte count (?50%), decreased neutrophils (?50%), decreased platelet count (?50%; prema: 14 to 21 days; recovery: 28 to 35 days), decreased white blood cell count (?50%; prema: 14 to 21 days; recovery: 28 to 35 days), febrile neutropenia (conditionin%; grades 3/4: 28%)  Miscellaneous: Fever (conditionin%)    1% to 10%:    Gastrointestinal: Hematochezia  Genitourinary: Amenorrhea  "(9%)  Hypersensitivity: Hypersensitivity reaction (IV: 2%; less common in oral formula; includes bronchospasm, dyspnea, edema, hypotension, pruritus, skin rash, tachycardia, urticaria), anaphylaxis (?2%)  Renal: Renal failure    Frequency not defined:    Cardiovascular: Vasculitis  Central nervous system: Flushing sensation, tingling sensation  Endocrine & metabolic: SIADH (dose related; [place] 1985)  Genitourinary: Infertility, inhibition of testicular function  Hematologic & oncologic: Bone marrow depression  Hepatic: Hepatic sinusoidal obstruction syndrome (formerly known as hepatic veno-occlusive disease), hepatitis, increased serum transaminases, jaundice  Renal: Increased blood urea nitrogen  Miscellaneous: Chromosomal abnormality          *Risks associated with transfusion    --Infections  --Volume over load  --Lung injury (very rare)  --Iron overload  --Formation of antibodies against platelets and/or red blood cells  --hemolytic reactions  --Febrile reactions    * Long-Term Side Effects of Autologous Stem Cell Transplant    There are several long-term or late side effects that result from the chemotherapy and/or radiation therapy used in autologous stem cell transplant. The frequency and severity of these problems depends on the chemotherapy used to treat the patient. Some examples of complications include the following:      --New Cancers: Treatment with chemotherapy and radiation therapy is known to increase the risk of developing a new cancer. These are called "secondary cancers" and may occur as a late complication of high-dose chemotherapy. Patients with lymphoma treated with conventional chemotherapy have also been reported to have a 4 to 8 percent risk of developing a secondary cancer. How much additional risk occurs from high-dose chemotherapy is unclear; however, high-dose radiation clearly increases the risk of developing a secondary cancer.                   3. She is on PPI    5. She " has received COVID 19 vaccine.

## 2022-04-13 NOTE — TELEPHONE ENCOUNTER
Called patient to discuss adjustment to transplant timeline by one day; patient willing to make adjustment. Will see in clinic this afternoon for consent signing appointment and review calendar, address questions. Patient with direct line for questions.

## 2022-04-14 ENCOUNTER — TELEPHONE (OUTPATIENT)
Dept: SURGERY | Facility: CLINIC | Age: 67
End: 2022-04-14
Payer: COMMERCIAL

## 2022-04-14 NOTE — PRE-PROCEDURE INSTRUCTIONS
PREOP INSTRUCTIONS:  No food,milk or milk products after midnight  Clear liquids like water,gatorade,apple juice are allowed up until 2 hours before surgery.  Instructed to follow the surgeon's instructions if they differ from these.  Shower instructions as well as directions to the Surgery Center were given.  Encouraged to wear loose fitting,comfortable clothing.  Medication instructions for pm prior to and am of procedure reviewed.  Instructed to avoid taking vitamins,supplements,aspirin and ibuprofen the morning of surgery.    Patient denies any side effects or issues with anesthesia or sedation.

## 2022-04-15 ENCOUNTER — INFUSION (OUTPATIENT)
Dept: INFUSION THERAPY | Facility: HOSPITAL | Age: 67
End: 2022-04-15
Attending: INTERNAL MEDICINE
Payer: COMMERCIAL

## 2022-04-15 ENCOUNTER — DOCUMENTATION ONLY (OUTPATIENT)
Dept: HEMATOLOGY/ONCOLOGY | Facility: CLINIC | Age: 67
End: 2022-04-15
Payer: COMMERCIAL

## 2022-04-15 DIAGNOSIS — Z76.82 STEM CELL TRANSPLANT CANDIDATE: Primary | ICD-10-CM

## 2022-04-15 PROCEDURE — 63600175 PHARM REV CODE 636 W HCPCS: Mod: JG | Performed by: NURSE PRACTITIONER

## 2022-04-15 RX ADMIN — FILGRASTIM 600 MCG: 300 INJECTION, SOLUTION INTRAVENOUS; SUBCUTANEOUS at 07:04

## 2022-04-15 NOTE — PROGRESS NOTES
Neupogen 600 mcg administered to abdomen; patient tolerated well. No bleeding or irritation at the site. Reviewed potential side effects.  Instructed to reach out with any concerns.

## 2022-04-16 ENCOUNTER — INFUSION (OUTPATIENT)
Dept: INFUSION THERAPY | Facility: HOSPITAL | Age: 67
End: 2022-04-16
Attending: INTERNAL MEDICINE
Payer: COMMERCIAL

## 2022-04-16 VITALS — BODY MASS INDEX: 23.36 KG/M2 | WEIGHT: 148.81 LBS | HEIGHT: 67 IN

## 2022-04-16 DIAGNOSIS — Z76.82 STEM CELL TRANSPLANT CANDIDATE: Primary | ICD-10-CM

## 2022-04-16 PROCEDURE — 63600175 PHARM REV CODE 636 W HCPCS: Mod: JG | Performed by: NURSE PRACTITIONER

## 2022-04-16 PROCEDURE — 96372 THER/PROPH/DIAG INJ SC/IM: CPT

## 2022-04-16 RX ADMIN — FILGRASTIM 600 MCG: 300 INJECTION, SOLUTION INTRAVENOUS; SUBCUTANEOUS at 08:04

## 2022-04-16 NOTE — NURSING
0805 pt here for neupogen injection, tolerated well to abdomen, no distress noted upon d/c to home

## 2022-04-17 ENCOUNTER — ANESTHESIA EVENT (OUTPATIENT)
Dept: SURGERY | Facility: HOSPITAL | Age: 67
DRG: 842 | End: 2022-04-17
Payer: COMMERCIAL

## 2022-04-17 ENCOUNTER — INFUSION (OUTPATIENT)
Dept: INFUSION THERAPY | Facility: HOSPITAL | Age: 67
End: 2022-04-17
Attending: INTERNAL MEDICINE
Payer: COMMERCIAL

## 2022-04-17 VITALS — HEIGHT: 67 IN | BODY MASS INDEX: 23.36 KG/M2 | WEIGHT: 148.81 LBS

## 2022-04-17 DIAGNOSIS — Z76.82 STEM CELL TRANSPLANT CANDIDATE: Primary | ICD-10-CM

## 2022-04-17 PROCEDURE — 63600175 PHARM REV CODE 636 W HCPCS: Mod: JG | Performed by: NURSE PRACTITIONER

## 2022-04-17 PROCEDURE — 96372 THER/PROPH/DIAG INJ SC/IM: CPT

## 2022-04-17 RX ADMIN — FILGRASTIM 600 MCG: 300 INJECTION, SOLUTION INTRAVENOUS; SUBCUTANEOUS at 08:04

## 2022-04-17 NOTE — NURSING
0810- Patient arrived ambulatory to the unit for injection.  NAD, no complaints.  Patient tolerated injection to abdomen without issue and was discharged to home setting.  Patient will return to clinic tomorrow for next injection, instructed to contact MD or transplant coordinator with any issues or concerns.

## 2022-04-18 ENCOUNTER — HOSPITAL ENCOUNTER (INPATIENT)
Facility: HOSPITAL | Age: 67
LOS: 1 days | Discharge: HOME | DRG: 842 | End: 2022-04-18
Attending: SURGERY | Admitting: INTERNAL MEDICINE
Payer: COMMERCIAL

## 2022-04-18 ENCOUNTER — ANESTHESIA (OUTPATIENT)
Dept: SURGERY | Facility: HOSPITAL | Age: 67
DRG: 842 | End: 2022-04-18
Payer: COMMERCIAL

## 2022-04-18 ENCOUNTER — TELEPHONE (OUTPATIENT)
Dept: HEMATOLOGY/ONCOLOGY | Facility: CLINIC | Age: 67
End: 2022-04-18
Payer: COMMERCIAL

## 2022-04-18 ENCOUNTER — INFUSION (OUTPATIENT)
Dept: INFUSION THERAPY | Facility: HOSPITAL | Age: 67
DRG: 842 | End: 2022-04-18
Attending: SURGERY
Payer: COMMERCIAL

## 2022-04-18 VITALS
SYSTOLIC BLOOD PRESSURE: 122 MMHG | RESPIRATION RATE: 16 BRPM | HEIGHT: 67 IN | TEMPERATURE: 98 F | WEIGHT: 146 LBS | BODY MASS INDEX: 22.91 KG/M2 | DIASTOLIC BLOOD PRESSURE: 64 MMHG | OXYGEN SATURATION: 96 % | HEART RATE: 66 BPM

## 2022-04-18 DIAGNOSIS — C90.00 MULTIPLE MYELOMA: ICD-10-CM

## 2022-04-18 DIAGNOSIS — Z76.82 STEM CELL TRANSPLANT CANDIDATE: Primary | ICD-10-CM

## 2022-04-18 LAB
ABO + RH BLD: NORMAL
ALBUMIN SERPL BCP-MCNC: 3.6 G/DL (ref 3.5–5.2)
ALP SERPL-CCNC: 88 U/L (ref 55–135)
ALT SERPL W/O P-5'-P-CCNC: 33 U/L (ref 10–44)
ANION GAP SERPL CALC-SCNC: 12 MMOL/L (ref 8–16)
APTT BLDCRRT: 22.4 SEC (ref 21–32)
AST SERPL-CCNC: 25 U/L (ref 10–40)
BASOPHILS # BLD AUTO: 0.11 K/UL (ref 0–0.2)
BASOPHILS NFR BLD: 0.6 % (ref 0–1.9)
BILIRUB SERPL-MCNC: 0.4 MG/DL (ref 0.1–1)
BLD GP AB SCN CELLS X3 SERPL QL: NORMAL
BUN SERPL-MCNC: 12 MG/DL (ref 8–23)
CA-I BLDV-SCNC: 1.13 MMOL/L (ref 1.06–1.42)
CALCIUM SERPL-MCNC: 9.1 MG/DL (ref 8.7–10.5)
CD34 %: 0.01 %
CD34 ABSOLUTE: 1.03 CELLS/UL
CD34 VIABILITY: 94.6 %
CHLORIDE SERPL-SCNC: 103 MMOL/L (ref 95–110)
CO2 SERPL-SCNC: 25 MMOL/L (ref 23–29)
CREAT SERPL-MCNC: 1 MG/DL (ref 0.5–1.4)
DIFFERENTIAL METHOD: ABNORMAL
EOSINOPHIL # BLD AUTO: 0.3 K/UL (ref 0–0.5)
EOSINOPHIL NFR BLD: 1.6 % (ref 0–8)
ERYTHROCYTE [DISTWIDTH] IN BLOOD BY AUTOMATED COUNT: 14.3 % (ref 11.5–14.5)
EST. GFR  (AFRICAN AMERICAN): >60 ML/MIN/1.73 M^2
EST. GFR  (NON AFRICAN AMERICAN): 59.3 ML/MIN/1.73 M^2
GLUCOSE SERPL-MCNC: 86 MG/DL (ref 70–110)
HCT VFR BLD AUTO: 38.9 % (ref 37–48.5)
HGB BLD-MCNC: 12.4 G/DL (ref 12–16)
IMM GRANULOCYTES # BLD AUTO: 0.19 K/UL (ref 0–0.04)
IMM GRANULOCYTES NFR BLD AUTO: 1.1 % (ref 0–0.5)
INR PPP: 1 (ref 0.8–1.2)
LYMPHOCYTES # BLD AUTO: 1.7 K/UL (ref 1–4.8)
LYMPHOCYTES NFR BLD: 9.7 % (ref 18–48)
MAGNESIUM SERPL-MCNC: 1.9 MG/DL (ref 1.6–2.6)
MCH RBC QN AUTO: 32 PG (ref 27–31)
MCHC RBC AUTO-ENTMCNC: 31.9 G/DL (ref 32–36)
MCV RBC AUTO: 101 FL (ref 82–98)
MONOCYTES # BLD AUTO: 1.3 K/UL (ref 0.3–1)
MONOCYTES NFR BLD: 7.4 % (ref 4–15)
NEUTROPHILS # BLD AUTO: 14 K/UL (ref 1.8–7.7)
NEUTROPHILS NFR BLD: 79.6 % (ref 38–73)
NRBC BLD-RTO: 0 /100 WBC
PHOSPHATE SERPL-MCNC: 3.2 MG/DL (ref 2.7–4.5)
PLATELET # BLD AUTO: 135 K/UL (ref 150–450)
PLATELET BLD QL SMEAR: ABNORMAL
PMV BLD AUTO: 10.7 FL (ref 9.2–12.9)
POTASSIUM SERPL-SCNC: 3.8 MMOL/L (ref 3.5–5.1)
PROT SERPL-MCNC: 6.9 G/DL (ref 6–8.4)
PROTHROMBIN TIME: 10.3 SEC (ref 9–12.5)
RBC # BLD AUTO: 3.87 M/UL (ref 4–5.4)
SODIUM SERPL-SCNC: 140 MMOL/L (ref 136–145)
WBC # BLD AUTO: 17.63 K/UL (ref 3.9–12.7)

## 2022-04-18 PROCEDURE — D9220A PRA ANESTHESIA: Mod: ,,, | Performed by: ANESTHESIOLOGY

## 2022-04-18 PROCEDURE — 80053 COMPREHEN METABOLIC PANEL: CPT | Performed by: SURGERY

## 2022-04-18 PROCEDURE — 83735 ASSAY OF MAGNESIUM: CPT | Performed by: SURGERY

## 2022-04-18 PROCEDURE — 63600175 PHARM REV CODE 636 W HCPCS: Mod: JG | Performed by: NURSE PRACTITIONER

## 2022-04-18 PROCEDURE — 96402 CHEMO HORMON ANTINEOPL SQ/IM: CPT

## 2022-04-18 PROCEDURE — 85730 THROMBOPLASTIN TIME PARTIAL: CPT | Performed by: SURGERY

## 2022-04-18 PROCEDURE — 85610 PROTHROMBIN TIME: CPT | Performed by: SURGERY

## 2022-04-18 PROCEDURE — 84100 ASSAY OF PHOSPHORUS: CPT | Performed by: SURGERY

## 2022-04-18 PROCEDURE — 96372 THER/PROPH/DIAG INJ SC/IM: CPT

## 2022-04-18 PROCEDURE — 63600175 PHARM REV CODE 636 W HCPCS: Performed by: STUDENT IN AN ORGANIZED HEALTH CARE EDUCATION/TRAINING PROGRAM

## 2022-04-18 PROCEDURE — 86850 RBC ANTIBODY SCREEN: CPT | Performed by: SURGERY

## 2022-04-18 PROCEDURE — 63600175 PHARM REV CODE 636 W HCPCS: Performed by: SURGERY

## 2022-04-18 PROCEDURE — 25000003 PHARM REV CODE 250: Performed by: STUDENT IN AN ORGANIZED HEALTH CARE EDUCATION/TRAINING PROGRAM

## 2022-04-18 PROCEDURE — D9220A PRA ANESTHESIA: ICD-10-PCS | Mod: ,,, | Performed by: ANESTHESIOLOGY

## 2022-04-18 PROCEDURE — 71000015 HC POSTOP RECOV 1ST HR: Performed by: SURGERY

## 2022-04-18 PROCEDURE — 36558 PR INSERT TUNNELED CV CATH W/O PORT OR PUMP: ICD-10-PCS | Mod: RT,,, | Performed by: SURGERY

## 2022-04-18 PROCEDURE — C1750 CATH, HEMODIALYSIS,LONG-TERM: HCPCS | Performed by: SURGERY

## 2022-04-18 PROCEDURE — 71000044 HC DOSC ROUTINE RECOVERY FIRST HOUR: Performed by: SURGERY

## 2022-04-18 PROCEDURE — 36000706: Performed by: SURGERY

## 2022-04-18 PROCEDURE — 77001 CHG FLUOROGUIDE CNTRL VEN ACCESS,PLACE,REPLACE,REMOVE: ICD-10-PCS | Mod: 26,,, | Performed by: SURGERY

## 2022-04-18 PROCEDURE — 12000002 HC ACUTE/MED SURGE SEMI-PRIVATE ROOM

## 2022-04-18 PROCEDURE — 37000009 HC ANESTHESIA EA ADD 15 MINS: Performed by: SURGERY

## 2022-04-18 PROCEDURE — 36558 INSERT TUNNELED CV CATH: CPT | Mod: RT,,, | Performed by: SURGERY

## 2022-04-18 PROCEDURE — 25000003 PHARM REV CODE 250: Performed by: SURGERY

## 2022-04-18 PROCEDURE — 77001 FLUOROGUIDE FOR VEIN DEVICE: CPT | Mod: 26,,, | Performed by: SURGERY

## 2022-04-18 PROCEDURE — 36000707: Performed by: SURGERY

## 2022-04-18 PROCEDURE — 82330 ASSAY OF CALCIUM: CPT | Performed by: SURGERY

## 2022-04-18 PROCEDURE — 37000008 HC ANESTHESIA 1ST 15 MINUTES: Performed by: SURGERY

## 2022-04-18 PROCEDURE — 86367 STEM CELLS TOTAL COUNT: CPT | Performed by: SURGERY

## 2022-04-18 PROCEDURE — 85025 COMPLETE CBC W/AUTO DIFF WBC: CPT | Performed by: SURGERY

## 2022-04-18 DEVICE — CATH HEMOSPLIT 14.5FR 19CM: Type: IMPLANTABLE DEVICE | Site: NECK | Status: FUNCTIONAL

## 2022-04-18 RX ORDER — ACETAMINOPHEN 325 MG/1
650 TABLET ORAL EVERY 4 HOURS PRN
Status: DISCONTINUED | OUTPATIENT
Start: 2022-04-18 | End: 2022-04-18 | Stop reason: HOSPADM

## 2022-04-18 RX ORDER — LIDOCAINE HYDROCHLORIDE 20 MG/ML
INJECTION, SOLUTION EPIDURAL; INFILTRATION; INTRACAUDAL; PERINEURAL
Status: DISCONTINUED | OUTPATIENT
Start: 2022-04-18 | End: 2022-04-18

## 2022-04-18 RX ORDER — SODIUM CHLORIDE 9 MG/ML
INJECTION, SOLUTION INTRAVENOUS CONTINUOUS
Status: DISCONTINUED | OUTPATIENT
Start: 2022-04-18 | End: 2022-04-18 | Stop reason: HOSPADM

## 2022-04-18 RX ORDER — MIDAZOLAM HYDROCHLORIDE 1 MG/ML
INJECTION, SOLUTION INTRAMUSCULAR; INTRAVENOUS
Status: DISCONTINUED | OUTPATIENT
Start: 2022-04-18 | End: 2022-04-18

## 2022-04-18 RX ORDER — SODIUM CHLORIDE 0.9 % (FLUSH) 0.9 %
10 SYRINGE (ML) INJECTION
Status: DISCONTINUED | OUTPATIENT
Start: 2022-04-18 | End: 2022-04-18 | Stop reason: HOSPADM

## 2022-04-18 RX ORDER — OXYCODONE HYDROCHLORIDE 5 MG/1
5 TABLET ORAL EVERY 4 HOURS PRN
Status: DISCONTINUED | OUTPATIENT
Start: 2022-04-18 | End: 2022-04-18 | Stop reason: HOSPADM

## 2022-04-18 RX ORDER — ONDANSETRON 2 MG/ML
INJECTION INTRAMUSCULAR; INTRAVENOUS
Status: DISCONTINUED | OUTPATIENT
Start: 2022-04-18 | End: 2022-04-18

## 2022-04-18 RX ORDER — BUPIVACAINE HYDROCHLORIDE 5 MG/ML
INJECTION, SOLUTION EPIDURAL; INTRACAUDAL
Status: DISCONTINUED | OUTPATIENT
Start: 2022-04-18 | End: 2022-04-18 | Stop reason: HOSPADM

## 2022-04-18 RX ORDER — PLERIXAFOR 24 MG/1.2ML
0.24 SOLUTION SUBCUTANEOUS ONCE AS NEEDED
Status: CANCELLED | OUTPATIENT
Start: 2022-04-18 | End: 2033-09-14

## 2022-04-18 RX ORDER — HEPARIN SODIUM 1000 [USP'U]/ML
INJECTION, SOLUTION INTRAVENOUS; SUBCUTANEOUS
Status: DISCONTINUED | OUTPATIENT
Start: 2022-04-18 | End: 2022-04-18 | Stop reason: HOSPADM

## 2022-04-18 RX ORDER — LIDOCAINE HYDROCHLORIDE 10 MG/ML
INJECTION, SOLUTION EPIDURAL; INFILTRATION; INTRACAUDAL; PERINEURAL
Status: DISCONTINUED
Start: 2022-04-18 | End: 2022-04-18 | Stop reason: HOSPADM

## 2022-04-18 RX ORDER — CEFAZOLIN SODIUM/WATER 2 G/20 ML
SYRINGE (ML) INTRAVENOUS
Status: DISCONTINUED | OUTPATIENT
Start: 2022-04-18 | End: 2022-04-18

## 2022-04-18 RX ORDER — DEXMEDETOMIDINE HYDROCHLORIDE 100 UG/ML
INJECTION, SOLUTION INTRAVENOUS
Status: DISCONTINUED | OUTPATIENT
Start: 2022-04-18 | End: 2022-04-18

## 2022-04-18 RX ORDER — PLERIXAFOR 24 MG/1.2ML
0.24 SOLUTION SUBCUTANEOUS ONCE AS NEEDED
Status: COMPLETED | OUTPATIENT
Start: 2022-04-18 | End: 2022-04-18

## 2022-04-18 RX ORDER — LIDOCAINE HYDROCHLORIDE 10 MG/ML
1 INJECTION, SOLUTION EPIDURAL; INFILTRATION; INTRACAUDAL; PERINEURAL ONCE AS NEEDED
Status: DISCONTINUED | OUTPATIENT
Start: 2022-04-18 | End: 2022-04-18 | Stop reason: HOSPADM

## 2022-04-18 RX ORDER — HYDROMORPHONE HYDROCHLORIDE 1 MG/ML
1 INJECTION, SOLUTION INTRAMUSCULAR; INTRAVENOUS; SUBCUTANEOUS EVERY 4 HOURS PRN
Status: DISCONTINUED | OUTPATIENT
Start: 2022-04-18 | End: 2022-04-18 | Stop reason: HOSPADM

## 2022-04-18 RX ORDER — ONDANSETRON 2 MG/ML
4 INJECTION INTRAMUSCULAR; INTRAVENOUS EVERY 6 HOURS PRN
Status: DISCONTINUED | OUTPATIENT
Start: 2022-04-18 | End: 2022-04-18 | Stop reason: HOSPADM

## 2022-04-18 RX ORDER — FENTANYL CITRATE 50 UG/ML
INJECTION, SOLUTION INTRAMUSCULAR; INTRAVENOUS
Status: DISCONTINUED | OUTPATIENT
Start: 2022-04-18 | End: 2022-04-18

## 2022-04-18 RX ORDER — PROPOFOL 10 MG/ML
VIAL (ML) INTRAVENOUS
Status: DISCONTINUED | OUTPATIENT
Start: 2022-04-18 | End: 2022-04-18

## 2022-04-18 RX ADMIN — LIDOCAINE HYDROCHLORIDE 65 MG: 20 INJECTION, SOLUTION EPIDURAL; INFILTRATION; INTRACAUDAL; PERINEURAL at 07:04

## 2022-04-18 RX ADMIN — Medication 2 G: at 07:04

## 2022-04-18 RX ADMIN — DEXMEDETOMIDINE HYDROCHLORIDE 8 MCG: 100 INJECTION, SOLUTION INTRAVENOUS at 07:04

## 2022-04-18 RX ADMIN — MIDAZOLAM 2 MG: 1 INJECTION INTRAMUSCULAR; INTRAVENOUS at 07:04

## 2022-04-18 RX ADMIN — FENTANYL CITRATE 50 MCG: 50 INJECTION INTRAMUSCULAR; INTRAVENOUS at 07:04

## 2022-04-18 RX ADMIN — PLERIXAFOR 16 MG: 24 SOLUTION SUBCUTANEOUS at 04:04

## 2022-04-18 RX ADMIN — SODIUM CHLORIDE: 0.9 INJECTION, SOLUTION INTRAVENOUS at 07:04

## 2022-04-18 RX ADMIN — PROPOFOL 75 MCG/KG/MIN: 10 INJECTION, EMULSION INTRAVENOUS at 07:04

## 2022-04-18 RX ADMIN — PROPOFOL 30 MG: 10 INJECTION, EMULSION INTRAVENOUS at 07:04

## 2022-04-18 RX ADMIN — FENTANYL CITRATE 25 MCG: 50 INJECTION INTRAMUSCULAR; INTRAVENOUS at 07:04

## 2022-04-18 RX ADMIN — FILGRASTIM 600 MCG: 300 INJECTION, SOLUTION INTRAVENOUS; SUBCUTANEOUS at 08:04

## 2022-04-18 RX ADMIN — ONDANSETRON 4 MG: 2 INJECTION INTRAMUSCULAR; INTRAVENOUS at 07:04

## 2022-04-18 NOTE — PROGRESS NOTES
Pt arrived via stretcher from OR AAOx4. Vitals WDL. Pt to remain NPO  Til after xray placement confirmation. Family brought to bedside

## 2022-04-18 NOTE — H&P
"History & Physical  General Surgery Clinic    SUBJECTIVE:     Chief complaint: port placement    History of Present Illness:  Patient is a 66 y.o. female presents with anemia, GERD, esophageal erosions, and MM planned for bone marrow transplant. She states that she is feeling well and is NPO for procedure this AM. No fever, chills, shortness of breath, chest pain.     Past Medical History:  Past Medical History:   Diagnosis Date    Anxiety     Fatigue     Multiple myeloma        Past Surgical History:  Past Surgical History:   Procedure Laterality Date    breast implants Bilateral 04/01/2021    CHOLECYSTECTOMY      HYSTERECTOMY         Family History:  Family History   Problem Relation Age of Onset    Hypertension Father        Social History:  Social History     Socioeconomic History    Marital status:      Spouse name: Junior    Number of children: 3   Tobacco Use    Smoking status: Never Smoker    Smokeless tobacco: Never Used   Substance and Sexual Activity    Alcohol use: Not Currently    Drug use: Never    Sexual activity: Not Currently     Partners: Male       Medications:  Current Facility-Administered Medications   Medication Dose Route Frequency Provider Last Rate Last Admin    0.9%  NaCl infusion   Intravenous Continuous Hernan Plascencia MD        LIDOcaine (PF) 10 mg/ml (1%) 10 mg/mL (1 %) injection             LIDOcaine (PF) 10 mg/ml (1%) injection 10 mg  1 mL Intradermal Once PRN Hernan Plascencia MD           Allergies:  Review of patient's allergies indicates:   Allergen Reactions    Garlic     Milk Hives, Itching and Nausea Only     Dairy Products       Review of Systems:  Negative except for HPI.      OBJECTIVE:     BP (!) 156/78 (BP Location: Right arm, Patient Position: Lying)   Pulse 83   Temp 98.7 °F (37.1 °C) (Temporal)   Resp 18   Ht 5' 7" (1.702 m)   Wt 66.2 kg (146 lb)   SpO2 99%   Breastfeeding No   BMI 22.87 kg/m²     Physical Exam:  Constitutional: " Oriented to person, place, and time. Appears well-developed and well-nourished.   Gen: WD NN female, NAD  HEENT: no scleral icterus, MMM  Neck: subtle, trachea midlien  CV: RRR  Chest: flat  Pulm: NWOB on RA  Abd: soft, non distended non tender  Ext: WWP  Neuro: no deficits  Psych: appropriate behavior and affect      Labs reviewed: Plts 125, Hgb 12.4. INR 1.0 PTT 22.4    ASSESSMENT/PLAN:     66 y.o. female planned for CVC placement today for chemo prior to bone marrow transplant. She is NPO and consented for right possible left chest or neck central line placement.    -OR today    Connor Cruz MDR4  General Surgery  [facility]

## 2022-04-18 NOTE — OP NOTE
Date: .4/18/2022    Pre-operative diagnosis: Multiple myeloma not having achieved remission [C90.00]    Post-operative diagnosis: Multiple myeloma not having achieved remission [C90.00]    Attending: Hernan Plascencia M.D.     Assistant:      * Winifred Mcbride MD - Resident - Chief     * Connor Cruz MD- Resident    Procedure: INSERTION, CATHETER, HEMODIALYSIS, DUAL LUMEN R IJV    Anesthesia: Local/MAC    EBL: 10 ml    Complications: None.     Procedure in detail: After informed consent was obtained, the patient was brought to the operating suite and placed in the supine position and prepped and draped in the usual sterile fashion. After undergoing adequate sedation, the ultrasound was used to assess her right internal jugular vein, which appeared to be a good target for placement of the Perm-A-Cath.  Ultrasound-guidance documented vessel patency. This was then cannulated under ultrasound guidance with a micropuncture needle, clearly visualizing the tip of the needle in the vessel.  After using a 0.018 inch guidewire thru the micropuncture sheath, a 0.035 inch waswas then threaded through into the SVC/RA under fluoroscopic-guidance. After confirming this with fluoro, the needle was removed. A tunnel was then made with the 19 cm 14.5 Fr Perm-A-Cath with the cuff under the subcutaneous tissue and the catheter tunneled. At this time, the dilator sheath was then placed over the wire, removing the wire and dilator. The catheter was then placed through the sheath with ease and the sheath subsequently removed. This had been done under fluoroscopic guidance, and one final picture confirmed good placement of the Perm-A-Cath in good position. Both ports functioned well and were heparin locked with 1.5ml and 1.6ml of heparin solution. It was sutured in place using permanent sutures and a sterile dressing was placed over the line exit site.  In the neck, 4-0 Monocryl buried subcutaneous suture and dermabond were used for  closure of the access site.The patient was then transferred to Recovery in stable and satisfactory condition. A chest xray in the PACU demonstrated proper placement and no pneumothorax.  All sponge, needle, instrument counts correct at the end of the case.  I was present and scrubbed throughout the procedure.

## 2022-04-18 NOTE — BRIEF OP NOTE
[facility] - Surgery ([facility])  Brief Operative Note    Surgery Date: 4/18/2022     Surgeon(s) and Role:     * Hernan Plascencia MD - Primary     * Winifred Mcbride MD - Resident - Chief     * Connor Cruz MD- Resident    Pre-op Diagnosis:  Multiple myeloma not having achieved remission [C90.00]    Post-op Diagnosis:  Post-Op Diagnosis Codes:     * Multiple myeloma not having achieved remission [C90.00]    Procedure(s) (LRB):  INSERTION, CATHETER, HEMODIALYSIS, DUAL LUMEN Bard 14.5 Fr Hemosplit Catheter Model [id number], Right Possible Left Chest (Right)    Anesthesia: Local MAC    Operative Findings: Right IJ tunneled central venous catheter placement under ultrasound and fluoroscopic guidance     Estimated Blood Loss: 10cc         Specimens:   Specimen (24h ago, onward)            None        Dispo: Patient tolerated procedure well and transferred to PACU in stable condition.  Winifred Mcbride MD   General Surgery- PGYV  [phone]        Discharge Note  SUMMARY    Admit Date: 4/18/2022    Attending Physician: Hernan Plascencia MD    Discharge Physician: Hernan Plascencia MD    Discharge Date: 04/19/2022    Final Diagnosis: Multiple myeloma not having achieved remission [C90.00]    Outcome of Treatment: Successful placement of central venous catheter     Disposition: Home or self-care    Patient Instructions:   Discharge Medication List as of 4/18/2022  8:12 AM      CONTINUE these medications which have NOT CHANGED    Details   acetaminophen (TYLENOL) 500 MG tablet Take 500 mg by mouth every 6 (six) hours as needed for Pain., Historical Med      acyclovir (ZOVIRAX) 400 MG tablet Take 400 mg by mouth 2 (two) times daily., Starting Thu 11/4/2021, Historical Med      ascorbic acid, vitamin C, (VITAMIN C) 250 MG tablet Take 250 mg by mouth once daily., Historical Med      aspirin (ECOTRIN) 81 MG EC tablet Take 81 mg by mouth once daily., Starting Wed 10/27/2021, Historical Med      calcium carbonate (OS-LAILA) 600 mg calcium  (1,500 mg) Tab Take 1 tablet by mouth once daily., Historical Med      calcium carbonate (TUMS ULTRA ORAL) Take 500 mg by mouth daily as needed (heart burn)., Historical Med      citalopram (CELEXA) 10 MG tablet Take 10 mg by mouth every morning., Starting Sun 11/7/2021, Historical Med      clonazePAM (KLONOPIN) 0.5 MG tablet Take 0.5 mg by mouth 2 (two) times daily as needed for Anxiety., Starting Tue 11/16/2021, Historical Med      codeine-butalbital-ASA-caffeine (BUTALBITAL COMPOUND W/CODEINE) 54--40 mg Cap Take 1 capsule by mouth every 4 (four) hours as needed (migraine headaches)., Starting Tue 9/7/2021, Historical Med      dexAMETHasone (DECADRON) 4 MG Tab Take 20 mg by mouth once daily. On Tuesdays and Wednesdays, Starting Wed 10/27/2021, Historical Med      docusate sodium (COLACE) 100 MG capsule Take 100 mg by mouth 2 (two) times daily., Starting Thu 7/15/2021, Historical Med      fluticasone propionate (FLONASE) 50 mcg/actuation nasal spray 1 spray by Each Nostril route every evening., Historical Med      GI cocktail antac/dicyc/lidoc Take 5 mLs by mouth daily as needed (nausea)., Historical Med      multivitamin (THERAGRAN) per tablet Take 1 tablet by mouth once daily., Historical Med      ondansetron (ZOFRAN) 8 MG tablet Take 8 mg by mouth every 8 (eight) hours as needed for Nausea., Starting Wed 10/27/2021, Historical Med      pantoprazole (PROTONIX) 40 MG tablet Take 40 mg by mouth every evening., Starting Wed 11/24/2021, Historical Med      polyethylene glycol (GLYCOLAX) 17 gram PwPk Take 17 g by mouth once daily., Historical Med      promethazine (PHENERGAN) 25 MG tablet Take 25 mg by mouth every 6 (six) hours as needed (nuasea)., Starting Mon 10/11/2021, Historical Med      REVLIMID 25 mg Cap Take 25 mg by mouth once daily. For 21 days on and 7 days off, Starting Mon 11/29/2021, Historical Med      zinc gluconate 50 mg tablet Take 50 mg by mouth once daily., Starting Wed 10/6/2021, Historical  Med             Diet:  Resume pre-operative diet    Condition: good    Activity:  Ad bladimir    Follow-up:  Follow-up as needed when line no longer required.

## 2022-04-18 NOTE — ANESTHESIA PREPROCEDURE EVALUATION
[facility]  Anesthesia Pre-Operative Evaluation         Patient Name: Caty Diaz  YOB: 1955  MRN: [id number]    SUBJECTIVE:     Pre-operative evaluation for Procedure(s) (LRB):  INSERTION, CATHETER, HEMODIALYSIS, DUAL LUMEN Bard 14.5 Fr Hemosplit Catheter Model [id number], Right Possible Left Chest (Right)     04/18/2022    Caty Diaz is a 66 y.o. female w/ a significant PMHx of muliple myeloma, anemia.    Patient now presents for the above procedure(s).      LDA: None documented.       Peripheral IV - Single Lumen 04/18/22 0540 18 G Left Wrist (Active)   Site Assessment Clean;Dry;Intact;No redness;No swelling 04/18/22 0549   Extremity Assessment Distal to IV No warmth;No swelling;No redness;No abnormal discoloration 04/18/22 0549   Line Status Infusing 04/18/22 0549   Dressing Status Clean;Dry;Intact 04/18/22 0549   Dressing Intervention First dressing 04/18/22 0540   Number of days: 0       Prev airway: None documented.    Drips: None documented.   sodium chloride 0.9%         Patient Active Problem List   Diagnosis    Stem cell transplant candidate    Chronic fatigue    Anemia in neoplastic disease    Anxiety    Multiple myeloma       Review of patient's allergies indicates:   Allergen Reactions    Garlic     Milk Hives, Itching and Nausea Only     Dairy Products       Current Inpatient Medications:   LIDOcaine (PF) 10 mg/ml (1%)           No current facility-administered medications on file prior to encounter.     Current Outpatient Medications on File Prior to Encounter   Medication Sig Dispense Refill    acetaminophen (TYLENOL) 500 MG tablet Take 500 mg by mouth every 6 (six) hours as needed for Pain.      acyclovir (ZOVIRAX) 400 MG tablet Take 400 mg by mouth 2 (two) times daily.      ascorbic acid, vitamin C, (VITAMIN C) 250 MG tablet Take 250 mg by mouth once daily.      aspirin (ECOTRIN) 81 MG EC tablet Take 81 mg by mouth once daily.       citalopram (CELEXA) 10 MG tablet Take 10 mg by mouth every morning.      clonazePAM (KLONOPIN) 0.5 MG tablet Take 0.5 mg by mouth 2 (two) times daily as needed for Anxiety.      dexAMETHasone (DECADRON) 4 MG Tab Take 20 mg by mouth once daily. On Tuesdays and Wednesdays      docusate sodium (COLACE) 100 MG capsule Take 100 mg by mouth 2 (two) times daily.      fluticasone propionate (FLONASE) 50 mcg/actuation nasal spray 1 spray by Each Nostril route every evening.      GI cocktail antac/dicyc/lidoc Take 5 mLs by mouth daily as needed (nausea).      multivitamin (THERAGRAN) per tablet Take 1 tablet by mouth once daily.      ondansetron (ZOFRAN) 8 MG tablet Take 8 mg by mouth every 8 (eight) hours as needed for Nausea.      pantoprazole (PROTONIX) 40 MG tablet Take 40 mg by mouth every evening.      polyethylene glycol (GLYCOLAX) 17 gram PwPk Take 17 g by mouth once daily.      REVLIMID 25 mg Cap Take 25 mg by mouth once daily. For 21 days on and 7 days off      zinc gluconate 50 mg tablet Take 50 mg by mouth once daily.      calcium carbonate (TUMS ULTRA ORAL) Take 500 mg by mouth daily as needed (heart burn).      codeine-butalbital-ASA-caffeine (BUTALBITAL COMPOUND W/CODEINE) 01--40 mg Cap Take 1 capsule by mouth every 4 (four) hours as needed (migraine headaches).      promethazine (PHENERGAN) 25 MG tablet Take 25 mg by mouth every 6 (six) hours as needed (nuasea).         Past Surgical History:   Procedure Laterality Date    breast implants Bilateral 04/01/2021    CHOLECYSTECTOMY      HYSTERECTOMY         Social History     Socioeconomic History    Marital status:      Spouse name: Junior    Number of children: 3   Tobacco Use    Smoking status: Never Smoker    Smokeless tobacco: Never Used   Substance and Sexual Activity    Alcohol use: Not Currently    Drug use: Never    Sexual activity: Not Currently     Partners: Male       OBJECTIVE:     Vital Signs Range (Last  24H):  Temp:  [37.1 °C (98.7 °F)]   Pulse:  [83]   Resp:  [18]   BP: (156)/(78)   SpO2:  [99 %]       Significant Labs:  Lab Results   Component Value Date    WBC 3.5 (L) 04/05/2022    HGB 11.7 04/05/2022    HCT 36.2 04/05/2022     04/05/2022    ALT 31 04/04/2022    AST 20 04/04/2022     04/04/2022    K 4.3 04/04/2022     04/04/2022    CREATININE 0.9 04/04/2022    BUN 14 04/04/2022    CO2 28 04/04/2022    TSH 2.24 09/21/2021    INR 1.0 04/18/2022       Diagnostic Studies: No relevant studies.    EKG:   Results for orders placed or performed during the hospital encounter of 04/01/22   EKG 12-lead    Collection Time: 04/01/22  9:56 AM    Narrative    Test Reason : Z76.82,C90.00,    Vent. Rate : 072 BPM     Atrial Rate : 072 BPM     P-R Int : 106 ms          QRS Dur : 082 ms      QT Int : 410 ms       P-R-T Axes : 072 079 074 degrees     QTc Int : 448 ms    Sinus rhythm with short LA  Otherwise normal ECG  No previous ECGs available  Confirmed by Bri Crawford MD ([id number]) on 4/3/2022 11:41:45 AM    Referred By: HILL LOJA           Confirmed By:Bri Crawford MD       2D ECHO:  TTE:  Results for orders placed or performed during the hospital encounter of 04/01/22   Echo   Result Value Ref Range    BSA 1.79 m2    TDI SEPTAL 0.06 m/s    LV LATERAL E/E' RATIO 10.50 m/s    LV SEPTAL E/E' RATIO 10.50 m/s    LA WIDTH 4.75 cm    TDI LATERAL 0.06 m/s    LVIDd 4.70 3.5 - 6.0 cm    IVS 0.90 0.6 - 1.1 cm    Posterior Wall 0.98 0.6 - 1.1 cm    LVIDs 2.58 2.1 - 4.0 cm    FS 45 28 - 44 %    LA volume 78.83 cm3    Sinus 3.04 cm    STJ 2.56 cm    Ascending aorta 3.16 cm    LV mass 151.25 g    LA size 4.06 cm    RVDD 3.46 cm    TAPSE 1.87 cm    RV S' 14.71 cm/s    Left Ventricle Relative Wall Thickness 0.42 cm    AV mean gradient 2 mmHg    AV valve area 3.09 cm2    AV Velocity Ratio 1.03     AV index (prosthetic) 1.02     MV valve area p 1/2 method 4.55 cm2    E/A ratio 1.31     Mean e' 0.06 m/s    E wave  deceleration time 166.84 msec    LVOT diameter 1.97 cm    LVOT area 3.0 cm2    LVOT peak ihsan 1.14 m/s    LVOT peak VTI 23.66 cm    Ao peak ihsan 1.11 m/s    Ao VTI 23.29 cm    LVOT stroke volume 72.08 cm3    AV peak gradient 5 mmHg    E/E' ratio 10.50 m/s    MV Peak E Ihsan 0.63 m/s    TR Max Ihsan 2.49 m/s    MV stenosis pressure 1/2 time 48.38 ms    MV Peak A Ihsan 0.48 m/s    LV Systolic Volume 24.25 mL    LV Systolic Volume Index 13.6 mL/m2    LV Diastolic Volume 123.66 mL    LV Diastolic Volume Index 69.47 mL/m2    LA Volume Index 44.3 mL/m2    LV Mass Index 85 g/m2    RA Major Axis 4.35 cm    Left Atrium Minor Axis 4.87 cm    Left Atrium Major Axis 4.75 cm    Triscuspid Valve Regurgitation Peak Gradient 25 mmHg    LA Volume Index (Mod) 44.7 mL/m2    LA volume (mod) 79.48 cm3    RA Width 3.46 cm    Right Atrial Pressure (from IVC) 3 mmHg    EF 60 %    TV rest pulmonary artery pressure 28 mmHg    Narrative    · The left ventricle is normal in size with normal systolic function. The   estimated ejection fraction is 60%.  · Normal right ventricular size with normal right ventricular systolic   function.  · Indeterminate left ventricular diastolic function.  · Moderate left atrial enlargement.  · Mild mitral regurgitation.  · The estimated PA systolic pressure is 28 mmHg.  · Normal central venous pressure (3 mmHg).          AZUL:  No results found for this or any previous visit.    ASSESSMENT/PLAN:                                                                                                                 04/18/2022  Caty Diaz is a 66 y.o., female.      Pre-op Assessment    I have reviewed the Patient Summary Reports.    I have reviewed the NPO Status.   I have reviewed the Medications.     Review of Systems  Anesthesia Hx:  Denies Family Hx of Anesthesia complications.    Social:  Non-Smoker, No Alcohol Use    Hematology/Oncology:        Current/Recent Cancer. (multiple myeloma)   EENT/Dental:EENT/Dental Normal    Cardiovascular:   Valvular problems/Murmurs, MR ECG has been reviewed.    Hepatic/GI:  Hepatic/GI Normal    Musculoskeletal:  Musculoskeletal Normal    Neurological:  Neurology Normal    Endocrine:  Endocrine Normal    Psych:  Psychiatric Normal           Physical Exam  General: Well nourished    Airway:  Mallampati: II   Mouth Opening: Normal  Tongue: Normal  Neck ROM: Normal ROM    Dental:  Intact        Anesthesia Plan  Type of Anesthesia, risks & benefits discussed:    Anesthesia Type: MAC  Intra-op Monitoring Plan: Standard ASA Monitors  Post Op Pain Control Plan: multimodal analgesia  Informed Consent: Informed consent signed with the Patient and all parties understand the risks and agree with anesthesia plan.  All questions answered.   ASA Score: 3  Day of Surgery Review of History & Physical: H&P Update referred to the surgeon/provider.    Ready For Surgery From Anesthesia Perspective.     .

## 2022-04-18 NOTE — TELEPHONE ENCOUNTER
Called patient, spoke with spouse and caregiver: instructed patient to arrive at 5PM for Mozobil injection this evening. 8AM labs and collection tomorrow. Per spouse, patient napping, tolerated line placement well. Allowed time for questions. Instructed to call with further concerns.

## 2022-04-18 NOTE — NURSING
1700- Patient arrived ambulatory to the unit for mozobil injection.  Patient not instructed to take mozobil a head of time, brought it with her and took dose on unit prior to injection.  Injection tolerated well.  Dressing to new central line assessed per patients request- looks good, reinforced near bottom edge to prevent any leaking.  Patient discharged to home setting, will return to clinic in the morning.

## 2022-04-18 NOTE — ANESTHESIA POSTPROCEDURE EVALUATION
Anesthesia Post Evaluation    Patient: Caty Diaz    Procedure(s) Performed: Procedure(s) (LRB):  INSERTION, CATHETER, HEMODIALYSIS, DUAL LUMEN Bard 14.5 Fr Hemosplit Catheter Model [id number], Right Possible Left Chest (Right)    Final Anesthesia Type: general      Patient location during evaluation: [facility]  Patient participation: Yes- Able to Participate  Level of consciousness: awake and alert and oriented  Post-procedure vital signs: reviewed and stable  Pain management: adequate  Airway patency: patent    PONV status at discharge: No PONV  Anesthetic complications: no      Cardiovascular status: blood pressure returned to baseline  Respiratory status: unassisted, spontaneous ventilation and room air  Hydration status: euvolemic  Follow-up not needed.          Vitals Value Taken Time   /64 04/18/22 0830   Temp 36.6 °C (97.9 °F) 04/18/22 0830   Pulse 66 04/18/22 0830   Resp 16 04/18/22 0830   SpO2 96 % 04/18/22 0830         No case tracking events are documented in the log.      Pain/John Score: John Score: 9 (4/18/2022  8:00 AM)

## 2022-04-18 NOTE — TRANSFER OF CARE
"Anesthesia Transfer of Care Note    Patient: Caty Diaz    Procedure(s) Performed: Procedure(s) (LRB):  INSERTION, CATHETER, HEMODIALYSIS, DUAL LUMEN Bard 14.5 Fr Hemosplit Catheter Model [id number], Right Possible Left Chest (Right)    Patient location: [facility]    Anesthesia Type: MAC    Transport from OR: Transported from OR on room air with adequate spontaneous ventilation    Post pain: adequate analgesia    Post assessment: no apparent anesthetic complications    Post vital signs: stable    Level of consciousness: awake and alert    Nausea/Vomiting: no nausea/vomiting    Complications: none    Transfer of care protocol was followed      Last vitals:   Visit Vitals  /61 (BP Location: Right arm, Patient Position: Lying)   Pulse 76   Temp 36.7 °C (98.1 °F) (Skin)   Resp 16   Ht 5' 7" (1.702 m)   Wt 66.2 kg (146 lb)   SpO2 98%   Breastfeeding No   BMI 22.87 kg/m²     "

## 2022-04-19 ENCOUNTER — INFUSION (OUTPATIENT)
Dept: INFUSION THERAPY | Facility: HOSPITAL | Age: 67
End: 2022-04-19
Attending: INTERNAL MEDICINE
Payer: COMMERCIAL

## 2022-04-19 ENCOUNTER — HOSPITAL ENCOUNTER (OUTPATIENT)
Dept: TRANSFUSION MEDICINE | Facility: HOSPITAL | Age: 67
Discharge: HOME | End: 2022-04-19
Attending: INTERNAL MEDICINE
Payer: COMMERCIAL

## 2022-04-19 VITALS
TEMPERATURE: 98 F | HEART RATE: 80 BPM | OXYGEN SATURATION: 100 % | RESPIRATION RATE: 18 BRPM | SYSTOLIC BLOOD PRESSURE: 152 MMHG | DIASTOLIC BLOOD PRESSURE: 66 MMHG

## 2022-04-19 VITALS
DIASTOLIC BLOOD PRESSURE: 71 MMHG | TEMPERATURE: 99 F | BODY MASS INDEX: 23.23 KG/M2 | SYSTOLIC BLOOD PRESSURE: 144 MMHG | HEIGHT: 67 IN | WEIGHT: 148 LBS | HEART RATE: 74 BPM

## 2022-04-19 DIAGNOSIS — Z76.82 STEM CELL TRANSPLANT CANDIDATE: Primary | ICD-10-CM

## 2022-04-19 DIAGNOSIS — C90.00 MULTIPLE MYELOMA: ICD-10-CM

## 2022-04-19 LAB
ALBUMIN SERPL BCP-MCNC: 2.7 G/DL (ref 3.5–5.2)
ALBUMIN SERPL BCP-MCNC: 2.7 G/DL (ref 3.5–5.2)
ALBUMIN SERPL BCP-MCNC: 3.3 G/DL (ref 3.5–5.2)
ALP SERPL-CCNC: 110 U/L (ref 55–135)
ALP SERPL-CCNC: 78 U/L (ref 55–135)
ALP SERPL-CCNC: 84 U/L (ref 55–135)
ALT SERPL W/O P-5'-P-CCNC: 24 U/L (ref 10–44)
ALT SERPL W/O P-5'-P-CCNC: 25 U/L (ref 10–44)
ALT SERPL W/O P-5'-P-CCNC: 29 U/L (ref 10–44)
ANION GAP SERPL CALC-SCNC: 11 MMOL/L (ref 8–16)
ANION GAP SERPL CALC-SCNC: 7 MMOL/L (ref 8–16)
ANION GAP SERPL CALC-SCNC: 9 MMOL/L (ref 8–16)
ANISOCYTOSIS BLD QL SMEAR: SLIGHT
AST SERPL-CCNC: 21 U/L (ref 10–40)
AST SERPL-CCNC: 21 U/L (ref 10–40)
AST SERPL-CCNC: 27 U/L (ref 10–40)
BASO STIPL BLD QL SMEAR: ABNORMAL
BASO STIPL BLD QL SMEAR: ABNORMAL
BASOPHILS # BLD AUTO: 0.09 K/UL (ref 0–0.2)
BASOPHILS # BLD AUTO: 0.09 K/UL (ref 0–0.2)
BASOPHILS NFR BLD: 0 % (ref 0–1.9)
BASOPHILS NFR BLD: 0.5 % (ref 0–1.9)
BASOPHILS NFR BLD: 0.5 % (ref 0–1.9)
BILIRUB SERPL-MCNC: 0.2 MG/DL (ref 0.1–1)
BILIRUB SERPL-MCNC: 0.2 MG/DL (ref 0.1–1)
BILIRUB SERPL-MCNC: 0.4 MG/DL (ref 0.1–1)
BUN SERPL-MCNC: 7 MG/DL (ref 8–23)
BUN SERPL-MCNC: 8 MG/DL (ref 8–23)
BUN SERPL-MCNC: 8 MG/DL (ref 8–23)
CA-I BLDV-SCNC: 1.05 MMOL/L (ref 1.06–1.42)
CA-I BLDV-SCNC: 1.07 MMOL/L (ref 1.06–1.42)
CA-I BLDV-SCNC: 1.12 MMOL/L (ref 1.06–1.42)
CALCIUM SERPL-MCNC: 8.5 MG/DL (ref 8.7–10.5)
CALCIUM SERPL-MCNC: 9.2 MG/DL (ref 8.7–10.5)
CALCIUM SERPL-MCNC: 9.3 MG/DL (ref 8.7–10.5)
CD34 %: 0.07 %
CD34 ABSOLUTE: 17.37 CELLS/UL
CD34 VIABILITY: 97.8 %
CHLORIDE SERPL-SCNC: 102 MMOL/L (ref 95–110)
CHLORIDE SERPL-SCNC: 106 MMOL/L (ref 95–110)
CHLORIDE SERPL-SCNC: 106 MMOL/L (ref 95–110)
CO2 SERPL-SCNC: 26 MMOL/L (ref 23–29)
CO2 SERPL-SCNC: 32 MMOL/L (ref 23–29)
CO2 SERPL-SCNC: 32 MMOL/L (ref 23–29)
CREAT SERPL-MCNC: 0.9 MG/DL (ref 0.5–1.4)
DIFFERENTIAL METHOD: ABNORMAL
DOHLE BOD BLD QL SMEAR: PRESENT
EOSINOPHIL # BLD AUTO: 0.8 K/UL (ref 0–0.5)
EOSINOPHIL # BLD AUTO: 0.8 K/UL (ref 0–0.5)
EOSINOPHIL NFR BLD: 4 % (ref 0–8)
EOSINOPHIL NFR BLD: 4.3 % (ref 0–8)
EOSINOPHIL NFR BLD: 4.5 % (ref 0–8)
ERYTHROCYTE [DISTWIDTH] IN BLOOD BY AUTOMATED COUNT: 14.3 % (ref 11.5–14.5)
ERYTHROCYTE [DISTWIDTH] IN BLOOD BY AUTOMATED COUNT: 14.6 % (ref 11.5–14.5)
ERYTHROCYTE [DISTWIDTH] IN BLOOD BY AUTOMATED COUNT: 14.6 % (ref 11.5–14.5)
EST. GFR  (AFRICAN AMERICAN): >60 ML/MIN/1.73 M^2
EST. GFR  (NON AFRICAN AMERICAN): >60 ML/MIN/1.73 M^2
GLUCOSE SERPL-MCNC: 110 MG/DL (ref 70–110)
GLUCOSE SERPL-MCNC: 84 MG/DL (ref 70–110)
GLUCOSE SERPL-MCNC: 90 MG/DL (ref 70–110)
HCT VFR BLD AUTO: 29.7 % (ref 37–48.5)
HCT VFR BLD AUTO: 30.7 % (ref 37–48.5)
HCT VFR BLD AUTO: 34.7 % (ref 37–48.5)
HGB BLD-MCNC: 10.1 G/DL (ref 12–16)
HGB BLD-MCNC: 11.1 G/DL (ref 12–16)
HGB BLD-MCNC: 9.7 G/DL (ref 12–16)
HYPOCHROMIA BLD QL SMEAR: ABNORMAL
IMM GRANULOCYTES # BLD AUTO: 0.5 K/UL (ref 0–0.04)
IMM GRANULOCYTES # BLD AUTO: 0.63 K/UL (ref 0–0.04)
IMM GRANULOCYTES # BLD AUTO: ABNORMAL K/UL (ref 0–0.04)
IMM GRANULOCYTES NFR BLD AUTO: 2.7 % (ref 0–0.5)
IMM GRANULOCYTES NFR BLD AUTO: 3.3 % (ref 0–0.5)
IMM GRANULOCYTES NFR BLD AUTO: ABNORMAL % (ref 0–0.5)
LYMPHOCYTES # BLD AUTO: 1.3 K/UL (ref 1–4.8)
LYMPHOCYTES # BLD AUTO: 1.3 K/UL (ref 1–4.8)
LYMPHOCYTES NFR BLD: 16 % (ref 18–48)
LYMPHOCYTES NFR BLD: 6.8 % (ref 18–48)
LYMPHOCYTES NFR BLD: 7.1 % (ref 18–48)
MAGNESIUM SERPL-MCNC: 1.5 MG/DL (ref 1.6–2.6)
MAGNESIUM SERPL-MCNC: 1.7 MG/DL (ref 1.6–2.6)
MAGNESIUM SERPL-MCNC: 1.8 MG/DL (ref 1.6–2.6)
MCH RBC QN AUTO: 32 PG (ref 27–31)
MCH RBC QN AUTO: 32 PG (ref 27–31)
MCH RBC QN AUTO: 32.3 PG (ref 27–31)
MCHC RBC AUTO-ENTMCNC: 32 G/DL (ref 32–36)
MCHC RBC AUTO-ENTMCNC: 32.7 G/DL (ref 32–36)
MCHC RBC AUTO-ENTMCNC: 32.9 G/DL (ref 32–36)
MCV RBC AUTO: 100 FL (ref 82–98)
MCV RBC AUTO: 98 FL (ref 82–98)
MCV RBC AUTO: 98 FL (ref 82–98)
MONOCYTES # BLD AUTO: 1.3 K/UL (ref 0.3–1)
MONOCYTES # BLD AUTO: 1.5 K/UL (ref 0.3–1)
MONOCYTES NFR BLD: 3 % (ref 4–15)
MONOCYTES NFR BLD: 6.8 % (ref 4–15)
MONOCYTES NFR BLD: 8.3 % (ref 4–15)
NEUTROPHILS # BLD AUTO: 14.1 K/UL (ref 1.8–7.7)
NEUTROPHILS # BLD AUTO: 14.9 K/UL (ref 1.8–7.7)
NEUTROPHILS NFR BLD: 68 % (ref 38–73)
NEUTROPHILS NFR BLD: 76.9 % (ref 38–73)
NEUTROPHILS NFR BLD: 78.3 % (ref 38–73)
NEUTS BAND NFR BLD MANUAL: 9 %
NRBC BLD-RTO: 0 /100 WBC
OVALOCYTES BLD QL SMEAR: ABNORMAL
OVALOCYTES BLD QL SMEAR: ABNORMAL
PHOSPHATE SERPL-MCNC: 2.2 MG/DL (ref 2.7–4.5)
PHOSPHATE SERPL-MCNC: 3.4 MG/DL (ref 2.7–4.5)
PHOSPHATE SERPL-MCNC: 3.5 MG/DL (ref 2.7–4.5)
PLATELET # BLD AUTO: 127 K/UL (ref 150–450)
PLATELET # BLD AUTO: 59 K/UL (ref 150–450)
PLATELET # BLD AUTO: 76 K/UL (ref 150–450)
PLATELET BLD QL SMEAR: ABNORMAL
PMV BLD AUTO: 11.3 FL (ref 9.2–12.9)
PMV BLD AUTO: 9.2 FL (ref 9.2–12.9)
PMV BLD AUTO: 9.7 FL (ref 9.2–12.9)
POIKILOCYTOSIS BLD QL SMEAR: SLIGHT
POLYCHROMASIA BLD QL SMEAR: ABNORMAL
POTASSIUM SERPL-SCNC: 3.5 MMOL/L (ref 3.5–5.1)
POTASSIUM SERPL-SCNC: 3.7 MMOL/L (ref 3.5–5.1)
POTASSIUM SERPL-SCNC: 3.9 MMOL/L (ref 3.5–5.1)
PROT SERPL-MCNC: 5.1 G/DL (ref 6–8.4)
PROT SERPL-MCNC: 5.1 G/DL (ref 6–8.4)
PROT SERPL-MCNC: 6.1 G/DL (ref 6–8.4)
RBC # BLD AUTO: 3.03 M/UL (ref 4–5.4)
RBC # BLD AUTO: 3.13 M/UL (ref 4–5.4)
RBC # BLD AUTO: 3.47 M/UL (ref 4–5.4)
SODIUM SERPL-SCNC: 141 MMOL/L (ref 136–145)
SODIUM SERPL-SCNC: 145 MMOL/L (ref 136–145)
SODIUM SERPL-SCNC: 145 MMOL/L (ref 136–145)
TOXIC GRANULES BLD QL SMEAR: PRESENT
WBC # BLD AUTO: 18.38 K/UL (ref 3.9–12.7)
WBC # BLD AUTO: 19 K/UL (ref 3.9–12.7)
WBC # BLD AUTO: 22.95 K/UL (ref 3.9–12.7)

## 2022-04-19 PROCEDURE — 25000003 PHARM REV CODE 250: Performed by: NURSE PRACTITIONER

## 2022-04-19 PROCEDURE — 63600175 PHARM REV CODE 636 W HCPCS: Mod: JG | Performed by: NURSE PRACTITIONER

## 2022-04-19 PROCEDURE — 84100 ASSAY OF PHOSPHORUS: CPT | Mod: 91 | Performed by: INTERNAL MEDICINE

## 2022-04-19 PROCEDURE — 85025 COMPLETE CBC W/AUTO DIFF WBC: CPT | Performed by: INTERNAL MEDICINE

## 2022-04-19 PROCEDURE — 83735 ASSAY OF MAGNESIUM: CPT | Mod: 91 | Performed by: INTERNAL MEDICINE

## 2022-04-19 PROCEDURE — 38206 HARVEST AUTO STEM CELLS: CPT | Mod: ,,, | Performed by: PATHOLOGY

## 2022-04-19 PROCEDURE — 25000003 PHARM REV CODE 250: Performed by: INTERNAL MEDICINE

## 2022-04-19 PROCEDURE — 80053 COMPREHEN METABOLIC PANEL: CPT | Mod: 91 | Performed by: INTERNAL MEDICINE

## 2022-04-19 PROCEDURE — 85027 COMPLETE CBC AUTOMATED: CPT | Mod: 91 | Performed by: INTERNAL MEDICINE

## 2022-04-19 PROCEDURE — 63600175 PHARM REV CODE 636 W HCPCS: Performed by: INTERNAL MEDICINE

## 2022-04-19 PROCEDURE — 25000003 PHARM REV CODE 250: Performed by: PATHOLOGY

## 2022-04-19 PROCEDURE — 96372 THER/PROPH/DIAG INJ SC/IM: CPT

## 2022-04-19 PROCEDURE — 38206 PR PROG CELL HARVEST,TRANSPLANT,AUTOLOGOUS: ICD-10-PCS | Mod: ,,, | Performed by: PATHOLOGY

## 2022-04-19 PROCEDURE — 86367 STEM CELLS TOTAL COUNT: CPT | Performed by: INTERNAL MEDICINE

## 2022-04-19 PROCEDURE — 85007 BL SMEAR W/DIFF WBC COUNT: CPT | Mod: NCS | Performed by: INTERNAL MEDICINE

## 2022-04-19 PROCEDURE — 82330 ASSAY OF CALCIUM: CPT | Performed by: INTERNAL MEDICINE

## 2022-04-19 PROCEDURE — 38206 HARVEST AUTO STEM CELLS: CPT

## 2022-04-19 PROCEDURE — 63600175 PHARM REV CODE 636 W HCPCS: Performed by: PATHOLOGY

## 2022-04-19 RX ORDER — SODIUM,POTASSIUM PHOSPHATES 280-250MG
2 POWDER IN PACKET (EA) ORAL ONCE AS NEEDED
Status: COMPLETED | OUTPATIENT
Start: 2022-04-19 | End: 2022-04-19

## 2022-04-19 RX ORDER — PLERIXAFOR 24 MG/1.2ML
0.24 SOLUTION SUBCUTANEOUS ONCE AS NEEDED
Status: CANCELLED | OUTPATIENT
Start: 2022-04-19 | End: 2033-09-15

## 2022-04-19 RX ORDER — SODIUM,POTASSIUM PHOSPHATES 280-250MG
2 POWDER IN PACKET (EA) ORAL
Status: DISCONTINUED | OUTPATIENT
Start: 2022-04-19 | End: 2022-04-19 | Stop reason: HOSPADM

## 2022-04-19 RX ORDER — HEPARIN SODIUM 1000 [USP'U]/ML
3100 INJECTION, SOLUTION INTRAVENOUS; SUBCUTANEOUS ONCE
Status: COMPLETED | OUTPATIENT
Start: 2022-04-19 | End: 2022-04-19

## 2022-04-19 RX ORDER — POTASSIUM CHLORIDE 20 MEQ/1
40 TABLET, EXTENDED RELEASE ORAL ONCE AS NEEDED
Status: DISCONTINUED | OUTPATIENT
Start: 2022-04-19 | End: 2022-04-19 | Stop reason: HOSPADM

## 2022-04-19 RX ORDER — ACETAMINOPHEN 325 MG/1
650 TABLET ORAL ONCE AS NEEDED
Status: DISCONTINUED | OUTPATIENT
Start: 2022-04-19 | End: 2022-04-19 | Stop reason: HOSPADM

## 2022-04-19 RX ORDER — CALCIUM GLUCONATE 20 MG/ML
2 INJECTION, SOLUTION INTRAVENOUS ONCE
Status: DISCONTINUED | OUTPATIENT
Start: 2022-04-19 | End: 2022-04-19 | Stop reason: SDUPTHER

## 2022-04-19 RX ORDER — PLERIXAFOR 24 MG/1.2ML
0.24 SOLUTION SUBCUTANEOUS ONCE AS NEEDED
Status: CANCELLED | OUTPATIENT
Start: 2022-04-20 | End: 2033-09-16

## 2022-04-19 RX ORDER — LANOLIN ALCOHOL/MO/W.PET/CERES
800 CREAM (GRAM) TOPICAL ONCE AS NEEDED
Status: DISCONTINUED | OUTPATIENT
Start: 2022-04-19 | End: 2022-04-19 | Stop reason: HOSPADM

## 2022-04-19 RX ORDER — DIPHENHYDRAMINE HCL 25 MG
25 CAPSULE ORAL ONCE AS NEEDED
Status: DISCONTINUED | OUTPATIENT
Start: 2022-04-19 | End: 2022-04-19 | Stop reason: HOSPADM

## 2022-04-19 RX ORDER — PLERIXAFOR 24 MG/1.2ML
0.24 SOLUTION SUBCUTANEOUS ONCE AS NEEDED
Status: COMPLETED | OUTPATIENT
Start: 2022-04-19 | End: 2022-04-19

## 2022-04-19 RX ADMIN — FILGRASTIM 600 MCG: 300 INJECTION, SOLUTION INTRAVENOUS; SUBCUTANEOUS at 08:04

## 2022-04-19 RX ADMIN — PLERIXAFOR 16 MG: 24 SOLUTION SUBCUTANEOUS at 05:04

## 2022-04-19 RX ADMIN — CALCIUM GLUCONATE 4 G: 98 INJECTION, SOLUTION INTRAVENOUS at 11:04

## 2022-04-19 RX ADMIN — POTASSIUM & SODIUM PHOSPHATES POWDER PACK 280-160-250 MG 2 PACKET: 280-160-250 PACK at 04:04

## 2022-04-19 RX ADMIN — CALCIUM GLUCONATE 2 G: 98 INJECTION, SOLUTION INTRAVENOUS at 04:04

## 2022-04-19 RX ADMIN — HEPARIN SODIUM 3100 UNITS: 1000 INJECTION, SOLUTION INTRAVENOUS; SUBCUTANEOUS at 05:04

## 2022-04-19 NOTE — PROGRESS NOTES
Pt ambulatory to Apheresis / Chemo Infusion for day 1 autologous stem cell collection with . Voiced no complaints.  Pt stated had no pain, 0 on pain scale.  Pt A&O x4.  Apheresis RN accessed R perm cath this am without difficulty.  CD34 drawn and sent to lab.  OK to proceed per Dr. Sheets.  Apheresis RN accessed R perm cath again and HPC started at 1016.  R perm cath patent.  Dressing changed.  Dried old blood under tegaderm, no active bleeding.    Meds  approx 5 g Ca Glu IVPB per Apheresis RN.  Tx ended at 1655.  Cath flushed and locked per Apheresis RN.  Pt to return  on  04/20/2022 for day 2 collection.  Pt tolerated well.  Pt in collection room awaiting post labs to result, and to be discharged per Chemo RN.

## 2022-04-19 NOTE — NURSING
End of day labs reviewed. No replacements needed. Mozobil injection given subq to abdomen. Patient tolerated well. States took immodium. Plan to rtc tomorrow for D2 collection.

## 2022-04-19 NOTE — PLAN OF CARE
0800 Pt arrived to clinic for blood draws and possible collection by apheresis nurse. Neupogen injection given. VSS Assessment done and plan of care reviewed with patient, pt verbalized understanding. Will continue to monitor for safety.

## 2022-04-20 ENCOUNTER — HOSPITAL ENCOUNTER (OUTPATIENT)
Dept: TRANSFUSION MEDICINE | Facility: HOSPITAL | Age: 67
Discharge: HOME | DRG: 016 | End: 2022-04-20
Attending: INTERNAL MEDICINE
Payer: COMMERCIAL

## 2022-04-20 ENCOUNTER — INFUSION (OUTPATIENT)
Dept: INFUSION THERAPY | Facility: HOSPITAL | Age: 67
DRG: 016 | End: 2022-04-20
Attending: INTERNAL MEDICINE
Payer: COMMERCIAL

## 2022-04-20 VITALS
HEIGHT: 67 IN | DIASTOLIC BLOOD PRESSURE: 62 MMHG | BODY MASS INDEX: 23.23 KG/M2 | HEART RATE: 86 BPM | TEMPERATURE: 99 F | SYSTOLIC BLOOD PRESSURE: 112 MMHG | WEIGHT: 148 LBS

## 2022-04-20 DIAGNOSIS — Z76.82 STEM CELL TRANSPLANT CANDIDATE: Primary | ICD-10-CM

## 2022-04-20 DIAGNOSIS — C90.00 MULTIPLE MYELOMA: ICD-10-CM

## 2022-04-20 DIAGNOSIS — Z76.82 STEM CELL TRANSPLANT CANDIDATE: ICD-10-CM

## 2022-04-20 LAB
ALBUMIN SERPL BCP-MCNC: 2.6 G/DL (ref 3.5–5.2)
ALBUMIN SERPL BCP-MCNC: 2.7 G/DL (ref 3.5–5.2)
ALBUMIN SERPL BCP-MCNC: 3.2 G/DL (ref 3.5–5.2)
ALP SERPL-CCNC: 109 U/L (ref 55–135)
ALP SERPL-CCNC: 87 U/L (ref 55–135)
ALP SERPL-CCNC: 90 U/L (ref 55–135)
ALT SERPL W/O P-5'-P-CCNC: 25 U/L (ref 10–44)
ALT SERPL W/O P-5'-P-CCNC: 27 U/L (ref 10–44)
ALT SERPL W/O P-5'-P-CCNC: 30 U/L (ref 10–44)
ANION GAP SERPL CALC-SCNC: 6 MMOL/L (ref 8–16)
ANION GAP SERPL CALC-SCNC: 8 MMOL/L (ref 8–16)
ANION GAP SERPL CALC-SCNC: 9 MMOL/L (ref 8–16)
ANISOCYTOSIS BLD QL SMEAR: SLIGHT
AST SERPL-CCNC: 21 U/L (ref 10–40)
AST SERPL-CCNC: 23 U/L (ref 10–40)
AST SERPL-CCNC: 24 U/L (ref 10–40)
BASOPHILS # BLD AUTO: ABNORMAL K/UL (ref 0–0.2)
BASOPHILS NFR BLD: 0 % (ref 0–1.9)
BILIRUB SERPL-MCNC: 0.3 MG/DL (ref 0.1–1)
BUN SERPL-MCNC: 8 MG/DL (ref 8–23)
BUN SERPL-MCNC: 8 MG/DL (ref 8–23)
BUN SERPL-MCNC: 9 MG/DL (ref 8–23)
CA-I BLDV-SCNC: 1.01 MMOL/L (ref 1.06–1.42)
CA-I BLDV-SCNC: 1.06 MMOL/L (ref 1.06–1.42)
CA-I BLDV-SCNC: 1.28 MMOL/L (ref 1.06–1.42)
CALCIUM SERPL-MCNC: 9.1 MG/DL (ref 8.7–10.5)
CALCIUM SERPL-MCNC: 9.3 MG/DL (ref 8.7–10.5)
CALCIUM SERPL-MCNC: 9.4 MG/DL (ref 8.7–10.5)
CHLORIDE SERPL-SCNC: 101 MMOL/L (ref 95–110)
CHLORIDE SERPL-SCNC: 102 MMOL/L (ref 95–110)
CHLORIDE SERPL-SCNC: 105 MMOL/L (ref 95–110)
CO2 SERPL-SCNC: 31 MMOL/L (ref 23–29)
CO2 SERPL-SCNC: 34 MMOL/L (ref 23–29)
CO2 SERPL-SCNC: 34 MMOL/L (ref 23–29)
CREAT SERPL-MCNC: 0.9 MG/DL (ref 0.5–1.4)
CREAT SERPL-MCNC: 0.9 MG/DL (ref 0.5–1.4)
CREAT SERPL-MCNC: 1 MG/DL (ref 0.5–1.4)
DIFFERENTIAL METHOD: ABNORMAL
DOHLE BOD BLD QL SMEAR: PRESENT
DOHLE BOD BLD QL SMEAR: PRESENT
EOSINOPHIL # BLD AUTO: ABNORMAL K/UL (ref 0–0.5)
EOSINOPHIL NFR BLD: 1 % (ref 0–8)
EOSINOPHIL NFR BLD: 5 % (ref 0–8)
EOSINOPHIL NFR BLD: 6 % (ref 0–8)
ERYTHROCYTE [DISTWIDTH] IN BLOOD BY AUTOMATED COUNT: 14.2 % (ref 11.5–14.5)
ERYTHROCYTE [DISTWIDTH] IN BLOOD BY AUTOMATED COUNT: 14.2 % (ref 11.5–14.5)
ERYTHROCYTE [DISTWIDTH] IN BLOOD BY AUTOMATED COUNT: 14.3 % (ref 11.5–14.5)
EST. GFR  (AFRICAN AMERICAN): >60 ML/MIN/1.73 M^2
EST. GFR  (NON AFRICAN AMERICAN): 59.3 ML/MIN/1.73 M^2
EST. GFR  (NON AFRICAN AMERICAN): >60 ML/MIN/1.73 M^2
EST. GFR  (NON AFRICAN AMERICAN): >60 ML/MIN/1.73 M^2
GLUCOSE SERPL-MCNC: 120 MG/DL (ref 70–110)
GLUCOSE SERPL-MCNC: 142 MG/DL (ref 70–110)
GLUCOSE SERPL-MCNC: 88 MG/DL (ref 70–110)
HCT VFR BLD AUTO: 29.7 % (ref 37–48.5)
HCT VFR BLD AUTO: 31.8 % (ref 37–48.5)
HCT VFR BLD AUTO: 34.9 % (ref 37–48.5)
HGB BLD-MCNC: 10.3 G/DL (ref 12–16)
HGB BLD-MCNC: 11.1 G/DL (ref 12–16)
HGB BLD-MCNC: 9.7 G/DL (ref 12–16)
IMM GRANULOCYTES # BLD AUTO: ABNORMAL K/UL (ref 0–0.04)
IMM GRANULOCYTES NFR BLD AUTO: ABNORMAL % (ref 0–0.5)
LYMPHOCYTES # BLD AUTO: ABNORMAL K/UL (ref 1–4.8)
LYMPHOCYTES NFR BLD: 3 % (ref 18–48)
LYMPHOCYTES NFR BLD: 5 % (ref 18–48)
LYMPHOCYTES NFR BLD: 6 % (ref 18–48)
MAGNESIUM SERPL-MCNC: 1.3 MG/DL (ref 1.6–2.6)
MAGNESIUM SERPL-MCNC: 1.3 MG/DL (ref 1.6–2.6)
MAGNESIUM SERPL-MCNC: 1.5 MG/DL (ref 1.6–2.6)
MCH RBC QN AUTO: 31.7 PG (ref 27–31)
MCH RBC QN AUTO: 32.2 PG (ref 27–31)
MCH RBC QN AUTO: 32.9 PG (ref 27–31)
MCHC RBC AUTO-ENTMCNC: 31.8 G/DL (ref 32–36)
MCHC RBC AUTO-ENTMCNC: 32.4 G/DL (ref 32–36)
MCHC RBC AUTO-ENTMCNC: 32.7 G/DL (ref 32–36)
MCV RBC AUTO: 100 FL (ref 82–98)
MCV RBC AUTO: 101 FL (ref 82–98)
MCV RBC AUTO: 99 FL (ref 82–98)
METAMYELOCYTES NFR BLD MANUAL: 1 %
MONOCYTES # BLD AUTO: ABNORMAL K/UL (ref 0.3–1)
MONOCYTES NFR BLD: 2 % (ref 4–15)
MONOCYTES NFR BLD: 3 % (ref 4–15)
MONOCYTES NFR BLD: 6 % (ref 4–15)
MYELOCYTES NFR BLD MANUAL: 1 %
NEUTROPHILS NFR BLD: 75 % (ref 38–73)
NEUTROPHILS NFR BLD: 82 % (ref 38–73)
NEUTROPHILS NFR BLD: 86 % (ref 38–73)
NEUTS BAND NFR BLD MANUAL: 1 %
NEUTS BAND NFR BLD MANUAL: 14 %
NEUTS BAND NFR BLD MANUAL: 3 %
NRBC BLD-RTO: 0 /100 WBC
OVALOCYTES BLD QL SMEAR: ABNORMAL
PHOSPHATE SERPL-MCNC: 2.9 MG/DL (ref 2.7–4.5)
PHOSPHATE SERPL-MCNC: 3.4 MG/DL (ref 2.7–4.5)
PHOSPHATE SERPL-MCNC: 3.9 MG/DL (ref 2.7–4.5)
PLATELET # BLD AUTO: 37 K/UL (ref 150–450)
PLATELET # BLD AUTO: 45 K/UL (ref 150–450)
PLATELET # BLD AUTO: 72 K/UL (ref 150–450)
PLATELET BLD QL SMEAR: ABNORMAL
PMV BLD AUTO: 10 FL (ref 9.2–12.9)
PMV BLD AUTO: 10.2 FL (ref 9.2–12.9)
PMV BLD AUTO: 9.7 FL (ref 9.2–12.9)
POIKILOCYTOSIS BLD QL SMEAR: SLIGHT
POLYCHROMASIA BLD QL SMEAR: ABNORMAL
POTASSIUM SERPL-SCNC: 3.4 MMOL/L (ref 3.5–5.1)
POTASSIUM SERPL-SCNC: 3.5 MMOL/L (ref 3.5–5.1)
POTASSIUM SERPL-SCNC: 3.6 MMOL/L (ref 3.5–5.1)
PROT SERPL-MCNC: 4.9 G/DL (ref 6–8.4)
PROT SERPL-MCNC: 4.9 G/DL (ref 6–8.4)
PROT SERPL-MCNC: 6 G/DL (ref 6–8.4)
RBC # BLD AUTO: 2.95 M/UL (ref 4–5.4)
RBC # BLD AUTO: 3.2 M/UL (ref 4–5.4)
RBC # BLD AUTO: 3.5 M/UL (ref 4–5.4)
SODIUM SERPL-SCNC: 142 MMOL/L (ref 136–145)
SODIUM SERPL-SCNC: 144 MMOL/L (ref 136–145)
SODIUM SERPL-SCNC: 144 MMOL/L (ref 136–145)
TOXIC GRANULES BLD QL SMEAR: PRESENT
WBC # BLD AUTO: 19.12 K/UL (ref 3.9–12.7)
WBC # BLD AUTO: 22.27 K/UL (ref 3.9–12.7)
WBC # BLD AUTO: 23.2 K/UL (ref 3.9–12.7)

## 2022-04-20 PROCEDURE — 38206 HARVEST AUTO STEM CELLS: CPT | Mod: ,,, | Performed by: PATHOLOGY

## 2022-04-20 PROCEDURE — 38214 VOLUME DEPLETE OF HARVEST: CPT

## 2022-04-20 PROCEDURE — 85027 COMPLETE CBC AUTOMATED: CPT | Mod: 91 | Performed by: INTERNAL MEDICINE

## 2022-04-20 PROCEDURE — 83735 ASSAY OF MAGNESIUM: CPT | Mod: 91 | Performed by: INTERNAL MEDICINE

## 2022-04-20 PROCEDURE — 63600175 PHARM REV CODE 636 W HCPCS: Performed by: PATHOLOGY

## 2022-04-20 PROCEDURE — 38206 HARVEST AUTO STEM CELLS: CPT

## 2022-04-20 PROCEDURE — 85007 BL SMEAR W/DIFF WBC COUNT: CPT | Mod: 91 | Performed by: INTERNAL MEDICINE

## 2022-04-20 PROCEDURE — 38207 CRYOPRESERVE STEM CELLS: CPT

## 2022-04-20 PROCEDURE — 38206 PR PROG CELL HARVEST,TRANSPLANT,AUTOLOGOUS: ICD-10-PCS | Mod: ,,, | Performed by: PATHOLOGY

## 2022-04-20 PROCEDURE — 25000003 PHARM REV CODE 250: Performed by: NURSE PRACTITIONER

## 2022-04-20 PROCEDURE — 96372 THER/PROPH/DIAG INJ SC/IM: CPT

## 2022-04-20 PROCEDURE — 25000003 PHARM REV CODE 250: Performed by: PATHOLOGY

## 2022-04-20 PROCEDURE — 82330 ASSAY OF CALCIUM: CPT | Mod: 91 | Performed by: INTERNAL MEDICINE

## 2022-04-20 PROCEDURE — 80053 COMPREHEN METABOLIC PANEL: CPT | Performed by: INTERNAL MEDICINE

## 2022-04-20 PROCEDURE — 25000003 PHARM REV CODE 250: Performed by: INTERNAL MEDICINE

## 2022-04-20 PROCEDURE — 84100 ASSAY OF PHOSPHORUS: CPT | Mod: 91 | Performed by: INTERNAL MEDICINE

## 2022-04-20 PROCEDURE — 63600175 PHARM REV CODE 636 W HCPCS: Mod: JG | Performed by: NURSE PRACTITIONER

## 2022-04-20 RX ORDER — LANOLIN ALCOHOL/MO/W.PET/CERES
800 CREAM (GRAM) TOPICAL ONCE AS NEEDED
Status: COMPLETED | OUTPATIENT
Start: 2022-04-20 | End: 2022-04-20

## 2022-04-20 RX ORDER — POTASSIUM CHLORIDE 20 MEQ/1
40 TABLET, EXTENDED RELEASE ORAL ONCE
Status: DISCONTINUED | OUTPATIENT
Start: 2022-04-20 | End: 2022-04-20 | Stop reason: HOSPADM

## 2022-04-20 RX ORDER — ACETAMINOPHEN 325 MG/1
650 TABLET ORAL ONCE AS NEEDED
Status: DISCONTINUED | OUTPATIENT
Start: 2022-04-20 | End: 2022-04-20 | Stop reason: HOSPADM

## 2022-04-20 RX ORDER — POTASSIUM CHLORIDE 20 MEQ/1
40 TABLET, EXTENDED RELEASE ORAL ONCE AS NEEDED
Status: COMPLETED | OUTPATIENT
Start: 2022-04-20 | End: 2022-04-20

## 2022-04-20 RX ORDER — DIPHENHYDRAMINE HCL 25 MG
25 CAPSULE ORAL ONCE AS NEEDED
Status: DISCONTINUED | OUTPATIENT
Start: 2022-04-20 | End: 2022-04-20 | Stop reason: HOSPADM

## 2022-04-20 RX ORDER — PLERIXAFOR 24 MG/1.2ML
0.24 SOLUTION SUBCUTANEOUS ONCE AS NEEDED
Status: DISCONTINUED | OUTPATIENT
Start: 2022-04-20 | End: 2022-04-20 | Stop reason: HOSPADM

## 2022-04-20 RX ORDER — HEPARIN SODIUM 1000 [USP'U]/ML
3100 INJECTION, SOLUTION INTRAVENOUS; SUBCUTANEOUS ONCE
Status: COMPLETED | OUTPATIENT
Start: 2022-04-20 | End: 2022-04-20

## 2022-04-20 RX ORDER — SODIUM,POTASSIUM PHOSPHATES 280-250MG
2 POWDER IN PACKET (EA) ORAL ONCE AS NEEDED
Status: DISCONTINUED | OUTPATIENT
Start: 2022-04-20 | End: 2022-04-20 | Stop reason: HOSPADM

## 2022-04-20 RX ADMIN — FILGRASTIM 600 MCG: 300 INJECTION, SOLUTION INTRAVENOUS; SUBCUTANEOUS at 08:04

## 2022-04-20 RX ADMIN — HEPARIN SODIUM 3100 UNITS: 1000 INJECTION, SOLUTION INTRAVENOUS; SUBCUTANEOUS at 04:04

## 2022-04-20 RX ADMIN — Medication 800 MG: at 01:04

## 2022-04-20 RX ADMIN — Medication 800 MG: at 09:04

## 2022-04-20 RX ADMIN — Medication 800 MG: at 05:04

## 2022-04-20 RX ADMIN — POTASSIUM CHLORIDE 40 MEQ: 20 TABLET, EXTENDED RELEASE ORAL at 01:04

## 2022-04-20 RX ADMIN — CALCIUM GLUCONATE 4 G: 98 INJECTION, SOLUTION INTRAVENOUS at 09:04

## 2022-04-20 NOTE — PROGRESS NOTES
Pt ambulatory to Apheresis / Chemo Infusion for day 2 autologous stem cell collection with . Voiced no complaints.  Pt stated had no pain, 0 on pain scale.  Pt A&O x4.  Apheresis RN accessed R perm cath this am without difficulty.   HPC started at 1016.  R perm cath patent.  Dressing changed.    Meds   4 g Ca Glu IVPB per Apheresis RN.  Tx ended at 1624.  Cath flushed and locked per Apheresis RN.    Pt tolerated well.   No further collections needed per Dr. Rosales. . Chemo RN discharged pt to home after supplements given.

## 2022-04-20 NOTE — NURSING
Hand off received from Jay WALKER RN. Patient finishing up stemcell collection and blood sent to labs. Electrolyte replacement completed.   Patient instructed to call clinic with any problems or concerns. Patient verbalize understanding. AVS given and patient discharged home.

## 2022-04-20 NOTE — PLAN OF CARE
Pt ambulatory to clinic today for SSC with . Denies any complaints. Neupogen injection given. Covered for abnormal labs at 0830 and 1230. Report to CARI Copeland, end of care. Pt in NAD.

## 2022-04-21 ENCOUNTER — TELEPHONE (OUTPATIENT)
Dept: HEMATOLOGY/ONCOLOGY | Facility: CLINIC | Age: 67
End: 2022-04-21
Payer: COMMERCIAL

## 2022-04-21 NOTE — PROCEDURES
[facility] - Apheresis  Transfusion Medicine  Procedure Note    SUMMARY   Procedures  Date of Procedure: 4/20/22    Procedure: Hematopoietic Progenitor Cell Collection    Provider: Ginny Sheets MD     Assisting Provider: None    Pre-Procedure Diagnosis: Autologous stem cell transplant candidate    Post-Procedure Diagnosis: Autologous stem cell transplant candidate    Follow-up Assessment: Mrs. Diaz is a 66 year old with multiple myeloma. She is undergoing autologous stem cell collection in preparation for transplant. The mid-run (3 hours) CD34 cell count was 1.02 million/kg, indicating that we will remain short of the 5-10 million goal. Collection was continued until the end of the day, with no third day planned.Final collection count for day 2:  2.02 million CD34 cells/kg for a two day total of 3.97 million/kg.    Pertinent Laboratory Data:   Complete Blood Count:   Lab Results   Component Value Date    HGB 10.3 (L) 04/20/2022    HCT 31.8 (L) 04/20/2022    PLT 37 (LL) 04/20/2022    WBC 22.27 (H) 04/20/2022     Basic Metabolic Panel:   Lab Results   Component Value Date     04/20/2022    K 3.5 04/20/2022     04/20/2022    CO2 34 (H) 04/20/2022    GLU 88 04/20/2022    BUN 8 04/20/2022    CREATININE 0.9 04/20/2022    CALCIUM 9.4 04/20/2022    ANIONGAP 9 04/20/2022    ESTGFRAFRICA >60.0 04/20/2022    EGFRNONAA >60.0 04/20/2022     CD34 - quantitative:   Lab Results   Component Value Date    CD34 0.07 04/19/2022    [id number] 17.37 04/19/2022    [id number] 97.8 04/19/2022       Pertinent Medications: None contraindicated. Mobilized with Neupogen and Mozobil    Review of patient's allergies indicates:   Allergen Reactions    Garlic     Milk Hives, Itching and Nausea Only     Dairy Products       Anesthesia: None     Technical Procedures Used: Hematopoietic Progenitor Cell collection: The patient presented to the 5th floor Chemotherapy unit, details about the procedure reviewed. Any interim  clinical changes from previous clinic visit - No. All pertinent labs reviewed. Human Progenitor (Stem) Cell Collection initiated by Apheresis Nurse. Current plan is to perform the procedure for approximately 8 hours. Initial laboratory tests collected, results to Oncology Nurse. Mid-run laboratory tests collected, results to Oncology Nurse. Included CD34 count on collection bag - results to Stem Cell Lab and BMT clinical team. Final laboratory tests collected, results to Oncology Nurse. Red blood cell or platelet transfusion - No.  Date of next procedure Complete.    Description of the Findings of the Procedure:     Please see Apheresis Nurse flowsheet for details.    The patient was evaluated and all clinical and laboratory data relevant to the treatment was reviewed, and a decision was made to proceed with the Apheresis procedure.    I was available to the clinical staff throughout the procedure.    Significant Surgical Tasks Conducted by the Assistant(s): Not applicable    Complications: None    Estimated Blood Loss (EBL): None    Implants: None     Specimens: None

## 2022-04-21 NOTE — PROCEDURES
[facility] - Apheresis  Transfusion Medicine  Procedure Note    SUMMARY   Procedures  Date of Procedure: 4/19/22     Procedure: Hematopoietic Progenitor Cell Collection    Provider: Ginny Sheets MD     Assisting Provider: None    Pre-Procedure Diagnosis: Multiple Myeloma, stem cell transplant candidate    Post-Procedure Diagnosis: Multiple Myeloma, stem cell transplant candidate    Follow-up Assessment: Mrs. Diaz is a 66 year old with multiple myeloma. She is beginning autologous stem cell collection in preparation for transplant. The mid-run (3 hours) CD34 cell count was 1.72 million/kg, indicating that we will not reach the 5-10 million goal today. Collection was continued until the end of the day, with a second day planned.    Final collection count for day 1:  1.95 million CD34 cells/kg    Pertinent Laboratory Data:   Complete Blood Count:   Lab Results   Component Value Date    HGB 10.3 (L) 04/20/2022    HCT 31.8 (L) 04/20/2022    PLT 37 (LL) 04/20/2022    WBC 22.27 (H) 04/20/2022     Basic Metabolic Panel:   Lab Results   Component Value Date     04/20/2022    K 3.5 04/20/2022     04/20/2022    CO2 34 (H) 04/20/2022    GLU 88 04/20/2022    BUN 8 04/20/2022    CREATININE 0.9 04/20/2022    CALCIUM 9.4 04/20/2022    ANIONGAP 9 04/20/2022    ESTGFRAFRICA >60.0 04/20/2022    EGFRNONAA >60.0 04/20/2022     CD34 - quantitative:   Lab Results   Component Value Date    CD34 0.07 04/19/2022    [id number] 17.37 04/19/2022    [id number] 97.8 04/19/2022       Pertinent Medications: None contraindicated. Mobilized with Neupogen and Mozobil    Review of patient's allergies indicates:   Allergen Reactions    Garlic     Milk Hives, Itching and Nausea Only     Dairy Products       Anesthesia: None     Technical Procedures Used: Hematopoietic Progenitor Cell collection: The patient presented to the 5th floor Chemotherapy unit, details about the procedure reviewed. Any interim clinical changes from  previous clinic visit - No. All pertinent labs reviewed. Human Progenitor (Stem) Cell Collection initiated by Apheresis Nurse. Current plan is to perform the procedure for approximately 8 hours. Initial laboratory tests collected, results to Oncology Nurse. Mid-run laboratory tests collected, results to Oncology Nurse. Included CD34 count on collection bag - results to Stem Cell Lab and BMT clinical team. Final laboratory tests collected, results to Oncology Nurse. Red blood cell or platelet transfusion - No.  Date of next procedure 4/20/22.    Description of the Findings of the Procedure:     Please see Apheresis Nurse flowsheet for details.    The patient was evaluated and all clinical and laboratory data relevant to the treatment was reviewed, and a decision was made to proceed with the Apheresis procedure.    I was available to the clinical staff throughout the procedure.    Significant Surgical Tasks Conducted by the Assistant(s): Not applicable    Complications: None    Estimated Blood Loss (EBL): None    Implants: None     Specimens: None

## 2022-04-21 NOTE — TELEPHONE ENCOUNTER
Left message regarding the confirmation of appointments scheduled on 4/22/22 as well as the clinic callback number.

## 2022-04-21 NOTE — TELEPHONE ENCOUNTER
Spoke with patient following collection; reports feeling well. Tolerating central line much better. Reviewed upcoming appointments tomorrow prior to admission on [facility] for stem cell transplant. Allowed time for questions. Instructed to reach out as needed.

## 2022-04-22 ENCOUNTER — CLINICAL SUPPORT (OUTPATIENT)
Dept: HEMATOLOGY/ONCOLOGY | Facility: CLINIC | Age: 67
DRG: 016 | End: 2022-04-22
Payer: COMMERCIAL

## 2022-04-22 ENCOUNTER — INFUSION (OUTPATIENT)
Dept: INFUSION THERAPY | Facility: HOSPITAL | Age: 67
DRG: 016 | End: 2022-04-22
Attending: INTERNAL MEDICINE
Payer: COMMERCIAL

## 2022-04-22 VITALS
OXYGEN SATURATION: 99 % | TEMPERATURE: 98 F | BODY MASS INDEX: 23.49 KG/M2 | HEIGHT: 67 IN | RESPIRATION RATE: 17 BRPM | SYSTOLIC BLOOD PRESSURE: 135 MMHG | HEART RATE: 80 BPM | DIASTOLIC BLOOD PRESSURE: 70 MMHG | WEIGHT: 149.69 LBS

## 2022-04-22 DIAGNOSIS — Z76.82 STEM CELL TRANSPLANT CANDIDATE: Primary | ICD-10-CM

## 2022-04-22 DIAGNOSIS — C90.01 MULTIPLE MYELOMA IN REMISSION: Primary | ICD-10-CM

## 2022-04-22 DIAGNOSIS — C90.00 MULTIPLE MYELOMA, REMISSION STATUS UNSPECIFIED: ICD-10-CM

## 2022-04-22 DIAGNOSIS — C90.00 MULTIPLE MYELOMA: ICD-10-CM

## 2022-04-22 DIAGNOSIS — Z86.69 HISTORY OF MIGRAINE: ICD-10-CM

## 2022-04-22 DIAGNOSIS — D75.9 DRUG-INDUCED CYTOPENIA: ICD-10-CM

## 2022-04-22 DIAGNOSIS — K21.9 CHRONIC GERD: ICD-10-CM

## 2022-04-22 DIAGNOSIS — F51.01 PRIMARY INSOMNIA: ICD-10-CM

## 2022-04-22 DIAGNOSIS — Z76.82 STEM CELL TRANSPLANT CANDIDATE: ICD-10-CM

## 2022-04-22 DIAGNOSIS — T50.905A DRUG-INDUCED CYTOPENIA: ICD-10-CM

## 2022-04-22 PROBLEM — D69.6 THROMBOCYTOPENIA: Status: ACTIVE | Noted: 2022-04-22

## 2022-04-22 PROBLEM — T45.1X5A ANEMIA ASSOCIATED WITH CHEMOTHERAPY: Status: ACTIVE | Noted: 2022-04-22

## 2022-04-22 PROBLEM — D64.81 ANEMIA ASSOCIATED WITH CHEMOTHERAPY: Status: ACTIVE | Noted: 2022-04-22

## 2022-04-22 LAB
ABO + RH BLD: NORMAL
ALBUMIN SERPL BCP-MCNC: 3.2 G/DL (ref 3.5–5.2)
ALP SERPL-CCNC: 113 U/L (ref 55–135)
ALT SERPL W/O P-5'-P-CCNC: 60 U/L (ref 10–44)
ANION GAP SERPL CALC-SCNC: 8 MMOL/L (ref 8–16)
ANISOCYTOSIS BLD QL SMEAR: SLIGHT
AST SERPL-CCNC: 52 U/L (ref 10–40)
BASOPHILS # BLD AUTO: 0.05 K/UL (ref 0–0.2)
BASOPHILS NFR BLD: 0.4 % (ref 0–1.9)
BILIRUB SERPL-MCNC: 0.3 MG/DL (ref 0.1–1)
BLD GP AB SCN CELLS X3 SERPL QL: NORMAL
BUN SERPL-MCNC: 10 MG/DL (ref 8–23)
CALCIUM SERPL-MCNC: 8.8 MG/DL (ref 8.7–10.5)
CHLORIDE SERPL-SCNC: 104 MMOL/L (ref 95–110)
CO2 SERPL-SCNC: 30 MMOL/L (ref 23–29)
CREAT SERPL-MCNC: 1 MG/DL (ref 0.5–1.4)
DIFFERENTIAL METHOD: ABNORMAL
EOSINOPHIL # BLD AUTO: 0.1 K/UL (ref 0–0.5)
EOSINOPHIL NFR BLD: 1.1 % (ref 0–8)
ERYTHROCYTE [DISTWIDTH] IN BLOOD BY AUTOMATED COUNT: 14.3 % (ref 11.5–14.5)
EST. GFR  (AFRICAN AMERICAN): >60 ML/MIN/1.73 M^2
EST. GFR  (NON AFRICAN AMERICAN): 59.3 ML/MIN/1.73 M^2
GLUCOSE SERPL-MCNC: 108 MG/DL (ref 70–110)
HCT VFR BLD AUTO: 33.2 % (ref 37–48.5)
HGB BLD-MCNC: 10.7 G/DL (ref 12–16)
HYPOCHROMIA BLD QL SMEAR: ABNORMAL
IMM GRANULOCYTES # BLD AUTO: 0.25 K/UL (ref 0–0.04)
IMM GRANULOCYTES NFR BLD AUTO: 2.1 % (ref 0–0.5)
LYMPHOCYTES # BLD AUTO: 0.7 K/UL (ref 1–4.8)
LYMPHOCYTES NFR BLD: 5.6 % (ref 18–48)
MCH RBC QN AUTO: 32 PG (ref 27–31)
MCHC RBC AUTO-ENTMCNC: 32.2 G/DL (ref 32–36)
MCV RBC AUTO: 99 FL (ref 82–98)
MONOCYTES # BLD AUTO: 1 K/UL (ref 0.3–1)
MONOCYTES NFR BLD: 8.3 % (ref 4–15)
NEUTROPHILS # BLD AUTO: 9.7 K/UL (ref 1.8–7.7)
NEUTROPHILS NFR BLD: 82.5 % (ref 38–73)
NRBC BLD-RTO: 0 /100 WBC
OVALOCYTES BLD QL SMEAR: ABNORMAL
PLATELET # BLD AUTO: 69 K/UL (ref 150–450)
PMV BLD AUTO: 11.1 FL (ref 9.2–12.9)
POIKILOCYTOSIS BLD QL SMEAR: SLIGHT
POLYCHROMASIA BLD QL SMEAR: ABNORMAL
POTASSIUM SERPL-SCNC: 3.8 MMOL/L (ref 3.5–5.1)
PROT SERPL-MCNC: 6.1 G/DL (ref 6–8.4)
RBC # BLD AUTO: 3.34 M/UL (ref 4–5.4)
SARS-COV-2 RDRP RESP QL NAA+PROBE: NEGATIVE
SODIUM SERPL-SCNC: 142 MMOL/L (ref 136–145)
WBC # BLD AUTO: 11.76 K/UL (ref 3.9–12.7)

## 2022-04-22 PROCEDURE — 99215 PR OFFICE/OUTPT VISIT, EST, LEVL V, 40-54 MIN: ICD-10-PCS | Mod: S$GLB,,, | Performed by: NURSE PRACTITIONER

## 2022-04-22 PROCEDURE — A4216 STERILE WATER/SALINE, 10 ML: HCPCS | Performed by: INTERNAL MEDICINE

## 2022-04-22 PROCEDURE — 99999 PR PBB SHADOW E&M-EST. PATIENT-LVL V: ICD-10-PCS | Mod: PBBFAC,,, | Performed by: NURSE PRACTITIONER

## 2022-04-22 PROCEDURE — 85025 COMPLETE CBC W/AUTO DIFF WBC: CPT | Performed by: INTERNAL MEDICINE

## 2022-04-22 PROCEDURE — 25000003 PHARM REV CODE 250: Performed by: INTERNAL MEDICINE

## 2022-04-22 PROCEDURE — 80053 COMPREHEN METABOLIC PANEL: CPT | Performed by: INTERNAL MEDICINE

## 2022-04-22 PROCEDURE — 36592 COLLECT BLOOD FROM PICC: CPT

## 2022-04-22 PROCEDURE — 99215 OFFICE O/P EST HI 40 MIN: CPT | Mod: S$GLB,,, | Performed by: NURSE PRACTITIONER

## 2022-04-22 PROCEDURE — 99999 PR PBB SHADOW E&M-EST. PATIENT-LVL V: CPT | Mod: PBBFAC,,, | Performed by: NURSE PRACTITIONER

## 2022-04-22 PROCEDURE — 63600175 PHARM REV CODE 636 W HCPCS: Performed by: INTERNAL MEDICINE

## 2022-04-22 PROCEDURE — U0002 COVID-19 LAB TEST NON-CDC: HCPCS | Performed by: NURSE PRACTITIONER

## 2022-04-22 PROCEDURE — 86850 RBC ANTIBODY SCREEN: CPT | Performed by: INTERNAL MEDICINE

## 2022-04-22 RX ORDER — PANTOPRAZOLE SODIUM 40 MG/1
40 TABLET, DELAYED RELEASE ORAL
Status: ON HOLD | COMMUNITY
Start: 2022-04-13 | End: 2022-05-06 | Stop reason: SDUPTHER

## 2022-04-22 RX ORDER — CHLORHEXIDINE GLUCONATE ORAL RINSE 1.2 MG/ML
SOLUTION DENTAL
Status: ON HOLD | COMMUNITY
Start: 2021-11-06 | End: 2022-05-08 | Stop reason: HOSPADM

## 2022-04-22 RX ORDER — SODIUM CHLORIDE 0.9 % (FLUSH) 0.9 %
10 SYRINGE (ML) INJECTION
Status: DISCONTINUED | OUTPATIENT
Start: 2022-04-22 | End: 2022-04-22 | Stop reason: HOSPADM

## 2022-04-22 RX ORDER — HEPARIN SODIUM 1000 [USP'U]/ML
1000 INJECTION, SOLUTION INTRAVENOUS; SUBCUTANEOUS
Status: CANCELLED
Start: 2022-04-22

## 2022-04-22 RX ORDER — FLUTICASONE PROPIONATE 50 MCG
2 SPRAY, SUSPENSION (ML) NASAL
Status: ON HOLD | COMMUNITY
Start: 2022-02-12 | End: 2022-05-08 | Stop reason: HOSPADM

## 2022-04-22 RX ORDER — POLYETHYLENE GLYCOL 3350 17 G/17G
17 POWDER, FOR SOLUTION ORAL
Status: ON HOLD | COMMUNITY
End: 2022-05-08 | Stop reason: HOSPADM

## 2022-04-22 RX ORDER — LENALIDOMIDE 25 MG/1
25 CAPSULE ORAL
COMMUNITY
Start: 2022-03-15 | End: 2022-04-22 | Stop reason: ALTCHOICE

## 2022-04-22 RX ORDER — SUCRALFATE 1 G/1
1 TABLET ORAL 4 TIMES DAILY
Status: ON HOLD | COMMUNITY
Start: 2021-10-29 | End: 2022-05-06 | Stop reason: SDUPTHER

## 2022-04-22 RX ORDER — ASCORBIC ACID 500 MG
500 TABLET ORAL
Status: ON HOLD | COMMUNITY
End: 2022-05-06 | Stop reason: SDUPTHER

## 2022-04-22 RX ORDER — NYSTATIN 100000 [USP'U]/ML
5 SUSPENSION ORAL 4 TIMES DAILY
COMMUNITY
Start: 2021-12-17 | End: 2022-04-22

## 2022-04-22 RX ORDER — SODIUM CHLORIDE 0.9 % (FLUSH) 0.9 %
10 SYRINGE (ML) INJECTION
Status: CANCELLED | OUTPATIENT
Start: 2022-04-22

## 2022-04-22 RX ORDER — HEPARIN SODIUM 1000 [USP'U]/ML
1000 INJECTION, SOLUTION INTRAVENOUS; SUBCUTANEOUS
Status: DISCONTINUED | OUTPATIENT
Start: 2022-04-22 | End: 2022-04-22 | Stop reason: HOSPADM

## 2022-04-22 RX ORDER — CHOLECALCIFEROL (VITAMIN D3) 25 MCG
1000 TABLET ORAL
Status: ON HOLD | COMMUNITY
End: 2022-05-06 | Stop reason: SDUPTHER

## 2022-04-22 RX ORDER — AMOXICILLIN AND CLAVULANATE POTASSIUM 875; 125 MG/1; MG/1
1 TABLET, FILM COATED ORAL 2 TIMES DAILY
COMMUNITY
Start: 2022-02-12 | End: 2022-04-22 | Stop reason: ALTCHOICE

## 2022-04-22 RX ADMIN — HEPARIN SODIUM 1000 UNITS: 1000 INJECTION, SOLUTION INTRAVENOUS; SUBCUTANEOUS at 01:04

## 2022-04-22 RX ADMIN — Medication 10 ML: at 01:04

## 2022-04-22 NOTE — ASSESSMENT & PLAN NOTE
- The patient states that she takes half of a clonazepam pill at night to help her sleep but would like to consider stopping this medication.

## 2022-04-22 NOTE — ASSESSMENT & PLAN NOTE
- due to chemo and stem cell collection  - daily CBC  - transfuse for platelets count <10 or <50 pre-procedure or bleeding  - hold Lovenox when platelets <50k

## 2022-04-22 NOTE — PROGRESS NOTES
Chief Complaint   Patient presents with    Multiple Myeloma     Admit visit for transplant         HPI : , 66, is here for hematology/ stem cell transplant follow up and signing consent . She is a stem cell transplant candidate. She has IgG lambda multiple myeloma. Multiple myeloma was diagnosed after work up for anemia showed a paraprotein  She has anemia, GERD, esophageal erosions. On 10/8/21 she had a bone marrow biopsy. It showed nearly 50%  positive plasma cells on core biopsy by IHC.  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  [place]ongo red on bone marrow negative.She has chronic anemia. Cytogenetics was normal. FISH showed increase in 1q copies, t(4,14) and monosomy 13.  She had no lytic lesions on skeletal survey. She was treated with RVd.     Interval History: Ms.Richards Byrne presents at BMT clinic for admit visit. Admitting tomorrow for high dose emi transplant. Collection completed without any issues. Mild soreness at right Soto, no signs of infection.   Denies any N/V/D/ chest pain/fever. Taking multiple OTC supplements, patient aware to hold at inpatient.She is covid negative.   Weekend admission, inpatient team made aware to call with bed availability.     Review of patient's allergies indicates:   Allergen Reactions    Garlic     Milk Hives, Itching and Nausea Only     Dairy Products       Current Outpatient Medications   Medication Sig    acyclovir (ZOVIRAX) 400 MG tablet Take 400 mg by mouth 2 (two) times daily.    ascorbic acid, vitamin C, (VITAMIN C) 250 MG tablet Take 250 mg by mouth once daily.    ascorbic acid, vitamin C, (VITAMIN C) 500 MG tablet Take 500 mg by mouth.    calcium carbonate (OS-LAILA) 600 mg calcium (1,500 mg) Tab Take 1 tablet by mouth once daily.    calcium carbonate (TUMS ULTRA ORAL) Take 500 mg by mouth daily as needed (heart burn).    chlorhexidine (PERIDEX) 0.12 % solution SMARTSIG:By Mouth    citalopram (CELEXA) 10 MG tablet Take 10 mg by mouth every  morning.    clonazePAM (KLONOPIN) 0.5 MG tablet Take 0.5 mg by mouth 2 (two) times daily as needed for Anxiety.    codeine-butalbital-ASA-caffeine (BUTALBITAL COMPOUND W/CODEINE) 90--40 mg Cap Take 1 capsule by mouth every 4 (four) hours as needed (migraine headaches).    docusate sodium (COLACE) 100 MG capsule Take 100 mg by mouth 2 (two) times daily.    fluticasone propionate (FLONASE) 50 mcg/actuation nasal spray 1 spray by Each Nostril route every evening.    fluticasone propionate (FLONASE) 50 mcg/actuation nasal spray 2 sprays by Nasal route.    GI cocktail antac/dicyc/lidoc Take 5 mLs by mouth daily as needed (nausea).    multivitamin (THERAGRAN) per tablet Take 1 tablet by mouth once daily.    ondansetron (ZOFRAN) 8 MG tablet Take 8 mg by mouth every 8 (eight) hours as needed for Nausea.    pantoprazole (PROTONIX) 40 MG tablet Take 40 mg by mouth every evening.    pantoprazole (PROTONIX) 40 MG tablet Take 40 mg by mouth.    polyethylene glycol (GLYCOLAX) 17 gram PwPk Take 17 g by mouth once daily.    polyethylene glycol (GLYCOLAX) 17 gram/dose powder Take 17 g by mouth.    promethazine (PHENERGAN) 25 MG tablet Take 25 mg by mouth every 6 (six) hours as needed (nuasea).    sucralfate (CARAFATE) 1 gram tablet Take 1 g by mouth 4 (four) times daily.    vitamin D (VITAMIN D3) 1000 units Tab Take 1,000 Units by mouth.    zinc gluconate 50 mg tablet Take 50 mg by mouth once daily.    REVLIMID 25 mg Cap Take 25 mg by mouth once daily. For 21 days on and 7 days off     No current facility-administered medications for this visit.     Facility-Administered Medications Ordered in Other Visits   Medication    heparin (porcine) injection 1,000 Units    sodium chloride 0.9% flush 10 mL         Review of Systems   Constitutional: Positive for malaise/fatigue. Negative for chills, fever and weight loss.   HENT: Negative for congestion, ear discharge, ear pain, hearing loss and nosebleeds.    Eyes:  Negative for blurred vision, double vision, photophobia and pain.   Respiratory: Negative for hemoptysis, sputum production and shortness of breath.    Cardiovascular: Negative for orthopnea, leg swelling and PND.   Gastrointestinal: Negative for abdominal pain, blood in stool, diarrhea, heartburn, melena and vomiting.   Genitourinary: Negative for dysuria and frequency.   Musculoskeletal: Negative for back pain, myalgias and neck pain.   Neurological: Negative for dizziness, tingling, tremors, sensory change, focal weakness, weakness and headaches.   Endo/Heme/Allergies: Negative for environmental allergies. Does not bruise/bleed easily.   Psychiatric/Behavioral: Negative for depression, hallucinations and substance abuse.       Vitals:    04/22/22 1330   BP: 135/70   Pulse: 80   Resp: 17   Temp: 98.2 °F (36.8 °C)     PS: ECOG 1  Vitals:    04/22/22 1330   BP: 135/70   Pulse: 80   Resp: 17   Temp: 98.2 °F (36.8 °C)       Physical Exam  Constitutional:       Appearance: Normal appearance.   HENT:      Head: Normocephalic and atraumatic.      Mouth/Throat:      Pharynx: No posterior oropharyngeal erythema.   Eyes:      General: No scleral icterus.  Cardiovascular:      Rate and Rhythm: Normal rate and regular rhythm.      Pulses: Normal pulses.      Heart sounds: Normal heart sounds. No murmur heard.  Pulmonary:      Effort: Pulmonary effort is normal. No respiratory distress.      Breath sounds: Normal breath sounds.   Abdominal:      General: There is no distension.      Palpations: There is no mass.      Tenderness: There is no abdominal tenderness.   Musculoskeletal:         General: No swelling.   Lymphadenopathy:      Cervical: No cervical adenopathy.   Skin:     Coloration: Skin is not jaundiced or pale.      Comments: Right chest Soto, dressing C/D/I   Neurological:      General: No focal deficit present.      Mental Status: She is alert and oriented to person, place, and time.      Cranial Nerves: No  cranial nerve deficit.   Psychiatric:         Mood and Affect: Mood normal.           Lab Results   Component Value Date    WBC 11.76 04/22/2022    HGB 10.7 (L) 04/22/2022    HCT 33.2 (L) 04/22/2022    MCV 99 (H) 04/22/2022    PLT 69 (L) 04/22/2022       CMP  Sodium   Date Value Ref Range Status   04/22/2022 142 136 - 145 mmol/L Final     Potassium   Date Value Ref Range Status   04/22/2022 3.8 3.5 - 5.1 mmol/L Final     Chloride   Date Value Ref Range Status   04/22/2022 104 95 - 110 mmol/L Final     CO2   Date Value Ref Range Status   04/22/2022 30 (H) 23 - 29 mmol/L Final     Glucose   Date Value Ref Range Status   04/22/2022 108 70 - 110 mg/dL Final     BUN   Date Value Ref Range Status   04/22/2022 10 8 - 23 mg/dL Final     Creatinine   Date Value Ref Range Status   04/22/2022 1.0 0.5 - 1.4 mg/dL Final     Calcium   Date Value Ref Range Status   04/22/2022 8.8 8.7 - 10.5 mg/dL Final     Total Protein   Date Value Ref Range Status   04/22/2022 6.1 6.0 - 8.4 g/dL Final     Albumin   Date Value Ref Range Status   04/22/2022 3.2 (L) 3.5 - 5.2 g/dL Final     Total Bilirubin   Date Value Ref Range Status   04/22/2022 0.3 0.1 - 1.0 mg/dL Final     Comment:     For infants and newborns, interpretation of results should be based  on gestational age, weight and in agreement with clinical  observations.    Premature Infant recommended reference ranges:  Up to 24 hours.............<8.0 mg/dL  Up to 48 hours............<12.0 mg/dL  3-5 days..................<15.0 mg/dL  6-29 days.................<15.0 mg/dL       Alkaline Phosphatase   Date Value Ref Range Status   04/22/2022 113 55 - 135 U/L Final     AST   Date Value Ref Range Status   04/22/2022 52 (H) 10 - 40 U/L Final     ALT   Date Value Ref Range Status   04/22/2022 60 (H) 10 - 44 U/L Final     Anion Gap   Date Value Ref Range Status   04/22/2022 8 8 - 16 mmol/L Final     eGFR if    Date Value Ref Range Status   04/22/2022 >60.0 >60 mL/min/1.73 m^2  Final     eGFR if non    Date Value Ref Range Status   04/22/2022 59.3 (A) >60 mL/min/1.73 m^2 Final     Comment:     Calculation used to obtain the estimated glomerular filtration  rate (eGFR) is the CKD-EPI equation.          1. Stem cell transplant candidate     2. Multiple myeloma, remission status unspecified     3. Drug-induced cytopenia     4. Chronic GERD     5. Primary insomnia     6. History of migraine       Assessment and Plan:  Collection completed without any issues. Collected two day total of 3.97 million/kg.  Treatment plan as below;  Stem Cell Transplant Candidate  Disease status: HI  Type of transplant: Auto  Conditioning regimen: Melphalan  Karnofsky Score: 90%  Post transplant maintenance plans: Not Yet  Planned conditioning regimen:  Melphalan on Day -1     Antimicrobial Prophylaxis:  Acyclovir starting on Day -1  Levofloxacin starting on Day -1  Fluconazole starting on Day -1     Growth Factor Support:  Neupogen starting on Day +7      Caregiver:   Post-transplant discharge plans: [facility]    Multiple Myeloma     R-ISS II IgG lambda multiple myeloma, treated with  RVd .  She had increased 1q21 copy number and has t(4,14), both high risk markers in multiple myeloma. She also has monosomy 13 on FISH. She did not have baseline PET CT.   Prior to C5 her M spike on SPEP was 0.52g/dl( was 5.36g/dl at diagnosis). She has received 6 cycles of RVd, followed by 2 cycles of velcade-dexamethasone. She has tolerated the treatments well.    PET CT on 3/31/22 did not demonstrate any hypermetabolic lytic or soft tissue lesions. BM biopsy on 3/21/22 showed 10% plasma cells ( versus 50% at diagnosis).  M spike on 3/3/22 SPEP was 0.85g/dl, suggesting she is in partial response. 4/1/22 PFTs showed mild decrease in DLCO. EKG did not show any abnormality. 2D ECHO on 4/1/22 showed moderate left atrial enlargement, mild mitral regurgitation but normal LVEF. HBV, HIV, HCV, Chagas Ab,  RPR, HTLV I/II all non-reactive. Varicella negative. CMV Ab was reactive. HSV 1 IgG was reactive. HSV 2 IgG negative.   Colonoscopy normal. No abnormality of mammogram or PAP smear. Dental clearance, social work, onco-psychology evaluation all appropriate to proceed to auto SCT.  Admitting for transplant as above.     Cytopenia related to chemo  - Transfuse if hgb<7, plt<10  - Monitor cbc daily at inpatient  - Stopped ASA, which is started with revlimid  GERD  - Continue PPI at inpatient     Insomnia  - The patient states that she takes half of a clonazepam pill at night to help her sleep but would like to consider stopping this medication.   - Consider to order prn while admitted.    Migraine History  - She uses Florinol for migrane headaches and states that she only takes it 2-3 times per year        Disposition : Admit to inpatient tomorrow for transplant    Hortencia Dubose NP  Hematology/Oncology/BMT

## 2022-04-22 NOTE — SUBJECTIVE & OBJECTIVE
Patient information was obtained from patient and past medical records.     Oncology History:   She has IgG lambda multiple myeloma. Multiple myeloma was diagnosed after work up for anemia showed a paraprotein  She has anemia, GERD, esophageal erosions. On 10/8/21 she had a bone marrow biopsy. It showed nearly 50%  positive plasma cells on core biopsy by IHC.  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  C[place] red on bone marrow negative.She has chronic anemia. Cytogenetics was normal. FISH showed increase in 1q copies, t(4,14) and monosomy 13.  She had no lytic lesions on skeletal survey. She was treated with RVd. PET CT on 3/31/22 did not demonstrate any hypermetabolic lytic or soft tissue lesions. BM biopsy on 3/21/22 showed 10% plasma cells ( versus 50% at diagnosis).  M spike on 3/3/22 SPEP was 0.85g/dl, suggesting she is in partial response. Restaging bone marrow biopsy 3/21 with 10% involvement by plasma cell neoplasm  No medications prior to admission.       Garlic and Milk     Past Medical History:   Diagnosis Date    Anxiety     Fatigue     Multiple myeloma      Past Surgical History:   Procedure Laterality Date    breast implants Bilateral 04/01/2021    CHOLECYSTECTOMY      HYSTERECTOMY      INSERTION OF TUNNELED CENTRAL VENOUS HEMODIALYSIS CATHETER Right 4/18/2022    Procedure: INSERTION, CATHETER, HEMODIALYSIS, DUAL LUMEN Bard 14.5 Fr Hemosplit Catheter Model [id number], Right Possible Left Chest;  Surgeon: Hernan Plascencia MD;  Location: [facility] OR [address] [place] [address];  Service: General;  Laterality: Right;     Family History       Problem Relation (Age of Onset)    Hypertension Father          Tobacco Use    Smoking status: Never Smoker    Smokeless tobacco: Never Used   Substance and Sexual Activity    Alcohol use: Not Currently    Drug use: Never    Sexual activity: Not Currently     Partners: Male       Review of Systems   Constitutional:  Positive for fatigue. Negative for activity change, appetite change, chills,  diaphoresis, fever and unexpected weight change.   HENT:  Negative for congestion, dental problem, mouth sores, nosebleeds, postnasal drip, rhinorrhea, sinus pressure, sore throat and trouble swallowing.    Eyes:  Negative for photophobia and visual disturbance.   Respiratory:  Negative for cough and shortness of breath.    Cardiovascular:  Negative for chest pain, palpitations and leg swelling.   Gastrointestinal:  Negative for abdominal distention, abdominal pain, blood in stool, constipation, diarrhea, nausea and vomiting.   Genitourinary:  Negative for difficulty urinating, dysuria, frequency, hematuria, menstrual problem, urgency and vaginal bleeding.   Musculoskeletal:  Negative for arthralgias, back pain and myalgias.   Skin:  Negative for pallor and rash.   Allergic/Immunologic: Negative for immunocompromised state.   Neurological:  Negative for dizziness, syncope, weakness, numbness and headaches.   Hematological:  Negative for adenopathy. Does not bruise/bleed easily.   Psychiatric/Behavioral:  Negative for confusion. The patient is not nervous/anxious.    Objective:     Vital Signs (Most Recent):    Vital Signs (24h Range):  Temp:  [98.2 °F (36.8 °C)] 98.2 °F (36.8 °C)  Pulse:  [80] 80  Resp:  [17] 17  SpO2:  [99 %] 99 %  BP: (135)/(70) 135/70        There is no height or weight on file to calculate BMI.  There is no height or weight on file to calculate BSA.    ECOG SCORE           [unfilled]    Lines/Drains/Airways       Central Venous Catheter Line       Name Duration    Tunneled Central Line Insertion/Assessment - Double Lumen  right subclavian No Data         Hemodialysis Catheter 04/18/22 0734 right internal jugular 4 days              Peripheral Intravenous Line       Name Duration         Peripheral IV - Single Lumen 04/18/22 0540 18 G Left Wrist 4 days                    Physical Exam  Vitals reviewed.   Constitutional:       General: She is not in acute distress.     Appearance: She is  well-developed.   HENT:      Mouth/Throat:      Pharynx: No oropharyngeal exudate or posterior oropharyngeal erythema.   Eyes:      General: No scleral icterus.     Conjunctiva/sclera: Conjunctivae normal.      Pupils: Pupils are equal, round, and reactive to light.   Neck:      Thyroid: No thyromegaly.   Cardiovascular:      Rate and Rhythm: Normal rate and regular rhythm.      Heart sounds: Normal heart sounds.   Pulmonary:      Effort: Pulmonary effort is normal. No respiratory distress.      Breath sounds: Normal breath sounds.   Abdominal:      General: Bowel sounds are normal. There is no distension.      Palpations: Abdomen is soft. There is no hepatomegaly or splenomegaly.      Tenderness: There is no abdominal tenderness.   Musculoskeletal:         General: No tenderness. Normal range of motion.      Cervical back: Normal range of motion and neck supple.   Lymphadenopathy:      Cervical: No cervical adenopathy.   Skin:     Coloration: Skin is not pale.      Findings: No rash.      Nails: There is no clubbing.      Comments: Vascath intact, no redness or drainage   Neurological:      Mental Status: She is alert and oriented to person, place, and time.      Cranial Nerves: No cranial nerve deficit.      Coordination: Coordination normal.   Psychiatric:         Behavior: Behavior normal.         Thought Content: Thought content normal.       Significant Labs:   CBC:   Recent Labs   Lab 04/22/22  1313   WBC 11.76   HGB 10.7*   HCT 33.2*   PLT 69*    and CMP:   Recent Labs   Lab 04/22/22  1313      K 3.8      CO2 30*      BUN 10   CREATININE 1.0   CALCIUM 8.8   PROT 6.1   ALBUMIN 3.2*   BILITOT 0.3   ALKPHOS 113   AST 52*   ALT 60*   ANIONGAP 8   EGFRNONAA 59.3*       Diagnostic Results:  None

## 2022-04-22 NOTE — ASSESSMENT & PLAN NOTE
- R-ISS II IgG lambda multiple myeloma, She had increased 1q21 copy number and has t(4,14), both high risk markers in multiple myeloma. She also has monosomy 13 on FISH. She did not have baseline PET CT.   - She has received 6 cycles of RVd, followed by 2 cycles of velcade-dexamethasone. She has tolerated the treatments well.    - PET CT on 3/31/22 did not demonstrate any hypermetabolic lytic or soft tissue lesions. BM biopsy on 3/21/22 showed 10% plasma cells ( versus 50% at diagnosis).  - Admitting for transplant as above.

## 2022-04-22 NOTE — HPI
Ms.Debra Diaz presents as a direct admission for high dose emi autologous stem cell transplant. Collection completed without any issues. Seen in BMT clinic yesterday. Mild soreness at right Soto, no signs of infection. Denies any N/V/D/ chest pain/fever. Covid negative in clinic.

## 2022-04-22 NOTE — NURSING
Pt here for lab draw from [facility]. Labs drawn from red distal port. Port flushed and hep locked with high dose heparin. Tolerated procedure without difficulty.

## 2022-04-22 NOTE — ASSESSMENT & PLAN NOTE
Disease status: [place]  Type of transplant: Auto  Conditioning regimen: Melphalan  Karnofsky Score: 90%  Post transplant maintenance plans: Not Yet  Today is D -2    Planned conditioning regimen:  Melphalan on Day -1     Antimicrobial Prophylaxis:  Acyclovir starting on Day -1  Levofloxacin starting on Day -1  Fluconazole starting on Day -1     Growth Factor Support:  Neupogen starting on Day +7      Caregiver:   Post-transplant discharge plans: [facility]

## 2022-04-23 ENCOUNTER — HOSPITAL ENCOUNTER (INPATIENT)
Facility: HOSPITAL | Age: 67
LOS: 15 days | Discharge: HOME | DRG: 016 | End: 2022-05-08
Attending: INTERNAL MEDICINE | Admitting: INTERNAL MEDICINE
Payer: COMMERCIAL

## 2022-04-23 DIAGNOSIS — K59.00 CONSTIPATION, UNSPECIFIED CONSTIPATION TYPE: ICD-10-CM

## 2022-04-23 DIAGNOSIS — C90.00 MULTIPLE MYELOMA, REMISSION STATUS UNSPECIFIED: Primary | ICD-10-CM

## 2022-04-23 DIAGNOSIS — Z76.82 STEM CELL TRANSPLANT CANDIDATE: ICD-10-CM

## 2022-04-23 DIAGNOSIS — Z94.81 STATUS POST AUTOLOGOUS BONE MARROW TRANSPLANT: ICD-10-CM

## 2022-04-23 DIAGNOSIS — E83.51 HYPOCALCEMIA: ICD-10-CM

## 2022-04-23 DIAGNOSIS — C90.00 MULTIPLE MYELOMA: ICD-10-CM

## 2022-04-23 DIAGNOSIS — E87.8 ELECTROLYTE ABNORMALITY: ICD-10-CM

## 2022-04-23 DIAGNOSIS — F41.9 ANXIETY: ICD-10-CM

## 2022-04-23 DIAGNOSIS — R11.0 NAUSEA: ICD-10-CM

## 2022-04-23 DIAGNOSIS — C90.01 MULTIPLE MYELOMA IN REMISSION: ICD-10-CM

## 2022-04-23 DIAGNOSIS — R51.9 NONINTRACTABLE HEADACHE, UNSPECIFIED CHRONICITY PATTERN, UNSPECIFIED HEADACHE TYPE: ICD-10-CM

## 2022-04-23 PROBLEM — R74.01 TRANSAMINASEMIA: Status: ACTIVE | Noted: 2022-04-23

## 2022-04-23 PROCEDURE — 25000242 PHARM REV CODE 250 ALT 637 W/ HCPCS: Performed by: NURSE PRACTITIONER

## 2022-04-23 PROCEDURE — 25000003 PHARM REV CODE 250: Performed by: NURSE PRACTITIONER

## 2022-04-23 PROCEDURE — 63600175 PHARM REV CODE 636 W HCPCS: Performed by: INTERNAL MEDICINE

## 2022-04-23 PROCEDURE — 99223 PR INITIAL HOSPITAL CARE,LEVL III: ICD-10-PCS | Mod: ,,, | Performed by: INTERNAL MEDICINE

## 2022-04-23 PROCEDURE — 20600001 HC STEP DOWN PRIVATE ROOM

## 2022-04-23 PROCEDURE — 99223 1ST HOSP IP/OBS HIGH 75: CPT | Mod: ,,, | Performed by: INTERNAL MEDICINE

## 2022-04-23 PROCEDURE — 63600175 PHARM REV CODE 636 W HCPCS: Performed by: NURSE PRACTITIONER

## 2022-04-23 RX ORDER — DOCUSATE SODIUM 100 MG/1
100 CAPSULE, LIQUID FILLED ORAL 2 TIMES DAILY
Status: DISCONTINUED | OUTPATIENT
Start: 2022-04-23 | End: 2022-04-27

## 2022-04-23 RX ORDER — PANTOPRAZOLE SODIUM 40 MG/1
40 TABLET, DELAYED RELEASE ORAL DAILY
Status: DISCONTINUED | OUTPATIENT
Start: 2022-04-23 | End: 2022-04-23

## 2022-04-23 RX ORDER — POTASSIUM CHLORIDE 20 MEQ/1
20 TABLET, EXTENDED RELEASE ORAL
Status: DISCONTINUED | OUTPATIENT
Start: 2022-04-23 | End: 2022-05-08 | Stop reason: HOSPADM

## 2022-04-23 RX ORDER — PROCHLORPERAZINE EDISYLATE 5 MG/ML
10 INJECTION INTRAMUSCULAR; INTRAVENOUS EVERY 6 HOURS PRN
Status: DISCONTINUED | OUTPATIENT
Start: 2022-04-23 | End: 2022-04-27

## 2022-04-23 RX ORDER — CLONAZEPAM 0.5 MG/1
0.5 TABLET ORAL 2 TIMES DAILY PRN
Status: DISCONTINUED | OUTPATIENT
Start: 2022-04-23 | End: 2022-04-25

## 2022-04-23 RX ORDER — HEPARIN SODIUM 1000 [USP'U]/ML
1600 INJECTION, SOLUTION INTRAVENOUS; SUBCUTANEOUS
Status: DISCONTINUED | OUTPATIENT
Start: 2022-04-23 | End: 2022-05-08 | Stop reason: HOSPADM

## 2022-04-23 RX ORDER — ONDANSETRON 8 MG/1
16 TABLET, ORALLY DISINTEGRATING ORAL
Status: COMPLETED | OUTPATIENT
Start: 2022-04-24 | End: 2022-04-25

## 2022-04-23 RX ORDER — LEVOFLOXACIN 500 MG/1
500 TABLET, FILM COATED ORAL DAILY
Status: DISCONTINUED | OUTPATIENT
Start: 2022-04-24 | End: 2022-05-04

## 2022-04-23 RX ORDER — FLUCONAZOLE 200 MG/1
400 TABLET ORAL DAILY
Status: DISCONTINUED | OUTPATIENT
Start: 2022-04-24 | End: 2022-05-04

## 2022-04-23 RX ORDER — ONDANSETRON 8 MG/1
8 TABLET, ORALLY DISINTEGRATING ORAL EVERY 8 HOURS PRN
Status: DISCONTINUED | OUTPATIENT
Start: 2022-04-23 | End: 2022-04-26

## 2022-04-23 RX ORDER — FLUTICASONE PROPIONATE 50 MCG
1 SPRAY, SUSPENSION (ML) NASAL NIGHTLY
Status: DISCONTINUED | OUTPATIENT
Start: 2022-04-23 | End: 2022-05-08 | Stop reason: HOSPADM

## 2022-04-23 RX ORDER — PANTOPRAZOLE SODIUM 40 MG/1
40 TABLET, DELAYED RELEASE ORAL NIGHTLY
Status: DISCONTINUED | OUTPATIENT
Start: 2022-04-24 | End: 2022-05-08 | Stop reason: HOSPADM

## 2022-04-23 RX ORDER — LORAZEPAM 2 MG/ML
1 INJECTION INTRAMUSCULAR EVERY 6 HOURS PRN
Status: DISCONTINUED | OUTPATIENT
Start: 2022-04-23 | End: 2022-04-27

## 2022-04-23 RX ORDER — DEXAMETHASONE 4 MG/1
12 TABLET ORAL
Status: COMPLETED | OUTPATIENT
Start: 2022-04-24 | End: 2022-04-24

## 2022-04-23 RX ORDER — LANOLIN ALCOHOL/MO/W.PET/CERES
800 CREAM (GRAM) TOPICAL EVERY 4 HOURS PRN
Status: DISCONTINUED | OUTPATIENT
Start: 2022-04-23 | End: 2022-05-08 | Stop reason: HOSPADM

## 2022-04-23 RX ORDER — SODIUM CHLORIDE AND POTASSIUM CHLORIDE 150; 900 MG/100ML; MG/100ML
INJECTION, SOLUTION INTRAVENOUS CONTINUOUS
Status: DISCONTINUED | OUTPATIENT
Start: 2022-04-23 | End: 2022-05-06

## 2022-04-23 RX ORDER — DIPHENHYDRAMINE HYDROCHLORIDE 50 MG/ML
25 INJECTION INTRAMUSCULAR; INTRAVENOUS
Status: DISCONTINUED | OUTPATIENT
Start: 2022-04-23 | End: 2022-05-05

## 2022-04-23 RX ORDER — SODIUM,POTASSIUM PHOSPHATES 280-250MG
2 POWDER IN PACKET (EA) ORAL EVERY 4 HOURS PRN
Status: DISCONTINUED | OUTPATIENT
Start: 2022-04-23 | End: 2022-05-08 | Stop reason: HOSPADM

## 2022-04-23 RX ORDER — POLYETHYLENE GLYCOL 3350 17 G/17G
17 POWDER, FOR SOLUTION ORAL DAILY
Status: DISCONTINUED | OUTPATIENT
Start: 2022-04-23 | End: 2022-04-27

## 2022-04-23 RX ORDER — SODIUM,POTASSIUM PHOSPHATES 280-250MG
1 POWDER IN PACKET (EA) ORAL EVERY 4 HOURS PRN
Status: DISCONTINUED | OUTPATIENT
Start: 2022-04-23 | End: 2022-05-08 | Stop reason: HOSPADM

## 2022-04-23 RX ORDER — CITALOPRAM 10 MG/1
10 TABLET ORAL EVERY MORNING
Status: DISCONTINUED | OUTPATIENT
Start: 2022-04-23 | End: 2022-05-08 | Stop reason: HOSPADM

## 2022-04-23 RX ORDER — LANOLIN ALCOHOL/MO/W.PET/CERES
400 CREAM (GRAM) TOPICAL EVERY 4 HOURS PRN
Status: DISCONTINUED | OUTPATIENT
Start: 2022-04-23 | End: 2022-05-08 | Stop reason: HOSPADM

## 2022-04-23 RX ORDER — MAG HYDROX/ALUMINUM HYD/SIMETH 200-200-20
30 SUSPENSION, ORAL (FINAL DOSE FORM) ORAL EVERY 6 HOURS PRN
Status: DISCONTINUED | OUTPATIENT
Start: 2022-04-23 | End: 2022-05-08 | Stop reason: HOSPADM

## 2022-04-23 RX ORDER — ACYCLOVIR 200 MG/1
800 CAPSULE ORAL 2 TIMES DAILY
Status: DISCONTINUED | OUTPATIENT
Start: 2022-04-24 | End: 2022-04-30

## 2022-04-23 RX ORDER — ENOXAPARIN SODIUM 100 MG/ML
40 INJECTION SUBCUTANEOUS EVERY 24 HOURS
Status: DISCONTINUED | OUTPATIENT
Start: 2022-04-23 | End: 2022-05-01

## 2022-04-23 RX ORDER — SODIUM CHLORIDE 0.9 % (FLUSH) 0.9 %
10 SYRINGE (ML) INJECTION
Status: DISCONTINUED | OUTPATIENT
Start: 2022-04-23 | End: 2022-05-08 | Stop reason: HOSPADM

## 2022-04-23 RX ADMIN — DOCUSATE SODIUM 100 MG: 100 CAPSULE, LIQUID FILLED ORAL at 09:04

## 2022-04-23 RX ADMIN — CLONAZEPAM 0.5 MG: 0.5 TABLET ORAL at 09:04

## 2022-04-23 RX ADMIN — ENOXAPARIN SODIUM 40 MG: 40 INJECTION SUBCUTANEOUS at 04:04

## 2022-04-23 RX ADMIN — FLUTICASONE PROPIONATE 50 MCG: 50 SPRAY, METERED NASAL at 09:04

## 2022-04-23 RX ADMIN — SODIUM CHLORIDE AND POTASSIUM CHLORIDE: 9; 1.49 INJECTION, SOLUTION INTRAVENOUS at 09:04

## 2022-04-23 NOTE — H&P
Marty Alejo - Oncology ([facility])  Hematology  Bone Marrow Transplant  H&P    Subjective:     Principal Problem: Stem cell transplant candidate    HPI: Ms.Debra Diaz presents as a direct admission for high dose emi autologous stem cell transplant. Collection completed without any issues. Seen in BMT clinic yesterday. Mild soreness at right Soto, no signs of infection. Denies any N/V/D/ chest pain/fever. Covid negative in clinic.       Patient information was obtained from patient and past medical records.     Oncology History:   She has IgG lambda multiple myeloma. Multiple myeloma was diagnosed after work up for anemia showed a paraprotein  She has anemia, GERD, esophageal erosions. On 10/8/21 she had a bone marrow biopsy. It showed nearly 50%  positive plasma cells on core biopsy by IHC.  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  [place] red on bone marrow negative.She has chronic anemia. Cytogenetics was normal. FISH showed increase in 1q copies, t(4,14) and monosomy 13.  She had no lytic lesions on skeletal survey. She was treated with RVd. PET CT on 3/31/22 did not demonstrate any hypermetabolic lytic or soft tissue lesions. BM biopsy on 3/21/22 showed 10% plasma cells ( versus 50% at diagnosis).  M spike on 3/3/22 SPEP was 0.85g/dl, suggesting she is in partial response. Restaging bone marrow biopsy 3/21 with 10% involvement by plasma cell neoplasm  No medications prior to admission.       Garlic and Milk     Past Medical History:   Diagnosis Date    Anxiety     Fatigue     Multiple myeloma      Past Surgical History:   Procedure Laterality Date    breast implants Bilateral 04/01/2021    CHOLECYSTECTOMY      HYSTERECTOMY      INSERTION OF TUNNELED CENTRAL VENOUS HEMODIALYSIS CATHETER Right 4/18/2022    Procedure: INSERTION, CATHETER, HEMODIALYSIS, DUAL LUMEN Bard 14.5 Fr Hemosplit Catheter Model [id number], Right Possible Left Chest;  Surgeon: Hernan Plascencia MD;  [facility] OR [address] [place] [address];  Service:  General;  Laterality: Right;     Family History       Problem Relation (Age of Onset)    Hypertension Father          Tobacco Use    Smoking status: Never Smoker    Smokeless tobacco: Never Used   Substance and Sexual Activity    Alcohol use: Not Currently    Drug use: Never    Sexual activity: Not Currently     Partners: Male       Review of Systems   Constitutional:  Positive for fatigue. Negative for activity change, appetite change, chills, diaphoresis, fever and unexpected weight change.   HENT:  Negative for congestion, dental problem, mouth sores, nosebleeds, postnasal drip, rhinorrhea, sinus pressure, sore throat and trouble swallowing.    Eyes:  Negative for photophobia and visual disturbance.   Respiratory:  Negative for cough and shortness of breath.    Cardiovascular:  Negative for chest pain, palpitations and leg swelling.   Gastrointestinal:  Negative for abdominal distention, abdominal pain, blood in stool, constipation, diarrhea, nausea and vomiting.   Genitourinary:  Negative for difficulty urinating, dysuria, frequency, hematuria, menstrual problem, urgency and vaginal bleeding.   Musculoskeletal:  Negative for arthralgias, back pain and myalgias.   Skin:  Negative for pallor and rash.   Allergic/Immunologic: Negative for immunocompromised state.   Neurological:  Negative for dizziness, syncope, weakness, numbness and headaches.   Hematological:  Negative for adenopathy. Does not bruise/bleed easily.   Psychiatric/Behavioral:  Negative for confusion. The patient is not nervous/anxious.    Objective:     Vital Signs (Most Recent):    Vital Signs (24h Range):  Temp:  [98.2 °F (36.8 °C)] 98.2 °F (36.8 °C)  Pulse:  [80] 80  Resp:  [17] 17  SpO2:  [99 %] 99 %  BP: (135)/(70) 135/70        There is no height or weight on file to calculate BMI.  There is no height or weight on file to calculate BSA.    ECOG SCORE           [unfilled]    Lines/Drains/Airways       Central Venous Catheter Line       Name  Duration    Tunneled Central Line Insertion/Assessment - Double Lumen  right subclavian No Data         Hemodialysis Catheter 04/18/22 0734 right internal jugular 4 days              Peripheral Intravenous Line       Name Duration         Peripheral IV - Single Lumen 04/18/22 0540 18 G Left Wrist 4 days                    Physical Exam  Vitals reviewed.   Constitutional:       General: She is not in acute distress.     Appearance: She is well-developed.   HENT:      Mouth/Throat:      Pharynx: No oropharyngeal exudate or posterior oropharyngeal erythema.   Eyes:      General: No scleral icterus.     Conjunctiva/sclera: Conjunctivae normal.      Pupils: Pupils are equal, round, and reactive to light.   Neck:      Thyroid: No thyromegaly.   Cardiovascular:      Rate and Rhythm: Normal rate and regular rhythm.      Heart sounds: Normal heart sounds.   Pulmonary:      Effort: Pulmonary effort is normal. No respiratory distress.      Breath sounds: Normal breath sounds.   Abdominal:      General: Bowel sounds are normal. There is no distension.      Palpations: Abdomen is soft. There is no hepatomegaly or splenomegaly.      Tenderness: There is no abdominal tenderness.   Musculoskeletal:         General: No tenderness. Normal range of motion.      Cervical back: Normal range of motion and neck supple.   Lymphadenopathy:      Cervical: No cervical adenopathy.   Skin:     Coloration: Skin is not pale.      Findings: No rash.      Nails: There is no clubbing.      Comments: Vascath intact, no redness or drainage   Neurological:      Mental Status: She is alert and oriented to person, place, and time.      Cranial Nerves: No cranial nerve deficit.      Coordination: Coordination normal.   Psychiatric:         Behavior: Behavior normal.         Thought Content: Thought content normal.       Significant Labs:   CBC:   Recent Labs   Lab 04/22/22  1313   WBC 11.76   HGB 10.7*   HCT 33.2*   PLT 69*    and CMP:   Recent Labs   Lab  04/22/22  1313      K 3.8      CO2 30*      BUN 10   CREATININE 1.0   CALCIUM 8.8   PROT 6.1   ALBUMIN 3.2*   BILITOT 0.3   ALKPHOS 113   AST 52*   ALT 60*   ANIONGAP 8   EGFRNONAA 59.3*       Diagnostic Results:  None    Assessment/Plan:     * Stem cell transplant candidate  Disease status: [place]  Type of transplant: Auto  Conditioning regimen: Melphalan  Karnofsky Score: 90%  Post transplant maintenance plans: Not Yet  Today is D -2    Planned conditioning regimen:  Melphalan on Day -1     Antimicrobial Prophylaxis:  Acyclovir starting on Day -1  Levofloxacin starting on Day -1  Fluconazole starting on Day -1     Growth Factor Support:  Neupogen starting on Day +7      Caregiver:   Post-transplant discharge plans: [facility]       Constipation  Prn colace   Prn miralax  Expect diarrhea after chemotherapy, so will hold them then    Transaminasemia  Mild increase in LFTs,   Will monitor for now     Gastroesophageal reflux disease without esophagitis  - continue Protonix    Thrombocytopenia  - due to chemo and stem cell collection  - daily CBC  - transfuse for platelets count <10 or <50 pre-procedure or bleeding  - hold Lovenox when platelets <50k    Anemia associated with chemotherapy  - daily CBC  - transfuse for hgb <7    Multiple myeloma  - R-ISS II IgG lambda multiple myeloma, She had increased 1q21 copy number and has t(4,14), both high risk markers in multiple myeloma. She also has monosomy 13 on FISH. She did not have baseline PET CT.   - She has received 6 cycles of RVd, followed by 2 cycles of velcade-dexamethasone. She has tolerated the treatments well.    - PET CT on 3/31/22 did not demonstrate any hypermetabolic lytic or soft tissue lesions. BM biopsy on 3/21/22 showed 10% plasma cells ( versus 50% at diagnosis).  - Admitting for transplant as above.    Anxiety  - The patient states that she takes half of a clonazepam pill at night to help her sleep but would like to  consider stopping this medication.           VTE Risk Mitigation (From admission, onward)         Ordered     enoxaparin injection 40 mg  Daily         04/23/22 1311     IP VTE HIGH RISK PATIENT  Once         04/23/22 1311     Place sequential compression device  Until discontinued         04/23/22 1311                Disposition: [facility]    Kali Kumari MD  Bone Marrow Transplant  Hematology  [facility] - Oncology ([facility])

## 2022-04-24 LAB
ABO + RH BLD: NORMAL
ALBUMIN SERPL BCP-MCNC: 2.8 G/DL (ref 3.5–5.2)
ALP SERPL-CCNC: 82 U/L (ref 55–135)
ALT SERPL W/O P-5'-P-CCNC: 52 U/L (ref 10–44)
ANION GAP SERPL CALC-SCNC: 7 MMOL/L (ref 8–16)
AST SERPL-CCNC: 30 U/L (ref 10–40)
BASOPHILS # BLD AUTO: 0.03 K/UL (ref 0–0.2)
BASOPHILS NFR BLD: 0.7 % (ref 0–1.9)
BILIRUB SERPL-MCNC: 0.3 MG/DL (ref 0.1–1)
BLD GP AB SCN CELLS X3 SERPL QL: NORMAL
BUN SERPL-MCNC: 12 MG/DL (ref 8–23)
CALCIUM SERPL-MCNC: 7.7 MG/DL (ref 8.7–10.5)
CHLORIDE SERPL-SCNC: 110 MMOL/L (ref 95–110)
CO2 SERPL-SCNC: 26 MMOL/L (ref 23–29)
CREAT SERPL-MCNC: 0.8 MG/DL (ref 0.5–1.4)
DIFFERENTIAL METHOD: ABNORMAL
EOSINOPHIL # BLD AUTO: 0.3 K/UL (ref 0–0.5)
EOSINOPHIL NFR BLD: 6.3 % (ref 0–8)
ERYTHROCYTE [DISTWIDTH] IN BLOOD BY AUTOMATED COUNT: 14.1 % (ref 11.5–14.5)
EST. GFR  (AFRICAN AMERICAN): >60 ML/MIN/1.73 M^2
EST. GFR  (NON AFRICAN AMERICAN): >60 ML/MIN/1.73 M^2
GLUCOSE SERPL-MCNC: 91 MG/DL (ref 70–110)
HCT VFR BLD AUTO: 29.9 % (ref 37–48.5)
HGB BLD-MCNC: 9.7 G/DL (ref 12–16)
IMM GRANULOCYTES # BLD AUTO: 0.07 K/UL (ref 0–0.04)
IMM GRANULOCYTES NFR BLD AUTO: 1.7 % (ref 0–0.5)
LYMPHOCYTES # BLD AUTO: 0.6 K/UL (ref 1–4.8)
LYMPHOCYTES NFR BLD: 13.5 % (ref 18–48)
MAGNESIUM SERPL-MCNC: 1.9 MG/DL (ref 1.6–2.6)
MCH RBC QN AUTO: 32.9 PG (ref 27–31)
MCHC RBC AUTO-ENTMCNC: 32.4 G/DL (ref 32–36)
MCV RBC AUTO: 101 FL (ref 82–98)
MONOCYTES # BLD AUTO: 0.5 K/UL (ref 0.3–1)
MONOCYTES NFR BLD: 11.5 % (ref 4–15)
NEUTROPHILS # BLD AUTO: 2.8 K/UL (ref 1.8–7.7)
NEUTROPHILS NFR BLD: 66.3 % (ref 38–73)
NRBC BLD-RTO: 0 /100 WBC
PHOSPHATE SERPL-MCNC: 3.3 MG/DL (ref 2.7–4.5)
PLATELET # BLD AUTO: 94 K/UL (ref 150–450)
PMV BLD AUTO: 10.6 FL (ref 9.2–12.9)
POTASSIUM SERPL-SCNC: 4.1 MMOL/L (ref 3.5–5.1)
PROT SERPL-MCNC: 5.1 G/DL (ref 6–8.4)
RBC # BLD AUTO: 2.95 M/UL (ref 4–5.4)
SODIUM SERPL-SCNC: 143 MMOL/L (ref 136–145)
WBC # BLD AUTO: 4.16 K/UL (ref 3.9–12.7)

## 2022-04-24 PROCEDURE — 99233 SBSQ HOSP IP/OBS HIGH 50: CPT | Mod: ,,, | Performed by: INTERNAL MEDICINE

## 2022-04-24 PROCEDURE — 25000003 PHARM REV CODE 250: Performed by: NURSE PRACTITIONER

## 2022-04-24 PROCEDURE — 63600175 PHARM REV CODE 636 W HCPCS: Performed by: NURSE PRACTITIONER

## 2022-04-24 PROCEDURE — 84100 ASSAY OF PHOSPHORUS: CPT | Performed by: NURSE PRACTITIONER

## 2022-04-24 PROCEDURE — 99233 PR SUBSEQUENT HOSPITAL CARE,LEVL III: ICD-10-PCS | Mod: ,,, | Performed by: INTERNAL MEDICINE

## 2022-04-24 PROCEDURE — 86901 BLOOD TYPING SEROLOGIC RH(D): CPT | Performed by: INTERNAL MEDICINE

## 2022-04-24 PROCEDURE — 80053 COMPREHEN METABOLIC PANEL: CPT | Performed by: NURSE PRACTITIONER

## 2022-04-24 PROCEDURE — 63600175 PHARM REV CODE 636 W HCPCS: Mod: JG | Performed by: INTERNAL MEDICINE

## 2022-04-24 PROCEDURE — 25000003 PHARM REV CODE 250: Performed by: INTERNAL MEDICINE

## 2022-04-24 PROCEDURE — 25000242 PHARM REV CODE 250 ALT 637 W/ HCPCS: Performed by: NURSE PRACTITIONER

## 2022-04-24 PROCEDURE — 83735 ASSAY OF MAGNESIUM: CPT | Performed by: NURSE PRACTITIONER

## 2022-04-24 PROCEDURE — 20600001 HC STEP DOWN PRIVATE ROOM

## 2022-04-24 PROCEDURE — 85025 COMPLETE CBC W/AUTO DIFF WBC: CPT | Performed by: NURSE PRACTITIONER

## 2022-04-24 PROCEDURE — 97165 OT EVAL LOW COMPLEX 30 MIN: CPT

## 2022-04-24 PROCEDURE — 25000003 PHARM REV CODE 250: Performed by: STUDENT IN AN ORGANIZED HEALTH CARE EDUCATION/TRAINING PROGRAM

## 2022-04-24 RX ADMIN — CLONAZEPAM 0.5 MG: 0.5 TABLET ORAL at 08:04

## 2022-04-24 RX ADMIN — ACYCLOVIR 800 MG: 200 CAPSULE ORAL at 08:04

## 2022-04-24 RX ADMIN — CITALOPRAM HYDROBROMIDE 10 MG: 10 TABLET ORAL at 06:04

## 2022-04-24 RX ADMIN — ACYCLOVIR 800 MG: 200 CAPSULE ORAL at 09:04

## 2022-04-24 RX ADMIN — Medication 1 DOSE: at 05:04

## 2022-04-24 RX ADMIN — LEVOFLOXACIN 500 MG: 500 TABLET, FILM COATED ORAL at 09:04

## 2022-04-24 RX ADMIN — SODIUM CHLORIDE AND POTASSIUM CHLORIDE: 9; 1.49 INJECTION, SOLUTION INTRAVENOUS at 03:04

## 2022-04-24 RX ADMIN — ENOXAPARIN SODIUM 40 MG: 40 INJECTION SUBCUTANEOUS at 05:04

## 2022-04-24 RX ADMIN — DEXAMETHASONE 12 MG: 4 TABLET ORAL at 09:04

## 2022-04-24 RX ADMIN — PANTOPRAZOLE SODIUM 40 MG: 40 TABLET, DELAYED RELEASE ORAL at 08:04

## 2022-04-24 RX ADMIN — Medication 400 MG: at 10:04

## 2022-04-24 RX ADMIN — DOCUSATE SODIUM 100 MG: 100 CAPSULE, LIQUID FILLED ORAL at 08:04

## 2022-04-24 RX ADMIN — SODIUM CHLORIDE AND POTASSIUM CHLORIDE: 9; 1.49 INJECTION, SOLUTION INTRAVENOUS at 08:04

## 2022-04-24 RX ADMIN — FLUCONAZOLE 400 MG: 200 TABLET ORAL at 09:04

## 2022-04-24 RX ADMIN — Medication 400 MG: at 05:04

## 2022-04-24 RX ADMIN — POLYETHYLENE GLYCOL 3350 17 G: 17 POWDER, FOR SOLUTION ORAL at 09:04

## 2022-04-24 RX ADMIN — MELPHALAN 350 MG: 50 INJECTION, POWDER, LYOPHILIZED, FOR SOLUTION INTRAVENOUS at 10:04

## 2022-04-24 RX ADMIN — ONDANSETRON 16 MG: 8 TABLET, ORALLY DISINTEGRATING ORAL at 09:04

## 2022-04-24 RX ADMIN — DOCUSATE SODIUM 100 MG: 100 CAPSULE, LIQUID FILLED ORAL at 09:04

## 2022-04-24 RX ADMIN — Medication 1 DOSE: at 08:04

## 2022-04-24 RX ADMIN — Medication 1 DOSE: at 12:04

## 2022-04-24 RX ADMIN — Medication 1 DOSE: at 09:04

## 2022-04-24 RX ADMIN — FLUTICASONE PROPIONATE 50 MCG: 50 SPRAY, METERED NASAL at 08:04

## 2022-04-24 NOTE — PLAN OF CARE
Day -1 of Melphalan + Auto stem call transplant. Patient remained awake, alert, and oriented throughout day. VS remained stable. No complaints of pain. Tolerating a regular diet without any difficulty; good intake.Magnesium replaced.Ambulate independently;voids per hat ;no BM noted today. Fall and safety precaution maintained throughout shift. care plan explained to patient; no additional complaints at this time. Will continue routine plan of care.

## 2022-04-24 NOTE — CONSULTS
"Marty Alejo - Oncology ([facility])  Adult Nutrition  Consult Note    SUMMARY     Recommendations    1. Continue current Regular, Lactose restricted diet. Add [employer] ONS to aid in caloric intake.  2. RD to monitor & follow-up.    Goals: Meet % EEN, EPN by RD f/u date  Nutrition Goal Status: new  Communication of RD Recs: reviewed with RN    Assessment and Plan    Nutrition Problem:  Increased nutrient needs    Related to (etiology):   Increased demand for protein    Signs and Symptoms (as evidenced by):   Upcoming auto SCT    Interventions(treatment strategy):  Collaboration of nutrition care w/ other providers  ONS    Nutrition Diagnosis Status:   New    Reason for Assessment    Reason For Assessment: consult  Diagnosis: other (see comments) (Stem cell tx candidate)  Interdisciplinary Rounds: did not attend    General Information Comments: Pt reports good appetite, tolerating diet. PTA - pt reports good appetite & UBW of 140#; chart review confirms. Pt appears nourished w/ no indicators of malnutrition. Pt willing to try ONS. Pending auto SCT.  Nutrition Discharge Planning: Post transplant nutrition education provided. Food safety/drug interactions emphasized. General healthy diet recommended. RD name/contact information, education material left.  No other needs identified. Caregiver present.    Nutrition/Diet History    Patient Reported Diet/Restrictions/Preferences: general  Factors Affecting Nutritional Intake: None identified at this time    Anthropometrics    Temp: 97.8 °F (36.6 °C)  Height Method: Measured  Height: 5' 7" (170.2 cm)  Height (inches): 67 in  Weight Method: Standard Scale  Weight: 68.1 kg (150 lb 2.1 oz)  Weight (lb): 150.13 lb  Ideal Body Weight (IBW), Female: 135 lb  % Ideal Body Weight, Female (lb): 111.21 %  BMI (Calculated): 23.5  BMI Grade: 18.5-24.9 - normal    Lab/Procedures/Meds    Pertinent Labs Reviewed: reviewed  Pertinent Medications Reviewed: reviewed    Estimated/Assessed " Needs    Weight Used For Calorie Calculations: 68.1 kg (150 lb 2.1 oz)     Energy Calorie Requirements (kcal): 2043 kcal/d  Energy Need Method: Kcal/kg (30 kcal/kg)     Protein Requirements: 89 g/d (1.3 g/kg)  Weight Used For Protein Calculations: 68.1 kg (150 lb 2.1 oz)     Estimated Fluid Requirement Method: other (see comments) (Per MD or 1 mL/kcal)  RDA Method (mL): 2043    Nutrition Prescription Ordered    Current Diet Order: Regular, Lactose restricted     Evaluation of Received Nutrient/Fluid Intake    I/O: +9.5L since admit    Comments: LBM: 4/24    Tolerance: tolerating    Nutrition Risk    Level of Risk/Frequency of Follow-up:  (1x/week)     Monitor and Evaluation    Food and Nutrient Intake: energy intake, food and beverage intake  Food and Nutrient Adminstration: diet order  Physical Activity and Function: nutrition-related ADLs and IADLs  Anthropometric Measurements: weight, weight change  Biochemical Data, Medical Tests and Procedures: inflammatory profile, lipid profile, glucose/endocrine profile, gastrointestinal profile, electrolyte and renal panel  Nutrition-Focused Physical Findings: overall appearance     Nutrition Follow-Up    RD Follow-up?: Yes

## 2022-04-24 NOTE — PT/OT/SLP EVAL
Occupational Therapy   Evaluation    Name: Caty Diaz  MRN: [id number]  Admitting Diagnosis:  Stem cell transplant candidate  Recent Surgery: * No surgery found *      Recommendations:     Discharge Recommendations: home  Discharge Equipment Recommendations:  none  Barriers to discharge:  None    Assessment:     Caty Diaz is a 66 y.o. female with a medical diagnosis of Stem cell transplant candidate.  She presents with high level of functioning with self-care tasks and mobility at this time but at risk for decline . Pt. Was noted to have some SOB with ambulation on this date and required 4 standing rest breaks.  Performance deficits affecting function:  (at risk for functional decline due to treatments).  Pt. Would benefit from continued OT services to maximize level of I and safety with all self-care tasks as well as mobility and improve level of endurance.     Rehab Prognosis: Good; patient would benefit from acute skilled OT services to address these deficits and reach maximum level of function.       Plan:     Patient to be seen 1 x/week to address the above listed problems via self-care/home management, therapeutic exercises  · Plan of Care Expires: 05/24/22  · Plan of Care Reviewed with: patient, spouse    Subjective     Chief Complaint: No complaints on this date.  Mild SOB with ambulation.  Patient/Family Comments/goals: To maintain health and mobility    Occupational Profile:  Living Environment: Pt resides with spouse in 2 story house with bed and bath on 1st floor. Pt. Has 4 steps to enter house with BHR.    Previous level of function: Pt. Was completely I with ADL tasks and mobility.  Pt. Does not own any DME.   Roles and Routines: caretaker of self, gardens, cuts grass, plays with grandchildren, + drives  Equipment Used at Home:  none  Assistance upon Discharge: spouse    Pain/Comfort:  Pain Rating 1: 0/10  Pain Rating Post-Intervention 1: 0/10    Patients cultural, spiritual, Episcopal  conflicts given the current situation: no    Objective:     Communicated with: nurse prior to session.  Patient found supine with  (port) upon OT entry to room.    General Precautions: Standard,  (standard)   Orthopedic Precautions:N/A   Braces: N/A  Respiratory Status: Room air    Occupational Performance:    Bed Mobility:    · Patient completed Supine to Sit with independence  · Patient completed Sit to Supine with independence    Functional Mobility/Transfers:  · Patient completed Sit <> Stand Transfer with independence  with  no assistive device   · Patient completed Toilet Transfer Stand Pivot technique with independence with  no AD  · Functional Mobility: Pt. Ambulated in room pushing IV pole with [place]  · Pt ambulated in hallway greater than 350 feet .  Pt. Required ~ 4 standing rest breaks during ambulation activity 2/2 slight SOB. Pt. Initially pushing IV pole during ambulation but the assisted with task due to fatigue    Activities of Daily Living:  · Grooming: independence to wash hands at sink   · Toileting: independence in bathroom on this date    Cognitive/Visual Perceptual:  Cognitive/Psychosocial Skills:     -       Oriented to: Person, Place, Time and Situation   -       Follows Commands/attention:Follows multistep  commands  -       Communication: clear/fluent  -       Memory: No Deficits noted  -       Safety awareness/insight to disability: intact   -       Mood/Affect/Coping skills/emotional control: Appropriate to situation  Visual/Perceptual:      -wears glasses    Physical Exam:  Balance: -       sit: good, stand : good  Postural examination/scapula alignment:    -       No postural abnormalities identified  Skin integrity: Visible skin intact  Upper Extremity Range of Motion:     -       Right Upper Extremity: WFL  -       Left Upper Extremity: WFL   Strength:    -       Right Upper Extremity: WFL  -       Left Upper Extremity: WFL    AMPAC 6 Click ADL:  AMPAC Total Score:  23    Treatment & Education:  Pt. And pt. Spouse educated on importance of OOB daily as well as ambulation   Pt educated on role of OT and POC  Education:    Patient left up in chair with all lines intact, call button in reach and spouse present    GOALS:   Multidisciplinary Problems     Occupational Therapy Goals        Problem: Occupational Therapy    Goal Priority Disciplines Outcome Interventions   Occupational Therapy Goal     OT, PT/OT     Description: Goals to be met by: 05-10-22     Patient will increase functional independence with ADLs by performing:    Pt. To be I with ADL task performance  Pt. To be I with HEP to improve level of endurance  Pt to be I with mobility  Pt. To verbalize energy conservation techniques to utilize with IADL /ADL tasks.                      History:     Past Medical History:   Diagnosis Date    Anxiety     Fatigue     Multiple myeloma          Past Surgical History:   Procedure Laterality Date    breast implants Bilateral 04/01/2021    CHOLECYSTECTOMY      HYSTERECTOMY      INSERTION OF TUNNELED CENTRAL VENOUS HEMODIALYSIS CATHETER Right 4/18/2022    Procedure: INSERTION, CATHETER, HEMODIALYSIS, DUAL LUMEN Bard 14.5 Fr Hemosplit Catheter Model [id number], Right Possible Left Chest;  Surgeon: Hernan Plascencia MD;  Location: [facility] OR [address] [place] [address];  Service: General;  Laterality: Right;       Time Tracking:     OT Date of Treatment: 04/24/22  OT Start Time: 1429  OT Stop Time: 1444  OT Total Time (min): 15 min    Billable Minutes:Evaluation 15    4/24/2022

## 2022-04-24 NOTE — PLAN OF CARE
Recommendations     1. Continue current Regular, Lactose restricted diet. Add [employer] ONS to aid in caloric intake.  2. RD to monitor & follow-up.     Goals: Meet % EEN, EPN by RD f/u date  Nutrition Goal Status: new  Communication of RD Recs: reviewed with RN

## 2022-04-24 NOTE — ASSESSMENT & PLAN NOTE
Disease status: [place]  Type of transplant: Auto  Conditioning regimen: Melphalan  Karnofsky Score: 90%  Post transplant maintenance plans: Not Yet  Today is D -1    Planned conditioning regimen:  Melphalan on Day -1     Antimicrobial Prophylaxis:  Acyclovir starting on Day -1  Levofloxacin starting on Day -1  Fluconazole starting on Day -1     Growth Factor Support:  Neupogen starting on Day +7      Caregiver:   Post-transplant discharge plans: [facility]

## 2022-04-24 NOTE — PLAN OF CARE
Patient admitted today for Melphalan + Auto stem cell transplant. Patient remained awake, alert, and oriented. VS stable. Assessment completed & no alarming findings found. No complaints of pain.Tolerating a regular diet without any difficulty;good intake.Ambulate independently; voids per hat; adequate output noted.Fall and safety precaution maintained throughout shift. Will continue routine care.

## 2022-04-24 NOTE — HOSPITAL COURSE
4/24/2022 Day -1 of [id number] ASCT. Tolerated chemotherapy today with no issues. She has no complaints today  4/25/2022 Day 0 of [id number] ASCT. She had mild headache in the morning that resolved on its own. She also had nausea the was relieved with zofran. Otherwise, her VSS, afebrile. She will receive 8 bags of CD34 cell total dose of 3.97 x10^6 at 1330  4/26/2022 Day +1 of [id number] ASCT. She received her cells yesterday with no issues. She is still with nausea and had 1x vomiting episode today. Will schedule her zofran ATC. She is ambulatory, has no mouth sores, no diarrhea.   4/27/2022 Day +2 [id number] ASCT. Remains nauseated specially in the morning despite scheduled Zofran. Will add nightly zyprexa. Has low PO intake, and low appetite. Had soft BM, but she was on miralax and colace yesterday.    4/28/2022 Day +3 [id number] ASCT. Feeling better this morning, and nausea is better controlled. No other complaints. VSS, afebrile   4/29/2022 Day +4 [id number] ASCT. NAEON. Nausea is controlled. No headaches. Had fair oral intake.   04/30/2022 Day +5 [id number] ASCT.  Patient reports nausea daughter is improved with antiemetics.  Has had multiple episodes of diarrhea, C diff negative, will start Imodium.  Tolerating solid foods but does not like the food here, will obtain a salad from the cafeteria.  05/01/2022 Day +6 [id number] ASCT.  Patient reports nausea improved.  No further episodes of diarrhea since starting Imodium.  Tolerating solid foods but does not like the food here, will obtain a salad from the cafeteria.  05/02/2022 D+7 emi auto. Doing well. No concerns today. Continue supportive care. Starting filgrastim today.   05/03/2022 D+8 Emi AUTO. Uncontrolled nausea, not tolerating PO zofran. Will switch to IV zofran Q8H and increase zyprexa to 7.5 mg HS  05/04/2022 D+9 Emi AUTO. Uncontrolled nausea, not tolerating PO medications. C/w with zofran scheduled. IV ativan/compazine as needed. Zyprexa 7.5 mg HS. And switching antimicrobials to  IV. Platelet transfusion today.   05/05/2022 D+10 P[place]a AUTO. Nausea/diarrhea controlled. Had some rash after calcium supplements PO. Will switch to IV. ANC still 0.   05/06/2022 D+11 PPIPA AUTO, vitals are within normal range, weight is stable. ANC 60, plts 8k receiving 1 unit plts this morning. calcium normalized. switching antimicrobials to PO.   05/07/2022 D+12 PIPPA AUTO. Counts recovered. ANC >500 today. Doing well. Will give 1 unit plts and plan to remove line tomorrow.  05/08/2022  received 1 unit of plts. [place]mann line removed. Discharge today.

## 2022-04-24 NOTE — PLAN OF CARE
Patient resting in bed: side rails up x 2, lowest position, bed ,locked and call bell in place. Plan of care was reviewed with patient. She is tolerating PO intake,  at bedside. She is able to ambulate in room to the restroom. Continuous fluids initiated; hat placed in restroom and patient given a labeled to cup to keep up I/Os. Afebrile and vitals remain stable. Labs obtained, awaiting results and electrolytes to be replaced per PRN orders. Frequent rounding completed and patient encouraged to call as needed. Will continue to monitor patient.

## 2022-04-24 NOTE — PROGRESS NOTES
Marty Alejo - Oncology ([facility])  Hematology  Bone Marrow Transplant  Progress Note    Patient Name: Caty Diaz  Admission Date: 4/23/2022  Hospital Length of Stay: 1 days  Code Status: Full Code    Subjective:     Interval History: Day -1 of [id number] ASCT. Tolerated chemotherapy today with no issues. She has no complaints today    Objective:     Vital Signs (Most Recent):  Temp: 98 °F (36.7 °C) (04/24/22 1214)  Pulse: 74 (04/24/22 1214)  Resp: 17 (04/24/22 1214)  BP: (!) 156/72 (04/24/22 1214)  SpO2: 97 % (04/24/22 1214)   Vital Signs (24h Range):  Temp:  [97.8 °F (36.6 °C)-98.6 °F (37 °C)] 98 °F (36.7 °C)  Pulse:  [64-76] 74  Resp:  [17-19] 17  SpO2:  [94 %-97 %] 97 %  BP: (127-156)/(67-72) 156/72     Weight: 68.1 kg (150 lb 2.1 oz)  Body mass index is 23.51 kg/m².  Body surface area is 1.79 meters squared.    ECOG SCORE           [unfilled]    Intake/Output - Last 3 Shifts         04/22 0700  04/23 0659 04/23 0700 04/24 0659 04/24 0700  04/25 0659    P.O.  1505 450    I.V. (mL/kg)  1353.5 (19.9) 566.9 (8.3)    Total Intake(mL/kg)  2858.5 (42) 1016.9 (14.9)    Urine (mL/kg/hr)  2100 2850 (6.2)    Stool   0    Total Output  2100 2850    Net  +758.5 -1833.1           Stool Occurrence   1 x            Physical Exam  Constitutional:       General: She is not in acute distress.     Appearance: Normal appearance.   HENT:      Head: Normocephalic and atraumatic.   Eyes:      Pupils: Pupils are equal, round, and reactive to light.   Cardiovascular:      Rate and Rhythm: Normal rate and regular rhythm.      Heart sounds: No murmur heard.  Pulmonary:      Effort: No respiratory distress.      Breath sounds: Normal breath sounds. No wheezing.   Abdominal:      General: Abdomen is flat. Bowel sounds are normal. There is no distension.      Palpations: Abdomen is soft. There is no mass.      Tenderness: There is no abdominal tenderness.   Musculoskeletal:         General: No swelling or deformity.   Skin:     Coloration: Skin  is not jaundiced.      Comments: Soto line in R side chest. Dressing c/d/i   Neurological:      General: No focal deficit present.      Mental Status: She is alert and oriented to person, place, and time. Mental status is at baseline.       Significant Labs:   CBC:   Recent Labs   Lab 04/24/22  0310   WBC 4.16   HGB 9.7*   HCT 29.9*   PLT 94*    and CMP:   Recent Labs   Lab 04/24/22  0310      K 4.1      CO2 26   GLU 91   BUN 12   CREATININE 0.8   CALCIUM 7.7*   PROT 5.1*   ALBUMIN 2.8*   BILITOT 0.3   ALKPHOS 82   AST 30   ALT 52*   ANIONGAP 7*   EGFRNONAA >60.0       Diagnostic Results:  None    Assessment/Plan:     * Stem cell transplant candidate  Disease status: SC  Type of transplant: Auto  Conditioning regimen: Melphalan  Karnofsky Score: 90%  Post transplant maintenance plans: Not Yet  Today is D -1    Planned conditioning regimen:  Melphalan on Day -1     Antimicrobial Prophylaxis:  Acyclovir starting on Day -1  Levofloxacin starting on Day -1  Fluconazole starting on Day -1     Growth Factor Support:  Neupogen starting on Day +7      Caregiver:   Post-transplant discharge plans: [facility]       Constipation  Prn colace   Prn miralax  Expect diarrhea after chemotherapy, so will hold them then    Transaminasemia  Mild increase in LFTs,   resolving    Gastroesophageal reflux disease without esophagitis  - continue Protonix nightly    Thrombocytopenia  - due to chemo and stem cell collection  - daily CBC  - transfuse for platelets count <10 or <50 pre-procedure or bleeding  - hold Lovenox when platelets <50k    Anemia associated with chemotherapy  - daily CBC  - transfuse for hgb <7    Multiple myeloma  - R-ISS II IgG lambda multiple myeloma, She had increased 1q21 copy number and has t(4,14), both high risk markers in multiple myeloma. She also has monosomy 13 on FISH. She did not have baseline PET CT.   - She has received 6 cycles of RVd, followed by 2 cycles of  velcade-dexamethasone. She has tolerated the treatments well.    - PET CT on 3/31/22 did not demonstrate any hypermetabolic lytic or soft tissue lesions. BM biopsy on 3/21/22 showed 10% plasma cells ( versus 50% at diagnosis).  - Admitting for transplant as above.    Anxiety  - The patient states that she takes half of a clonazepam pill at night to help her sleep but would like to consider stopping this medication.           VTE Risk Mitigation (From admission, onward)         Ordered     enoxaparin injection 40 mg  Daily         04/23/22 1311     heparin (porcine) injection 1,600 Units  As needed (PRN)         04/23/22 1520     IP VTE HIGH RISK PATIENT  Once         04/23/22 1311     Place sequential compression device  Until discontinued         04/23/22 1311                Disposition: [facility]    Kali Kumari MD  Bone Marrow Transplant  Marty Alejo - Oncology ([facility])

## 2022-04-24 NOTE — SUBJECTIVE & OBJECTIVE
Subjective:     Interval History: Day -1 of [id number] ASCT. Tolerated chemotherapy today with no issues. She has no complaints today    Objective:     Vital Signs (Most Recent):  Temp: 98 °F (36.7 °C) (04/24/22 1214)  Pulse: 74 (04/24/22 1214)  Resp: 17 (04/24/22 1214)  BP: (!) 156/72 (04/24/22 1214)  SpO2: 97 % (04/24/22 1214)   Vital Signs (24h Range):  Temp:  [97.8 °F (36.6 °C)-98.6 °F (37 °C)] 98 °F (36.7 °C)  Pulse:  [64-76] 74  Resp:  [17-19] 17  SpO2:  [94 %-97 %] 97 %  BP: (127-156)/(67-72) 156/72     Weight: 68.1 kg (150 lb 2.1 oz)  Body mass index is 23.51 kg/m².  Body surface area is 1.79 meters squared.    ECOG SCORE           [unfilled]    Intake/Output - Last 3 Shifts         04/22 0700  04/23 0659 04/23 0700  04/24 0659 04/24 0700  04/25 0659    P.O.  1505 450    I.V. (mL/kg)  1353.5 (19.9) 566.9 (8.3)    Total Intake(mL/kg)  2858.5 (42) 1016.9 (14.9)    Urine (mL/kg/hr)  2100 2850 (6.2)    Stool   0    Total Output  2100 2850    Net  +758.5 -1833.1           Stool Occurrence   1 x            Physical Exam  Constitutional:       General: She is not in acute distress.     Appearance: Normal appearance.   HENT:      Head: Normocephalic and atraumatic.   Eyes:      Pupils: Pupils are equal, round, and reactive to light.   Cardiovascular:      Rate and Rhythm: Normal rate and regular rhythm.      Heart sounds: No murmur heard.  Pulmonary:      Effort: No respiratory distress.      Breath sounds: Normal breath sounds. No wheezing.   Abdominal:      General: Abdomen is flat. Bowel sounds are normal. There is no distension.      Palpations: Abdomen is soft. There is no mass.      Tenderness: There is no abdominal tenderness.   Musculoskeletal:         General: No swelling or deformity.   Skin:     Coloration: Skin is not jaundiced.      Comments: Soto line in R side chest. Dressing c/d/i   Neurological:      General: No focal deficit present.      Mental Status: She is alert and oriented to person, place,  and time. Mental status is at baseline.       Significant Labs:   CBC:   Recent Labs   Lab 04/24/22 0310   WBC 4.16   HGB 9.7*   HCT 29.9*   PLT 94*    and CMP:   Recent Labs   Lab 04/24/22 0310      K 4.1      CO2 26   GLU 91   BUN 12   CREATININE 0.8   CALCIUM 7.7*   PROT 5.1*   ALBUMIN 2.8*   BILITOT 0.3   ALKPHOS 82   AST 30   ALT 52*   ANIONGAP 7*   EGFRNONAA >60.0       Diagnostic Results:  None

## 2022-04-25 LAB
ALBUMIN SERPL BCP-MCNC: 2.9 G/DL (ref 3.5–5.2)
ALP SERPL-CCNC: 75 U/L (ref 55–135)
ALT SERPL W/O P-5'-P-CCNC: 48 U/L (ref 10–44)
ANION GAP SERPL CALC-SCNC: 6 MMOL/L (ref 8–16)
AST SERPL-CCNC: 25 U/L (ref 10–40)
BASOPHILS # BLD AUTO: 0.01 K/UL (ref 0–0.2)
BASOPHILS NFR BLD: 0.2 % (ref 0–1.9)
BILIRUB SERPL-MCNC: 0.4 MG/DL (ref 0.1–1)
BLD PROD TYP BPU: NORMAL
BLOOD UNIT EXPIRATION DATE: NORMAL
BLOOD UNIT TYPE CODE: 6200
BLOOD UNIT TYPE: NORMAL
BUN SERPL-MCNC: 15 MG/DL (ref 8–23)
CALCIUM SERPL-MCNC: 7.5 MG/DL (ref 8.7–10.5)
CHLORIDE SERPL-SCNC: 111 MMOL/L (ref 95–110)
CO2 SERPL-SCNC: 24 MMOL/L (ref 23–29)
CODING SYSTEM: NORMAL
CREAT SERPL-MCNC: 0.8 MG/DL (ref 0.5–1.4)
DIFFERENTIAL METHOD: ABNORMAL
DISPENSE STATUS: NORMAL
EOSINOPHIL # BLD AUTO: 0 K/UL (ref 0–0.5)
EOSINOPHIL NFR BLD: 0 % (ref 0–8)
ERYTHROCYTE [DISTWIDTH] IN BLOOD BY AUTOMATED COUNT: 14.2 % (ref 11.5–14.5)
EST. GFR  (AFRICAN AMERICAN): >60 ML/MIN/1.73 M^2
EST. GFR  (NON AFRICAN AMERICAN): >60 ML/MIN/1.73 M^2
GLUCOSE SERPL-MCNC: 115 MG/DL (ref 70–110)
HCT VFR BLD AUTO: 29.5 % (ref 37–48.5)
HGB BLD-MCNC: 9.8 G/DL (ref 12–16)
IMM GRANULOCYTES # BLD AUTO: 0.07 K/UL (ref 0–0.04)
IMM GRANULOCYTES NFR BLD AUTO: 1.2 % (ref 0–0.5)
LYMPHOCYTES # BLD AUTO: 0.3 K/UL (ref 1–4.8)
LYMPHOCYTES NFR BLD: 5.3 % (ref 18–48)
MAGNESIUM SERPL-MCNC: 2 MG/DL (ref 1.6–2.6)
MCH RBC QN AUTO: 32.2 PG (ref 27–31)
MCHC RBC AUTO-ENTMCNC: 33.2 G/DL (ref 32–36)
MCV RBC AUTO: 97 FL (ref 82–98)
MONOCYTES # BLD AUTO: 0.6 K/UL (ref 0.3–1)
MONOCYTES NFR BLD: 10.4 % (ref 4–15)
NEUTROPHILS # BLD AUTO: 4.7 K/UL (ref 1.8–7.7)
NEUTROPHILS NFR BLD: 82.9 % (ref 38–73)
NRBC BLD-RTO: 0 /100 WBC
PHOSPHATE SERPL-MCNC: 2.7 MG/DL (ref 2.7–4.5)
PLATELET # BLD AUTO: 113 K/UL (ref 150–450)
PMV BLD AUTO: 10.2 FL (ref 9.2–12.9)
POTASSIUM SERPL-SCNC: 4.2 MMOL/L (ref 3.5–5.1)
PROT SERPL-MCNC: 5.3 G/DL (ref 6–8.4)
RBC # BLD AUTO: 3.04 M/UL (ref 4–5.4)
SODIUM SERPL-SCNC: 141 MMOL/L (ref 136–145)
UNIT NUMBER: NORMAL
WBC # BLD AUTO: 5.67 K/UL (ref 3.9–12.7)

## 2022-04-25 PROCEDURE — 80053 COMPREHEN METABOLIC PANEL: CPT | Performed by: NURSE PRACTITIONER

## 2022-04-25 PROCEDURE — 84100 ASSAY OF PHOSPHORUS: CPT | Performed by: NURSE PRACTITIONER

## 2022-04-25 PROCEDURE — 25000003 PHARM REV CODE 250: Performed by: NURSE PRACTITIONER

## 2022-04-25 PROCEDURE — 85025 COMPLETE CBC W/AUTO DIFF WBC: CPT | Performed by: NURSE PRACTITIONER

## 2022-04-25 PROCEDURE — 25000003 PHARM REV CODE 250: Performed by: STUDENT IN AN ORGANIZED HEALTH CARE EDUCATION/TRAINING PROGRAM

## 2022-04-25 PROCEDURE — 25000003 PHARM REV CODE 250: Performed by: INTERNAL MEDICINE

## 2022-04-25 PROCEDURE — 99233 PR SUBSEQUENT HOSPITAL CARE,LEVL III: ICD-10-PCS | Mod: ,,, | Performed by: INTERNAL MEDICINE

## 2022-04-25 PROCEDURE — 38241 TRANSPLT AUTOL HCT/DONOR: CPT

## 2022-04-25 PROCEDURE — 83735 ASSAY OF MAGNESIUM: CPT | Performed by: NURSE PRACTITIONER

## 2022-04-25 PROCEDURE — 63600175 PHARM REV CODE 636 W HCPCS: Performed by: NURSE PRACTITIONER

## 2022-04-25 PROCEDURE — 99233 SBSQ HOSP IP/OBS HIGH 50: CPT | Mod: ,,, | Performed by: INTERNAL MEDICINE

## 2022-04-25 PROCEDURE — 20600001 HC STEP DOWN PRIVATE ROOM

## 2022-04-25 PROCEDURE — 94799 UNLISTED PULMONARY SVC/PX: CPT

## 2022-04-25 PROCEDURE — 63600175 PHARM REV CODE 636 W HCPCS: Performed by: STUDENT IN AN ORGANIZED HEALTH CARE EDUCATION/TRAINING PROGRAM

## 2022-04-25 PROCEDURE — 63600175 PHARM REV CODE 636 W HCPCS: Performed by: INTERNAL MEDICINE

## 2022-04-25 RX ORDER — MEPERIDINE HYDROCHLORIDE 50 MG/ML
50 INJECTION INTRAMUSCULAR; INTRAVENOUS; SUBCUTANEOUS ONCE AS NEEDED
Status: DISCONTINUED | OUTPATIENT
Start: 2022-04-25 | End: 2022-04-26

## 2022-04-25 RX ORDER — HYDROCODONE BITARTRATE AND ACETAMINOPHEN 500; 5 MG/1; MG/1
TABLET ORAL
Status: DISCONTINUED | OUTPATIENT
Start: 2022-04-25 | End: 2022-05-06

## 2022-04-25 RX ORDER — DIPHENHYDRAMINE HYDROCHLORIDE 50 MG/ML
50 INJECTION INTRAMUSCULAR; INTRAVENOUS ONCE
Status: COMPLETED | OUTPATIENT
Start: 2022-04-25 | End: 2022-04-25

## 2022-04-25 RX ORDER — NITROGLYCERIN 0.4 MG/1
0.4 TABLET SUBLINGUAL ONCE AS NEEDED
Status: COMPLETED | OUTPATIENT
Start: 2022-04-25 | End: 2022-04-25

## 2022-04-25 RX ORDER — CLONAZEPAM 0.25 MG/1
0.25 TABLET, ORALLY DISINTEGRATING ORAL 2 TIMES DAILY PRN
Status: DISCONTINUED | OUTPATIENT
Start: 2022-04-25 | End: 2022-05-08 | Stop reason: HOSPADM

## 2022-04-25 RX ORDER — SODIUM CHLORIDE AND POTASSIUM CHLORIDE 150; 900 MG/100ML; MG/100ML
INJECTION, SOLUTION INTRAVENOUS CONTINUOUS
Status: DISCONTINUED | OUTPATIENT
Start: 2022-04-25 | End: 2022-04-26

## 2022-04-25 RX ORDER — FUROSEMIDE 10 MG/ML
100 INJECTION INTRAMUSCULAR; INTRAVENOUS ONCE AS NEEDED
Status: COMPLETED | OUTPATIENT
Start: 2022-04-25 | End: 2022-04-25

## 2022-04-25 RX ORDER — EPINEPHRINE 1 MG/ML
0.5 INJECTION, SOLUTION INTRACARDIAC; INTRAMUSCULAR; INTRAVENOUS; SUBCUTANEOUS ONCE AS NEEDED
Status: COMPLETED | OUTPATIENT
Start: 2022-04-25 | End: 2022-04-25

## 2022-04-25 RX ORDER — DIPHENHYDRAMINE HYDROCHLORIDE 50 MG/ML
50 INJECTION INTRAMUSCULAR; INTRAVENOUS ONCE AS NEEDED
Status: COMPLETED | OUTPATIENT
Start: 2022-04-25 | End: 2022-04-25

## 2022-04-25 RX ORDER — LORAZEPAM 2 MG/ML
1 INJECTION INTRAMUSCULAR ONCE
Status: COMPLETED | OUTPATIENT
Start: 2022-04-25 | End: 2022-04-25

## 2022-04-25 RX ORDER — CLONIDINE HYDROCHLORIDE 0.1 MG/1
0.1 TABLET ORAL ONCE AS NEEDED
Status: COMPLETED | OUTPATIENT
Start: 2022-04-25 | End: 2022-04-25

## 2022-04-25 RX ORDER — TRAMADOL HYDROCHLORIDE 50 MG/1
50 TABLET ORAL EVERY 6 HOURS PRN
Status: DISCONTINUED | OUTPATIENT
Start: 2022-04-25 | End: 2022-05-03

## 2022-04-25 RX ADMIN — PROCHLORPERAZINE EDISYLATE 10 MG: 5 INJECTION INTRAMUSCULAR; INTRAVENOUS at 10:04

## 2022-04-25 RX ADMIN — Medication 1 DOSE: at 08:04

## 2022-04-25 RX ADMIN — Medication 1 DOSE: at 05:04

## 2022-04-25 RX ADMIN — Medication 1 DOSE: at 12:04

## 2022-04-25 RX ADMIN — ONDANSETRON 8 MG: 8 TABLET, ORALLY DISINTEGRATING ORAL at 04:04

## 2022-04-25 RX ADMIN — DOCUSATE SODIUM 100 MG: 100 CAPSULE, LIQUID FILLED ORAL at 08:04

## 2022-04-25 RX ADMIN — ALUMINUM HYDROXIDE, MAGNESIUM HYDROXIDE, AND DIMETHICONE 10 ML: 400; 400; 40 SUSPENSION ORAL at 05:04

## 2022-04-25 RX ADMIN — LORAZEPAM 1 MG: 2 INJECTION INTRAMUSCULAR; INTRAVENOUS at 01:04

## 2022-04-25 RX ADMIN — FLUTICASONE PROPIONATE 50 MCG: 50 SPRAY, METERED NASAL at 08:04

## 2022-04-25 RX ADMIN — SODIUM CHLORIDE AND POTASSIUM CHLORIDE: .9; .15 SOLUTION INTRAVENOUS at 11:04

## 2022-04-25 RX ADMIN — ACYCLOVIR 800 MG: 200 CAPSULE ORAL at 08:04

## 2022-04-25 RX ADMIN — HYDROCORTISONE SODIUM SUCCINATE 250 MG: 250 INJECTION, POWDER, FOR SOLUTION INTRAMUSCULAR; INTRAVENOUS at 01:04

## 2022-04-25 RX ADMIN — CITALOPRAM HYDROBROMIDE 10 MG: 10 TABLET ORAL at 08:04

## 2022-04-25 RX ADMIN — SODIUM CHLORIDE AND POTASSIUM CHLORIDE: 9; 1.49 INJECTION, SOLUTION INTRAVENOUS at 07:04

## 2022-04-25 RX ADMIN — FLUCONAZOLE 400 MG: 200 TABLET ORAL at 08:04

## 2022-04-25 RX ADMIN — ALUMINUM HYDROXIDE, MAGNESIUM HYDROXIDE, AND DIMETHICONE 10 ML: 400; 400; 40 SUSPENSION ORAL at 08:04

## 2022-04-25 RX ADMIN — ENOXAPARIN SODIUM 40 MG: 40 INJECTION SUBCUTANEOUS at 05:04

## 2022-04-25 RX ADMIN — ONDANSETRON 16 MG: 8 TABLET, ORALLY DISINTEGRATING ORAL at 08:04

## 2022-04-25 RX ADMIN — PANTOPRAZOLE SODIUM 40 MG: 40 TABLET, DELAYED RELEASE ORAL at 09:04

## 2022-04-25 RX ADMIN — LEVOFLOXACIN 500 MG: 500 TABLET, FILM COATED ORAL at 08:04

## 2022-04-25 RX ADMIN — DIPHENHYDRAMINE HYDROCHLORIDE 50 MG: 50 INJECTION, SOLUTION INTRAMUSCULAR; INTRAVENOUS at 01:04

## 2022-04-25 RX ADMIN — ALUMINUM HYDROXIDE, MAGNESIUM HYDROXIDE, AND DIMETHICONE 10 ML: 400; 400; 40 SUSPENSION ORAL at 09:04

## 2022-04-25 NOTE — NURSING
Autologous SCT x8 bags started via right chest VasCath noted for having positive blood return. Each bag verified by BMT/SCT certified RN Kerry HO and Aylin Gamino RN prior to transplantation. Lab at bedside as well. Consents verified in chart. Pre-meds given prior. IVFs adjusted per orders. Vital signs stable throughout. PRN/emergency meds at bedside, but none given. Tolerated entire process well and without issues. Patient remains ambulatory and without complaints. VS to be monitored q30 minutes for 2 hours following completion of last bag.  remains at bedside; patient will continue to be monitored by primary RN Aylin.

## 2022-04-25 NOTE — ASSESSMENT & PLAN NOTE
Disease status: TN  Type of transplant: Auto  Conditioning regimen: Melphalan  Karnofsky Score: 90%  Post transplant maintenance plans: Not Yet  Today is D 0  She is to receive 8 bags of CD34 cells with total dose of 3.97x10^6.     Planned conditioning regimen:  Melphalan on Day -1     Antimicrobial Prophylaxis:  Acyclovir starting on Day -1  Levofloxacin starting on Day -1  Fluconazole starting on Day -1     Growth Factor Support:  Neupogen starting on Day +7      Caregiver:   Post-transplant discharge plans: [facility]

## 2022-04-25 NOTE — PLAN OF CARE
Patient AAOx4. Day 0 Ju Auto SCT. Tolerated 8 bag transplant well; no issues. Fluids adjusted per orders. IVF currently infusing; NS with 20K @ 75. Complaints of headache and nausea. Nausea relieved with PRN antiemetics. Bed in low locked position, call light and personal items within reach. Instructed to call if needed.

## 2022-04-25 NOTE — PROGRESS NOTES
Marty Alejo - Oncology ([facility])  Hematology  Bone Marrow Transplant  Progress Note    Patient Name: Caty Diaz  Admission Date: 4/23/2022  Hospital Length of Stay: 2 days  Code Status: Full Code    Subjective:     Interval History: Day 0 of [id number] ASCT. She had mild headache in the morning that resolved on its own. She also had nausea the was relieved with zofran. Otherwise, her VSS, afebrile. She will receive 8 bags of CD34 cell total dose of 3.97 x10^6 at 1330    Objective:     Vital Signs (Most Recent):  Temp: 97.9 °F (36.6 °C) (04/25/22 1122)  Pulse: 82 (04/25/22 1122)  Resp: 17 (04/25/22 1122)  BP: 118/62 (04/25/22 1122)  SpO2: (!) 94 % (04/25/22 1122)   Vital Signs (24h Range):  Temp:  [97.6 °F (36.4 °C)-98.5 °F (36.9 °C)] 97.9 °F (36.6 °C)  Pulse:  [75-96] 82  Resp:  [16-18] 17  SpO2:  [94 %-98 %] 94 %  BP: (118-138)/(62-81) 118/62     Weight: 68.6 kg (151 lb 3.8 oz)  Body mass index is 23.69 kg/m².  Body surface area is 1.8 meters squared.    ECOG SCORE           [unfilled]    Intake/Output - Last 3 Shifts         04/23 0700  04/24 0659 04/24 0700  04/25 0659 04/25 0700  04/26 0659    P.O. 1505 4401     I.V. (mL/kg) 1353.5 (19.9) 2476.9 (36.1)     Total Intake(mL/kg) 2858.5 (42) 6877.9 (100.3)     Urine (mL/kg/hr) 2100 6100 (3.7) 800 (2)    Stool  0 0    Total Output 2100 6100 800    Net +758.5 +777.9 -800           Stool Occurrence  1 x 1 x            Physical Exam  Constitutional:       General: She is not in acute distress.     Appearance: Normal appearance.   HENT:      Head: Normocephalic and atraumatic.   Eyes:      Pupils: Pupils are equal, round, and reactive to light.   Cardiovascular:      Rate and Rhythm: Normal rate and regular rhythm.      Heart sounds: No murmur heard.  Pulmonary:      Effort: No respiratory distress.      Breath sounds: Normal breath sounds. No wheezing.   Abdominal:      General: Abdomen is flat. Bowel sounds are normal. There is no distension.      Palpations: Abdomen is  soft. There is no mass.      Tenderness: There is no abdominal tenderness.   Musculoskeletal:         General: No swelling or deformity.   Skin:     Coloration: Skin is not jaundiced.      Comments: Soto line in R side chest. Dressing c/d/i   Neurological:      General: No focal deficit present.      Mental Status: She is alert and oriented to person, place, and time. Mental status is at baseline.       Significant Labs:   CBC:   Recent Labs   Lab 04/24/22 0310 04/25/22  0332   WBC 4.16 5.67   HGB 9.7* 9.8*   HCT 29.9* 29.5*   PLT 94* 113*      and CMP:   Recent Labs   Lab 04/24/22 0310 04/25/22  0332    141   K 4.1 4.2    111*   CO2 26 24   GLU 91 115*   BUN 12 15   CREATININE 0.8 0.8   CALCIUM 7.7* 7.5*   PROT 5.1* 5.3*   ALBUMIN 2.8* 2.9*   BILITOT 0.3 0.4   ALKPHOS 82 75   AST 30 25   ALT 52* 48*   ANIONGAP 7* 6*   EGFRNONAA >60.0 >60.0         Diagnostic Results:  None    Assessment/Plan:     * Stem cell transplant candidate  Disease status: SC  Type of transplant: Auto  Conditioning regimen: Melphalan  Karnofsky Score: 90%  Post transplant maintenance plans: Not Yet  Today is D 0  She is to receive 8 bags of CD34 cells with total dose of 3.97x10^6.     Planned conditioning regimen:  Melphalan on Day -1     Antimicrobial Prophylaxis:  Acyclovir starting on Day -1  Levofloxacin starting on Day -1  Fluconazole starting on Day -1     Growth Factor Support:  Neupogen starting on Day +7      Caregiver:   Post-transplant discharge plans: [facility]       Constipation  Prn colace   Prn miralax  Expect diarrhea after chemotherapy, so will hold them then    Transaminasemia  Mild increase in LFTs,   resolving    Gastroesophageal reflux disease without esophagitis  - continue Protonix nightly    Thrombocytopenia  - due to chemo and stem cell collection  - daily CBC  - transfuse for platelets count <10 or <50 pre-procedure or bleeding  - hold Lovenox when platelets <50k    Anemia associated  with chemotherapy  - daily CBC  - transfuse for hgb <7    Multiple myeloma  - R-ISS II IgG lambda multiple myeloma, She had increased 1q21 copy number and has t(4,14), both high risk markers in multiple myeloma. She also has monosomy 13 on FISH. She did not have baseline PET CT.   - She has received 6 cycles of RVd, followed by 2 cycles of velcade-dexamethasone. She has tolerated the treatments well.    - PET CT on 3/31/22 did not demonstrate any hypermetabolic lytic or soft tissue lesions. BM biopsy on 3/21/22 showed 10% plasma cells ( versus 50% at diagnosis).  - Admitting for transplant as above.    Anxiety  - The patient states that she takes half of a clonazepam pill at night to help her sleep but would like to consider stopping this medication.           VTE Risk Mitigation (From admission, onward)         Ordered     enoxaparin injection 40 mg  Daily         04/23/22 1311     heparin (porcine) injection 1,600 Units  As needed (PRN)         04/23/22 1520     IP VTE HIGH RISK PATIENT  Once         04/23/22 1311     Place sequential compression device  Until discontinued         04/23/22 1311                Disposition: [facility].    Kali Kumari MD  Bone Marrow Transplant  Marty Alejo - Oncology ([facility])

## 2022-04-25 NOTE — NURSING
Melphalan started to right chest Vas-cath with positive blood return noted; infusing 999 mL/hr over 30 minute.Explained medication to patient; instructed on possible side effects.Pre-medications given prior to therapy.Cryotherapy with ice chips started prior to therapy. Patient, BSA,consent,and dose double checked by two chemotherapy certified nurses prior to starting. Chemotherapy precautions maintained throughout therapy.

## 2022-04-25 NOTE — PLAN OF CARE
Plan of care reviewed with the pt at the beginning of the shift. Pt has remained free from injury and falls. Pt ambulating independently without difficulty. Pt reports have generalized fatigue but does not require assistance with ambulation. Fall precautions maintained. Bed locked in lowest position, side rails up x2, non skid footwear on, personal belongings and call light within reach. Instructed pt to call for assistance as needed. Pt has remained afebrile at this time.  Pt c/o of nausea and headache this am.  Zofran given. Pt also c/o of irritation to inside of jaw from eating ice with Melphalan.

## 2022-04-25 NOTE — SUBJECTIVE & OBJECTIVE
Subjective:     Interval History: Day 0 of [id number] ASCT. She had mild headache in the morning that resolved on its own. She also had nausea the was relieved with zofran. Otherwise, her VSS, afebrile. She will receive 8 bags of CD34 cell total dose of 3.97 x10^6 at 1330    Objective:     Vital Signs (Most Recent):  Temp: 97.9 °F (36.6 °C) (04/25/22 1122)  Pulse: 82 (04/25/22 1122)  Resp: 17 (04/25/22 1122)  BP: 118/62 (04/25/22 1122)  SpO2: (!) 94 % (04/25/22 1122)   Vital Signs (24h Range):  Temp:  [97.6 °F (36.4 °C)-98.5 °F (36.9 °C)] 97.9 °F (36.6 °C)  Pulse:  [75-96] 82  Resp:  [16-18] 17  SpO2:  [94 %-98 %] 94 %  BP: (118-138)/(62-81) 118/62     Weight: 68.6 kg (151 lb 3.8 oz)  Body mass index is 23.69 kg/m².  Body surface area is 1.8 meters squared.    ECOG SCORE           [unfilled]    Intake/Output - Last 3 Shifts         04/23 0700  04/24 0659 04/24 0700 04/25 0659 04/25 0700 04/26 0659    P.O. 1505 4401     I.V. (mL/kg) 1353.5 (19.9) 2476.9 (36.1)     Total Intake(mL/kg) 2858.5 (42) 6877.9 (100.3)     Urine (mL/kg/hr) 2100 6100 (3.7) 800 (2)    Stool  0 0    Total Output 2100 6100 800    Net +758.5 +777.9 -800           Stool Occurrence  1 x 1 x            Physical Exam  Constitutional:       General: She is not in acute distress.     Appearance: Normal appearance.   HENT:      Head: Normocephalic and atraumatic.   Eyes:      Pupils: Pupils are equal, round, and reactive to light.   Cardiovascular:      Rate and Rhythm: Normal rate and regular rhythm.      Heart sounds: No murmur heard.  Pulmonary:      Effort: No respiratory distress.      Breath sounds: Normal breath sounds. No wheezing.   Abdominal:      General: Abdomen is flat. Bowel sounds are normal. There is no distension.      Palpations: Abdomen is soft. There is no mass.      Tenderness: There is no abdominal tenderness.   Musculoskeletal:         General: No swelling or deformity.   Skin:     Coloration: Skin is not jaundiced.      Comments:  Soto line in R side chest. Dressing c/d/i   Neurological:      General: No focal deficit present.      Mental Status: She is alert and oriented to person, place, and time. Mental status is at baseline.       Significant Labs:   CBC:   Recent Labs   Lab 04/24/22 0310 04/25/22  0332   WBC 4.16 5.67   HGB 9.7* 9.8*   HCT 29.9* 29.5*   PLT 94* 113*      and CMP:   Recent Labs   Lab 04/24/22 0310 04/25/22 0332    141   K 4.1 4.2    111*   CO2 26 24   GLU 91 115*   BUN 12 15   CREATININE 0.8 0.8   CALCIUM 7.7* 7.5*   PROT 5.1* 5.3*   ALBUMIN 2.8* 2.9*   BILITOT 0.3 0.4   ALKPHOS 82 75   AST 30 25   ALT 52* 48*   ANIONGAP 7* 6*   EGFRNONAA >60.0 >60.0         Diagnostic Results:  None   bruise right shoulder and bilateral arms, right FA skin tear

## 2022-04-26 PROBLEM — Z94.81 STATUS POST AUTOLOGOUS BONE MARROW TRANSPLANT: Status: ACTIVE | Noted: 2021-12-02

## 2022-04-26 PROBLEM — R74.01 TRANSAMINASEMIA: Status: RESOLVED | Noted: 2022-04-23 | Resolved: 2022-04-26

## 2022-04-26 PROBLEM — R11.0 NAUSEA: Status: ACTIVE | Noted: 2022-04-26

## 2022-04-26 LAB
ALBUMIN SERPL BCP-MCNC: 2.7 G/DL (ref 3.5–5.2)
ALP SERPL-CCNC: 75 U/L (ref 55–135)
ALT SERPL W/O P-5'-P-CCNC: 39 U/L (ref 10–44)
ANION GAP SERPL CALC-SCNC: 5 MMOL/L (ref 8–16)
AST SERPL-CCNC: 56 U/L (ref 10–40)
BASOPHILS # BLD AUTO: 0.03 K/UL (ref 0–0.2)
BASOPHILS NFR BLD: 0.2 % (ref 0–1.9)
BILIRUB SERPL-MCNC: 0.6 MG/DL (ref 0.1–1)
BUN SERPL-MCNC: 15 MG/DL (ref 8–23)
CALCIUM SERPL-MCNC: 6.8 MG/DL (ref 8.7–10.5)
CHLORIDE SERPL-SCNC: 113 MMOL/L (ref 95–110)
CO2 SERPL-SCNC: 23 MMOL/L (ref 23–29)
CREAT SERPL-MCNC: 0.8 MG/DL (ref 0.5–1.4)
DIFFERENTIAL METHOD: ABNORMAL
EOSINOPHIL # BLD AUTO: 0 K/UL (ref 0–0.5)
EOSINOPHIL NFR BLD: 0 % (ref 0–8)
ERYTHROCYTE [DISTWIDTH] IN BLOOD BY AUTOMATED COUNT: 14.7 % (ref 11.5–14.5)
EST. GFR  (AFRICAN AMERICAN): >60 ML/MIN/1.73 M^2
EST. GFR  (NON AFRICAN AMERICAN): >60 ML/MIN/1.73 M^2
GLUCOSE SERPL-MCNC: 86 MG/DL (ref 70–110)
HCT VFR BLD AUTO: 27.7 % (ref 37–48.5)
HGB BLD-MCNC: 9 G/DL (ref 12–16)
IMM GRANULOCYTES # BLD AUTO: 0.37 K/UL (ref 0–0.04)
IMM GRANULOCYTES NFR BLD AUTO: 2.5 % (ref 0–0.5)
LYMPHOCYTES # BLD AUTO: 0.3 K/UL (ref 1–4.8)
LYMPHOCYTES NFR BLD: 1.8 % (ref 18–48)
MAGNESIUM SERPL-MCNC: 2.1 MG/DL (ref 1.6–2.6)
MCH RBC QN AUTO: 32.4 PG (ref 27–31)
MCHC RBC AUTO-ENTMCNC: 32.5 G/DL (ref 32–36)
MCV RBC AUTO: 100 FL (ref 82–98)
MONOCYTES # BLD AUTO: 0.4 K/UL (ref 0.3–1)
MONOCYTES NFR BLD: 2.4 % (ref 4–15)
NEUTROPHILS # BLD AUTO: 13.7 K/UL (ref 1.8–7.7)
NEUTROPHILS NFR BLD: 93.1 % (ref 38–73)
NRBC BLD-RTO: 0 /100 WBC
PHOSPHATE SERPL-MCNC: 2.9 MG/DL (ref 2.7–4.5)
PLATELET # BLD AUTO: 114 K/UL (ref 150–450)
PMV BLD AUTO: 10.8 FL (ref 9.2–12.9)
POTASSIUM SERPL-SCNC: 4 MMOL/L (ref 3.5–5.1)
PROT SERPL-MCNC: 4.7 G/DL (ref 6–8.4)
RBC # BLD AUTO: 2.78 M/UL (ref 4–5.4)
SODIUM SERPL-SCNC: 141 MMOL/L (ref 136–145)
WBC # BLD AUTO: 14.76 K/UL (ref 3.9–12.7)

## 2022-04-26 PROCEDURE — 20600001 HC STEP DOWN PRIVATE ROOM

## 2022-04-26 PROCEDURE — 63600175 PHARM REV CODE 636 W HCPCS: Performed by: NURSE PRACTITIONER

## 2022-04-26 PROCEDURE — 99233 SBSQ HOSP IP/OBS HIGH 50: CPT | Mod: ,,, | Performed by: INTERNAL MEDICINE

## 2022-04-26 PROCEDURE — 99233 PR SUBSEQUENT HOSPITAL CARE,LEVL III: ICD-10-PCS | Mod: ,,, | Performed by: INTERNAL MEDICINE

## 2022-04-26 PROCEDURE — 25000003 PHARM REV CODE 250: Performed by: INTERNAL MEDICINE

## 2022-04-26 PROCEDURE — 25000003 PHARM REV CODE 250: Performed by: STUDENT IN AN ORGANIZED HEALTH CARE EDUCATION/TRAINING PROGRAM

## 2022-04-26 PROCEDURE — 83735 ASSAY OF MAGNESIUM: CPT | Performed by: NURSE PRACTITIONER

## 2022-04-26 PROCEDURE — 97161 PT EVAL LOW COMPLEX 20 MIN: CPT

## 2022-04-26 PROCEDURE — 84100 ASSAY OF PHOSPHORUS: CPT | Performed by: NURSE PRACTITIONER

## 2022-04-26 PROCEDURE — 94761 N-INVAS EAR/PLS OXIMETRY MLT: CPT

## 2022-04-26 PROCEDURE — 99900035 HC TECH TIME PER 15 MIN (STAT)

## 2022-04-26 PROCEDURE — 97530 THERAPEUTIC ACTIVITIES: CPT

## 2022-04-26 PROCEDURE — 85025 COMPLETE CBC W/AUTO DIFF WBC: CPT | Performed by: NURSE PRACTITIONER

## 2022-04-26 PROCEDURE — 80053 COMPREHEN METABOLIC PANEL: CPT | Performed by: NURSE PRACTITIONER

## 2022-04-26 PROCEDURE — 63600175 PHARM REV CODE 636 W HCPCS: Performed by: INTERNAL MEDICINE

## 2022-04-26 PROCEDURE — 25000003 PHARM REV CODE 250: Performed by: NURSE PRACTITIONER

## 2022-04-26 RX ORDER — ONDANSETRON 8 MG/1
8 TABLET, ORALLY DISINTEGRATING ORAL EVERY 8 HOURS
Status: DISCONTINUED | OUTPATIENT
Start: 2022-04-26 | End: 2022-05-03

## 2022-04-26 RX ORDER — CALCIUM GLUCONATE 20 MG/ML
1 INJECTION, SOLUTION INTRAVENOUS
Status: COMPLETED | OUTPATIENT
Start: 2022-04-26 | End: 2022-04-26

## 2022-04-26 RX ADMIN — ONDANSETRON 8 MG: 8 TABLET, ORALLY DISINTEGRATING ORAL at 02:04

## 2022-04-26 RX ADMIN — FLUTICASONE PROPIONATE 50 MCG: 50 SPRAY, METERED NASAL at 09:04

## 2022-04-26 RX ADMIN — CITALOPRAM HYDROBROMIDE 10 MG: 10 TABLET ORAL at 06:04

## 2022-04-26 RX ADMIN — ONDANSETRON 8 MG: 8 TABLET, ORALLY DISINTEGRATING ORAL at 09:04

## 2022-04-26 RX ADMIN — Medication 1 DOSE: at 08:04

## 2022-04-26 RX ADMIN — ACYCLOVIR 800 MG: 200 CAPSULE ORAL at 08:04

## 2022-04-26 RX ADMIN — ONDANSETRON 8 MG: 8 TABLET, ORALLY DISINTEGRATING ORAL at 05:04

## 2022-04-26 RX ADMIN — CALCIUM GLUCONATE 1 G: 20 INJECTION, SOLUTION INTRAVENOUS at 09:04

## 2022-04-26 RX ADMIN — DOCUSATE SODIUM 100 MG: 100 CAPSULE, LIQUID FILLED ORAL at 09:04

## 2022-04-26 RX ADMIN — DOCUSATE SODIUM 100 MG: 100 CAPSULE, LIQUID FILLED ORAL at 08:04

## 2022-04-26 RX ADMIN — Medication 1 DOSE: at 09:04

## 2022-04-26 RX ADMIN — PROCHLORPERAZINE EDISYLATE 10 MG: 5 INJECTION INTRAMUSCULAR; INTRAVENOUS at 09:04

## 2022-04-26 RX ADMIN — Medication 1 DOSE: at 12:04

## 2022-04-26 RX ADMIN — ALUMINUM HYDROXIDE, MAGNESIUM HYDROXIDE, AND DIMETHICONE 10 ML: 400; 400; 40 SUSPENSION ORAL at 08:04

## 2022-04-26 RX ADMIN — ALUMINUM HYDROXIDE, MAGNESIUM HYDROXIDE, AND DIMETHICONE 10 ML: 400; 400; 40 SUSPENSION ORAL at 09:04

## 2022-04-26 RX ADMIN — POLYETHYLENE GLYCOL 3350 17 G: 17 POWDER, FOR SOLUTION ORAL at 09:04

## 2022-04-26 RX ADMIN — LORAZEPAM 1 MG: 2 INJECTION INTRAMUSCULAR; INTRAVENOUS at 11:04

## 2022-04-26 RX ADMIN — Medication 1 DOSE: at 05:04

## 2022-04-26 RX ADMIN — SODIUM CHLORIDE AND POTASSIUM CHLORIDE: 9; 1.49 INJECTION, SOLUTION INTRAVENOUS at 06:04

## 2022-04-26 RX ADMIN — ACYCLOVIR 800 MG: 200 CAPSULE ORAL at 09:04

## 2022-04-26 RX ADMIN — ALUMINUM HYDROXIDE, MAGNESIUM HYDROXIDE, AND DIMETHICONE 10 ML: 400; 400; 40 SUSPENSION ORAL at 12:04

## 2022-04-26 RX ADMIN — PANTOPRAZOLE SODIUM 40 MG: 40 TABLET, DELAYED RELEASE ORAL at 09:04

## 2022-04-26 RX ADMIN — CALCIUM GLUCONATE 1 G: 20 INJECTION, SOLUTION INTRAVENOUS at 08:04

## 2022-04-26 RX ADMIN — SODIUM CHLORIDE AND POTASSIUM CHLORIDE: 9; 1.49 INJECTION, SOLUTION INTRAVENOUS at 03:04

## 2022-04-26 RX ADMIN — LEVOFLOXACIN 500 MG: 500 TABLET, FILM COATED ORAL at 08:04

## 2022-04-26 RX ADMIN — FLUCONAZOLE 400 MG: 200 TABLET ORAL at 08:04

## 2022-04-26 RX ADMIN — ENOXAPARIN SODIUM 40 MG: 40 INJECTION SUBCUTANEOUS at 05:04

## 2022-04-26 RX ADMIN — ALUMINUM HYDROXIDE, MAGNESIUM HYDROXIDE, AND DIMETHICONE 10 ML: 400; 400; 40 SUSPENSION ORAL at 05:04

## 2022-04-26 NOTE — PROGRESS NOTES
Admit Assessment    Patient Identification  Caty Diaz   :  1955  Admit Date:  2022  Attending Provider:  Chad Tabor MD              Referral:   Pt was admitted to  with a diagnosis of Stem cell transplant candidate, and was admitted this hospital stay due to Multiple myeloma [C90.00].   is involved was referred to the Social Work Department via ([employer]eral).  Patient presents as a 66 y.o. year old  female.    Persons interviewed: patient and spouse.      Living Situation:  Lives with spouse Junior ([phone]) in own two-story home.  Independent with ADLs.      Resides at [address] [place] [address],   phone: [phone] (home).      (RETIRED) Functional Status Prior  Ambulation Prior: 0-->independent  Transferrin-->independent  Toiletin-->independent  Bathin-->independent  Dressin-->independent  Eatin-->independent  Communication: understands/communicates without difficulty  Swallowing: swallows foods/liquids without difficulty    Current or Past Agencies and Description of Services/Supplies    DME  Agency Name: none  Equipment Currently Used at Home: none    Home Health  Agency Name: none    IV Infusion  Agency Name: none    Nutrition: Oral.      Outpatient Pharmacy:     [address] [address]  Phone: [phone] Fax: [phone]      Patient Preference of agencies include: none expressed.      Patient/Caregiver informed of right to choose providers or agencies.  Patient provides permission to release any necessary information to [facility] and to Non-[facility] agencies as needed to facilitate patient care, treatment planning, and patient discharge planning.  Written and verbal resources provided.      Coping   Coping well with support of family.  Struggling with current nausea.         Adjustment to Diagnosis and  Treatment  Adequate after initial difficulty.    Emotional/Behavioral/Cognitive Issues   Hx of anxiety.  Compliant with Celexa 10mg daily and Clonazepam 0.25 HS PRN.          History/Current Symptoms of Anxiety/Depression: Yes  History/Current Substance Use:   Social History     Tobacco Use    Smoking status: Never Smoker    Smokeless tobacco: Never Used   Substance and Sexual Activity    Alcohol use: Not Currently    Drug use: Never    Sexual activity: Not Currently     Partners: Male       Indications of Abuse/Neglect: No:   Abuse Screen (yes response referral indicated)  Feels Unsafe at Home or Work/School: no  Physical Signs of Abuse Present: no    Financial:  Payer/Plan Subscr  Sex Relation Sub. Ins. ID Effective Group Num   1. [employer] * ERNESTO HANSON 1955 Female Self [id number]                                   [address]        Other identified concerns/needs: TBD.      Plan: to local hotel in care of spouse.      Interventions/Referrals: TBD.  Previous assistance through [employer] fund.    Patient/caregiver engaged in treatment planning process.     providing psychosocial and supportive counseling, resources, education, assistance and discharge planning as appropriate.  Patient/caregiver state understanding of  available resources,  following, remains available.  Given [facility] contact info and encouraged to call with any needs or concerns.

## 2022-04-26 NOTE — PLAN OF CARE
Patient AAOx4. Day +1 Ju Auto. Complaints of nausea; x1 episode of emesis. All PRN antiemetics administered. Zofran now scheduled Q8. Calcium gluconate administered for Ca-6.8. Poor appetite; encouraged oral intake. Bed in low locked position, call light and personal items within reach. Instructed to call if needed.

## 2022-04-26 NOTE — PLAN OF CARE
Problem: Physical Therapy  Goal: Physical Therapy Goal  Description: Goals to be met by: 22     Patient will increase functional independence with mobility by performin. Sit to stand transfer with [place]  2. Gait  x >150 feet with [place] using No Assistive Device.   3. Ascend/descend 4 stair with bilateral Handrails [place] using No Assistive Device.   4. Lower extremity exercise program x 20 reps per handout, with independence    Outcome: Ongoing, Progressing   Pt progressing, appropriate goals established

## 2022-04-26 NOTE — PT/OT/SLP EVAL
Physical Therapy Evaluation    Patient Name:  Caty Diaz   MRN:  [id number]  Admit Date: 4/23/2022  Admitting Diagnosis:  Status post autologous bone marrow transplant  Length of Stay: 3 days  Recent Surgery: * No surgery found *      Recommendations:   Discharge Recommendations:  home   Discharge Equipment Recommendations: none   Barriers to discharge: None      Assessment:     Caty Diaz is a 66 y.o. female admitted with a medical diagnosis of Status post autologous bone marrow transplant.  Pt presents with risk for functional decline due to cancer related treatments, but is functioning very close to her baseline. Pt demonstrating independence with all mobility, but reports significant nausea and fatigue. Pt will continue to benefit from acute PT services in order to maximize safety and (I) with functional mobility.    Problem List: other (comment) (at risk for functional decline due to treatments)  Rehab Prognosis: Good; patient would benefit from acute skilled PT services to address these deficits and reach maximum level of function.        Plan:   During this hospitalization, patient to be seen 1 x/week to address the above listed problems via gait training, therapeutic activities, therapeutic exercises, neuromuscular re-education  · Plan of Care Expires:  05/24/22    Subjective     Chief Complaint: bone marrow transplant    Patient/Family Comments/goals: to maintain independence   Pain/Comfort:  · Pain Rating 1: 0/10  · Pain Rating Post-Intervention 1: 0/10    Social History:  Residence: Pt resides with spouse in 2 story house with bed and bath on 1st floor. Pt. Has 4 steps to enter house with BHR.    Support available: spouse  Equipment Used: none  Prior level of function: independent     Objective:     Communicated with RN prior to session.  Patient found HOB elevated upon PT entry to room.   Additional staff present: N/A    General Precautions: Standard, other (see comments) (standard)    Orthopedic Precautions:N/A   Braces: N/A   Oxygen Device: Room Air    Exams:  · Cognition:   · AxOx4  · Command following: Follows multistep verbal commands    · Postural Exam:  Patient presented with the following abnormalities:    · -       No postural abnormalities identified  · Sensation:    · -       Intact    · RLE ROM: WFL  · RLE Strength: WFL  · LLE ROM: WFL  · LLE Strength: WFL    Functional Mobility:  Bed Mobility:     Scooting: independence  Supine to Sit: independence  Transfers:     Sit to Stand:  independence with no AD  Bed to Chair: independence with  no AD  using  Step Transfer  Gait:   · Distance: ~50 ft in room  · Level of Assistance:  supervision  · AD used: no AD  · Gait Deviations: no overt gait deviations noted  · Comments: Pt holding IV pole  Balance:   · Static Sitting: (I)  · Dynamic Sitting: (I)   · Static Standing: (I)  · Dynamic Standing: SPV    Therapeutic Activities & Exercises:      Pt educated on potential for functional decline and role of PT in POC   Education on importance of upright positioning to promote cardiopulmonary health.    Patient educated on the importance of early mobility to prevent functional decline during hospital stay   Patient was instructed to utilize staff assistance for mobility/transfers.   Patient was educated on PT POC and all questions answered within PT scope of practice.   Patient able to verbalize understanding; will follow-up with pt during current admit for additional questions/concerns within scope of practice.     Outcome Measures:  AM-PAC 6 CLICK MOBILITY  Turning over in bed (including adjusting bedclothes, sheets and blankets)?: 4  Sitting down on and standing up from a chair with arms (e.g., wheelchair, bedside commode, etc.): 4  Moving from lying on back to sitting on the side of the bed?: 4  Moving to and from a bed to a chair (including a wheelchair)?: 4  Need to walk in hospital room?: 4  Climbing 3-5 steps with a railing?: 4  Basic  Mobility Total Score: 24     Patient left up in chair with all lines intact, call button in reach, RN notified and spouse present.    GOALS:   Multidisciplinary Problems     Physical Therapy Goals        Problem: Physical Therapy    Goal Priority Disciplines Outcome Goal Variances Interventions   Physical Therapy Goal     PT, PT/OT Ongoing, Progressing     Description: Goals to be met by: 22     Patient will increase functional independence with mobility by performin. Sit to stand transfer with [place]  2. Gait  x >150 feet with [place] using No Assistive Device.   3. Ascend/descend 4 stair with bilateral Handrails [place] using No Assistive Device.   4. Lower extremity exercise program x 20 reps per handout, with independence                     History:     Past Medical History:   Diagnosis Date    Anxiety     Fatigue     Multiple myeloma        Past Surgical History:   Procedure Laterality Date    breast implants Bilateral 2021    CHOLECYSTECTOMY      HYSTERECTOMY      INSERTION OF TUNNELED CENTRAL VENOUS HEMODIALYSIS CATHETER Right 2022    Procedure: INSERTION, CATHETER, HEMODIALYSIS, DUAL LUMEN Bard 14.5 Fr Hemosplit Catheter Model 0232290, Right Possible Left Chest;  Surgeon: Hernan Plascencia MD;  Location: [facility] OR [address] [place] [address];  Service: General;  Laterality: Right;       Family History   Problem Relation Age of Onset    Hypertension Father        Social History     Socioeconomic History    Marital status:      Spouse name: Junior    Number of children: 3   Tobacco Use    Smoking status: Never Smoker    Smokeless tobacco: Never Used   Substance and Sexual Activity    Alcohol use: Not Currently    Drug use: Never    Sexual activity: Not Currently     Partners: Male     Time Tracking:     PT Received On: 22  PT Start Time: 1400     PT Stop Time: 1419  PT Total Time (min): 19 min       Billable Minutes: Evaluation 9 and Therapeutic Activity  10    4/26/2022

## 2022-04-26 NOTE — PROGRESS NOTES
Marty Alejo - Oncology ([facility])  Hematology  Bone Marrow Transplant  Progress Note    Patient Name: Caty Diaz  Admission Date: 4/23/2022  Hospital Length of Stay: 3 days  Code Status: Full Code    Subjective:     Interval History:  Day +1 of [id number] ASCT. She received her cells yesterday with no issues. She is still with nausea and had 1x vomiting episode today. Will schedule her zofran ATC. She is ambulatory, has no mouth sores, no diarrhea.     Objective:     Vital Signs (Most Recent):  Temp: 98.1 °F (36.7 °C) (04/26/22 1208)  Pulse: 79 (04/26/22 1208)  Resp: (!) 21 (04/26/22 1208)  BP: 116/62 (04/26/22 1208)  SpO2: (!) 93 % (04/26/22 1208)   Vital Signs (24h Range):  Temp:  [97.4 °F (36.3 °C)-98.8 °F (37.1 °C)] 98.1 °F (36.7 °C)  Pulse:  [79-96] 79  Resp:  [15-24] 21  SpO2:  [91 %-98 %] 93 %  BP: (112-141)/(57-74) 116/62     Weight: 70.5 kg (155 lb 6.8 oz)  Body mass index is 24.34 kg/m².  Body surface area is 1.83 meters squared.    ECOG SCORE           [unfilled]    Intake/Output - Last 3 Shifts         04/24 0700  04/25 0659 04/25 0700 04/26 0659 04/26 0700  04/27 0659    P.O. 4401 1695     I.V. (mL/kg) 2476.9 (36.1) 2038.5 (28.9)     Blood  480     Total Intake(mL/kg) 6877.9 (100.3) 4213.5 (59.8)     Urine (mL/kg/hr) 6100 (3.7) 4550 (2.7) 500 (1.1)    Stool 0 0     Total Output 6100 4550 500    Net +777.9 -336.5 -500           Stool Occurrence 1 x 1 x             Physical Exam  Constitutional:       General: She is not in acute distress.     Appearance: Normal appearance.   HENT:      Head: Normocephalic and atraumatic.   Eyes:      Pupils: Pupils are equal, round, and reactive to light.   Cardiovascular:      Rate and Rhythm: Normal rate and regular rhythm.      Heart sounds: No murmur heard.  Pulmonary:      Effort: No respiratory distress.      Breath sounds: Normal breath sounds. No wheezing.   Abdominal:      General: Abdomen is flat. Bowel sounds are normal. There is no distension.      Palpations:  Abdomen is soft. There is no mass.      Tenderness: There is no abdominal tenderness.   Musculoskeletal:         General: No swelling or deformity.   Skin:     Coloration: Skin is not jaundiced.      Comments: Soto line in R side chest. Dressing c/d/i   Neurological:      General: No focal deficit present.      Mental Status: She is alert and oriented to person, place, and time. Mental status is at baseline.       Significant Labs:   CBC:   Recent Labs   Lab 04/25/22 0332 04/26/22 0315   WBC 5.67 14.76*   HGB 9.8* 9.0*   HCT 29.5* 27.7*   * 114*      and CMP:   Recent Labs   Lab 04/25/22 0332 04/26/22 0315    141   K 4.2 4.0   * 113*   CO2 24 23   * 86   BUN 15 15   CREATININE 0.8 0.8   CALCIUM 7.5* 6.8*   PROT 5.3* 4.7*   ALBUMIN 2.9* 2.7*   BILITOT 0.4 0.6   ALKPHOS 75 75   AST 25 56*   ALT 48* 39   ANIONGAP 6* 5*   EGFRNONAA >60.0 >60.0         Diagnostic Results:  None    Assessment/Plan:     * Status post autologous bone marrow transplant  Disease status: [place]  Type of transplant: Auto  Conditioning regimen: Melphalan  Karnofsky Score: 90%  Post transplant maintenance plans: Not Yet  Today is D +1  She received 8 bags of CD34 cells with total dose of 3.97x10^6.     Planned conditioning regimen:  Melphalan on Day -1     Antimicrobial Prophylaxis:  Acyclovir starting on Day -1  Levofloxacin starting on Day -1  Fluconazole starting on Day -1     Growth Factor Support:  Neupogen starting on Day +7      Caregiver:   Post-transplant discharge plans: [facility]       Nausea  Scheduled her Zofran   Compazine prn    Constipation  Prn colace   Prn miralax  Expect diarrhea after chemotherapy, so will hold them then    Gastroesophageal reflux disease without esophagitis  - continue Protonix nightly    Thrombocytopenia  - due to chemo and stem cell collection  - daily CBC  - transfuse for platelets count <10 or <50 pre-procedure or bleeding  - hold Lovenox when platelets  <50k    Anemia associated with chemotherapy  - daily CBC  - transfuse for hgb <7    Multiple myeloma  - R-ISS II IgG lambda multiple myeloma, She had increased 1q21 copy number and has t(4,14), both high risk markers in multiple myeloma. She also has monosomy 13 on FISH. She did not have baseline PET CT.   - She has received 6 cycles of RVd, followed by 2 cycles of velcade-dexamethasone. She has tolerated the treatments well.    - PET CT on 3/31/22 did not demonstrate any hypermetabolic lytic or soft tissue lesions. BM biopsy on 3/21/22 showed 10% plasma cells ( versus 50% at diagnosis).  - Admitting for transplant as above.    Anxiety  - The patient states that she takes half of a clonazepam pill at night to help her sleep but would like to consider stopping this medication.           VTE Risk Mitigation (From admission, onward)         Ordered     enoxaparin injection 40 mg  Daily         04/23/22 1311     heparin (porcine) injection 1,600 Units  As needed (PRN)         04/23/22 1520     IP VTE HIGH RISK PATIENT  Once         04/23/22 1311     Place sequential compression device  Until discontinued         04/23/22 1311                Disposition: [facility]     Kali Kumari MD  Bone Marrow Transplant  Marty long - Oncology ([facility])

## 2022-04-26 NOTE — SUBJECTIVE & OBJECTIVE
Subjective:     Interval History:  Day +1 of [id number] ASCT. She received her cells yesterday with no issues. She is still with nausea and had 1x vomiting episode today. Will schedule her zofran ATC. She is ambulatory, has no mouth sores, no diarrhea.     Objective:     Vital Signs (Most Recent):  Temp: 98.1 °F (36.7 °C) (04/26/22 1208)  Pulse: 79 (04/26/22 1208)  Resp: (!) 21 (04/26/22 1208)  BP: 116/62 (04/26/22 1208)  SpO2: (!) 93 % (04/26/22 1208)   Vital Signs (24h Range):  Temp:  [97.4 °F (36.3 °C)-98.8 °F (37.1 °C)] 98.1 °F (36.7 °C)  Pulse:  [79-96] 79  Resp:  [15-24] 21  SpO2:  [91 %-98 %] 93 %  BP: (112-141)/(57-74) 116/62     Weight: 70.5 kg (155 lb 6.8 oz)  Body mass index is 24.34 kg/m².  Body surface area is 1.83 meters squared.    ECOG SCORE           [unfilled]    Intake/Output - Last 3 Shifts         04/24 0700  04/25 0659 04/25 0700 04/26 0659 04/26 0700  04/27 0659    P.O. 4401 1695     I.V. (mL/kg) 2476.9 (36.1) 2038.5 (28.9)     Blood  480     Total Intake(mL/kg) 6877.9 (100.3) 4213.5 (59.8)     Urine (mL/kg/hr) 6100 (3.7) 4550 (2.7) 500 (1.1)    Stool 0 0     Total Output 6100 4550 500    Net +777.9 -336.5 -500           Stool Occurrence 1 x 1 x             Physical Exam  Constitutional:       General: She is not in acute distress.     Appearance: Normal appearance.   HENT:      Head: Normocephalic and atraumatic.   Eyes:      Pupils: Pupils are equal, round, and reactive to light.   Cardiovascular:      Rate and Rhythm: Normal rate and regular rhythm.      Heart sounds: No murmur heard.  Pulmonary:      Effort: No respiratory distress.      Breath sounds: Normal breath sounds. No wheezing.   Abdominal:      General: Abdomen is flat. Bowel sounds are normal. There is no distension.      Palpations: Abdomen is soft. There is no mass.      Tenderness: There is no abdominal tenderness.   Musculoskeletal:         General: No swelling or deformity.   Skin:     Coloration: Skin is not jaundiced.       Comments: Soto line in R side chest. Dressing c/d/i   Neurological:      General: No focal deficit present.      Mental Status: She is alert and oriented to person, place, and time. Mental status is at baseline.       Significant Labs:   CBC:   Recent Labs   Lab 04/25/22 0332 04/26/22 0315   WBC 5.67 14.76*   HGB 9.8* 9.0*   HCT 29.5* 27.7*   * 114*      and CMP:   Recent Labs   Lab 04/25/22 0332 04/26/22 0315    141   K 4.2 4.0   * 113*   CO2 24 23   * 86   BUN 15 15   CREATININE 0.8 0.8   CALCIUM 7.5* 6.8*   PROT 5.3* 4.7*   ALBUMIN 2.9* 2.7*   BILITOT 0.4 0.6   ALKPHOS 75 75   AST 25 56*   ALT 48* 39   ANIONGAP 6* 5*   EGFRNONAA >60.0 >60.0         Diagnostic Results:  None

## 2022-04-26 NOTE — ASSESSMENT & PLAN NOTE
Disease status: [place]  Type of transplant: Auto  Conditioning regimen: Melphalan  Karnofsky Score: 90%  Post transplant maintenance plans: Not Yet  Today is D +1  She received 8 bags of CD34 cells with total dose of 3.97x10^6.     Planned conditioning regimen:  Melphalan on Day -1     Antimicrobial Prophylaxis:  Acyclovir starting on Day -1  Levofloxacin starting on Day -1  Fluconazole starting on Day -1     Growth Factor Support:  Neupogen starting on Day +7      Caregiver:   Post-transplant discharge plans: [facility]

## 2022-04-26 NOTE — PLAN OF CARE
Plan of care reviewed with the pt at the beginning of the shift. Pt has remained free from injury and falls. Pt ambulating independently without difficulty. Pt reports have generalized fatigue but does not require assistance with ambulation. Fall precautions maintained. Bed locked in lowest position, side rails up x2, non skid footwear on, personal belongings and call light within reach. Instructed pt to call for assistance as needed. Pt has remained afebrile at this time.  No c/o headache, nausea. Or diarrhea.

## 2022-04-27 PROBLEM — K59.00 CONSTIPATION: Status: RESOLVED | Noted: 2022-04-23 | Resolved: 2022-04-27

## 2022-04-27 LAB
ABO + RH BLD: NORMAL
ALBUMIN SERPL BCP-MCNC: 2.7 G/DL (ref 3.5–5.2)
ALP SERPL-CCNC: 64 U/L (ref 55–135)
ALT SERPL W/O P-5'-P-CCNC: 36 U/L (ref 10–44)
ANION GAP SERPL CALC-SCNC: 4 MMOL/L (ref 8–16)
ANISOCYTOSIS BLD QL SMEAR: SLIGHT
AST SERPL-CCNC: 35 U/L (ref 10–40)
BASOPHILS NFR BLD: 0 % (ref 0–1.9)
BILIRUB SERPL-MCNC: 1.1 MG/DL (ref 0.1–1)
BLD GP AB SCN CELLS X3 SERPL QL: NORMAL
BUN SERPL-MCNC: 11 MG/DL (ref 8–23)
CALCIUM SERPL-MCNC: 7.3 MG/DL (ref 8.7–10.5)
CHLORIDE SERPL-SCNC: 109 MMOL/L (ref 95–110)
CO2 SERPL-SCNC: 24 MMOL/L (ref 23–29)
CREAT SERPL-MCNC: 0.8 MG/DL (ref 0.5–1.4)
DIFFERENTIAL METHOD: ABNORMAL
EOSINOPHIL NFR BLD: 0 % (ref 0–8)
ERYTHROCYTE [DISTWIDTH] IN BLOOD BY AUTOMATED COUNT: 14.7 % (ref 11.5–14.5)
EST. GFR  (AFRICAN AMERICAN): >60 ML/MIN/1.73 M^2
EST. GFR  (NON AFRICAN AMERICAN): >60 ML/MIN/1.73 M^2
GLUCOSE SERPL-MCNC: 87 MG/DL (ref 70–110)
HCT VFR BLD AUTO: 26.8 % (ref 37–48.5)
HGB BLD-MCNC: 8.9 G/DL (ref 12–16)
HYPOCHROMIA BLD QL SMEAR: ABNORMAL
IMM GRANULOCYTES # BLD AUTO: ABNORMAL K/UL (ref 0–0.04)
IMM GRANULOCYTES NFR BLD AUTO: ABNORMAL % (ref 0–0.5)
LYMPHOCYTES NFR BLD: 6 % (ref 18–48)
MAGNESIUM SERPL-MCNC: 1.9 MG/DL (ref 1.6–2.6)
MCH RBC QN AUTO: 32.4 PG (ref 27–31)
MCHC RBC AUTO-ENTMCNC: 33.2 G/DL (ref 32–36)
MCV RBC AUTO: 98 FL (ref 82–98)
MONOCYTES NFR BLD: 1 % (ref 4–15)
NEUTROPHILS NFR BLD: 89 % (ref 38–73)
NEUTS BAND NFR BLD MANUAL: 4 %
NRBC BLD-RTO: 0 /100 WBC
PHOSPHATE SERPL-MCNC: 2.4 MG/DL (ref 2.7–4.5)
PLATELET # BLD AUTO: 92 K/UL (ref 150–450)
PLATELET BLD QL SMEAR: ABNORMAL
PMV BLD AUTO: 9.9 FL (ref 9.2–12.9)
POIKILOCYTOSIS BLD QL SMEAR: SLIGHT
POLYCHROMASIA BLD QL SMEAR: ABNORMAL
POTASSIUM SERPL-SCNC: 3.9 MMOL/L (ref 3.5–5.1)
PROT SERPL-MCNC: 5 G/DL (ref 6–8.4)
RBC # BLD AUTO: 2.75 M/UL (ref 4–5.4)
SODIUM SERPL-SCNC: 137 MMOL/L (ref 136–145)
TOXIC GRANULES BLD QL SMEAR: PRESENT
WBC # BLD AUTO: 1.87 K/UL (ref 3.9–12.7)

## 2022-04-27 PROCEDURE — 86850 RBC ANTIBODY SCREEN: CPT | Performed by: INTERNAL MEDICINE

## 2022-04-27 PROCEDURE — 25000003 PHARM REV CODE 250: Performed by: NURSE PRACTITIONER

## 2022-04-27 PROCEDURE — 94761 N-INVAS EAR/PLS OXIMETRY MLT: CPT

## 2022-04-27 PROCEDURE — 20600001 HC STEP DOWN PRIVATE ROOM

## 2022-04-27 PROCEDURE — 85027 COMPLETE CBC AUTOMATED: CPT | Performed by: NURSE PRACTITIONER

## 2022-04-27 PROCEDURE — 84100 ASSAY OF PHOSPHORUS: CPT | Performed by: NURSE PRACTITIONER

## 2022-04-27 PROCEDURE — 83735 ASSAY OF MAGNESIUM: CPT | Performed by: NURSE PRACTITIONER

## 2022-04-27 PROCEDURE — 99900035 HC TECH TIME PER 15 MIN (STAT)

## 2022-04-27 PROCEDURE — 85007 BL SMEAR W/DIFF WBC COUNT: CPT | Performed by: NURSE PRACTITIONER

## 2022-04-27 PROCEDURE — 99233 SBSQ HOSP IP/OBS HIGH 50: CPT | Mod: ,,, | Performed by: INTERNAL MEDICINE

## 2022-04-27 PROCEDURE — 25000003 PHARM REV CODE 250: Performed by: INTERNAL MEDICINE

## 2022-04-27 PROCEDURE — 63600175 PHARM REV CODE 636 W HCPCS: Performed by: INTERNAL MEDICINE

## 2022-04-27 PROCEDURE — 25000003 PHARM REV CODE 250: Performed by: STUDENT IN AN ORGANIZED HEALTH CARE EDUCATION/TRAINING PROGRAM

## 2022-04-27 PROCEDURE — 80053 COMPREHEN METABOLIC PANEL: CPT | Performed by: NURSE PRACTITIONER

## 2022-04-27 PROCEDURE — 63600175 PHARM REV CODE 636 W HCPCS: Performed by: NURSE PRACTITIONER

## 2022-04-27 PROCEDURE — 99233 PR SUBSEQUENT HOSPITAL CARE,LEVL III: ICD-10-PCS | Mod: ,,, | Performed by: INTERNAL MEDICINE

## 2022-04-27 RX ORDER — OLANZAPINE 2.5 MG/1
10 TABLET ORAL NIGHTLY
Status: DISCONTINUED | OUTPATIENT
Start: 2022-04-27 | End: 2022-04-28

## 2022-04-27 RX ORDER — POLYETHYLENE GLYCOL 3350 17 G/17G
17 POWDER, FOR SOLUTION ORAL DAILY PRN
Status: DISCONTINUED | OUTPATIENT
Start: 2022-04-27 | End: 2022-05-08 | Stop reason: HOSPADM

## 2022-04-27 RX ORDER — PROCHLORPERAZINE EDISYLATE 5 MG/ML
10 INJECTION INTRAMUSCULAR; INTRAVENOUS EVERY 6 HOURS PRN
Status: DISCONTINUED | OUTPATIENT
Start: 2022-04-27 | End: 2022-05-06

## 2022-04-27 RX ORDER — DOCUSATE SODIUM 100 MG/1
100 CAPSULE, LIQUID FILLED ORAL 2 TIMES DAILY PRN
Status: DISCONTINUED | OUTPATIENT
Start: 2022-04-27 | End: 2022-05-05

## 2022-04-27 RX ORDER — LORAZEPAM 2 MG/ML
1 INJECTION INTRAMUSCULAR EVERY 6 HOURS PRN
Status: DISCONTINUED | OUTPATIENT
Start: 2022-04-27 | End: 2022-05-08 | Stop reason: HOSPADM

## 2022-04-27 RX ADMIN — LORAZEPAM 1 MG: 2 INJECTION INTRAMUSCULAR; INTRAVENOUS at 07:04

## 2022-04-27 RX ADMIN — FLUTICASONE PROPIONATE 50 MCG: 50 SPRAY, METERED NASAL at 09:04

## 2022-04-27 RX ADMIN — Medication 1 DOSE: at 05:04

## 2022-04-27 RX ADMIN — POTASSIUM & SODIUM PHOSPHATES POWDER PACK 280-160-250 MG 1 PACKET: 280-160-250 PACK at 01:04

## 2022-04-27 RX ADMIN — ONDANSETRON 8 MG: 8 TABLET, ORALLY DISINTEGRATING ORAL at 01:04

## 2022-04-27 RX ADMIN — ALUMINUM HYDROXIDE, MAGNESIUM HYDROXIDE, AND DIMETHICONE 10 ML: 400; 400; 40 SUSPENSION ORAL at 09:04

## 2022-04-27 RX ADMIN — SODIUM CHLORIDE AND POTASSIUM CHLORIDE: 9; 1.49 INJECTION, SOLUTION INTRAVENOUS at 09:04

## 2022-04-27 RX ADMIN — LEVOFLOXACIN 500 MG: 500 TABLET, FILM COATED ORAL at 08:04

## 2022-04-27 RX ADMIN — POTASSIUM & SODIUM PHOSPHATES POWDER PACK 280-160-250 MG 1 PACKET: 280-160-250 PACK at 06:04

## 2022-04-27 RX ADMIN — ONDANSETRON 8 MG: 8 TABLET, ORALLY DISINTEGRATING ORAL at 06:04

## 2022-04-27 RX ADMIN — ALUMINUM HYDROXIDE, MAGNESIUM HYDROXIDE, AND DIMETHICONE 10 ML: 400; 400; 40 SUSPENSION ORAL at 08:04

## 2022-04-27 RX ADMIN — ENOXAPARIN SODIUM 40 MG: 40 INJECTION SUBCUTANEOUS at 05:04

## 2022-04-27 RX ADMIN — CITALOPRAM HYDROBROMIDE 10 MG: 10 TABLET ORAL at 06:04

## 2022-04-27 RX ADMIN — ONDANSETRON 8 MG: 8 TABLET, ORALLY DISINTEGRATING ORAL at 09:04

## 2022-04-27 RX ADMIN — Medication 1 DOSE: at 09:04

## 2022-04-27 RX ADMIN — PROCHLORPERAZINE EDISYLATE 10 MG: 5 INJECTION INTRAMUSCULAR; INTRAVENOUS at 03:04

## 2022-04-27 RX ADMIN — PANTOPRAZOLE SODIUM 40 MG: 40 TABLET, DELAYED RELEASE ORAL at 09:04

## 2022-04-27 RX ADMIN — Medication 1 DOSE: at 02:04

## 2022-04-27 RX ADMIN — ACYCLOVIR 800 MG: 200 CAPSULE ORAL at 09:04

## 2022-04-27 RX ADMIN — OLANZAPINE 10 MG: 2.5 TABLET, FILM COATED ORAL at 09:04

## 2022-04-27 RX ADMIN — ACYCLOVIR 800 MG: 200 CAPSULE ORAL at 08:04

## 2022-04-27 RX ADMIN — POTASSIUM & SODIUM PHOSPHATES POWDER PACK 280-160-250 MG 1 PACKET: 280-160-250 PACK at 10:04

## 2022-04-27 RX ADMIN — FLUCONAZOLE 400 MG: 200 TABLET ORAL at 08:04

## 2022-04-27 RX ADMIN — ALUMINUM HYDROXIDE, MAGNESIUM HYDROXIDE, AND DIMETHICONE 10 ML: 400; 400; 40 SUSPENSION ORAL at 01:04

## 2022-04-27 RX ADMIN — ALUMINUM HYDROXIDE, MAGNESIUM HYDROXIDE, AND DIMETHICONE 10 ML: 400; 400; 40 SUSPENSION ORAL at 05:04

## 2022-04-27 RX ADMIN — Medication 1 DOSE: at 08:04

## 2022-04-27 NOTE — ASSESSMENT & PLAN NOTE
Disease status: [place]  Type of transplant: Auto  Conditioning regimen: Melphalan  Karnofsky Score: 90%  Post transplant maintenance plans: Not Yet  Today is D +2  She received 8 bags of CD34 cells with total dose of 3.97x10^6.     Planned conditioning regimen:  Melphalan on Day -1     Antimicrobial Prophylaxis:  Acyclovir starting on Day -1  Levofloxacin starting on Day -1  Fluconazole starting on Day -1     Growth Factor Support:  Neupogen starting on Day +7      Caregiver:   Post-transplant discharge plans: [facility]

## 2022-04-27 NOTE — PROGRESS NOTES
Marty Alejo - Oncology ([facility])  Hematology  Bone Marrow Transplant  Progress Note    Patient Name: Caty Diaz  Admission Date: 4/23/2022  Hospital Length of Stay: 4 days  Code Status: Full Code    Subjective:     Interval History: Day +2 lam349 ASCT. Remains nauseated specially in the morning despite scheduled Zofran. Will add nightly zyprexa. Has low PO intake, and low appetite. Had soft BM, but she was on miralax and colace yesterday.      Objective:     Vital Signs (Most Recent):  Temp: 98 °F (36.7 °C) (04/27/22 1114)  Pulse: 85 (04/27/22 1114)  Resp: 16 (04/27/22 1114)  BP: 118/60 (04/27/22 1114)  SpO2: (!) 94 % (04/27/22 1114)   Vital Signs (24h Range):  Temp:  [98 °F (36.7 °C)-98.6 °F (37 °C)] 98 °F (36.7 °C)  Pulse:  [78-87] 85  Resp:  [16-21] 16  SpO2:  [93 %-98 %] 94 %  BP: (116-125)/(58-69) 118/60     Weight: 69.8 kg (153 lb 15.9 oz)  Body mass index is 24.12 kg/m².  Body surface area is 1.82 meters squared.    ECOG SCORE           [unfilled]    Intake/Output - Last 3 Shifts         04/25 0700  04/26 0659 04/26 0700  04/27 0659 04/27 0700  04/28 0659    P.O. 1695 940 120    I.V. (mL/kg) 2038.5 (28.9)      Blood 480      Total Intake(mL/kg) 4213.5 (59.8) 940 (13.5) 120 (1.7)    Urine (mL/kg/hr) 4550 (2.7) 4100 (2.4)     Stool 0 0     Total Output 4550 4100     Net -336.5 -3160 +120           Stool Occurrence 1 x 2 x 2 x            Physical Exam  Constitutional:       General: She is not in acute distress.     Appearance: Normal appearance.   HENT:      Head: Normocephalic and atraumatic.   Eyes:      Pupils: Pupils are equal, round, and reactive to light.   Cardiovascular:      Rate and Rhythm: Normal rate and regular rhythm.      Heart sounds: No murmur heard.  Pulmonary:      Effort: No respiratory distress.      Breath sounds: Normal breath sounds. No wheezing.   Abdominal:      General: Abdomen is flat. Bowel sounds are normal. There is no distension.      Palpations: Abdomen is soft. There is no  mass.      Tenderness: There is no abdominal tenderness.   Musculoskeletal:         General: No swelling or deformity.   Skin:     Coloration: Skin is not jaundiced.      Comments: Soto line in R side chest. Dressing c/d/i   Neurological:      General: No focal deficit present.      Mental Status: She is alert and oriented to person, place, and time. Mental status is at baseline.       Significant Labs:   CBC:   Recent Labs   Lab 04/26/22 0315 04/27/22  0306   WBC 14.76* 1.87*   HGB 9.0* 8.9*   HCT 27.7* 26.8*   * 92*      and CMP:   Recent Labs   Lab 04/26/22 0315 04/27/22  0306    137   K 4.0 3.9   * 109   CO2 23 24   GLU 86 87   BUN 15 11   CREATININE 0.8 0.8   CALCIUM 6.8* 7.3*   PROT 4.7* 5.0*   ALBUMIN 2.7* 2.7*   BILITOT 0.6 1.1*   ALKPHOS 75 64   AST 56* 35   ALT 39 36   ANIONGAP 5* 4*   EGFRNONAA >60.0 >60.0         Diagnostic Results:  None    Assessment/Plan:     * Status post autologous bone marrow transplant  Disease status: [place]  Type of transplant: Auto  Conditioning regimen: Melphalan  Karnofsky Score: 90%  Post transplant maintenance plans: Not Yet  Today is D +2  She received 8 bags of CD34 cells with total dose of 3.97x10^6.     Planned conditioning regimen:  Melphalan on Day -1     Antimicrobial Prophylaxis:  Acyclovir starting on Day -1  Levofloxacin starting on Day -1  Fluconazole starting on Day -1     Growth Factor Support:  Neupogen starting on Day +7      Caregiver:   Post-transplant discharge plans: [facility]       Nausea  Scheduled her Zofran   Compazine prn  - Will add zyprexa nightly     Gastroesophageal reflux disease without esophagitis  - continue Protonix nightly    Thrombocytopenia  - due to chemo and stem cell collection  - daily CBC  - transfuse for platelets count <10 or <50 pre-procedure or bleeding  - hold Lovenox when platelets <50k    Anemia associated with chemotherapy  - daily CBC  - transfuse for hgb <7    Multiple myeloma  - R-ISS II  IgG lambda multiple myeloma, She had increased 1q21 copy number and has t(4,14), both high risk markers in multiple myeloma. She also has monosomy 13 on FISH. She did not have baseline PET CT.   - She has received 6 cycles of RVd, followed by 2 cycles of velcade-dexamethasone. She has tolerated the treatments well.    - PET CT on 3/31/22 did not demonstrate any hypermetabolic lytic or soft tissue lesions. BM biopsy on 3/21/22 showed 10% plasma cells ( versus 50% at diagnosis).  - Admitting for transplant as above.    Anxiety  - The patient states that she takes half of a clonazepam pill at night to help her sleep but would like to consider stopping this medication.           VTE Risk Mitigation (From admission, onward)         Ordered     enoxaparin injection 40 mg  Daily         04/23/22 1311     heparin (porcine) injection 1,600 Units  As needed (PRN)         04/23/22 1520     IP VTE HIGH RISK PATIENT  Once         04/23/22 1311     Place sequential compression device  Until discontinued         04/23/22 1311                Disposition: [facility]     Kali Kumari MD  Bone Marrow Transplant  Marty Alejo - Oncology ([facility])

## 2022-04-27 NOTE — PLAN OF CARE
"OOC NT incoming call -  Pt reports low pressure readings range 112/45 -107/63 and is taking 2 BP meds.  - Low BP protocol followed and pt advised to see provider today.  Encounter routed to PCP as high priority.    Reason for Disposition   Systolic BP  while taking blood pressure medications and NOT dizzy, lightheaded or weak    Additional Information   Negative: Systolic BP < 90 and feeling dizzy, lightheaded, or weak   Negative: Started suddenly after an allergic medicine, an allergic food, or bee sting   Negative: Shock suspected (e.g., cold/pale/clammy skin, too weak to stand, low BP, rapid pulse)   Negative: Difficult to awaken or acting confused  (e.g., disoriented, slurred speech)   Negative: Fainted   Negative: Chest pain   Negative: Bleeding (e.g., vomiting blood, rectal bleeding or tarry stools, severe vaginal bleeding)   Negative: Extra heart beats or heart is beating fast  (i.e., "palpitations")   Negative: Sounds like a life-threatening emergency to the triager   Negative: Systolic BP < 80 and NOT dizzy, lightheaded or weak (feels normal)   Negative: Abdominal pain   Negative: Major surgery in the past month   Negative: Fever > 100.4 F (38.0 C)   Negative: Drinking very little and has signs of dehydration (e.g., no urine > 12 hours, very dry mouth, very lightheaded)   Negative: Fall in systolic BP > 20 mm Hg from normal and feeling dizzy, lightheaded, or weak   Negative: Patient sounds very sick or weak to the triager   Negative: Systolic BP < 90 and NOT dizzy, lightheaded or weak    Protocols used: LOW BLOOD PRESSURE-A-OH      " Plan of care reviewed with patient and family this shift. Pt is day +2 of Ju Auto. Pt c/o nausea and had scheduled Zofran and prn Compazine. VS stable. Maintaining saturations on room air. Phosphorous replacement initiated. NS w/20K @ 75/hr. Good, clear,yellow output. All questions and concerns addressed. Will continue to monitor.

## 2022-04-27 NOTE — PLAN OF CARE
Patient AAOx4. Day +2 Ju Auto SCT. Phos replaced. Complaints of nausea; PRNs administered. Zyprexa added for night time. Appetite better today. IVF infusing; NS w/ 20KCl @ 75. Ambulated in hallway. Bed in low locked position, call light and personal items within reach. Instructed to call if needed.

## 2022-04-28 LAB
ALBUMIN SERPL BCP-MCNC: 2.7 G/DL (ref 3.5–5.2)
ALP SERPL-CCNC: 61 U/L (ref 55–135)
ALT SERPL W/O P-5'-P-CCNC: 36 U/L (ref 10–44)
ANION GAP SERPL CALC-SCNC: 6 MMOL/L (ref 8–16)
AST SERPL-CCNC: 34 U/L (ref 10–40)
BASOPHILS # BLD AUTO: 0 K/UL (ref 0–0.2)
BASOPHILS NFR BLD: 0 % (ref 0–1.9)
BILIRUB SERPL-MCNC: 0.9 MG/DL (ref 0.1–1)
BUN SERPL-MCNC: 9 MG/DL (ref 8–23)
CALCIUM SERPL-MCNC: 7.4 MG/DL (ref 8.7–10.5)
CHLORIDE SERPL-SCNC: 108 MMOL/L (ref 95–110)
CO2 SERPL-SCNC: 25 MMOL/L (ref 23–29)
CREAT SERPL-MCNC: 0.8 MG/DL (ref 0.5–1.4)
DIFFERENTIAL METHOD: ABNORMAL
EOSINOPHIL # BLD AUTO: 0 K/UL (ref 0–0.5)
EOSINOPHIL NFR BLD: 1.2 % (ref 0–8)
ERYTHROCYTE [DISTWIDTH] IN BLOOD BY AUTOMATED COUNT: 14.3 % (ref 11.5–14.5)
EST. GFR  (AFRICAN AMERICAN): >60 ML/MIN/1.73 M^2
EST. GFR  (NON AFRICAN AMERICAN): >60 ML/MIN/1.73 M^2
GLUCOSE SERPL-MCNC: 82 MG/DL (ref 70–110)
HCT VFR BLD AUTO: 26.4 % (ref 37–48.5)
HGB BLD-MCNC: 8.6 G/DL (ref 12–16)
IMM GRANULOCYTES # BLD AUTO: 0 K/UL (ref 0–0.04)
IMM GRANULOCYTES NFR BLD AUTO: 0 % (ref 0–0.5)
LYMPHOCYTES # BLD AUTO: 0.1 K/UL (ref 1–4.8)
LYMPHOCYTES NFR BLD: 12 % (ref 18–48)
MAGNESIUM SERPL-MCNC: 1.9 MG/DL (ref 1.6–2.6)
MCH RBC QN AUTO: 32.6 PG (ref 27–31)
MCHC RBC AUTO-ENTMCNC: 32.6 G/DL (ref 32–36)
MCV RBC AUTO: 100 FL (ref 82–98)
MONOCYTES # BLD AUTO: 0 K/UL (ref 0.3–1)
MONOCYTES NFR BLD: 3.6 % (ref 4–15)
NEUTROPHILS # BLD AUTO: 0.7 K/UL (ref 1.8–7.7)
NEUTROPHILS NFR BLD: 83.2 % (ref 38–73)
NRBC BLD-RTO: 0 /100 WBC
PHOSPHATE SERPL-MCNC: 3 MG/DL (ref 2.7–4.5)
PLATELET # BLD AUTO: 107 K/UL (ref 150–450)
PMV BLD AUTO: 10.1 FL (ref 9.2–12.9)
POTASSIUM SERPL-SCNC: 3.9 MMOL/L (ref 3.5–5.1)
PROT SERPL-MCNC: 5.1 G/DL (ref 6–8.4)
RBC # BLD AUTO: 2.64 M/UL (ref 4–5.4)
SODIUM SERPL-SCNC: 139 MMOL/L (ref 136–145)
WBC # BLD AUTO: 0.83 K/UL (ref 3.9–12.7)

## 2022-04-28 PROCEDURE — 99233 PR SUBSEQUENT HOSPITAL CARE,LEVL III: ICD-10-PCS | Mod: ,,, | Performed by: INTERNAL MEDICINE

## 2022-04-28 PROCEDURE — 84100 ASSAY OF PHOSPHORUS: CPT | Performed by: NURSE PRACTITIONER

## 2022-04-28 PROCEDURE — 83735 ASSAY OF MAGNESIUM: CPT | Performed by: NURSE PRACTITIONER

## 2022-04-28 PROCEDURE — 80053 COMPREHEN METABOLIC PANEL: CPT | Performed by: NURSE PRACTITIONER

## 2022-04-28 PROCEDURE — 99233 SBSQ HOSP IP/OBS HIGH 50: CPT | Mod: ,,, | Performed by: INTERNAL MEDICINE

## 2022-04-28 PROCEDURE — 20600001 HC STEP DOWN PRIVATE ROOM

## 2022-04-28 PROCEDURE — 25000003 PHARM REV CODE 250: Performed by: STUDENT IN AN ORGANIZED HEALTH CARE EDUCATION/TRAINING PROGRAM

## 2022-04-28 PROCEDURE — 63600175 PHARM REV CODE 636 W HCPCS: Performed by: INTERNAL MEDICINE

## 2022-04-28 PROCEDURE — 25000003 PHARM REV CODE 250: Performed by: NURSE PRACTITIONER

## 2022-04-28 PROCEDURE — 25000003 PHARM REV CODE 250: Performed by: INTERNAL MEDICINE

## 2022-04-28 PROCEDURE — 85025 COMPLETE CBC W/AUTO DIFF WBC: CPT | Performed by: NURSE PRACTITIONER

## 2022-04-28 PROCEDURE — 63600175 PHARM REV CODE 636 W HCPCS: Performed by: NURSE PRACTITIONER

## 2022-04-28 RX ORDER — OLANZAPINE 2.5 MG/1
5 TABLET ORAL NIGHTLY
Status: DISCONTINUED | OUTPATIENT
Start: 2022-04-28 | End: 2022-05-03

## 2022-04-28 RX ADMIN — ONDANSETRON 8 MG: 8 TABLET, ORALLY DISINTEGRATING ORAL at 09:04

## 2022-04-28 RX ADMIN — ENOXAPARIN SODIUM 40 MG: 40 INJECTION SUBCUTANEOUS at 04:04

## 2022-04-28 RX ADMIN — OLANZAPINE 5 MG: 2.5 TABLET, FILM COATED ORAL at 08:04

## 2022-04-28 RX ADMIN — CITALOPRAM HYDROBROMIDE 10 MG: 10 TABLET ORAL at 06:04

## 2022-04-28 RX ADMIN — FLUTICASONE PROPIONATE 50 MCG: 50 SPRAY, METERED NASAL at 08:04

## 2022-04-28 RX ADMIN — ACYCLOVIR 800 MG: 200 CAPSULE ORAL at 08:04

## 2022-04-28 RX ADMIN — Medication 1 DOSE: at 04:04

## 2022-04-28 RX ADMIN — ALUMINUM HYDROXIDE, MAGNESIUM HYDROXIDE, AND DIMETHICONE 10 ML: 400; 400; 40 SUSPENSION ORAL at 04:04

## 2022-04-28 RX ADMIN — ONDANSETRON 8 MG: 8 TABLET, ORALLY DISINTEGRATING ORAL at 01:04

## 2022-04-28 RX ADMIN — Medication 400 MG: at 08:04

## 2022-04-28 RX ADMIN — Medication 1 DOSE: at 12:04

## 2022-04-28 RX ADMIN — Medication 1 DOSE: at 08:04

## 2022-04-28 RX ADMIN — LEVOFLOXACIN 500 MG: 500 TABLET, FILM COATED ORAL at 08:04

## 2022-04-28 RX ADMIN — SODIUM CHLORIDE AND POTASSIUM CHLORIDE: 9; 1.49 INJECTION, SOLUTION INTRAVENOUS at 10:04

## 2022-04-28 RX ADMIN — ALUMINUM HYDROXIDE, MAGNESIUM HYDROXIDE, AND DIMETHICONE 10 ML: 400; 400; 40 SUSPENSION ORAL at 08:04

## 2022-04-28 RX ADMIN — PANTOPRAZOLE SODIUM 40 MG: 40 TABLET, DELAYED RELEASE ORAL at 08:04

## 2022-04-28 RX ADMIN — ALUMINUM HYDROXIDE, MAGNESIUM HYDROXIDE, AND DIMETHICONE 10 ML: 400; 400; 40 SUSPENSION ORAL at 01:04

## 2022-04-28 RX ADMIN — FLUCONAZOLE 400 MG: 200 TABLET ORAL at 08:04

## 2022-04-28 RX ADMIN — Medication 400 MG: at 12:04

## 2022-04-28 RX ADMIN — ONDANSETRON 8 MG: 8 TABLET, ORALLY DISINTEGRATING ORAL at 06:04

## 2022-04-28 NOTE — PROGRESS NOTES
Marty Alejo - Oncology ([facility])  Hematology  Bone Marrow Transplant  Progress Note    Patient Name: Caty Diaz  Admission Date: 4/23/2022  Hospital Length of Stay: 5 days  Code Status: Full Code    Subjective:     Interval History: Day +3 [id number] ASCT. Feeling better this morning, and nausea is better controlled. No other complaints. VSS, afebrile     Objective:     Vital Signs (Most Recent):  Temp: 98.4 °F (36.9 °C) (04/28/22 1223)  Pulse: 83 (04/28/22 1223)  Resp: 17 (04/28/22 1223)  BP: 119/61 (04/28/22 1223)  SpO2: 96 % (04/28/22 1223)   Vital Signs (24h Range):  Temp:  [97.6 °F (36.4 °C)-99 °F (37.2 °C)] 98.4 °F (36.9 °C)  Pulse:  [79-84] 83  Resp:  [16-17] 17  SpO2:  [94 %-97 %] 96 %  BP: (108-124)/(59-64) 119/61     Weight: 67 kg (147 lb 11.3 oz)  Body mass index is 23.13 kg/m².  Body surface area is 1.78 meters squared.    ECOG SCORE           [unfilled]    Intake/Output - Last 3 Shifts         04/26 0700  04/27 0659 04/27 0700  04/28 0659 04/28 0700  04/29 0659    P.O. 940 480 570    I.V. (mL/kg)   524.9 (7.8)    Blood       Total Intake(mL/kg) 940 (13.5) 480 (7.2) 1094.9 (16.3)    Urine (mL/kg/hr) 4100 (2.4) 2000 (1.2) 800 (1.3)    Stool 0 0     Total Output 4100 2000 800    Net -3160 -1520 +294.9           Stool Occurrence 2 x 5 x             Physical Exam  Constitutional:       General: She is not in acute distress.     Appearance: Normal appearance.   HENT:      Head: Normocephalic and atraumatic.   Eyes:      Pupils: Pupils are equal, round, and reactive to light.   Cardiovascular:      Rate and Rhythm: Normal rate and regular rhythm.      Heart sounds: No murmur heard.  Pulmonary:      Effort: No respiratory distress.      Breath sounds: Normal breath sounds. No wheezing.   Abdominal:      General: Abdomen is flat. Bowel sounds are normal. There is no distension.      Palpations: Abdomen is soft. There is no mass.      Tenderness: There is no abdominal tenderness.   Musculoskeletal:         General:  No swelling or deformity.   Skin:     Coloration: Skin is not jaundiced.      Comments: Soto line in R side chest. Dressing c/d/i   Neurological:      General: No focal deficit present.      Mental Status: She is alert and oriented to person, place, and time. Mental status is at baseline.       Significant Labs:   CBC:   Recent Labs   Lab 04/27/22  0306 04/28/22  0356   WBC 1.87* 0.83*   HGB 8.9* 8.6*   HCT 26.8* 26.4*   PLT 92* 107*      and CMP:   Recent Labs   Lab 04/27/22  0306 04/28/22  0356    139   K 3.9 3.9    108   CO2 24 25   GLU 87 82   BUN 11 9   CREATININE 0.8 0.8   CALCIUM 7.3* 7.4*   PROT 5.0* 5.1*   ALBUMIN 2.7* 2.7*   BILITOT 1.1* 0.9   ALKPHOS 64 61   AST 35 34   ALT 36 36   ANIONGAP 4* 6*   EGFRNONAA >60.0 >60.0         Diagnostic Results:  None    Assessment/Plan:     * Status post autologous bone marrow transplant  Disease status: OK  Type of transplant: Auto  Conditioning regimen: Melphalan  Karnofsky Score: 90%  Post transplant maintenance plans: Not Yet  Today is D +3  She received 8 bags of CD34 cells with total dose of 3.97x10^6.     Planned conditioning regimen:  Melphalan on Day -1     Antimicrobial Prophylaxis:  Acyclovir starting on Day -1  Levofloxacin starting on Day -1  Fluconazole starting on Day -1     Growth Factor Support:  Neupogen starting on Day +7      Caregiver:   Post-transplant discharge plans: [facility]       Nausea  Scheduled her Zofran   Compazine prn  - Will add zyprexa nightly     Gastroesophageal reflux disease without esophagitis  - continue Protonix nightly    Thrombocytopenia  - due to chemo and stem cell collection  - daily CBC  - transfuse for platelets count <10 or <50 pre-procedure or bleeding  - hold Lovenox when platelets <50k    Anemia associated with chemotherapy  - daily CBC  - transfuse for hgb <7    Multiple myeloma  - R-ISS II IgG lambda multiple myeloma, She had increased 1q21 copy number and has t(4,14), both high risk  markers in multiple myeloma. She also has monosomy 13 on FISH. She did not have baseline PET CT.   - She has received 6 cycles of RVd, followed by 2 cycles of velcade-dexamethasone. She has tolerated the treatments well.    - PET CT on 3/31/22 did not demonstrate any hypermetabolic lytic or soft tissue lesions. BM biopsy on 3/21/22 showed 10% plasma cells ( versus 50% at diagnosis).  - Admitting for transplant as above.    Anxiety  - The patient states that she takes half of a clonazepam pill at night to help her sleep but would like to consider stopping this medication.           VTE Risk Mitigation (From admission, onward)         Ordered     enoxaparin injection 40 mg  Daily         04/23/22 1311     heparin (porcine) injection 1,600 Units  As needed (PRN)         04/23/22 1520     IP VTE HIGH RISK PATIENT  Once         04/23/22 1311     Place sequential compression device  Until discontinued         04/23/22 1311                Disposition: [facility]    Kali Kumari MD  Bone Marrow Transplant  Marty Alejo - Oncology ([facility])

## 2022-04-28 NOTE — PLAN OF CARE
Plan of care reviewed with patient and family this shift. Pt is day +3 of Ju Auto. Pt had scheduled Zofran and Zyprexa, nausea controlled. VS stable. Maintaining saturations while asleep on 2L NC. NS w/20K @ 75/hr. Good, clear,yellow output. Stool softeners held r/t previous diarrhea, but none was reported overnight. All questions and concerns addressed. Will continue to monitor.

## 2022-04-28 NOTE — ASSESSMENT & PLAN NOTE
Disease status: DC  Type of transplant: Auto  Conditioning regimen: Melphalan  Karnofsky Score: 90%  Post transplant maintenance plans: Not Yet  Today is D +3  She received 8 bags of CD34 cells with total dose of 3.97x10^6.     Planned conditioning regimen:  Melphalan on Day -1     Antimicrobial Prophylaxis:  Acyclovir starting on Day -1  Levofloxacin starting on Day -1  Fluconazole starting on Day -1     Growth Factor Support:  Neupogen starting on Day +7      Caregiver:   Post-transplant discharge plans: [facility]

## 2022-04-28 NOTE — SUBJECTIVE & OBJECTIVE
Subjective:     Interval History: Day +3 [id number] ASCT. Feeling better this morning, and nausea is better controlled. No other complaints. VSS, afebrile     Objective:     Vital Signs (Most Recent):  Temp: 98.4 °F (36.9 °C) (04/28/22 1223)  Pulse: 83 (04/28/22 1223)  Resp: 17 (04/28/22 1223)  BP: 119/61 (04/28/22 1223)  SpO2: 96 % (04/28/22 1223)   Vital Signs (24h Range):  Temp:  [97.6 °F (36.4 °C)-99 °F (37.2 °C)] 98.4 °F (36.9 °C)  Pulse:  [79-84] 83  Resp:  [16-17] 17  SpO2:  [94 %-97 %] 96 %  BP: (108-124)/(59-64) 119/61     Weight: 67 kg (147 lb 11.3 oz)  Body mass index is 23.13 kg/m².  Body surface area is 1.78 meters squared.    ECOG SCORE           [unfilled]    Intake/Output - Last 3 Shifts         04/26 0700  04/27 0659 04/27 0700  04/28 0659 04/28 0700  04/29 0659    P.O. 940 480 570    I.V. (mL/kg)   524.9 (7.8)    Blood       Total Intake(mL/kg) 940 (13.5) 480 (7.2) 1094.9 (16.3)    Urine (mL/kg/hr) 4100 (2.4) 2000 (1.2) 800 (1.3)    Stool 0 0     Total Output 4100 2000 800    Net -3160 -1520 +294.9           Stool Occurrence 2 x 5 x             Physical Exam  Constitutional:       General: She is not in acute distress.     Appearance: Normal appearance.   HENT:      Head: Normocephalic and atraumatic.   Eyes:      Pupils: Pupils are equal, round, and reactive to light.   Cardiovascular:      Rate and Rhythm: Normal rate and regular rhythm.      Heart sounds: No murmur heard.  Pulmonary:      Effort: No respiratory distress.      Breath sounds: Normal breath sounds. No wheezing.   Abdominal:      General: Abdomen is flat. Bowel sounds are normal. There is no distension.      Palpations: Abdomen is soft. There is no mass.      Tenderness: There is no abdominal tenderness.   Musculoskeletal:         General: No swelling or deformity.   Skin:     Coloration: Skin is not jaundiced.      Comments: Soto line in R side chest. Dressing c/d/i   Neurological:      General: No focal deficit present.      Mental  Status: She is alert and oriented to person, place, and time. Mental status is at baseline.       Significant Labs:   CBC:   Recent Labs   Lab 04/27/22 0306 04/28/22  0356   WBC 1.87* 0.83*   HGB 8.9* 8.6*   HCT 26.8* 26.4*   PLT 92* 107*      and CMP:   Recent Labs   Lab 04/27/22 0306 04/28/22  0356    139   K 3.9 3.9    108   CO2 24 25   GLU 87 82   BUN 11 9   CREATININE 0.8 0.8   CALCIUM 7.3* 7.4*   PROT 5.0* 5.1*   ALBUMIN 2.7* 2.7*   BILITOT 1.1* 0.9   ALKPHOS 64 61   AST 35 34   ALT 36 36   ANIONGAP 4* 6*   EGFRNONAA >60.0 >60.0         Diagnostic Results:  None

## 2022-04-28 NOTE — PROGRESS NOTES
Plan of care reviewed with patient and family this shift. Nausea controlled by scheduled meds; no PRNs needed. C/o drowsiness; nightly dose of zyprexa cut in half. VS stable. Maintaining saturations on room air.  Mag replaced per orders. IVF continued.  All questions and concerns addressed. Will continue to monitor.

## 2022-04-29 LAB
ALBUMIN SERPL BCP-MCNC: 2.9 G/DL (ref 3.5–5.2)
ALP SERPL-CCNC: 56 U/L (ref 55–135)
ALT SERPL W/O P-5'-P-CCNC: 43 U/L (ref 10–44)
ANION GAP SERPL CALC-SCNC: 7 MMOL/L (ref 8–16)
AST SERPL-CCNC: 42 U/L (ref 10–40)
BASOPHILS # BLD AUTO: 0.01 K/UL (ref 0–0.2)
BASOPHILS NFR BLD: 2 % (ref 0–1.9)
BILIRUB SERPL-MCNC: 0.7 MG/DL (ref 0.1–1)
BUN SERPL-MCNC: 10 MG/DL (ref 8–23)
CALCIUM SERPL-MCNC: 7.3 MG/DL (ref 8.7–10.5)
CHLORIDE SERPL-SCNC: 111 MMOL/L (ref 95–110)
CO2 SERPL-SCNC: 24 MMOL/L (ref 23–29)
CREAT SERPL-MCNC: 0.8 MG/DL (ref 0.5–1.4)
DIFFERENTIAL METHOD: ABNORMAL
EOSINOPHIL # BLD AUTO: 0 K/UL (ref 0–0.5)
EOSINOPHIL NFR BLD: 2 % (ref 0–8)
ERYTHROCYTE [DISTWIDTH] IN BLOOD BY AUTOMATED COUNT: 13.9 % (ref 11.5–14.5)
EST. GFR  (AFRICAN AMERICAN): >60 ML/MIN/1.73 M^2
EST. GFR  (NON AFRICAN AMERICAN): >60 ML/MIN/1.73 M^2
GLUCOSE SERPL-MCNC: 89 MG/DL (ref 70–110)
HCT VFR BLD AUTO: 26.7 % (ref 37–48.5)
HGB BLD-MCNC: 8.6 G/DL (ref 12–16)
IMM GRANULOCYTES # BLD AUTO: 0.01 K/UL (ref 0–0.04)
IMM GRANULOCYTES NFR BLD AUTO: 2 % (ref 0–0.5)
LYMPHOCYTES # BLD AUTO: 0.1 K/UL (ref 1–4.8)
LYMPHOCYTES NFR BLD: 16 % (ref 18–48)
MAGNESIUM SERPL-MCNC: 2 MG/DL (ref 1.6–2.6)
MCH RBC QN AUTO: 32.6 PG (ref 27–31)
MCHC RBC AUTO-ENTMCNC: 32.2 G/DL (ref 32–36)
MCV RBC AUTO: 101 FL (ref 82–98)
MONOCYTES # BLD AUTO: 0 K/UL (ref 0.3–1)
MONOCYTES NFR BLD: 6 % (ref 4–15)
NEUTROPHILS # BLD AUTO: 0.4 K/UL (ref 1.8–7.7)
NEUTROPHILS NFR BLD: 72 % (ref 38–73)
NRBC BLD-RTO: 0 /100 WBC
PHOSPHATE SERPL-MCNC: 2.6 MG/DL (ref 2.7–4.5)
PLATELET # BLD AUTO: 95 K/UL (ref 150–450)
PMV BLD AUTO: 10 FL (ref 9.2–12.9)
POTASSIUM SERPL-SCNC: 4.1 MMOL/L (ref 3.5–5.1)
PROT SERPL-MCNC: 5.2 G/DL (ref 6–8.4)
RBC # BLD AUTO: 2.64 M/UL (ref 4–5.4)
SODIUM SERPL-SCNC: 142 MMOL/L (ref 136–145)
WBC # BLD AUTO: 0.51 K/UL (ref 3.9–12.7)

## 2022-04-29 PROCEDURE — 85025 COMPLETE CBC W/AUTO DIFF WBC: CPT | Performed by: NURSE PRACTITIONER

## 2022-04-29 PROCEDURE — 25000003 PHARM REV CODE 250: Performed by: STUDENT IN AN ORGANIZED HEALTH CARE EDUCATION/TRAINING PROGRAM

## 2022-04-29 PROCEDURE — 94761 N-INVAS EAR/PLS OXIMETRY MLT: CPT

## 2022-04-29 PROCEDURE — 99233 SBSQ HOSP IP/OBS HIGH 50: CPT | Mod: GC,,, | Performed by: INTERNAL MEDICINE

## 2022-04-29 PROCEDURE — 80053 COMPREHEN METABOLIC PANEL: CPT | Performed by: NURSE PRACTITIONER

## 2022-04-29 PROCEDURE — 94799 UNLISTED PULMONARY SVC/PX: CPT

## 2022-04-29 PROCEDURE — 87449 NOS EACH ORGANISM AG IA: CPT | Performed by: STUDENT IN AN ORGANIZED HEALTH CARE EDUCATION/TRAINING PROGRAM

## 2022-04-29 PROCEDURE — 20600001 HC STEP DOWN PRIVATE ROOM

## 2022-04-29 PROCEDURE — 25000003 PHARM REV CODE 250: Performed by: NURSE PRACTITIONER

## 2022-04-29 PROCEDURE — 25000003 PHARM REV CODE 250: Performed by: INTERNAL MEDICINE

## 2022-04-29 PROCEDURE — 63600175 PHARM REV CODE 636 W HCPCS: Performed by: INTERNAL MEDICINE

## 2022-04-29 PROCEDURE — 99233 PR SUBSEQUENT HOSPITAL CARE,LEVL III: ICD-10-PCS | Mod: GC,,, | Performed by: INTERNAL MEDICINE

## 2022-04-29 PROCEDURE — 84100 ASSAY OF PHOSPHORUS: CPT | Performed by: NURSE PRACTITIONER

## 2022-04-29 PROCEDURE — 27000207 HC ISOLATION

## 2022-04-29 PROCEDURE — 63600175 PHARM REV CODE 636 W HCPCS: Performed by: NURSE PRACTITIONER

## 2022-04-29 PROCEDURE — 83735 ASSAY OF MAGNESIUM: CPT | Performed by: NURSE PRACTITIONER

## 2022-04-29 RX ADMIN — Medication 400 MG: at 12:04

## 2022-04-29 RX ADMIN — ALUMINUM HYDROXIDE, MAGNESIUM HYDROXIDE, AND DIMETHICONE 10 ML: 400; 400; 40 SUSPENSION ORAL at 12:04

## 2022-04-29 RX ADMIN — CITALOPRAM HYDROBROMIDE 10 MG: 10 TABLET ORAL at 08:04

## 2022-04-29 RX ADMIN — POTASSIUM & SODIUM PHOSPHATES POWDER PACK 280-160-250 MG 1 PACKET: 280-160-250 PACK at 06:04

## 2022-04-29 RX ADMIN — Medication 1 DOSE: at 05:04

## 2022-04-29 RX ADMIN — SODIUM CHLORIDE AND POTASSIUM CHLORIDE: 9; 1.49 INJECTION, SOLUTION INTRAVENOUS at 02:04

## 2022-04-29 RX ADMIN — POTASSIUM & SODIUM PHOSPHATES POWDER PACK 280-160-250 MG 1 PACKET: 280-160-250 PACK at 08:04

## 2022-04-29 RX ADMIN — ONDANSETRON 8 MG: 8 TABLET, ORALLY DISINTEGRATING ORAL at 05:04

## 2022-04-29 RX ADMIN — ONDANSETRON 8 MG: 8 TABLET, ORALLY DISINTEGRATING ORAL at 08:04

## 2022-04-29 RX ADMIN — FLUTICASONE PROPIONATE 50 MCG: 50 SPRAY, METERED NASAL at 08:04

## 2022-04-29 RX ADMIN — ALUMINUM HYDROXIDE, MAGNESIUM HYDROXIDE, AND DIMETHICONE 10 ML: 400; 400; 40 SUSPENSION ORAL at 04:04

## 2022-04-29 RX ADMIN — ACYCLOVIR 800 MG: 200 CAPSULE ORAL at 08:04

## 2022-04-29 RX ADMIN — FLUCONAZOLE 400 MG: 200 TABLET ORAL at 08:04

## 2022-04-29 RX ADMIN — POTASSIUM & SODIUM PHOSPHATES POWDER PACK 280-160-250 MG 1 PACKET: 280-160-250 PACK at 10:04

## 2022-04-29 RX ADMIN — ENOXAPARIN SODIUM 40 MG: 40 INJECTION SUBCUTANEOUS at 04:04

## 2022-04-29 RX ADMIN — PANTOPRAZOLE SODIUM 40 MG: 40 TABLET, DELAYED RELEASE ORAL at 08:04

## 2022-04-29 RX ADMIN — Medication 1 DOSE: at 08:04

## 2022-04-29 RX ADMIN — OLANZAPINE 5 MG: 2.5 TABLET, FILM COATED ORAL at 08:04

## 2022-04-29 RX ADMIN — LEVOFLOXACIN 500 MG: 500 TABLET, FILM COATED ORAL at 08:04

## 2022-04-29 RX ADMIN — ONDANSETRON 8 MG: 8 TABLET, ORALLY DISINTEGRATING ORAL at 01:04

## 2022-04-29 RX ADMIN — ALUMINUM HYDROXIDE, MAGNESIUM HYDROXIDE, AND DIMETHICONE 10 ML: 400; 400; 40 SUSPENSION ORAL at 08:04

## 2022-04-29 RX ADMIN — Medication 400 MG: at 05:04

## 2022-04-29 RX ADMIN — Medication 1 DOSE: at 12:04

## 2022-04-29 RX ADMIN — POTASSIUM & SODIUM PHOSPHATES POWDER PACK 280-160-250 MG 1 PACKET: 280-160-250 PACK at 02:04

## 2022-04-29 NOTE — PHYSICIAN QUERY
PT Name: Caty Diaz  MR #: [id number]    DOCUMENTATION CLARIFICATION      CDS: Tamela De Guzman RN        Contact information: [email or web address] or (cell) [phone]    This form is a permanent document in the medical record.      Query Date: April 29, 2022    By submitting this query, we are merely seeking further clarification of documentation. Please utilize your independent clinical judgment when addressing the question(s) below.       Indicators Supporting Clinical Findings Location in Medical Record   x Anemia, Thrombocytopenia, Neutropenia, Pancytopenia documented Thrombocytopenia  - due to chemo and stem cell collection    Anemia associated with chemotherapy  - daily CBC   BMT PN 4/28   x H & H  04/24/22 03:10 04/25/22 03:32 04/26/22 03:15 04/27/22 03:06 04/28/22 03:56 04/29/22 04:11   Hemoglobin 9.7 (L) 9.8 (L) 9.0 (L) 8.9 (L) 8.6 (L) 8.6 (L)   Hematocrit 29.9 (L) 29.5 (L) 27.7 (L) 26.8 (L) 26.4 (L) 26.7 (L)      Lab Results    x WBC  04/24/22 03:10 04/25/22 03:32 04/26/22 03:15 04/27/22 03:06 04/28/22 03:56 04/29/22 04:11   WBC 4.16 5.67 14.76 (H) 1.87 (LL) 0.83 (LL)  0.51 (LL)       Lab Results   x Neutrophils/ Granulocytes/ ANC  04/28/22 03:56 04/29/22 04:11   Gran # (ANC) 0.7 (L) 0.4 (L)      Lab Results   x Platelets  04/24/22 03:10 04/25/22 03:32 04/26/22 03:15 04/27/22 03:06 04/28/22 03:56 04/29/22 04:11   Platelets 94 (L) 113 (L) 114 (L) 92 (L) 107 (L) 95 (L)      Lab Results    Transfusion(s)      Treatments:     x Acute/ Chronic illness Status post autologous bone marrow transplant  Disease status: MO  Type of transplant: Auto  Conditioning regimen: Melphalan  Karnofsky Score: 90%  Post transplant maintenance plans: Not Yet  Today is D +3  She received 8 bags of CD34 cells with total dose of 3.97x10^6.    Multiple myeloma  - R-ISS II IgG lambda multiple myeloma, She had increased 1q21 copy number and has t(4,14), both high risk markers in multiple myeloma. She also has monosomy  13 on FISH   BMT PN 4/28    Other:     Pancytopenia is defined by:  Hb < 12g/dL (non pregnant women) or <13g/dL (men) + ANC < 1800/microL + Platelets < 150,000/microL      Provider, please specify diagnosis or diagnoses associated with above clinical findings.    [  x ] Pancytopenia due to chemotherapy     [   ] Pancytopenia, unspecified     [   ] Other Hematological Diagnosis (please specify) ________     [  ] Clinically Undetermined         Please document in your progress notes daily for the duration of treatment, until resolved, and include in your discharge summary.

## 2022-04-29 NOTE — PROGRESS NOTES
Marty Alejo - Oncology ([facility])  Hematology  Bone Marrow Transplant  Progress Note    Patient Name: Caty Diaz  Admission Date: 4/23/2022  Hospital Length of Stay: 6 days  Code Status: Full Code    Subjective:     Interval History: Day +4 [id number] ASCT. NAEON. Nausea is controlled. No headaches. Had fair oral intake.     Objective:     Vital Signs (Most Recent):  Temp: 98.6 °F (37 °C) (04/29/22 1230)  Pulse: 76 (04/29/22 1230)  Resp: 18 (04/29/22 1230)  BP: (!) 153/65 (04/29/22 1230)  SpO2: 100 % (04/29/22 1230)   Vital Signs (24h Range):  Temp:  [98.5 °F (36.9 °C)-98.9 °F (37.2 °C)] 98.6 °F (37 °C)  Pulse:  [76-97] 76  Resp:  [18] 18  SpO2:  [93 %-100 %] 100 %  BP: ()/(50-65) 153/65     Weight: 66 kg (145 lb 8.1 oz)  Body mass index is 22.79 kg/m².  Body surface area is 1.77 meters squared.    ECOG SCORE           [unfilled]    Intake/Output - Last 3 Shifts         04/27 0700  04/28 0659 04/28 0700 04/29 0659 04/29 0700 04/30 0659    P.O. 480 1390 320    I.V. (mL/kg)  1469.4 (22.3) 571.2 (8.7)    Total Intake(mL/kg) 480 (7.2) 2859.4 (43.3) 891.2 (13.5)    Urine (mL/kg/hr) 2000 (1.2) 3400 (2.1) 500 (1)    Stool 0 0 0    Total Output 2000 3400 500    Net -1520 -540.6 +391.2           Urine Occurrence  1 x 1 x    Stool Occurrence 5 x 1 x 1 x            Physical Exam  Constitutional:       General: She is not in acute distress.     Appearance: Normal appearance.   HENT:      Head: Normocephalic and atraumatic.   Eyes:      Pupils: Pupils are equal, round, and reactive to light.   Cardiovascular:      Rate and Rhythm: Normal rate and regular rhythm.      Heart sounds: No murmur heard.  Pulmonary:      Effort: No respiratory distress.      Breath sounds: Normal breath sounds. No wheezing.   Abdominal:      General: Abdomen is flat. Bowel sounds are normal. There is no distension.      Palpations: Abdomen is soft. There is no mass.      Tenderness: There is no abdominal tenderness.   Musculoskeletal:          General: No swelling or deformity.   Skin:     Coloration: Skin is not jaundiced.      Comments: Soto line in R side chest. Dressing c/d/i   Neurological:      General: No focal deficit present.      Mental Status: She is alert and oriented to person, place, and time. Mental status is at baseline.       Significant Labs:   CBC:   Recent Labs   Lab 04/28/22  0356 04/29/22  0411   WBC 0.83* 0.51*   HGB 8.6* 8.6*   HCT 26.4* 26.7*   * 95*      and CMP:   Recent Labs   Lab 04/28/22  0356 04/29/22  0411    142   K 3.9 4.1    111*   CO2 25 24   GLU 82 89   BUN 9 10   CREATININE 0.8 0.8   CALCIUM 7.4* 7.3*   PROT 5.1* 5.2*   ALBUMIN 2.7* 2.9*   BILITOT 0.9 0.7   ALKPHOS 61 56   AST 34 42*   ALT 36 43   ANIONGAP 6* 7*   EGFRNONAA >60.0 >60.0         Diagnostic Results:  None    Assessment/Plan:     * Status post autologous bone marrow transplant  Disease status: CT  Type of transplant: Auto  Conditioning regimen: Melphalan  Karnofsky Score: 90%  Post transplant maintenance plans: Not Yet  Today is D +4  She received 8 bags of CD34 cells with total dose of 3.97x10^6.     Planned conditioning regimen:  Melphalan on Day -1     Antimicrobial Prophylaxis:  Acyclovir starting on Day -1  Levofloxacin starting on Day -1  Fluconazole starting on Day -1     Growth Factor Support:  Neupogen starting on Day +7      Caregiver:   Post-transplant discharge plans: [facility]       Nausea  Scheduled her Zofran   Compazine prn  - zyprexa nightly     Gastroesophageal reflux disease without esophagitis  - continue Protonix nightly    Thrombocytopenia  - due to chemo and stem cell collection  - daily CBC  - transfuse for platelets count <10 or <50 pre-procedure or bleeding  - hold Lovenox when platelets <50k    Anemia associated with chemotherapy  - daily CBC  - transfuse for hgb <7    Multiple myeloma  - R-ISS II IgG lambda multiple myeloma, She had increased 1q21 copy number and has t(4,14), both high risk  markers in multiple myeloma. She also has monosomy 13 on FISH. She did not have baseline PET CT.   - She has received 6 cycles of RVd, followed by 2 cycles of velcade-dexamethasone. She has tolerated the treatments well.    - PET CT on 3/31/22 did not demonstrate any hypermetabolic lytic or soft tissue lesions. BM biopsy on 3/21/22 showed 10% plasma cells ( versus 50% at diagnosis).  - Admitting for transplant as above.    Anxiety  - The patient states that she takes half of a clonazepam pill at night to help her sleep but would like to consider stopping this medication.           VTE Risk Mitigation (From admission, onward)         Ordered     enoxaparin injection 40 mg  Daily         04/23/22 1311     heparin (porcine) injection 1,600 Units  As needed (PRN)         04/23/22 1520     IP VTE HIGH RISK PATIENT  Once         04/23/22 1311     Place sequential compression device  Until discontinued         04/23/22 1311                Disposition: [facility]    Kali Kumari MD  Bone Marrow Transplant  Marty Alejo - Oncology ([facility])

## 2022-04-29 NOTE — PLAN OF CARE
Plan of care reviewed with patient. Pt is day +4 of a Ju Auto SCT. Afebrile. Free from falls or injury. No complaints of pain. NS20K infusing at 75. Zofran ODT given as scheduled. Bed locked in lowest position, non skid socks on, call light within reach. Pt instructed to call if any assistance is needed. Vitals stable.  at bedside. Will cont to cosme pt.

## 2022-04-29 NOTE — PLAN OF CARE
C/o fatigue and nausea this shift. Nausea controlled by scheduled Zofran. Regular diet, appetite poor. PRN electrolytes given. Pt ambulated in room and alcazar. BM x 2. POC reviewed with patient; understanding verbalized. Pt. with nonskid footwear on, bed in lowest position, and locked with bed rails up x2.  Pt. instructed to call prior to getting OOB.  Pt. has call light and personal items within reach. Patient ambulates in room independently. VSS and afebrile this shift. All questions and concerns addressed at this time. Will continue to monitor.

## 2022-04-29 NOTE — SUBJECTIVE & OBJECTIVE
Subjective:     Interval History: Day +4 [id number] ASCT. NAEON. Nausea is controlled. No headaches. Had fair oral intake.     Objective:     Vital Signs (Most Recent):  Temp: 98.6 °F (37 °C) (04/29/22 1230)  Pulse: 76 (04/29/22 1230)  Resp: 18 (04/29/22 1230)  BP: (!) 153/65 (04/29/22 1230)  SpO2: 100 % (04/29/22 1230)   Vital Signs (24h Range):  Temp:  [98.5 °F (36.9 °C)-98.9 °F (37.2 °C)] 98.6 °F (37 °C)  Pulse:  [76-97] 76  Resp:  [18] 18  SpO2:  [93 %-100 %] 100 %  BP: ()/(50-65) 153/65     Weight: 66 kg (145 lb 8.1 oz)  Body mass index is 22.79 kg/m².  Body surface area is 1.77 meters squared.    ECOG SCORE           [unfilled]    Intake/Output - Last 3 Shifts         04/27 0700  04/28 0659 04/28 0700  04/29 0659 04/29 0700  04/30 0659    P.O. 480 1390 320    I.V. (mL/kg)  1469.4 (22.3) 571.2 (8.7)    Total Intake(mL/kg) 480 (7.2) 2859.4 (43.3) 891.2 (13.5)    Urine (mL/kg/hr) 2000 (1.2) 3400 (2.1) 500 (1)    Stool 0 0 0    Total Output 2000 3400 500    Net -1520 -540.6 +391.2           Urine Occurrence  1 x 1 x    Stool Occurrence 5 x 1 x 1 x            Physical Exam  Constitutional:       General: She is not in acute distress.     Appearance: Normal appearance.   HENT:      Head: Normocephalic and atraumatic.   Eyes:      Pupils: Pupils are equal, round, and reactive to light.   Cardiovascular:      Rate and Rhythm: Normal rate and regular rhythm.      Heart sounds: No murmur heard.  Pulmonary:      Effort: No respiratory distress.      Breath sounds: Normal breath sounds. No wheezing.   Abdominal:      General: Abdomen is flat. Bowel sounds are normal. There is no distension.      Palpations: Abdomen is soft. There is no mass.      Tenderness: There is no abdominal tenderness.   Musculoskeletal:         General: No swelling or deformity.   Skin:     Coloration: Skin is not jaundiced.      Comments: Soto line in R side chest. Dressing c/d/i   Neurological:      General: No focal deficit present.       Mental Status: She is alert and oriented to person, place, and time. Mental status is at baseline.       Significant Labs:   CBC:   Recent Labs   Lab 04/28/22  0356 04/29/22 0411   WBC 0.83* 0.51*   HGB 8.6* 8.6*   HCT 26.4* 26.7*   * 95*      and CMP:   Recent Labs   Lab 04/28/22  0356 04/29/22 0411    142   K 3.9 4.1    111*   CO2 25 24   GLU 82 89   BUN 9 10   CREATININE 0.8 0.8   CALCIUM 7.4* 7.3*   PROT 5.1* 5.2*   ALBUMIN 2.7* 2.9*   BILITOT 0.9 0.7   ALKPHOS 61 56   AST 34 42*   ALT 36 43   ANIONGAP 6* 7*   EGFRNONAA >60.0 >60.0         Diagnostic Results:  None

## 2022-04-29 NOTE — ASSESSMENT & PLAN NOTE
Disease status: AL  Type of transplant: Auto  Conditioning regimen: Melphalan  Karnofsky Score: 90%  Post transplant maintenance plans: Not Yet  Today is D +4  She received 8 bags of CD34 cells with total dose of 3.97x10^6.     Planned conditioning regimen:  Melphalan on Day -1     Antimicrobial Prophylaxis:  Acyclovir starting on Day -1  Levofloxacin starting on Day -1  Fluconazole starting on Day -1     Growth Factor Support:  Neupogen starting on Day +7      Caregiver:   Post-transplant discharge plans: [facility]

## 2022-04-30 LAB
ABO + RH BLD: NORMAL
ALBUMIN SERPL BCP-MCNC: 2.8 G/DL (ref 3.5–5.2)
ALP SERPL-CCNC: 56 U/L (ref 55–135)
ALT SERPL W/O P-5'-P-CCNC: 44 U/L (ref 10–44)
ANION GAP SERPL CALC-SCNC: 7 MMOL/L (ref 8–16)
ANISOCYTOSIS BLD QL SMEAR: SLIGHT
AST SERPL-CCNC: 33 U/L (ref 10–40)
BASOPHILS # BLD AUTO: 0.01 K/UL (ref 0–0.2)
BASOPHILS NFR BLD: 6.3 % (ref 0–1.9)
BILIRUB SERPL-MCNC: 0.6 MG/DL (ref 0.1–1)
BLD GP AB SCN CELLS X3 SERPL QL: NORMAL
BUN SERPL-MCNC: 7 MG/DL (ref 8–23)
C DIFF GDH STL QL: NEGATIVE
C DIFF TOX A+B STL QL IA: NEGATIVE
CALCIUM SERPL-MCNC: 7 MG/DL (ref 8.7–10.5)
CHLORIDE SERPL-SCNC: 113 MMOL/L (ref 95–110)
CO2 SERPL-SCNC: 23 MMOL/L (ref 23–29)
CREAT SERPL-MCNC: 0.7 MG/DL (ref 0.5–1.4)
DIFFERENTIAL METHOD: ABNORMAL
EOSINOPHIL # BLD AUTO: 0 K/UL (ref 0–0.5)
EOSINOPHIL NFR BLD: 6.3 % (ref 0–8)
ERYTHROCYTE [DISTWIDTH] IN BLOOD BY AUTOMATED COUNT: 13.6 % (ref 11.5–14.5)
EST. GFR  (AFRICAN AMERICAN): >60 ML/MIN/1.73 M^2
EST. GFR  (NON AFRICAN AMERICAN): >60 ML/MIN/1.73 M^2
GLUCOSE SERPL-MCNC: 90 MG/DL (ref 70–110)
HCT VFR BLD AUTO: 24.6 % (ref 37–48.5)
HGB BLD-MCNC: 8.1 G/DL (ref 12–16)
HYPOCHROMIA BLD QL SMEAR: ABNORMAL
IMM GRANULOCYTES # BLD AUTO: 0 K/UL (ref 0–0.04)
IMM GRANULOCYTES NFR BLD AUTO: 0 % (ref 0–0.5)
LYMPHOCYTES # BLD AUTO: 0.1 K/UL (ref 1–4.8)
LYMPHOCYTES NFR BLD: 37.5 % (ref 18–48)
MAGNESIUM SERPL-MCNC: 1.8 MG/DL (ref 1.6–2.6)
MCH RBC QN AUTO: 33.2 PG (ref 27–31)
MCHC RBC AUTO-ENTMCNC: 32.9 G/DL (ref 32–36)
MCV RBC AUTO: 101 FL (ref 82–98)
MONOCYTES # BLD AUTO: 0 K/UL (ref 0.3–1)
MONOCYTES NFR BLD: 18.8 % (ref 4–15)
NEUTROPHILS # BLD AUTO: 0.1 K/UL (ref 1.8–7.7)
NEUTROPHILS NFR BLD: 31.1 % (ref 38–73)
NRBC BLD-RTO: 0 /100 WBC
PHOSPHATE SERPL-MCNC: 2.5 MG/DL (ref 2.7–4.5)
PLATELET # BLD AUTO: 75 K/UL (ref 150–450)
PLATELET BLD QL SMEAR: ABNORMAL
PMV BLD AUTO: 10 FL (ref 9.2–12.9)
POTASSIUM SERPL-SCNC: 4.1 MMOL/L (ref 3.5–5.1)
PROT SERPL-MCNC: 4.8 G/DL (ref 6–8.4)
RBC # BLD AUTO: 2.44 M/UL (ref 4–5.4)
SODIUM SERPL-SCNC: 143 MMOL/L (ref 136–145)
WBC # BLD AUTO: 0.16 K/UL (ref 3.9–12.7)

## 2022-04-30 PROCEDURE — 80053 COMPREHEN METABOLIC PANEL: CPT | Performed by: NURSE PRACTITIONER

## 2022-04-30 PROCEDURE — 86850 RBC ANTIBODY SCREEN: CPT | Performed by: INTERNAL MEDICINE

## 2022-04-30 PROCEDURE — 83735 ASSAY OF MAGNESIUM: CPT | Performed by: NURSE PRACTITIONER

## 2022-04-30 PROCEDURE — 99233 SBSQ HOSP IP/OBS HIGH 50: CPT | Mod: GC,,, | Performed by: INTERNAL MEDICINE

## 2022-04-30 PROCEDURE — 63600175 PHARM REV CODE 636 W HCPCS: Performed by: NURSE PRACTITIONER

## 2022-04-30 PROCEDURE — 25000003 PHARM REV CODE 250: Performed by: NURSE PRACTITIONER

## 2022-04-30 PROCEDURE — 25000003 PHARM REV CODE 250: Performed by: INTERNAL MEDICINE

## 2022-04-30 PROCEDURE — 85025 COMPLETE CBC W/AUTO DIFF WBC: CPT | Performed by: NURSE PRACTITIONER

## 2022-04-30 PROCEDURE — 25000003 PHARM REV CODE 250: Performed by: STUDENT IN AN ORGANIZED HEALTH CARE EDUCATION/TRAINING PROGRAM

## 2022-04-30 PROCEDURE — 99233 PR SUBSEQUENT HOSPITAL CARE,LEVL III: ICD-10-PCS | Mod: GC,,, | Performed by: INTERNAL MEDICINE

## 2022-04-30 PROCEDURE — 63600175 PHARM REV CODE 636 W HCPCS: Performed by: INTERNAL MEDICINE

## 2022-04-30 PROCEDURE — 20600001 HC STEP DOWN PRIVATE ROOM

## 2022-04-30 PROCEDURE — 84100 ASSAY OF PHOSPHORUS: CPT | Performed by: NURSE PRACTITIONER

## 2022-04-30 PROCEDURE — 99900035 HC TECH TIME PER 15 MIN (STAT)

## 2022-04-30 RX ORDER — ACYCLOVIR 800 MG/1
800 TABLET ORAL 2 TIMES DAILY
Status: DISCONTINUED | OUTPATIENT
Start: 2022-04-30 | End: 2022-05-03

## 2022-04-30 RX ORDER — LOPERAMIDE HYDROCHLORIDE 2 MG/1
2 CAPSULE ORAL 4 TIMES DAILY PRN
Status: DISCONTINUED | OUTPATIENT
Start: 2022-04-30 | End: 2022-05-02

## 2022-04-30 RX ADMIN — Medication 400 MG: at 09:04

## 2022-04-30 RX ADMIN — Medication 1 DOSE: at 09:04

## 2022-04-30 RX ADMIN — ACYCLOVIR 800 MG: 200 CAPSULE ORAL at 08:04

## 2022-04-30 RX ADMIN — POTASSIUM & SODIUM PHOSPHATES POWDER PACK 280-160-250 MG 1 PACKET: 280-160-250 PACK at 03:04

## 2022-04-30 RX ADMIN — PROCHLORPERAZINE EDISYLATE 10 MG: 5 INJECTION INTRAMUSCULAR; INTRAVENOUS at 05:04

## 2022-04-30 RX ADMIN — SODIUM CHLORIDE AND POTASSIUM CHLORIDE: 9; 1.49 INJECTION, SOLUTION INTRAVENOUS at 05:04

## 2022-04-30 RX ADMIN — OLANZAPINE 5 MG: 2.5 TABLET, FILM COATED ORAL at 09:04

## 2022-04-30 RX ADMIN — CITALOPRAM HYDROBROMIDE 10 MG: 10 TABLET ORAL at 06:04

## 2022-04-30 RX ADMIN — LOPERAMIDE HYDROCHLORIDE 2 MG: 2 CAPSULE ORAL at 09:04

## 2022-04-30 RX ADMIN — ALUMINUM HYDROXIDE, MAGNESIUM HYDROXIDE, AND DIMETHICONE 10 ML: 400; 400; 40 SUSPENSION ORAL at 05:04

## 2022-04-30 RX ADMIN — LEVOFLOXACIN 500 MG: 500 TABLET, FILM COATED ORAL at 08:04

## 2022-04-30 RX ADMIN — ONDANSETRON 8 MG: 8 TABLET, ORALLY DISINTEGRATING ORAL at 06:04

## 2022-04-30 RX ADMIN — PANTOPRAZOLE SODIUM 40 MG: 40 TABLET, DELAYED RELEASE ORAL at 09:04

## 2022-04-30 RX ADMIN — ENOXAPARIN SODIUM 40 MG: 40 INJECTION SUBCUTANEOUS at 05:04

## 2022-04-30 RX ADMIN — ALUMINUM HYDROXIDE, MAGNESIUM HYDROXIDE, AND DIMETHICONE 10 ML: 400; 400; 40 SUSPENSION ORAL at 01:04

## 2022-04-30 RX ADMIN — Medication 1 DOSE: at 01:04

## 2022-04-30 RX ADMIN — ONDANSETRON 8 MG: 8 TABLET, ORALLY DISINTEGRATING ORAL at 09:04

## 2022-04-30 RX ADMIN — Medication 1 DOSE: at 05:04

## 2022-04-30 RX ADMIN — FLUTICASONE PROPIONATE 50 MCG: 50 SPRAY, METERED NASAL at 09:04

## 2022-04-30 RX ADMIN — ALUMINUM HYDROXIDE, MAGNESIUM HYDROXIDE, AND DIMETHICONE 10 ML: 400; 400; 40 SUSPENSION ORAL at 09:04

## 2022-04-30 RX ADMIN — FLUCONAZOLE 400 MG: 200 TABLET ORAL at 08:04

## 2022-04-30 RX ADMIN — POTASSIUM & SODIUM PHOSPHATES POWDER PACK 280-160-250 MG 1 PACKET: 280-160-250 PACK at 05:04

## 2022-04-30 RX ADMIN — Medication 400 MG: at 06:04

## 2022-04-30 RX ADMIN — POTASSIUM & SODIUM PHOSPHATES POWDER PACK 280-160-250 MG 1 PACKET: 280-160-250 PACK at 09:04

## 2022-04-30 RX ADMIN — POTASSIUM & SODIUM PHOSPHATES POWDER PACK 280-160-250 MG 1 PACKET: 280-160-250 PACK at 06:04

## 2022-04-30 RX ADMIN — ONDANSETRON 8 MG: 8 TABLET, ORALLY DISINTEGRATING ORAL at 03:04

## 2022-04-30 RX ADMIN — ALUMINUM HYDROXIDE, MAGNESIUM HYDROXIDE, AND DIMETHICONE 10 ML: 400; 400; 40 SUSPENSION ORAL at 08:04

## 2022-04-30 RX ADMIN — Medication 1 DOSE: at 08:04

## 2022-04-30 RX ADMIN — LOPERAMIDE HYDROCHLORIDE 2 MG: 2 CAPSULE ORAL at 12:04

## 2022-04-30 RX ADMIN — ACYCLOVIR 800 MG: 800 TABLET ORAL at 09:04

## 2022-04-30 RX ADMIN — Medication 400 MG: at 01:04

## 2022-04-30 NOTE — ASSESSMENT & PLAN NOTE
Disease status: MA  Type of transplant: Auto  Conditioning regimen: Melphalan  Karnofsky Score: 90%  Post transplant maintenance plans: Not Yet  Today is D +5  She received 8 bags of CD34 cells with total dose of 3.97x10^6.     Planned conditioning regimen:  Melphalan on Day -1     Antimicrobial Prophylaxis:  Acyclovir starting on Day -1  Levofloxacin starting on Day -1  Fluconazole starting on Day -1     Growth Factor Support:  Neupogen starting on Day +7      Caregiver:   Post-transplant discharge plans: [facility]

## 2022-04-30 NOTE — PLAN OF CARE
Day + 5 Melph HSCT. Patient is progressing and involved with plan of care, communicating needs throughout shift. Up ad bladimir. Tolerating diet, voiding without difficulty. Pt w/ 7 liquid BM's yesterday. C-Diff screen sent for analysis. Pending results. No c/o pain. IVF continued as ordered.  All vitals stable; no acute events this shift. Pt. Remaining free from falls or injury throughout shift; bed in lowest position; side rails up X2; call light within reach; pt instructed to call for assistance as needed - verbalized understanding. Q2h rounding on patient. Will continue to monitor.

## 2022-04-30 NOTE — PROGRESS NOTES
Marty Alejo - Oncology ([facility])  Hematology  Bone Marrow Transplant  Progress Note    Patient Name: Caty Diaz  Admission Date: 4/23/2022  Hospital Length of Stay: 7 days  Code Status: Full Code    Subjective:     Interval History: Day +5 [id number] ASCT.  Patient reports nausea that is improved with antiemetics.  Has had multiple episodes of diarrhea, C diff negative, will start Imodium.  Tolerating solid foods but does not like the food here, will obtain a salad from the cafeteria.    Objective:     Vital Signs (Most Recent):  Temp: 98.4 °F (36.9 °C) (04/30/22 1106)  Pulse: 79 (04/30/22 1106)  Resp: 18 (04/30/22 1106)  BP: 121/61 (04/30/22 1106)  SpO2: 97 % (04/30/22 1106) Vital Signs (24h Range):  Temp:  [97.8 °F (36.6 °C)-98.4 °F (36.9 °C)] 98.4 °F (36.9 °C)  Pulse:  [77-88] 79  Resp:  [16-18] 18  SpO2:  [95 %-98 %] 97 %  BP: (104-122)/(51-63) 121/61     Weight: 65.5 kg (144 lb 6.4 oz)  Body mass index is 22.62 kg/m².  Body surface area is 1.76 meters squared.    ECOG SCORE           2    Intake/Output - Last 3 Shifts         04/28 0700  04/29 0659 04/29 0700  04/30 0659 04/30 0700  05/01 0659    P.O. 1390 860     I.V. (mL/kg) 1469.4 (22.3) 1838.4 (28.1)     Total Intake(mL/kg) 2859.4 (43.3) 2698.4 (41.2)     Urine (mL/kg/hr) 3400 (2.1) 2050 (1.3)     Stool 0 1     Total Output 3400 2051     Net -540.6 +647.4            Urine Occurrence 1 x 1 x     Stool Occurrence 1 x 8 x             Physical Exam  Alert awake oriented x3  Regular rate and rhythm  Lungs clear to auscultation bilaterally  Abdomen soft nontender  No lower extremity edema    Significant Labs:   All pertinent labs from the last 24 hours have been reviewed.    Diagnostic Results:  I have reviewed all pertinent imaging results/findings within the past 24 hours.    Assessment/Plan:     * Status post autologous bone marrow transplant  Disease status: [place]  Type of transplant: Auto  Conditioning regimen: Melphalan  Karnofsky Score: 90%  Post transplant  maintenance plans: Not Yet  Today is D +5  She received 8 bags of CD34 cells with total dose of 3.97x10^6.     Planned conditioning regimen:  Melphalan on Day -1     Antimicrobial Prophylaxis:  Acyclovir starting on Day -1  Levofloxacin starting on Day -1  Fluconazole starting on Day -1     Growth Factor Support:  Neupogen starting on Day +7      Caregiver:   Post-transplant discharge plans: [facility]       Nausea  Scheduled her Zofran   Compazine prn  - zyprexa nightly     Gastroesophageal reflux disease without esophagitis  - continue Protonix nightly    Thrombocytopenia  - due to chemo and stem cell collection  - daily CBC  - transfuse for platelets count <10 or <50 pre-procedure or bleeding  - hold Lovenox when platelets <50k    Anemia associated with chemotherapy  - daily CBC  - transfuse for hgb <7    Multiple myeloma  - R-ISS II IgG lambda multiple myeloma, She had increased 1q21 copy number and has t(4,14), both high risk markers in multiple myeloma. She also has monosomy 13 on FISH. She did not have baseline PET CT.   - She has received 6 cycles of RVd, followed by 2 cycles of velcade-dexamethasone. She has tolerated the treatments well.    - PET CT on 3/31/22 did not demonstrate any hypermetabolic lytic or soft tissue lesions. BM biopsy on 3/21/22 showed 10% plasma cells ( versus 50% at diagnosis).  - Admitting for transplant as above.    Anxiety  - The patient states that she takes half of a clonazepam pill at night to help her sleep but would like to consider stopping this medication.           VTE Risk Mitigation (From admission, onward)         Ordered     enoxaparin injection 40 mg  Daily         04/23/22 1311     heparin (porcine) injection 1,600 Units  As needed (PRN)         04/23/22 1520     IP VTE HIGH RISK PATIENT  Once         04/23/22 1311     Place sequential compression device  Until discontinued         04/23/22 1311                Disposition: BMT    Hortencia Odonnell MD  Bone  Marrow Transplant  Marty Alejo - Oncology ([facility])

## 2022-04-30 NOTE — PLAN OF CARE
Pt is Day +5 for melph auto sct. No acute events. C diff neg. Imodium started prn. Scheduled zofran and prn compazine given for nausea. Pt remains afebrile and VSS. WCTM.

## 2022-04-30 NOTE — SUBJECTIVE & OBJECTIVE
Subjective:     Interval History: Day +5 [id number] ASCT.  Patient reports nausea that is improved with antiemetics.  Has had multiple episodes of diarrhea, C diff negative, will start Imodium.  Tolerating solid foods but does not like the food here, will obtain a salad from the cafeteria.    Objective:     Vital Signs (Most Recent):  Temp: 98.4 °F (36.9 °C) (04/30/22 1106)  Pulse: 79 (04/30/22 1106)  Resp: 18 (04/30/22 1106)  BP: 121/61 (04/30/22 1106)  SpO2: 97 % (04/30/22 1106) Vital Signs (24h Range):  Temp:  [97.8 °F (36.6 °C)-98.4 °F (36.9 °C)] 98.4 °F (36.9 °C)  Pulse:  [77-88] 79  Resp:  [16-18] 18  SpO2:  [95 %-98 %] 97 %  BP: (104-122)/(51-63) 121/61     Weight: 65.5 kg (144 lb 6.4 oz)  Body mass index is 22.62 kg/m².  Body surface area is 1.76 meters squared.    ECOG SCORE           2    Intake/Output - Last 3 Shifts         04/28 0700  04/29 0659 04/29 0700  04/30 0659 04/30 0700  05/01 0659    P.O. 1390 860     I.V. (mL/kg) 1469.4 (22.3) 1838.4 (28.1)     Total Intake(mL/kg) 2859.4 (43.3) 2698.4 (41.2)     Urine (mL/kg/hr) 3400 (2.1) 2050 (1.3)     Stool 0 1     Total Output 3400 2051     Net -540.6 +647.4            Urine Occurrence 1 x 1 x     Stool Occurrence 1 x 8 x             Physical Exam  Alert awake oriented x3  Regular rate and rhythm  Lungs clear to auscultation bilaterally  Abdomen soft nontender  No lower extremity edema    Significant Labs:   All pertinent labs from the last 24 hours have been reviewed.    Diagnostic Results:  I have reviewed all pertinent imaging results/findings within the past 24 hours.

## 2022-05-01 LAB
ALBUMIN SERPL BCP-MCNC: 2.8 G/DL (ref 3.5–5.2)
ALP SERPL-CCNC: 54 U/L (ref 55–135)
ALT SERPL W/O P-5'-P-CCNC: 40 U/L (ref 10–44)
ANION GAP SERPL CALC-SCNC: 7 MMOL/L (ref 8–16)
ANISOCYTOSIS BLD QL SMEAR: SLIGHT
AST SERPL-CCNC: 30 U/L (ref 10–40)
BASOPHILS # BLD AUTO: 0 K/UL (ref 0–0.2)
BASOPHILS NFR BLD: 0 % (ref 0–1.9)
BILIRUB SERPL-MCNC: 0.5 MG/DL (ref 0.1–1)
BUN SERPL-MCNC: 6 MG/DL (ref 8–23)
CALCIUM SERPL-MCNC: 6.6 MG/DL (ref 8.7–10.5)
CHLORIDE SERPL-SCNC: 111 MMOL/L (ref 95–110)
CO2 SERPL-SCNC: 22 MMOL/L (ref 23–29)
CREAT SERPL-MCNC: 0.7 MG/DL (ref 0.5–1.4)
DIFFERENTIAL METHOD: ABNORMAL
EOSINOPHIL # BLD AUTO: 0 K/UL (ref 0–0.5)
EOSINOPHIL NFR BLD: 8.3 % (ref 0–8)
ERYTHROCYTE [DISTWIDTH] IN BLOOD BY AUTOMATED COUNT: 13.3 % (ref 11.5–14.5)
EST. GFR  (AFRICAN AMERICAN): >60 ML/MIN/1.73 M^2
EST. GFR  (NON AFRICAN AMERICAN): >60 ML/MIN/1.73 M^2
GLUCOSE SERPL-MCNC: 85 MG/DL (ref 70–110)
HCT VFR BLD AUTO: 25.3 % (ref 37–48.5)
HGB BLD-MCNC: 8.2 G/DL (ref 12–16)
HYPOCHROMIA BLD QL SMEAR: ABNORMAL
IMM GRANULOCYTES # BLD AUTO: 0 K/UL (ref 0–0.04)
IMM GRANULOCYTES NFR BLD AUTO: 0 % (ref 0–0.5)
LYMPHOCYTES # BLD AUTO: 0.1 K/UL (ref 1–4.8)
LYMPHOCYTES NFR BLD: 50 % (ref 18–48)
MAGNESIUM SERPL-MCNC: 1.8 MG/DL (ref 1.6–2.6)
MCH RBC QN AUTO: 32.5 PG (ref 27–31)
MCHC RBC AUTO-ENTMCNC: 32.4 G/DL (ref 32–36)
MCV RBC AUTO: 100 FL (ref 82–98)
MONOCYTES # BLD AUTO: 0 K/UL (ref 0.3–1)
MONOCYTES NFR BLD: 25 % (ref 4–15)
NEUTROPHILS # BLD AUTO: 0 K/UL (ref 1.8–7.7)
NEUTROPHILS NFR BLD: 16.7 % (ref 38–73)
NRBC BLD-RTO: 0 /100 WBC
PHOSPHATE SERPL-MCNC: 2.6 MG/DL (ref 2.7–4.5)
PLATELET # BLD AUTO: 53 K/UL (ref 150–450)
PLATELET BLD QL SMEAR: ABNORMAL
PMV BLD AUTO: 9.3 FL (ref 9.2–12.9)
POTASSIUM SERPL-SCNC: 4.1 MMOL/L (ref 3.5–5.1)
PROT SERPL-MCNC: 4.9 G/DL (ref 6–8.4)
RBC # BLD AUTO: 2.52 M/UL (ref 4–5.4)
SODIUM SERPL-SCNC: 140 MMOL/L (ref 136–145)
WBC # BLD AUTO: 0.12 K/UL (ref 3.9–12.7)

## 2022-05-01 PROCEDURE — 84100 ASSAY OF PHOSPHORUS: CPT | Performed by: NURSE PRACTITIONER

## 2022-05-01 PROCEDURE — 25000003 PHARM REV CODE 250: Performed by: INTERNAL MEDICINE

## 2022-05-01 PROCEDURE — 80053 COMPREHEN METABOLIC PANEL: CPT | Performed by: NURSE PRACTITIONER

## 2022-05-01 PROCEDURE — 83735 ASSAY OF MAGNESIUM: CPT | Performed by: NURSE PRACTITIONER

## 2022-05-01 PROCEDURE — 99233 SBSQ HOSP IP/OBS HIGH 50: CPT | Mod: GC,,, | Performed by: INTERNAL MEDICINE

## 2022-05-01 PROCEDURE — 99233 PR SUBSEQUENT HOSPITAL CARE,LEVL III: ICD-10-PCS | Mod: GC,,, | Performed by: INTERNAL MEDICINE

## 2022-05-01 PROCEDURE — 25000003 PHARM REV CODE 250: Performed by: NURSE PRACTITIONER

## 2022-05-01 PROCEDURE — 20600001 HC STEP DOWN PRIVATE ROOM

## 2022-05-01 PROCEDURE — 25000003 PHARM REV CODE 250: Performed by: STUDENT IN AN ORGANIZED HEALTH CARE EDUCATION/TRAINING PROGRAM

## 2022-05-01 PROCEDURE — 63600175 PHARM REV CODE 636 W HCPCS: Performed by: INTERNAL MEDICINE

## 2022-05-01 PROCEDURE — 85025 COMPLETE CBC W/AUTO DIFF WBC: CPT | Performed by: NURSE PRACTITIONER

## 2022-05-01 RX ORDER — CALCIUM GLUCONATE 20 MG/ML
1 INJECTION, SOLUTION INTRAVENOUS
Status: COMPLETED | OUTPATIENT
Start: 2022-05-01 | End: 2022-05-01

## 2022-05-01 RX ADMIN — ALUMINUM HYDROXIDE, MAGNESIUM HYDROXIDE, AND DIMETHICONE 10 ML: 400; 400; 40 SUSPENSION ORAL at 12:05

## 2022-05-01 RX ADMIN — ACYCLOVIR 800 MG: 800 TABLET ORAL at 09:05

## 2022-05-01 RX ADMIN — Medication 400 MG: at 09:05

## 2022-05-01 RX ADMIN — POTASSIUM & SODIUM PHOSPHATES POWDER PACK 280-160-250 MG 1 PACKET: 280-160-250 PACK at 05:05

## 2022-05-01 RX ADMIN — CALCIUM GLUCONATE 1 G: 20 INJECTION, SOLUTION INTRAVENOUS at 04:05

## 2022-05-01 RX ADMIN — FLUCONAZOLE 400 MG: 200 TABLET ORAL at 09:05

## 2022-05-01 RX ADMIN — POTASSIUM & SODIUM PHOSPHATES POWDER PACK 280-160-250 MG 1 PACKET: 280-160-250 PACK at 01:05

## 2022-05-01 RX ADMIN — ONDANSETRON 8 MG: 8 TABLET, ORALLY DISINTEGRATING ORAL at 01:05

## 2022-05-01 RX ADMIN — LOPERAMIDE HYDROCHLORIDE 2 MG: 2 CAPSULE ORAL at 12:05

## 2022-05-01 RX ADMIN — ONDANSETRON 8 MG: 8 TABLET, ORALLY DISINTEGRATING ORAL at 09:05

## 2022-05-01 RX ADMIN — SODIUM CHLORIDE AND POTASSIUM CHLORIDE 150 ML/HR: 9; 1.49 INJECTION, SOLUTION INTRAVENOUS at 08:05

## 2022-05-01 RX ADMIN — Medication 1 DOSE: at 09:05

## 2022-05-01 RX ADMIN — PROCHLORPERAZINE EDISYLATE 10 MG: 5 INJECTION INTRAMUSCULAR; INTRAVENOUS at 09:05

## 2022-05-01 RX ADMIN — CALCIUM GLUCONATE 1 G: 20 INJECTION, SOLUTION INTRAVENOUS at 03:05

## 2022-05-01 RX ADMIN — ONDANSETRON 8 MG: 8 TABLET, ORALLY DISINTEGRATING ORAL at 05:05

## 2022-05-01 RX ADMIN — LEVOFLOXACIN 500 MG: 500 TABLET, FILM COATED ORAL at 09:05

## 2022-05-01 RX ADMIN — FLUTICASONE PROPIONATE 50 MCG: 50 SPRAY, METERED NASAL at 09:05

## 2022-05-01 RX ADMIN — OLANZAPINE 5 MG: 2.5 TABLET, FILM COATED ORAL at 09:05

## 2022-05-01 RX ADMIN — SODIUM CHLORIDE AND POTASSIUM CHLORIDE 75 ML/HR: 9; 1.49 INJECTION, SOLUTION INTRAVENOUS at 05:05

## 2022-05-01 RX ADMIN — CITALOPRAM HYDROBROMIDE 10 MG: 10 TABLET ORAL at 07:05

## 2022-05-01 RX ADMIN — ALUMINUM HYDROXIDE, MAGNESIUM HYDROXIDE, AND DIMETHICONE 10 ML: 400; 400; 40 SUSPENSION ORAL at 09:05

## 2022-05-01 RX ADMIN — Medication 1 DOSE: at 12:05

## 2022-05-01 RX ADMIN — Medication 400 MG: at 05:05

## 2022-05-01 RX ADMIN — ALUMINUM HYDROXIDE, MAGNESIUM HYDROXIDE, AND DIMETHICONE 10 ML: 400; 400; 40 SUSPENSION ORAL at 04:05

## 2022-05-01 RX ADMIN — PROCHLORPERAZINE EDISYLATE 10 MG: 5 INJECTION INTRAMUSCULAR; INTRAVENOUS at 04:05

## 2022-05-01 RX ADMIN — Medication 1 DOSE: at 04:05

## 2022-05-01 RX ADMIN — PANTOPRAZOLE SODIUM 40 MG: 40 TABLET, DELAYED RELEASE ORAL at 09:05

## 2022-05-01 RX ADMIN — HEPARIN SODIUM 1600 UNITS: 1000 INJECTION, SOLUTION INTRAVENOUS; SUBCUTANEOUS at 04:05

## 2022-05-01 RX ADMIN — POTASSIUM & SODIUM PHOSPHATES POWDER PACK 280-160-250 MG 1 PACKET: 280-160-250 PACK at 09:05

## 2022-05-01 NOTE — PLAN OF CARE
No acute events or changes overnight. Patient resting comfortably and in no distress at this time.

## 2022-05-01 NOTE — PLAN OF CARE
Problem: Adult Inpatient Plan of Care  Goal: Plan of Care Review  Outcome: Ongoing, Progressing  POC reviewed with patient and spouse. Today is day + 6 of emi auto SCT. She is feeling fatigue and some nausea this shift. Nausea controlled scheduled zofran and prn compazine. Na phos, k and calcium gluconate replaced this shift. Bed is in low locked position , call light is in place. Frequent checks for care and safety performed .

## 2022-05-01 NOTE — PROGRESS NOTES
Marty Alejo - Oncology ([facility])  Hematology  Bone Marrow Transplant  Progress Note    Patient Name: Caty Diaz  Admission Date: 4/23/2022  Hospital Length of Stay: 8 days  Code Status: Full Code    Subjective:     Interval History: Day +6 [id number] ASCT.  Patient reports nausea improved.  No further episodes of diarrhea since starting Imodium.  Tolerating solid foods but does not like the food here, will obtain a salad from the cafeteria.    Objective:     Vital Signs (Most Recent):  Temp: 98.9 °F (37.2 °C) (05/01/22 1137)  Pulse: 82 (05/01/22 1137)  Resp: 18 (05/01/22 1137)  BP: (!) 121/58 (05/01/22 1137)  SpO2: 95 % (05/01/22 1137)   Vital Signs (24h Range):  Temp:  [97.6 °F (36.4 °C)-98.9 °F (37.2 °C)] 98.9 °F (37.2 °C)  Pulse:  [] 82  Resp:  [18] 18  SpO2:  [92 %-99 %] 95 %  BP: (103-125)/(52-70) 121/58     Weight: 65.6 kg (144 lb 10 oz)  Body mass index is 22.65 kg/m².  Body surface area is 1.76 meters squared.    ECOG SCORE           2    Intake/Output - Last 3 Shifts         04/29 0700  04/30 0659 04/30 0700  05/01 0659 05/01 0700  05/02 0659    P.O. 860      I.V. (mL/kg) 1838.4 (28.1) 810.8 (12.4)     Total Intake(mL/kg) 2698.4 (41.2) 810.8 (12.4)     Urine (mL/kg/hr) 2050 (1.3) 1500 (1) 400 (1.1)    Stool 1      Total Output 2051 1500 400    Net +647.4 -689.2 -400           Urine Occurrence 1 x 6 x     Stool Occurrence 8 x 6 x             Physical Exam  Alert awake oriented x3  Regular rate and rhythm  Lungs clear to auscultation bilaterally  Abdomen soft nontender  No lower extremity edema      Significant Labs:   All pertinent labs from the last 24 hours have been reviewed.    Diagnostic Results:  I have reviewed all pertinent imaging results/findings within the past 24 hours.    Assessment/Plan:     * Status post autologous bone marrow transplant  Disease status: [place]  Type of transplant: Auto  Conditioning regimen: Melphalan  Karnofsky Score: 90%  Post transplant maintenance plans: Not Yet  Today  is D +6  She received 8 bags of CD34 cells with total dose of 3.97x10^6.     Planned conditioning regimen:  Melphalan on Day -1     Antimicrobial Prophylaxis:  Acyclovir starting on Day -1  Levofloxacin starting on Day -1  Fluconazole starting on Day -1     Growth Factor Support:  Neupogen starting on Day +7      Caregiver:   Post-transplant discharge plans: [facility]       Nausea  Scheduled her Zofran   Compazine prn  - zyprexa nightly     Gastroesophageal reflux disease without esophagitis  - continue Protonix nightly    Thrombocytopenia  - due to chemo and stem cell collection  - daily CBC  - transfuse for platelets count <10 or <50 pre-procedure or bleeding  - hold Lovenox when platelets <50k    Anemia associated with chemotherapy  - daily CBC  - transfuse for hgb <7    Multiple myeloma  - R-ISS II IgG lambda multiple myeloma, She had increased 1q21 copy number and has t(4,14), both high risk markers in multiple myeloma. She also has monosomy 13 on FISH. She did not have baseline PET CT.   - She has received 6 cycles of RVd, followed by 2 cycles of velcade-dexamethasone. She has tolerated the treatments well.    - PET CT on 3/31/22 did not demonstrate any hypermetabolic lytic or soft tissue lesions. BM biopsy on 3/21/22 showed 10% plasma cells ( versus 50% at diagnosis).  - Admitting for transplant as above.    Anxiety  - The patient states that she takes half of a clonazepam pill at night to help her sleep but would like to consider stopping this medication.           VTE Risk Mitigation (From admission, onward)         Ordered     enoxaparin injection 40 mg  Daily         04/23/22 1311     heparin (porcine) injection 1,600 Units  As needed (PRN)         04/23/22 1520     IP VTE HIGH RISK PATIENT  Once         04/23/22 1311     Place sequential compression device  Until discontinued         04/23/22 1311                Disposition: BMT    Hortencia Odonnell MD  Bone Marrow Transplant  Marty Alejo -  Oncology (Hospital)

## 2022-05-01 NOTE — ASSESSMENT & PLAN NOTE
Disease status: ID  Type of transplant: Auto  Conditioning regimen: Melphalan  Karnofsky Score: 90%  Post transplant maintenance plans: Not Yet  Today is D +6  She received 8 bags of CD34 cells with total dose of 3.97x10^6.     Planned conditioning regimen:  Melphalan on Day -1     Antimicrobial Prophylaxis:  Acyclovir starting on Day -1  Levofloxacin starting on Day -1  Fluconazole starting on Day -1     Growth Factor Support:  Neupogen starting on Day +7      Caregiver:   Post-transplant discharge plans: [facility]

## 2022-05-01 NOTE — SUBJECTIVE & OBJECTIVE
Subjective:     Interval History: Day +6 [id number] ASCT.  Patient reports nausea improved.  No further episodes of diarrhea since starting Imodium.  Tolerating solid foods but does not like the food here, will obtain a salad from the cafeteria.    Objective:     Vital Signs (Most Recent):  Temp: 98.9 °F (37.2 °C) (05/01/22 1137)  Pulse: 82 (05/01/22 1137)  Resp: 18 (05/01/22 1137)  BP: (!) 121/58 (05/01/22 1137)  SpO2: 95 % (05/01/22 1137)   Vital Signs (24h Range):  Temp:  [97.6 °F (36.4 °C)-98.9 °F (37.2 °C)] 98.9 °F (37.2 °C)  Pulse:  [] 82  Resp:  [18] 18  SpO2:  [92 %-99 %] 95 %  BP: (103-125)/(52-70) 121/58     Weight: 65.6 kg (144 lb 10 oz)  Body mass index is 22.65 kg/m².  Body surface area is 1.76 meters squared.    ECOG SCORE           2    Intake/Output - Last 3 Shifts         04/29 0700  04/30 0659 04/30 0700  05/01 0659 05/01 0700  05/02 0659    P.O. 860      I.V. (mL/kg) 1838.4 (28.1) 810.8 (12.4)     Total Intake(mL/kg) 2698.4 (41.2) 810.8 (12.4)     Urine (mL/kg/hr) 2050 (1.3) 1500 (1) 400 (1.1)    Stool 1      Total Output 2051 1500 400    Net +647.4 -689.2 -400           Urine Occurrence 1 x 6 x     Stool Occurrence 8 x 6 x             Physical Exam  Alert awake oriented x3  Regular rate and rhythm  Lungs clear to auscultation bilaterally  Abdomen soft nontender  No lower extremity edema      Significant Labs:   All pertinent labs from the last 24 hours have been reviewed.    Diagnostic Results:  I have reviewed all pertinent imaging results/findings within the past 24 hours.

## 2022-05-02 PROBLEM — E83.51 HYPOCALCEMIA: Status: ACTIVE | Noted: 2022-05-02

## 2022-05-02 LAB
ALBUMIN SERPL BCP-MCNC: 2.8 G/DL (ref 3.5–5.2)
ALP SERPL-CCNC: 55 U/L (ref 55–135)
ALT SERPL W/O P-5'-P-CCNC: 40 U/L (ref 10–44)
ANION GAP SERPL CALC-SCNC: 4 MMOL/L (ref 8–16)
ANISOCYTOSIS BLD QL SMEAR: SLIGHT
AST SERPL-CCNC: 27 U/L (ref 10–40)
BASOPHILS # BLD AUTO: 0 K/UL (ref 0–0.2)
BASOPHILS NFR BLD: 0 % (ref 0–1.9)
BILIRUB SERPL-MCNC: 0.5 MG/DL (ref 0.1–1)
BUN SERPL-MCNC: 5 MG/DL (ref 8–23)
CALCIUM SERPL-MCNC: 7 MG/DL (ref 8.7–10.5)
CHLORIDE SERPL-SCNC: 110 MMOL/L (ref 95–110)
CO2 SERPL-SCNC: 25 MMOL/L (ref 23–29)
CREAT SERPL-MCNC: 0.7 MG/DL (ref 0.5–1.4)
DIFFERENTIAL METHOD: ABNORMAL
EOSINOPHIL # BLD AUTO: 0 K/UL (ref 0–0.5)
EOSINOPHIL NFR BLD: 0 % (ref 0–8)
ERYTHROCYTE [DISTWIDTH] IN BLOOD BY AUTOMATED COUNT: 13.2 % (ref 11.5–14.5)
EST. GFR  (AFRICAN AMERICAN): >60 ML/MIN/1.73 M^2
EST. GFR  (NON AFRICAN AMERICAN): >60 ML/MIN/1.73 M^2
GLUCOSE SERPL-MCNC: 80 MG/DL (ref 70–110)
HCT VFR BLD AUTO: 24.9 % (ref 37–48.5)
HGB BLD-MCNC: 8.1 G/DL (ref 12–16)
HYPOCHROMIA BLD QL SMEAR: ABNORMAL
IMM GRANULOCYTES # BLD AUTO: 0 K/UL (ref 0–0.04)
IMM GRANULOCYTES NFR BLD AUTO: 0 % (ref 0–0.5)
LYMPHOCYTES # BLD AUTO: 0.1 K/UL (ref 1–4.8)
LYMPHOCYTES NFR BLD: 71.4 % (ref 18–48)
MAGNESIUM SERPL-MCNC: 1.7 MG/DL (ref 1.6–2.6)
MCH RBC QN AUTO: 32.5 PG (ref 27–31)
MCHC RBC AUTO-ENTMCNC: 32.5 G/DL (ref 32–36)
MCV RBC AUTO: 100 FL (ref 82–98)
MONOCYTES # BLD AUTO: 0 K/UL (ref 0.3–1)
MONOCYTES NFR BLD: 28.6 % (ref 4–15)
NEUTROPHILS # BLD AUTO: 0 K/UL (ref 1.8–7.7)
NEUTROPHILS NFR BLD: 0 % (ref 38–73)
NRBC BLD-RTO: 0 /100 WBC
OVALOCYTES BLD QL SMEAR: ABNORMAL
PHOSPHATE SERPL-MCNC: 2.7 MG/DL (ref 2.7–4.5)
PLATELET # BLD AUTO: 40 K/UL (ref 150–450)
PMV BLD AUTO: 9.9 FL (ref 9.2–12.9)
POIKILOCYTOSIS BLD QL SMEAR: SLIGHT
POLYCHROMASIA BLD QL SMEAR: ABNORMAL
POTASSIUM SERPL-SCNC: 3.9 MMOL/L (ref 3.5–5.1)
PROT SERPL-MCNC: 4.7 G/DL (ref 6–8.4)
RBC # BLD AUTO: 2.49 M/UL (ref 4–5.4)
SODIUM SERPL-SCNC: 139 MMOL/L (ref 136–145)
SPHEROCYTES BLD QL SMEAR: ABNORMAL
WBC # BLD AUTO: 0.14 K/UL (ref 3.9–12.7)

## 2022-05-02 PROCEDURE — 63600175 PHARM REV CODE 636 W HCPCS: Performed by: INTERNAL MEDICINE

## 2022-05-02 PROCEDURE — 25000003 PHARM REV CODE 250: Performed by: NURSE PRACTITIONER

## 2022-05-02 PROCEDURE — 99233 PR SUBSEQUENT HOSPITAL CARE,LEVL III: ICD-10-PCS | Mod: GC,,, | Performed by: INTERNAL MEDICINE

## 2022-05-02 PROCEDURE — 25000003 PHARM REV CODE 250: Performed by: STUDENT IN AN ORGANIZED HEALTH CARE EDUCATION/TRAINING PROGRAM

## 2022-05-02 PROCEDURE — 20600001 HC STEP DOWN PRIVATE ROOM

## 2022-05-02 PROCEDURE — 83735 ASSAY OF MAGNESIUM: CPT | Performed by: NURSE PRACTITIONER

## 2022-05-02 PROCEDURE — 25000003 PHARM REV CODE 250: Performed by: INTERNAL MEDICINE

## 2022-05-02 PROCEDURE — 80053 COMPREHEN METABOLIC PANEL: CPT | Performed by: NURSE PRACTITIONER

## 2022-05-02 PROCEDURE — 99233 SBSQ HOSP IP/OBS HIGH 50: CPT | Mod: GC,,, | Performed by: INTERNAL MEDICINE

## 2022-05-02 PROCEDURE — 84100 ASSAY OF PHOSPHORUS: CPT | Performed by: NURSE PRACTITIONER

## 2022-05-02 PROCEDURE — 85025 COMPLETE CBC W/AUTO DIFF WBC: CPT | Performed by: NURSE PRACTITIONER

## 2022-05-02 PROCEDURE — 94761 N-INVAS EAR/PLS OXIMETRY MLT: CPT

## 2022-05-02 RX ORDER — LOPERAMIDE HYDROCHLORIDE 2 MG/1
2 CAPSULE ORAL 4 TIMES DAILY
Status: DISCONTINUED | OUTPATIENT
Start: 2022-05-02 | End: 2022-05-06

## 2022-05-02 RX ORDER — CALCIUM CARBONATE 1250 MG/5ML
500 SUSPENSION ORAL 2 TIMES DAILY WITH MEALS
Status: DISCONTINUED | OUTPATIENT
Start: 2022-05-02 | End: 2022-05-03

## 2022-05-02 RX ADMIN — ACYCLOVIR 800 MG: 800 TABLET ORAL at 10:05

## 2022-05-02 RX ADMIN — ALUMINUM HYDROXIDE, MAGNESIUM HYDROXIDE, AND DIMETHICONE 10 ML: 400; 400; 40 SUSPENSION ORAL at 01:05

## 2022-05-02 RX ADMIN — CITALOPRAM HYDROBROMIDE 10 MG: 10 TABLET ORAL at 06:05

## 2022-05-02 RX ADMIN — Medication 1 DOSE: at 01:05

## 2022-05-02 RX ADMIN — ONDANSETRON 8 MG: 8 TABLET, ORALLY DISINTEGRATING ORAL at 02:05

## 2022-05-02 RX ADMIN — Medication 1 DOSE: at 08:05

## 2022-05-02 RX ADMIN — PROCHLORPERAZINE EDISYLATE 10 MG: 5 INJECTION INTRAMUSCULAR; INTRAVENOUS at 10:05

## 2022-05-02 RX ADMIN — Medication 1 DOSE: at 10:05

## 2022-05-02 RX ADMIN — LOPERAMIDE HYDROCHLORIDE 2 MG: 2 CAPSULE ORAL at 01:05

## 2022-05-02 RX ADMIN — ALUMINUM HYDROXIDE, MAGNESIUM HYDROXIDE, AND DIMETHICONE 10 ML: 400; 400; 40 SUSPENSION ORAL at 10:05

## 2022-05-02 RX ADMIN — Medication 400 MG: at 05:05

## 2022-05-02 RX ADMIN — ONDANSETRON 8 MG: 8 TABLET, ORALLY DISINTEGRATING ORAL at 05:05

## 2022-05-02 RX ADMIN — OLANZAPINE 5 MG: 2.5 TABLET, FILM COATED ORAL at 08:05

## 2022-05-02 RX ADMIN — LOPERAMIDE HYDROCHLORIDE 2 MG: 2 CAPSULE ORAL at 04:05

## 2022-05-02 RX ADMIN — PROCHLORPERAZINE EDISYLATE 10 MG: 5 INJECTION INTRAMUSCULAR; INTRAVENOUS at 04:05

## 2022-05-02 RX ADMIN — LEVOFLOXACIN 500 MG: 500 TABLET, FILM COATED ORAL at 10:05

## 2022-05-02 RX ADMIN — ALUMINUM HYDROXIDE, MAGNESIUM HYDROXIDE, AND DIMETHICONE 10 ML: 400; 400; 40 SUSPENSION ORAL at 05:05

## 2022-05-02 RX ADMIN — FLUTICASONE PROPIONATE 50 MCG: 50 SPRAY, METERED NASAL at 08:05

## 2022-05-02 RX ADMIN — Medication 1 DOSE: at 04:05

## 2022-05-02 RX ADMIN — ACYCLOVIR 800 MG: 800 TABLET ORAL at 08:05

## 2022-05-02 RX ADMIN — CALCIUM CARBONATE 500 MG: 1250 SUSPENSION ORAL at 04:05

## 2022-05-02 RX ADMIN — LOPERAMIDE HYDROCHLORIDE 2 MG: 2 CAPSULE ORAL at 08:05

## 2022-05-02 RX ADMIN — ONDANSETRON 8 MG: 8 TABLET, ORALLY DISINTEGRATING ORAL at 09:05

## 2022-05-02 RX ADMIN — FLUCONAZOLE 400 MG: 200 TABLET ORAL at 10:05

## 2022-05-02 RX ADMIN — PANTOPRAZOLE SODIUM 40 MG: 40 TABLET, DELAYED RELEASE ORAL at 08:05

## 2022-05-02 RX ADMIN — FILGRASTIM 480 MCG: 480 INJECTION, SOLUTION INTRAVENOUS; SUBCUTANEOUS at 10:05

## 2022-05-02 RX ADMIN — ALUMINUM HYDROXIDE, MAGNESIUM HYDROXIDE, AND DIMETHICONE 10 ML: 400; 400; 40 SUSPENSION ORAL at 08:05

## 2022-05-02 RX ADMIN — LOPERAMIDE HYDROCHLORIDE 2 MG: 2 CAPSULE ORAL at 05:05

## 2022-05-02 RX ADMIN — CALCIUM CARBONATE 500 MG: 1250 SUSPENSION ORAL at 10:05

## 2022-05-02 RX ADMIN — SODIUM CHLORIDE AND POTASSIUM CHLORIDE: 9; 1.49 INJECTION, SOLUTION INTRAVENOUS at 08:05

## 2022-05-02 RX ADMIN — Medication 400 MG: at 10:05

## 2022-05-02 RX ADMIN — SODIUM CHLORIDE AND POTASSIUM CHLORIDE: 9; 1.49 INJECTION, SOLUTION INTRAVENOUS at 03:05

## 2022-05-02 NOTE — PLAN OF CARE
Pt is Day +7 for melph auto sct. Pt endorsing sore throat. Mouth rinses continued. Scheduled zofran and prn compazine given for nausea. Started on scheduled imodium for diarrhea, Pt having 6 loose stools this shift. Pt remains afebrile and VSS. WCTM.

## 2022-05-02 NOTE — PROGRESS NOTES
"Marty Alejo - Oncology (Hospital)  Adult Nutrition  Progress Note    SUMMARY   Recommendations  Continue regular diet, encourage PO RD following  Goals: Meet % EEN, EPN by RD f/u date  Nutrition Goal Status: goal not met  Communication of RD Recs: reviewed with RN    Assessment and Plan   Increased nutrient needs(Protein)_     Related to (etiology):   Physiological demands of treatment     Signs and Symptoms (as evidenced by):   Upcoming auto SCT     Interventions(treatment strategy):  Collaboration of nutrition care w/ other providers  General diet  Nutrition education- post transplant precautions 4/24     Nutrition Diagnosis Status: Ongoing    Malnutrition Assessment mild age related muscle changes                                   Reason for Assessment  Reason For Assessment: RD follow-up  Diagnosis: other (see comments) (Stem cell tx candidate)  Interdisciplinary Rounds: did not attend    General Information Comments: Patient reports that this is her worst day and she has been expecting it. She is glad that she gained 15 pounds before treatment started. she has lost 6 pounds per her report. She did not try the [employer] oral nutrition supplement because she is also sensitive to Lecithin and she saw this listed on the ingredient label. due to her allergy then we are unable to offer her a supplement.Spouse has been great about bringing her any food she may tolerate  Nutrition Discharge Planning: dc on regular diet , lactose free    Nutrition/Diet History  She makes her own rice milk at home  Patient Reported Diet/Restrictions/Preferences: general  Spiritual, Cultural Beliefs, Congregational Practices, Values that Affect Care: no  Factors Affecting Nutritional Intake: None identified at this time    Anthropometrics    Temp: 98.1 °F (36.7 °C)  Height Method: Measured  Height: 5' 7" (170.2 cm)  Height (inches): 67 in  Weight Method: Standard Scale  Weight: 65.2 kg (143 lb 13.6 oz)  Weight (lb): 143.85 lb  Ideal Body " Weight (IBW), Female: 135 lb  % Ideal Body Weight, Female (lb): 111.21 %  BMI (Calculated): 22.5  BMI Grade: 18.5-24.9 - normal       Lab/Procedures/Meds    Pertinent Labs Reviewed: reviewed  Pertinent Labs Comments: Hg 8.1, Hct 24.9, BUN 5, Ca 7.0  Pertinent Medications Reviewed: reviewed  Pertinent Medications Comments: Ca carbonate, ondansetron, Dukes solution, olanzapine           Estimated/Assessed Needs    Weight Used For Calorie Calculations: 68.1 kg (150 lb 2.1 oz)  Energy Calorie Requirements (kcal): 2043 kcal/d  Energy Need Method: Kcal/kg (30 kcal/kg)  Protein Requirements: 89 g/d (1.3 g/kg)  Weight Used For Protein Calculations: 68.1 kg (150 lb 2.1 oz)     Estimated Fluid Requirement Method: other (see comments) (Per MD or 1 mL/kcal)  RDA Method (mL): 2043         Nutrition Prescription Ordered    Current Diet Order: Regular , lactose free  Nutrition Order Comments: PO 0-25%  Oral Nutrition Supplement: -    Evaluation of Received Nutrient/Fluid Intake    I/O: -3.4 L to date  Comments: LBM 5/2  Tolerance: not tolerating  % Intake of Estimated Energy Needs: 0 - 25 %  % Meal Intake: 0 - 25 %    Nutrition Risk    Level of Risk/Frequency of Follow-up: low (one time per week)     Monitor and Evaluation    Food and Nutrient Intake: energy intake, food and beverage intake  Food and Nutrient Adminstration: diet order  Physical Activity and Function: nutrition-related ADLs and IADLs  Anthropometric Measurements: weight, weight change  Biochemical Data, Medical Tests and Procedures: inflammatory profile, lipid profile, glucose/endocrine profile, gastrointestinal profile, electrolyte and renal panel  Nutrition-Focused Physical Findings: overall appearance     Nutrition Follow-Up    RD Follow-up?: Yes

## 2022-05-02 NOTE — PROGRESS NOTES
Marty Alejo - Oncology ([facility])  Hematology  Bone Marrow Transplant  Progress Note    Patient Name: Caty Diaz  Admission Date: 4/23/2022  Hospital Length of Stay: 9 days  Code Status: Full Code    Subjective:     Interval History: D+7 emi auto. Doing well. No concerns today. Continue supportive care. Starting filgrastim today.     Objective:     Vital Signs (Most Recent):  Temp: 98.3 °F (36.8 °C) (05/02/22 0750)  Pulse: 97 (05/02/22 0750)  Resp: 18 (05/02/22 0750)  BP: 132/60 (05/02/22 0750)  SpO2: 97 % (05/02/22 0750)   Vital Signs (24h Range):  Temp:  [98.3 °F (36.8 °C)-99.3 °F (37.4 °C)] 98.3 °F (36.8 °C)  Pulse:  [75-97] 97  Resp:  [16-18] 18  SpO2:  [93 %-99 %] 97 %  BP: (106-132)/(58-60) 132/60     Weight: 65.2 kg (143 lb 13.6 oz)  Body mass index is 22.53 kg/m².  Body surface area is 1.76 meters squared.    ECOG SCORE           2    Intake/Output - Last 3 Shifts         04/30 0700  05/01 0659 05/01 0700  05/02 0659 05/02 0700  05/03 0659    P.O.  680     I.V. (mL/kg) 810.8 (12.4) 2938.5 (45.1)     Total Intake(mL/kg) 810.8 (12.4) 3618.5 (55.5)     Urine (mL/kg/hr) 1500 (1) 1500 (1)     Stool  0     Total Output 1500 1500     Net -689.2 +2118.5            Urine Occurrence 6 x      Stool Occurrence 6 x 2 x             Physical Exam  Alert awake oriented x3  Regular rate and rhythm  Oral mucosa moist and clear, no ulcers   Lungs clear to auscultation bilaterally, no crackles no wheezing   Abdomen soft nontender  No lower extremity edema      Significant Labs:   All pertinent labs from the last 24 hours have been reviewed.    Diagnostic Results:  I have reviewed all pertinent imaging results/findings within the past 24 hours.    Assessment/Plan:     * Status post autologous bone marrow transplant  Disease status: [place]  Type of transplant: Auto  Conditioning regimen: Melphalan  Karnofsky Score: 90%  Post transplant maintenance plans: Not Yet  Today is D +7  She received 8 bags of CD34 cells with total dose of  3.97x10^6.     Planned conditioning regimen:  Melphalan on Day -1     Antimicrobial Prophylaxis:  Acyclovir starting on Day -1  Levofloxacin starting on Day -1  Fluconazole starting on Day -1     Growth Factor Support:  Neupogen starting on Day +7      Caregiver:   Post-transplant discharge plans: [facility]       Hypocalcemia  Starting calcium carbonate 500 mg BID 5/2  Repeat cmp in am    Nausea  Scheduled her Zofran   Compazine prn  - zyprexa nightly     Gastroesophageal reflux disease without esophagitis  - continue Protonix nightly    Thrombocytopenia  - due to chemo and stem cell collection  - daily CBC  - transfuse for platelets count <10 or <50 pre-procedure or bleeding  - hold Lovenox when platelets <50k    Anemia associated with chemotherapy  - daily CBC  - transfuse for hgb <7    Multiple myeloma  - R-ISS II IgG lambda multiple myeloma, She had increased 1q21 copy number and has t(4,14), both high risk markers in multiple myeloma. She also has monosomy 13 on FISH. She did not have baseline PET CT.   - She has received 6 cycles of RVd, followed by 2 cycles of velcade-dexamethasone. She has tolerated the treatments well.    - PET CT on 3/31/22 did not demonstrate any hypermetabolic lytic or soft tissue lesions. BM biopsy on 3/21/22 showed 10% plasma cells ( versus 50% at diagnosis).  - Admitting for transplant as above.    Anxiety  - The patient states that she takes half of a clonazepam pill at night to help her sleep but would like to consider stopping this medication.           VTE Risk Mitigation (From admission, onward)         Ordered     heparin (porcine) injection 1,600 Units  As needed (PRN)         04/23/22 1520     IP VTE HIGH RISK PATIENT  Once         04/23/22 1311     Place sequential compression device  Until discontinued         04/23/22 1311                Disposition: remains inpatient     Mitra Siu MD  Bone Marrow Transplant  [facility] - Oncology ([facility])

## 2022-05-02 NOTE — PLAN OF CARE
Continue regular diet, encourage PO RD following  Goals: Meet % EEN, EPN by RD f/u date  Nutrition Goal Status: goal not met  Communication of RD Recs: reviewed with RN     Assessment and Plan   Increased nutrient needs(Protein)_     Related to (etiology):   Physiological demands of treatment     Signs and Symptoms (as evidenced by):   Upcoming auto SCT     Interventions(treatment strategy):  Collaboration of nutrition care w/ other providers  General diet  Nutrition education- post transplant precautions 4/24

## 2022-05-02 NOTE — PHYSICIAN QUERY
PT Name: Caty Diaz  MR #: [id number]    DOCUMENTATION CLARIFICATION     CDS: Tamela De Guzman RN         Contact information:[email or web address] or (cell) [phone]    This form is a permanent document in the medical record.     Query Date: May 2, 2022    By submitting this query, we are merely seeking further clarification of documentation. Please utilize your independent clinical judgment when addressing the question(s) below.    Supporting Clinical Findings Location in Medical Record   Multiple myeloma  - R-ISS II IgG lambda multiple myeloma, She had increased 1q21 copy number and has t(4,14), both high risk markers in multiple myeloma. She also has monosomy 13 on FISH. She did not have baseline PET CT.   - She has received 6 cycles of RVd, followed by 2 cycles of velcade-dexamethasone. She has tolerated the treatments well.    - PET CT on 3/31/22 did not demonstrate any hypermetabolic lytic or soft tissue lesions. BM biopsy on 3/21/22 showed 10% plasma cells ( versus 50% at diagnosis).  - Admitting for transplant as above.   BMT PN 5/1   C.diff negative diarrhea overnight. Plan for imodium BMT PN 4/30       Provider, please clarify if there is any clinical correlation between Chemotherapy and Diarrhea.           Are the conditions:      [  x] Due to or associated with each other     [  ] Unrelated to each other     [  ] Other explanation (Please Specify): ______________     [  ] Clinically Undetermined                                                                               Please document in your progress notes daily for the duration of treatment until resolved and include in your discharge summary.

## 2022-05-02 NOTE — ASSESSMENT & PLAN NOTE
Disease status: [place]  Type of transplant: Auto  Conditioning regimen: Melphalan  Karnofsky Score: 90%  Post transplant maintenance plans: Not Yet  Today is D +7  She received 8 bags of CD34 cells with total dose of 3.97x10^6.     Planned conditioning regimen:  Melphalan on Day -1     Antimicrobial Prophylaxis:  Acyclovir starting on Day -1  Levofloxacin starting on Day -1  Fluconazole starting on Day -1     Growth Factor Support:  Neupogen starting on Day +7      Caregiver:   Post-transplant discharge plans: [facility]

## 2022-05-02 NOTE — PLAN OF CARE
No acute events or changes overnight. Patient with no episodes of emesis overnight. Patient resting comfortably and in no distress at this time.

## 2022-05-02 NOTE — SUBJECTIVE & OBJECTIVE
Subjective:     Interval History: D+7 emi auto. Doing well. No concerns today. Continue supportive care. Starting filgrastim today.     Objective:     Vital Signs (Most Recent):  Temp: 98.3 °F (36.8 °C) (05/02/22 0750)  Pulse: 97 (05/02/22 0750)  Resp: 18 (05/02/22 0750)  BP: 132/60 (05/02/22 0750)  SpO2: 97 % (05/02/22 0750)   Vital Signs (24h Range):  Temp:  [98.3 °F (36.8 °C)-99.3 °F (37.4 °C)] 98.3 °F (36.8 °C)  Pulse:  [75-97] 97  Resp:  [16-18] 18  SpO2:  [93 %-99 %] 97 %  BP: (106-132)/(58-60) 132/60     Weight: 65.2 kg (143 lb 13.6 oz)  Body mass index is 22.53 kg/m².  Body surface area is 1.76 meters squared.    ECOG SCORE           2    Intake/Output - Last 3 Shifts         04/30 0700  05/01 0659 05/01 0700  05/02 0659 05/02 0700  05/03 0659    P.O.  680     I.V. (mL/kg) 810.8 (12.4) 2938.5 (45.1)     Total Intake(mL/kg) 810.8 (12.4) 3618.5 (55.5)     Urine (mL/kg/hr) 1500 (1) 1500 (1)     Stool  0     Total Output 1500 1500     Net -689.2 +2118.5            Urine Occurrence 6 x      Stool Occurrence 6 x 2 x             Physical Exam  Alert awake oriented x3  Regular rate and rhythm  Oral mucosa moist and clear, no ulcers   Lungs clear to auscultation bilaterally, no crackles no wheezing   Abdomen soft nontender  No lower extremity edema      Significant Labs:   All pertinent labs from the last 24 hours have been reviewed.    Diagnostic Results:  I have reviewed all pertinent imaging results/findings within the past 24 hours.

## 2022-05-02 NOTE — PT/OT/SLP PROGRESS
Occupational Therapy      Patient Name:  Caty Diaz   MRN:  [id number]    Patient  Seen for weekly check-in.  Pt. And spouse reported that she has been ambulating with spouse in hallway throughout the day and in room. Pt. Also verbalized sitting up in chair during the day.  Pt and spouse with no needs at this time. Will check in on 05-09-22.    5/2/2022

## 2022-05-03 LAB
ABO + RH BLD: NORMAL
ALBUMIN SERPL BCP-MCNC: 2.8 G/DL (ref 3.5–5.2)
ALP SERPL-CCNC: 56 U/L (ref 55–135)
ALT SERPL W/O P-5'-P-CCNC: 35 U/L (ref 10–44)
ANION GAP SERPL CALC-SCNC: 8 MMOL/L (ref 8–16)
AST SERPL-CCNC: 24 U/L (ref 10–40)
BASOPHILS # BLD AUTO: 0.01 K/UL (ref 0–0.2)
BASOPHILS NFR BLD: 4 % (ref 0–1.9)
BILIRUB SERPL-MCNC: 0.4 MG/DL (ref 0.1–1)
BLD GP AB SCN CELLS X3 SERPL QL: NORMAL
BUN SERPL-MCNC: 7 MG/DL (ref 8–23)
CALCIUM SERPL-MCNC: 6.8 MG/DL (ref 8.7–10.5)
CHLORIDE SERPL-SCNC: 112 MMOL/L (ref 95–110)
CO2 SERPL-SCNC: 20 MMOL/L (ref 23–29)
CREAT SERPL-MCNC: 0.7 MG/DL (ref 0.5–1.4)
DIFFERENTIAL METHOD: ABNORMAL
EOSINOPHIL # BLD AUTO: 0 K/UL (ref 0–0.5)
EOSINOPHIL NFR BLD: 0 % (ref 0–8)
ERYTHROCYTE [DISTWIDTH] IN BLOOD BY AUTOMATED COUNT: 13.1 % (ref 11.5–14.5)
EST. GFR  (AFRICAN AMERICAN): >60 ML/MIN/1.73 M^2
EST. GFR  (NON AFRICAN AMERICAN): >60 ML/MIN/1.73 M^2
GLUCOSE SERPL-MCNC: 57 MG/DL (ref 70–110)
HCT VFR BLD AUTO: 23.7 % (ref 37–48.5)
HGB BLD-MCNC: 7.8 G/DL (ref 12–16)
IMM GRANULOCYTES # BLD AUTO: 0 K/UL (ref 0–0.04)
IMM GRANULOCYTES NFR BLD AUTO: 0 % (ref 0–0.5)
LYMPHOCYTES # BLD AUTO: 0.2 K/UL (ref 1–4.8)
LYMPHOCYTES NFR BLD: 84 % (ref 18–48)
MAGNESIUM SERPL-MCNC: 1.6 MG/DL (ref 1.6–2.6)
MCH RBC QN AUTO: 31.8 PG (ref 27–31)
MCHC RBC AUTO-ENTMCNC: 32.9 G/DL (ref 32–36)
MCV RBC AUTO: 97 FL (ref 82–98)
MONOCYTES # BLD AUTO: 0 K/UL (ref 0.3–1)
MONOCYTES NFR BLD: 12 % (ref 4–15)
NEUTROPHILS # BLD AUTO: 0 K/UL (ref 1.8–7.7)
NEUTROPHILS NFR BLD: 0 % (ref 38–73)
NRBC BLD-RTO: 0 /100 WBC
PHOSPHATE SERPL-MCNC: 2.4 MG/DL (ref 2.7–4.5)
PLATELET # BLD AUTO: 20 K/UL (ref 150–450)
PLATELET BLD QL SMEAR: ABNORMAL
PMV BLD AUTO: 9.6 FL (ref 9.2–12.9)
POTASSIUM SERPL-SCNC: 4.1 MMOL/L (ref 3.5–5.1)
PROT SERPL-MCNC: 4.7 G/DL (ref 6–8.4)
RBC # BLD AUTO: 2.45 M/UL (ref 4–5.4)
SODIUM SERPL-SCNC: 140 MMOL/L (ref 136–145)
WBC # BLD AUTO: 0.25 K/UL (ref 3.9–12.7)

## 2022-05-03 PROCEDURE — 25000003 PHARM REV CODE 250: Performed by: INTERNAL MEDICINE

## 2022-05-03 PROCEDURE — 63600175 PHARM REV CODE 636 W HCPCS: Performed by: INTERNAL MEDICINE

## 2022-05-03 PROCEDURE — 86850 RBC ANTIBODY SCREEN: CPT | Performed by: INTERNAL MEDICINE

## 2022-05-03 PROCEDURE — 83735 ASSAY OF MAGNESIUM: CPT | Performed by: NURSE PRACTITIONER

## 2022-05-03 PROCEDURE — 99233 SBSQ HOSP IP/OBS HIGH 50: CPT | Mod: GC,,, | Performed by: INTERNAL MEDICINE

## 2022-05-03 PROCEDURE — 97530 THERAPEUTIC ACTIVITIES: CPT

## 2022-05-03 PROCEDURE — 20600001 HC STEP DOWN PRIVATE ROOM

## 2022-05-03 PROCEDURE — 25000003 PHARM REV CODE 250: Performed by: STUDENT IN AN ORGANIZED HEALTH CARE EDUCATION/TRAINING PROGRAM

## 2022-05-03 PROCEDURE — 25000003 PHARM REV CODE 250: Performed by: NURSE PRACTITIONER

## 2022-05-03 PROCEDURE — 80053 COMPREHEN METABOLIC PANEL: CPT | Performed by: NURSE PRACTITIONER

## 2022-05-03 PROCEDURE — 99233 PR SUBSEQUENT HOSPITAL CARE,LEVL III: ICD-10-PCS | Mod: GC,,, | Performed by: INTERNAL MEDICINE

## 2022-05-03 PROCEDURE — 85025 COMPLETE CBC W/AUTO DIFF WBC: CPT | Performed by: NURSE PRACTITIONER

## 2022-05-03 PROCEDURE — 94761 N-INVAS EAR/PLS OXIMETRY MLT: CPT

## 2022-05-03 PROCEDURE — 84100 ASSAY OF PHOSPHORUS: CPT | Performed by: NURSE PRACTITIONER

## 2022-05-03 RX ORDER — ACYCLOVIR 200 MG/1
800 CAPSULE ORAL 2 TIMES DAILY
Status: DISCONTINUED | OUTPATIENT
Start: 2022-05-03 | End: 2022-05-04

## 2022-05-03 RX ORDER — MAGNESIUM SULFATE HEPTAHYDRATE 40 MG/ML
2 INJECTION, SOLUTION INTRAVENOUS ONCE
Status: COMPLETED | OUTPATIENT
Start: 2022-05-03 | End: 2022-05-03

## 2022-05-03 RX ORDER — TRAMADOL HYDROCHLORIDE 50 MG/1
50 TABLET ORAL EVERY 4 HOURS PRN
Status: DISCONTINUED | OUTPATIENT
Start: 2022-05-03 | End: 2022-05-08 | Stop reason: HOSPADM

## 2022-05-03 RX ORDER — CALCIUM CARBONATE 1250 MG/5ML
1000 SUSPENSION ORAL 2 TIMES DAILY WITH MEALS
Status: DISCONTINUED | OUTPATIENT
Start: 2022-05-03 | End: 2022-05-05

## 2022-05-03 RX ORDER — OXYCODONE HYDROCHLORIDE 5 MG/1
5 TABLET ORAL EVERY 6 HOURS PRN
Status: DISCONTINUED | OUTPATIENT
Start: 2022-05-03 | End: 2022-05-08 | Stop reason: HOSPADM

## 2022-05-03 RX ORDER — ONDANSETRON 2 MG/ML
8 INJECTION INTRAMUSCULAR; INTRAVENOUS EVERY 8 HOURS
Status: DISCONTINUED | OUTPATIENT
Start: 2022-05-03 | End: 2022-05-06

## 2022-05-03 RX ORDER — OLANZAPINE 2.5 MG/1
7.5 TABLET ORAL NIGHTLY
Status: DISCONTINUED | OUTPATIENT
Start: 2022-05-03 | End: 2022-05-08 | Stop reason: HOSPADM

## 2022-05-03 RX ADMIN — ONDANSETRON 8 MG: 2 INJECTION INTRAMUSCULAR; INTRAVENOUS at 02:05

## 2022-05-03 RX ADMIN — FLUTICASONE PROPIONATE 50 MCG: 50 SPRAY, METERED NASAL at 08:05

## 2022-05-03 RX ADMIN — ALUMINUM HYDROXIDE, MAGNESIUM HYDROXIDE, AND DIMETHICONE 10 ML: 400; 400; 40 SUSPENSION ORAL at 08:05

## 2022-05-03 RX ADMIN — CALCIUM CARBONATE 1000 MG: 1250 SUSPENSION ORAL at 07:05

## 2022-05-03 RX ADMIN — CITALOPRAM HYDROBROMIDE 10 MG: 10 TABLET ORAL at 06:05

## 2022-05-03 RX ADMIN — LOPERAMIDE HYDROCHLORIDE 2 MG: 2 CAPSULE ORAL at 09:05

## 2022-05-03 RX ADMIN — LOPERAMIDE HYDROCHLORIDE 2 MG: 2 CAPSULE ORAL at 05:05

## 2022-05-03 RX ADMIN — LORAZEPAM 1 MG: 2 INJECTION INTRAMUSCULAR; INTRAVENOUS at 08:05

## 2022-05-03 RX ADMIN — FILGRASTIM 480 MCG: 480 INJECTION, SOLUTION INTRAVENOUS; SUBCUTANEOUS at 09:05

## 2022-05-03 RX ADMIN — OLANZAPINE 7.5 MG: 2.5 TABLET, FILM COATED ORAL at 08:05

## 2022-05-03 RX ADMIN — SODIUM CHLORIDE AND POTASSIUM CHLORIDE: 9; 1.49 INJECTION, SOLUTION INTRAVENOUS at 02:05

## 2022-05-03 RX ADMIN — PROCHLORPERAZINE EDISYLATE 10 MG: 5 INJECTION INTRAMUSCULAR; INTRAVENOUS at 06:05

## 2022-05-03 RX ADMIN — ALUMINUM HYDROXIDE, MAGNESIUM HYDROXIDE, AND DIMETHICONE 10 ML: 400; 400; 40 SUSPENSION ORAL at 05:05

## 2022-05-03 RX ADMIN — CALCIUM CARBONATE 1000 MG: 1250 SUSPENSION ORAL at 05:05

## 2022-05-03 RX ADMIN — LEVOFLOXACIN 500 MG: 500 TABLET, FILM COATED ORAL at 09:05

## 2022-05-03 RX ADMIN — ACYCLOVIR 800 MG: 200 CAPSULE ORAL at 08:05

## 2022-05-03 RX ADMIN — SODIUM PHOSPHATE, MONOBASIC, MONOHYDRATE 30 MMOL: 276; 142 INJECTION, SOLUTION INTRAVENOUS at 07:05

## 2022-05-03 RX ADMIN — LOPERAMIDE HYDROCHLORIDE 2 MG: 2 CAPSULE ORAL at 12:05

## 2022-05-03 RX ADMIN — PANTOPRAZOLE SODIUM 40 MG: 40 TABLET, DELAYED RELEASE ORAL at 08:05

## 2022-05-03 RX ADMIN — PROCHLORPERAZINE EDISYLATE 10 MG: 5 INJECTION INTRAMUSCULAR; INTRAVENOUS at 10:05

## 2022-05-03 RX ADMIN — MAGNESIUM SULFATE 2 G: 2 INJECTION INTRAVENOUS at 07:05

## 2022-05-03 RX ADMIN — FLUCONAZOLE 400 MG: 200 TABLET ORAL at 09:05

## 2022-05-03 RX ADMIN — ONDANSETRON 8 MG: 2 INJECTION INTRAMUSCULAR; INTRAVENOUS at 09:05

## 2022-05-03 RX ADMIN — ACYCLOVIR 800 MG: 200 CAPSULE ORAL at 09:05

## 2022-05-03 RX ADMIN — LOPERAMIDE HYDROCHLORIDE 2 MG: 2 CAPSULE ORAL at 08:05

## 2022-05-03 RX ADMIN — Medication 1 DOSE: at 12:05

## 2022-05-03 RX ADMIN — Medication 1 DOSE: at 09:05

## 2022-05-03 RX ADMIN — Medication 1 DOSE: at 05:05

## 2022-05-03 RX ADMIN — ALUMINUM HYDROXIDE, MAGNESIUM HYDROXIDE, AND DIMETHICONE 10 ML: 400; 400; 40 SUSPENSION ORAL at 09:05

## 2022-05-03 RX ADMIN — ALUMINUM HYDROXIDE, MAGNESIUM HYDROXIDE, AND DIMETHICONE 10 ML: 400; 400; 40 SUSPENSION ORAL at 12:05

## 2022-05-03 RX ADMIN — ONDANSETRON 8 MG: 8 TABLET, ORALLY DISINTEGRATING ORAL at 06:05

## 2022-05-03 NOTE — PLAN OF CARE
Day + 8 emi auto SCT. Plan of care discussed at start of shift. Free from falls and injuries. Resting quietly with eyes closed. Respirations even, unlabored. Skin warm and dry. Denies pain. Nausea managed with scheduled antiemetics. Diarrhea noted this shift.  remains at bedside. Frequent checks for pain and safety maintained. Bed in lowest position, wheels locked, side rails up x's 2, call light in reach. Instructed to call for assistance as needed, verbalizes understanding. Will continue to monitor.

## 2022-05-03 NOTE — PLAN OF CARE
Pt involved in plan of care and communicating needs throughout shift. Day +8 Ju Auto SCT. Zofran and Compazine given for nausea with moderate relief. C/o of lethargy. Up in room and to bathroom independently; no c/o pain. Voiding without difficulty. Electrolytes replaced as ordered. All VSS; no acute events so far this shift.  Pt remaining free from falls or injury throughout shift; bed locked and in lowest position; call light within reach.  Pt instructed to call for assistance as needed.  Q1H rounding done on pt. WCTM.

## 2022-05-03 NOTE — ASSESSMENT & PLAN NOTE
Disease status: AR  Type of transplant: Auto  Conditioning regimen: Melphalan  Karnofsky Score: 90%  Post transplant maintenance plans: Not Yet  Today is D +8  She received 8 bags of CD34 cells with total dose of 3.97x10^6.     Planned conditioning regimen:  Melphalan on Day -1     Antimicrobial Prophylaxis:  Acyclovir starting on Day -1  Levofloxacin starting on Day -1  Fluconazole starting on Day -1     Growth Factor Support:  Neupogen starting on Day +7      Caregiver:   Post-transplant discharge plans: [facility]

## 2022-05-03 NOTE — PT/OT/SLP PROGRESS
"Physical Therapy Treatment and Discharge    Patient Name:  Caty Diaz   MRN:  [id number]    Recommendations:     Discharge Recommendations:  home   Discharge Equipment Recommendations: none   Barriers to discharge: None    Assessment:     Caty Diaz is a 66 y.o. female admitted with a medical diagnosis of Status post autologous bone marrow transplant. Pt is able to demonstrate independence with functional mobility on this date. Pt currently has no further skilled PT needs. Please re-consult if pt has a change in status. Will sign off.    Recent Surgery: * No surgery found *      Plan:     Pt has no further skilled PT needs, please re-consult if pt has a change in status.    Subjective     Subjective: "I've been movingaround"   Patient goals: none defined  Pain/Comfort:   · Pain Rating 1: 0/10      Objective:     Communicated with RN prior to session.  Patient found ambulatory in room/alcazar with  (port) upon PT entry to room.          General Precautions: Standard, fall   Orthopedic Precautions:N/A   Braces: N/A     Functional Mobility: pt noted to be ambulating independently within room with appropriate safety awareness      AM-PAC 6 CLICK MOBILITY  Turning over in bed (including adjusting bedclothes, sheets and blankets)?: 4  Sitting down on and standing up from a chair with arms (e.g., wheelchair, bedside commode, etc.): 4  Moving from lying on back to sitting on the side of the bed?: 4  Moving to and from a bed to a chair (including a wheelchair)?: 4  Need to walk in hospital room?: 4  Climbing 3-5 steps with a railing?: 4  Basic Mobility Total Score: 24     Education:  Education provided re: d/c planning, role of PT, benefits of mobility. Reviewed walking program for general strengthening and endurance. Stated will discontinue PT services while in house however pt and spouse endorse understanding to let primary team know should any acute mobility concerns arise. Pt endorsed understanding.     Patient " left ambulatory in room/alcazar with all lines intact and call button in reach.    GOALS:   Multidisciplinary Problems     Physical Therapy Goals        Problem: Physical Therapy    Goal Priority Disciplines Outcome Goal Variances Interventions   Physical Therapy Goal     PT, PT/OT Ongoing, Progressing     Description: Goals to be met by: 22     Patient will increase functional independence with mobility by performin. Sit to stand transfer with Hunt  2. Gait  x >150 feet with Hunt using No Assistive Device.   3. Ascend/descend 4 stair with bilateral Handrails Hunt using No Assistive Device.   4. Lower extremity exercise program x 20 reps per handout, with independence                     Time Tracking:     PT Received On: 22  PT Start Time: 0958     PT Stop Time: 1007  PT Total Time (min): 9 min     Billable Minutes: Therapeutic Activity 9 min    Treatment Type: Treatment  PT/PTA: PT     PTA Visit Number: 0     Lisbet Solano PT, DPT  5/3/2022

## 2022-05-03 NOTE — SUBJECTIVE & OBJECTIVE
Subjective:     Interval History: D+8 Ju AUTO. Uncontrolled nausea, not tolerating PO zofran. Will switch to IV zofran Q8H and increase zyprexa to 7.5 mg HS    Objective:     Vital Signs (Most Recent):  Temp: 97.8 °F (36.6 °C) (05/03/22 0739)  Pulse: 82 (05/03/22 0739)  Resp: 17 (05/03/22 0739)  BP: 126/60 (05/03/22 0739)  SpO2: 98 % (05/03/22 0739)   Vital Signs (24h Range):  Temp:  [97.8 °F (36.6 °C)-99.2 °F (37.3 °C)] 97.8 °F (36.6 °C)  Pulse:  [81-91] 82  Resp:  [16-18] 17  SpO2:  [93 %-98 %] 98 %  BP: (111-134)/(59-62) 126/60     Weight: 65.4 kg (144 lb 2.9 oz)  Body mass index is 22.58 kg/m².  Body surface area is 1.76 meters squared.    ECOG SCORE           2    Intake/Output - Last 3 Shifts         05/01 0700  05/02 0659 05/02 0700  05/03 0659 05/03 0700  05/04 0659    P.O. 680 240     I.V. (mL/kg) 2938.5 (45.1) 1511.2 (23.1)     Total Intake(mL/kg) 3618.5 (55.5) 1751.2 (26.8)     Urine (mL/kg/hr) 1500 (1)      Stool 0      Total Output 1500      Net +2118.5 +1751.2            Urine Occurrence  12 x     Stool Occurrence 2 x 11 x             Physical Exam  Alert awake oriented x3  Regular rate and rhythm  Oral mucosa moist and clear, no ulcers   Lungs clear to auscultation bilaterally, no crackles no wheezing   Abdomen soft nontender  No lower extremity edema      Significant Labs:   All pertinent labs from the last 24 hours have been reviewed.    Diagnostic Results:  I have reviewed all pertinent imaging results/findings within the past 24 hours.

## 2022-05-03 NOTE — PROGRESS NOTES
Pt and spouse seen for follow up. Nausea improving with less fatigue from medications today.  Hopeful for continued improvement through count recovery.  No needs identified at this time. Will continue to follow and assist as needed.

## 2022-05-04 LAB
ALBUMIN SERPL BCP-MCNC: 2.6 G/DL (ref 3.5–5.2)
ALP SERPL-CCNC: 52 U/L (ref 55–135)
ALT SERPL W/O P-5'-P-CCNC: 28 U/L (ref 10–44)
ANION GAP SERPL CALC-SCNC: 8 MMOL/L (ref 8–16)
ANISOCYTOSIS BLD QL SMEAR: SLIGHT
AST SERPL-CCNC: 16 U/L (ref 10–40)
BASOPHILS # BLD AUTO: 0 K/UL (ref 0–0.2)
BASOPHILS NFR BLD: 0 % (ref 0–1.9)
BILIRUB SERPL-MCNC: 0.5 MG/DL (ref 0.1–1)
BLD PROD TYP BPU: NORMAL
BLOOD UNIT EXPIRATION DATE: NORMAL
BLOOD UNIT TYPE CODE: 6200
BLOOD UNIT TYPE: NORMAL
BUN SERPL-MCNC: 6 MG/DL (ref 8–23)
CALCIUM SERPL-MCNC: 6.6 MG/DL (ref 8.7–10.5)
CHLORIDE SERPL-SCNC: 109 MMOL/L (ref 95–110)
CO2 SERPL-SCNC: 21 MMOL/L (ref 23–29)
CODING SYSTEM: NORMAL
CREAT SERPL-MCNC: 0.7 MG/DL (ref 0.5–1.4)
DIFFERENTIAL METHOD: ABNORMAL
DISPENSE STATUS: NORMAL
EOSINOPHIL # BLD AUTO: 0 K/UL (ref 0–0.5)
EOSINOPHIL NFR BLD: 0 % (ref 0–8)
ERYTHROCYTE [DISTWIDTH] IN BLOOD BY AUTOMATED COUNT: 13.1 % (ref 11.5–14.5)
EST. GFR  (AFRICAN AMERICAN): >60 ML/MIN/1.73 M^2
EST. GFR  (NON AFRICAN AMERICAN): >60 ML/MIN/1.73 M^2
GLUCOSE SERPL-MCNC: 67 MG/DL (ref 70–110)
HCT VFR BLD AUTO: 22.1 % (ref 37–48.5)
HGB BLD-MCNC: 7.5 G/DL (ref 12–16)
HYPOCHROMIA BLD QL SMEAR: ABNORMAL
IMM GRANULOCYTES # BLD AUTO: 0 K/UL (ref 0–0.04)
IMM GRANULOCYTES NFR BLD AUTO: 0 % (ref 0–0.5)
LYMPHOCYTES # BLD AUTO: 0.2 K/UL (ref 1–4.8)
LYMPHOCYTES NFR BLD: 74.2 % (ref 18–48)
MAGNESIUM SERPL-MCNC: 1.8 MG/DL (ref 1.6–2.6)
MCH RBC QN AUTO: 33 PG (ref 27–31)
MCHC RBC AUTO-ENTMCNC: 33.9 G/DL (ref 32–36)
MCV RBC AUTO: 97 FL (ref 82–98)
MONOCYTES # BLD AUTO: 0.1 K/UL (ref 0.3–1)
MONOCYTES NFR BLD: 22.6 % (ref 4–15)
NEUTROPHILS # BLD AUTO: 0 K/UL (ref 1.8–7.7)
NEUTROPHILS NFR BLD: 3.2 % (ref 38–73)
NRBC BLD-RTO: 0 /100 WBC
OVALOCYTES BLD QL SMEAR: ABNORMAL
PHOSPHATE SERPL-MCNC: 2 MG/DL (ref 2.7–4.5)
PLATELET # BLD AUTO: 7 K/UL (ref 150–450)
PMV BLD AUTO: 11 FL (ref 9.2–12.9)
POIKILOCYTOSIS BLD QL SMEAR: SLIGHT
POLYCHROMASIA BLD QL SMEAR: ABNORMAL
POTASSIUM SERPL-SCNC: 3.7 MMOL/L (ref 3.5–5.1)
PROT SERPL-MCNC: 4.4 G/DL (ref 6–8.4)
RBC # BLD AUTO: 2.27 M/UL (ref 4–5.4)
SODIUM SERPL-SCNC: 138 MMOL/L (ref 136–145)
UNIT NUMBER: NORMAL
WBC # BLD AUTO: 0.31 K/UL (ref 3.9–12.7)

## 2022-05-04 PROCEDURE — 25000003 PHARM REV CODE 250: Performed by: NURSE PRACTITIONER

## 2022-05-04 PROCEDURE — 63600175 PHARM REV CODE 636 W HCPCS: Performed by: NURSE PRACTITIONER

## 2022-05-04 PROCEDURE — 99900035 HC TECH TIME PER 15 MIN (STAT)

## 2022-05-04 PROCEDURE — P9037 PLATE PHERES LEUKOREDU IRRAD: HCPCS | Performed by: INTERNAL MEDICINE

## 2022-05-04 PROCEDURE — 85025 COMPLETE CBC W/AUTO DIFF WBC: CPT | Performed by: NURSE PRACTITIONER

## 2022-05-04 PROCEDURE — 94799 UNLISTED PULMONARY SVC/PX: CPT

## 2022-05-04 PROCEDURE — 94761 N-INVAS EAR/PLS OXIMETRY MLT: CPT

## 2022-05-04 PROCEDURE — 25000003 PHARM REV CODE 250: Performed by: INTERNAL MEDICINE

## 2022-05-04 PROCEDURE — 94760 N-INVAS EAR/PLS OXIMETRY 1: CPT

## 2022-05-04 PROCEDURE — 63600175 PHARM REV CODE 636 W HCPCS: Performed by: INTERNAL MEDICINE

## 2022-05-04 PROCEDURE — 84100 ASSAY OF PHOSPHORUS: CPT | Performed by: NURSE PRACTITIONER

## 2022-05-04 PROCEDURE — 99233 PR SUBSEQUENT HOSPITAL CARE,LEVL III: ICD-10-PCS | Mod: GC,,, | Performed by: INTERNAL MEDICINE

## 2022-05-04 PROCEDURE — 25000003 PHARM REV CODE 250: Performed by: STUDENT IN AN ORGANIZED HEALTH CARE EDUCATION/TRAINING PROGRAM

## 2022-05-04 PROCEDURE — 20600001 HC STEP DOWN PRIVATE ROOM

## 2022-05-04 PROCEDURE — 80053 COMPREHEN METABOLIC PANEL: CPT | Performed by: NURSE PRACTITIONER

## 2022-05-04 PROCEDURE — 99233 SBSQ HOSP IP/OBS HIGH 50: CPT | Mod: GC,,, | Performed by: INTERNAL MEDICINE

## 2022-05-04 PROCEDURE — 83735 ASSAY OF MAGNESIUM: CPT | Performed by: NURSE PRACTITIONER

## 2022-05-04 RX ORDER — HYDROCORTISONE 25 MG/G
CREAM TOPICAL 3 TIMES DAILY
Status: DISCONTINUED | OUTPATIENT
Start: 2022-05-04 | End: 2022-05-08 | Stop reason: HOSPADM

## 2022-05-04 RX ORDER — MAGNESIUM SULFATE 1 G/100ML
1 INJECTION INTRAVENOUS ONCE
Status: COMPLETED | OUTPATIENT
Start: 2022-05-04 | End: 2022-05-04

## 2022-05-04 RX ORDER — HYDROCORTISONE 25 MG/G
CREAM TOPICAL 2 TIMES DAILY
Status: DISCONTINUED | OUTPATIENT
Start: 2022-05-04 | End: 2022-05-04

## 2022-05-04 RX ORDER — LEVOFLOXACIN 5 MG/ML
500 INJECTION, SOLUTION INTRAVENOUS
Status: DISCONTINUED | OUTPATIENT
Start: 2022-05-04 | End: 2022-05-06

## 2022-05-04 RX ORDER — DIPHENOXYLATE HYDROCHLORIDE AND ATROPINE SULFATE 2.5; .025 MG/1; MG/1
1 TABLET ORAL 4 TIMES DAILY PRN
Status: DISCONTINUED | OUTPATIENT
Start: 2022-05-04 | End: 2022-05-08 | Stop reason: HOSPADM

## 2022-05-04 RX ORDER — FLUCONAZOLE 2 MG/ML
400 INJECTION, SOLUTION INTRAVENOUS
Status: DISCONTINUED | OUTPATIENT
Start: 2022-05-04 | End: 2022-05-06

## 2022-05-04 RX ADMIN — ALUMINUM HYDROXIDE, MAGNESIUM HYDROXIDE, AND DIMETHICONE 10 ML: 400; 400; 40 SUSPENSION ORAL at 09:05

## 2022-05-04 RX ADMIN — LOPERAMIDE HYDROCHLORIDE 2 MG: 2 CAPSULE ORAL at 05:05

## 2022-05-04 RX ADMIN — CALCIUM CHLORIDE 1 G: 100 INJECTION, SOLUTION INTRAVENOUS at 08:05

## 2022-05-04 RX ADMIN — PANTOPRAZOLE SODIUM 40 MG: 40 TABLET, DELAYED RELEASE ORAL at 09:05

## 2022-05-04 RX ADMIN — LEVOFLOXACIN 500 MG: 500 INJECTION, SOLUTION INTRAVENOUS at 10:05

## 2022-05-04 RX ADMIN — Medication 1 DOSE: at 09:05

## 2022-05-04 RX ADMIN — Medication 1 DOSE: at 02:05

## 2022-05-04 RX ADMIN — DIPHENHYDRAMINE HYDROCHLORIDE 25 MG: 50 INJECTION, SOLUTION INTRAMUSCULAR; INTRAVENOUS at 08:05

## 2022-05-04 RX ADMIN — OLANZAPINE 7.5 MG: 2.5 TABLET, FILM COATED ORAL at 09:05

## 2022-05-04 RX ADMIN — HYDROCORTISONE: 25 CREAM TOPICAL at 05:05

## 2022-05-04 RX ADMIN — ONDANSETRON 8 MG: 2 INJECTION INTRAMUSCULAR; INTRAVENOUS at 09:05

## 2022-05-04 RX ADMIN — SODIUM CHLORIDE AND POTASSIUM CHLORIDE: 9; 1.49 INJECTION, SOLUTION INTRAVENOUS at 08:05

## 2022-05-04 RX ADMIN — SODIUM PHOSPHATE, MONOBASIC, MONOHYDRATE 30 MMOL: 276; 142 INJECTION, SOLUTION INTRAVENOUS at 07:05

## 2022-05-04 RX ADMIN — MAGNESIUM SULFATE 1 G: 500 INJECTION, SOLUTION INTRAMUSCULAR; INTRAVENOUS at 07:05

## 2022-05-04 RX ADMIN — ALUMINUM HYDROXIDE, MAGNESIUM HYDROXIDE, AND DIMETHICONE 10 ML: 400; 400; 40 SUSPENSION ORAL at 05:05

## 2022-05-04 RX ADMIN — ALUMINUM HYDROXIDE, MAGNESIUM HYDROXIDE, AND DIMETHICONE 10 ML: 400; 400; 40 SUSPENSION ORAL at 02:05

## 2022-05-04 RX ADMIN — LOPERAMIDE HYDROCHLORIDE 2 MG: 2 CAPSULE ORAL at 08:05

## 2022-05-04 RX ADMIN — FLUCONAZOLE 400 MG: 2 INJECTION, SOLUTION INTRAVENOUS at 10:05

## 2022-05-04 RX ADMIN — FILGRASTIM 480 MCG: 480 INJECTION, SOLUTION INTRAVENOUS; SUBCUTANEOUS at 08:05

## 2022-05-04 RX ADMIN — Medication 1 DOSE: at 05:05

## 2022-05-04 RX ADMIN — FLUTICASONE PROPIONATE 50 MCG: 50 SPRAY, METERED NASAL at 09:05

## 2022-05-04 RX ADMIN — CALCIUM CARBONATE 1000 MG: 1250 SUSPENSION ORAL at 05:05

## 2022-05-04 RX ADMIN — LOPERAMIDE HYDROCHLORIDE 2 MG: 2 CAPSULE ORAL at 09:05

## 2022-05-04 RX ADMIN — CITALOPRAM HYDROBROMIDE 10 MG: 10 TABLET ORAL at 06:05

## 2022-05-04 RX ADMIN — ONDANSETRON 8 MG: 2 INJECTION INTRAMUSCULAR; INTRAVENOUS at 06:05

## 2022-05-04 RX ADMIN — SODIUM CHLORIDE AND POTASSIUM CHLORIDE: 9; 1.49 INJECTION, SOLUTION INTRAVENOUS at 03:05

## 2022-05-04 RX ADMIN — ONDANSETRON 8 MG: 2 INJECTION INTRAMUSCULAR; INTRAVENOUS at 02:05

## 2022-05-04 RX ADMIN — ACYCLOVIR SODIUM 400 MG: 1000 INJECTION, SOLUTION INTRAVENOUS at 11:05

## 2022-05-04 RX ADMIN — LOPERAMIDE HYDROCHLORIDE 2 MG: 2 CAPSULE ORAL at 02:05

## 2022-05-04 RX ADMIN — CALCIUM CARBONATE 1000 MG: 1250 SUSPENSION ORAL at 09:05

## 2022-05-04 RX ADMIN — HYDROCORTISONE: 25 CREAM TOPICAL at 09:05

## 2022-05-04 RX ADMIN — ACYCLOVIR SODIUM 400 MG: 1000 INJECTION, SOLUTION INTRAVENOUS at 09:05

## 2022-05-04 NOTE — PLAN OF CARE
Day + 9 emi auto SCT. Plan of care discussed at start of shift. Free from falls and injuries. Resting quietly with eyes closed. Respirations even, unlabored. Skin warm and dry. Denies pain. Nausea managed with scheduled antiemetics and Ativan 1 mg IV x's 1 dose.  remains at bedside. Frequent checks for pain and safety maintained. Bed in lowest position, wheels locked, side rails up x's 2, call light in reach. Instructed to call for assistance as needed, verbalizes understanding. Will continue to monitor.

## 2022-05-04 NOTE — PROGRESS NOTES
[facility] - Oncology ([facility])  Hematology  Bone Marrow Transplant  Progress Note    Patient Name: Caty Diaz  Admission Date: 4/23/2022  Hospital Length of Stay: 11 days  Code Status: Full Code    Subjective:     Interval History: D+9 Ju AUTO. Uncontrolled nausea, not tolerating PO medications. C/w with zofran scheduled. IV ativan/compazine as needed. Zyprexa 7.5 mg HS. And switching antimicrobials to IV. Platelet transfusion today.     Objective:     Vital Signs (Most Recent):  Temp: 98.4 °F (36.9 °C) (05/04/22 0713)  Pulse: 104 (05/04/22 0713)  Resp: 17 (05/04/22 0713)  BP: 120/62 (05/04/22 0713)  SpO2: 95 % (05/04/22 0713)   Vital Signs (24h Range):  Temp:  [98.3 °F (36.8 °C)-99.4 °F (37.4 °C)] 98.4 °F (36.9 °C)  Pulse:  [] 104  Resp:  [14-17] 17  SpO2:  [95 %-99 %] 95 %  BP: (108-125)/(51-62) 120/62     Weight: 66.1 kg (145 lb 11.6 oz)  Body mass index is 22.82 kg/m².  Body surface area is 1.77 meters squared.    ECOG SCORE           2    Intake/Output - Last 3 Shifts         05/02 0700  05/03 0659 05/03 0700  05/04 0659 05/04 0700  05/05 0659    P.O. 240 550     I.V. (mL/kg) 1511.2 (23.1) 1339.8 (20.3)     IV Piggyback  248.1     Total Intake(mL/kg) 1751.2 (26.8) 2137.9 (32.3)     Urine (mL/kg/hr)  650 (0.4)     Stool       Total Output  650     Net +1751.2 +1487.9            Urine Occurrence 12 x 4 x     Stool Occurrence 11 x              Physical Exam  Alert awake oriented x3  Regular rate and rhythm  Oral mucosa moist and clear, no ulcers   Lungs clear to auscultation bilaterally, no crackles no wheezing   Abdomen soft nontender  No lower extremity edema      Significant Labs:   All pertinent labs from the last 24 hours have been reviewed.    Diagnostic Results:  I have reviewed all pertinent imaging results/findings within the past 24 hours.    Assessment/Plan:     * Status post autologous bone marrow transplant  Disease status: [place]  Type of transplant: Auto  Conditioning regimen:  Melphalan  Karnofsky Score: 90%  Post transplant maintenance plans: Not Yet  Today is D +9  She received 8 bags of CD34 cells with total dose of 3.97x10^6.     Planned conditioning regimen:  Melphalan on Day -1     Antimicrobial Prophylaxis:  Acyclovir starting on Day -1  Levofloxacin starting on Day -1  Fluconazole starting on Day -1     Growth Factor Support:  Neupogen starting on Day +7      Caregiver:   Post-transplant discharge plans: [facility]       Hypocalcemia  Starting calcium carbonate 500 mg BID 5/2  Increased to 1 gm BID 5/3/22  1 gm IV calcium 5/4/22  Repeat cmp in am    Nausea  Scheduled her Zofran IV  Compazine and ativan prn  - zyprexa nightly increased to 7.5 mg 5/3/22    Gastroesophageal reflux disease without esophagitis  - continue Protonix nightly    Thrombocytopenia  - due to chemo and stem cell collection  - daily CBC  - transfuse for platelets count <10 or <50 pre-procedure or bleeding  - hold Lovenox when platelets <50k    Anemia associated with chemotherapy  - daily CBC  - transfuse for hgb <7    Multiple myeloma  - R-ISS II IgG lambda multiple myeloma, She had increased 1q21 copy number and has t(4,14), both high risk markers in multiple myeloma. She also has monosomy 13 on FISH. She did not have baseline PET CT.   - She has received 6 cycles of RVd, followed by 2 cycles of velcade-dexamethasone. She has tolerated the treatments well.    - PET CT on 3/31/22 did not demonstrate any hypermetabolic lytic or soft tissue lesions. BM biopsy on 3/21/22 showed 10% plasma cells ( versus 50% at diagnosis).  - Admitting for transplant as above.    Anxiety  - The patient states that she takes half of a clonazepam pill at night to help her sleep but would like to consider stopping this medication.           VTE Risk Mitigation (From admission, onward)         Ordered     heparin (porcine) injection 1,600 Units  As needed (PRN)         04/23/22 1520     IP VTE HIGH RISK PATIENT  Once          04/23/22 1311     Place sequential compression device  Until discontinued         04/23/22 1311                Disposition: remains     Mitra Siu MD  Bone Marrow Transplant  [facility] - Oncology ([facility])

## 2022-05-04 NOTE — SUBJECTIVE & OBJECTIVE
Subjective:     Interval History: D+9 Ju AUTO. Uncontrolled nausea, not tolerating PO medications. C/w with zofran scheduled. IV ativan/compazine as needed. Zyprexa 7.5 mg HS. And switching antimicrobials to IV. Platelet transfusion today.     Objective:     Vital Signs (Most Recent):  Temp: 98.4 °F (36.9 °C) (05/04/22 0713)  Pulse: 104 (05/04/22 0713)  Resp: 17 (05/04/22 0713)  BP: 120/62 (05/04/22 0713)  SpO2: 95 % (05/04/22 0713)   Vital Signs (24h Range):  Temp:  [98.3 °F (36.8 °C)-99.4 °F (37.4 °C)] 98.4 °F (36.9 °C)  Pulse:  [] 104  Resp:  [14-17] 17  SpO2:  [95 %-99 %] 95 %  BP: (108-125)/(51-62) 120/62     Weight: 66.1 kg (145 lb 11.6 oz)  Body mass index is 22.82 kg/m².  Body surface area is 1.77 meters squared.    ECOG SCORE           2    Intake/Output - Last 3 Shifts         05/02 0700  05/03 0659 05/03 0700  05/04 0659 05/04 0700  05/05 0659    P.O. 240 550     I.V. (mL/kg) 1511.2 (23.1) 1339.8 (20.3)     IV Piggyback  248.1     Total Intake(mL/kg) 1751.2 (26.8) 2137.9 (32.3)     Urine (mL/kg/hr)  650 (0.4)     Stool       Total Output  650     Net +1751.2 +1487.9            Urine Occurrence 12 x 4 x     Stool Occurrence 11 x              Physical Exam  Alert awake oriented x3  Regular rate and rhythm  Oral mucosa moist and clear, no ulcers   Lungs clear to auscultation bilaterally, no crackles no wheezing   Abdomen soft nontender  No lower extremity edema      Significant Labs:   All pertinent labs from the last 24 hours have been reviewed.    Diagnostic Results:  I have reviewed all pertinent imaging results/findings within the past 24 hours.

## 2022-05-04 NOTE — PLAN OF CARE
Pt involved in plan of care and communicating needs throughout shift. Day +9 Ju Auto SCT. 1 unit of platelets given. Pt pre-medicated with Benadryl. Zofran and  given for nausea with mild relief. C/o of lethargy. Up in room and to bathroom independently; no c/o pain. Voiding without difficulty. Electrolytes replaced as ordered. All VSS; no acute events so far this shift.  Pt remaining free from falls or injury throughout shift; bed locked and in lowest position; call light within reach.  Pt instructed to call for assistance as needed.  Q1H rounding done on pt. WCTM.

## 2022-05-04 NOTE — ASSESSMENT & PLAN NOTE
Disease status: [place]  Type of transplant: Auto  Conditioning regimen: Melphalan  Karnofsky Score: 90%  Post transplant maintenance plans: Not Yet  Today is D +9  She received 8 bags of CD34 cells with total dose of 3.97x10^6.     Planned conditioning regimen:  Melphalan on Day -1     Antimicrobial Prophylaxis:  Acyclovir starting on Day -1  Levofloxacin starting on Day -1  Fluconazole starting on Day -1     Growth Factor Support:  Neupogen starting on Day +7      Caregiver:   Post-transplant discharge plans: [facility]

## 2022-05-04 NOTE — ASSESSMENT & PLAN NOTE
Starting calcium carbonate 500 mg BID 5/2  Increased to 1 gm BID 5/3/22  1 gm IV calcium 5/4/22  Repeat cmp in am

## 2022-05-05 LAB
ALBUMIN SERPL BCP-MCNC: 2.5 G/DL (ref 3.5–5.2)
ALP SERPL-CCNC: 52 U/L (ref 55–135)
ALT SERPL W/O P-5'-P-CCNC: 22 U/L (ref 10–44)
ANION GAP SERPL CALC-SCNC: 4 MMOL/L (ref 8–16)
AST SERPL-CCNC: 13 U/L (ref 10–40)
BASOPHILS # BLD AUTO: 0 K/UL (ref 0–0.2)
BASOPHILS NFR BLD: 0 % (ref 0–1.9)
BILIRUB SERPL-MCNC: 0.5 MG/DL (ref 0.1–1)
BUN SERPL-MCNC: 3 MG/DL (ref 8–23)
CALCIUM SERPL-MCNC: 6.9 MG/DL (ref 8.7–10.5)
CHLORIDE SERPL-SCNC: 110 MMOL/L (ref 95–110)
CO2 SERPL-SCNC: 25 MMOL/L (ref 23–29)
CREAT SERPL-MCNC: 0.7 MG/DL (ref 0.5–1.4)
DIFFERENTIAL METHOD: ABNORMAL
EOSINOPHIL # BLD AUTO: 0 K/UL (ref 0–0.5)
EOSINOPHIL NFR BLD: 0 % (ref 0–8)
ERYTHROCYTE [DISTWIDTH] IN BLOOD BY AUTOMATED COUNT: 12.9 % (ref 11.5–14.5)
EST. GFR  (AFRICAN AMERICAN): >60 ML/MIN/1.73 M^2
EST. GFR  (NON AFRICAN AMERICAN): >60 ML/MIN/1.73 M^2
GLUCOSE SERPL-MCNC: 81 MG/DL (ref 70–110)
HCT VFR BLD AUTO: 21 % (ref 37–48.5)
HGB BLD-MCNC: 7.1 G/DL (ref 12–16)
IMM GRANULOCYTES # BLD AUTO: 0 K/UL (ref 0–0.04)
IMM GRANULOCYTES NFR BLD AUTO: 0 % (ref 0–0.5)
LYMPHOCYTES # BLD AUTO: 0.3 K/UL (ref 1–4.8)
LYMPHOCYTES NFR BLD: 78.6 % (ref 18–48)
MAGNESIUM SERPL-MCNC: 1.7 MG/DL (ref 1.6–2.6)
MCH RBC QN AUTO: 32.4 PG (ref 27–31)
MCHC RBC AUTO-ENTMCNC: 33.8 G/DL (ref 32–36)
MCV RBC AUTO: 96 FL (ref 82–98)
MONOCYTES # BLD AUTO: 0.1 K/UL (ref 0.3–1)
MONOCYTES NFR BLD: 19 % (ref 4–15)
NEUTROPHILS # BLD AUTO: 0 K/UL (ref 1.8–7.7)
NEUTROPHILS NFR BLD: 2.4 % (ref 38–73)
NRBC BLD-RTO: 0 /100 WBC
PHOSPHATE SERPL-MCNC: 2.4 MG/DL (ref 2.7–4.5)
PLATELET # BLD AUTO: 14 K/UL (ref 150–450)
PLATELET BLD QL SMEAR: ABNORMAL
PMV BLD AUTO: 10.8 FL (ref 9.2–12.9)
POTASSIUM SERPL-SCNC: 3.7 MMOL/L (ref 3.5–5.1)
PROT SERPL-MCNC: 4.3 G/DL (ref 6–8.4)
RBC # BLD AUTO: 2.19 M/UL (ref 4–5.4)
SODIUM SERPL-SCNC: 139 MMOL/L (ref 136–145)
WBC # BLD AUTO: 0.42 K/UL (ref 3.9–12.7)

## 2022-05-05 PROCEDURE — 20600001 HC STEP DOWN PRIVATE ROOM

## 2022-05-05 PROCEDURE — 80053 COMPREHEN METABOLIC PANEL: CPT | Performed by: NURSE PRACTITIONER

## 2022-05-05 PROCEDURE — 25000003 PHARM REV CODE 250: Performed by: INTERNAL MEDICINE

## 2022-05-05 PROCEDURE — 83735 ASSAY OF MAGNESIUM: CPT | Performed by: NURSE PRACTITIONER

## 2022-05-05 PROCEDURE — 85025 COMPLETE CBC W/AUTO DIFF WBC: CPT | Performed by: NURSE PRACTITIONER

## 2022-05-05 PROCEDURE — 99233 PR SUBSEQUENT HOSPITAL CARE,LEVL III: ICD-10-PCS | Mod: GC,,, | Performed by: INTERNAL MEDICINE

## 2022-05-05 PROCEDURE — 99233 SBSQ HOSP IP/OBS HIGH 50: CPT | Mod: GC,,, | Performed by: INTERNAL MEDICINE

## 2022-05-05 PROCEDURE — 63600175 PHARM REV CODE 636 W HCPCS: Performed by: INTERNAL MEDICINE

## 2022-05-05 PROCEDURE — 25000003 PHARM REV CODE 250: Performed by: STUDENT IN AN ORGANIZED HEALTH CARE EDUCATION/TRAINING PROGRAM

## 2022-05-05 PROCEDURE — 25000003 PHARM REV CODE 250: Performed by: NURSE PRACTITIONER

## 2022-05-05 PROCEDURE — 84100 ASSAY OF PHOSPHORUS: CPT | Performed by: NURSE PRACTITIONER

## 2022-05-05 RX ORDER — CALCIUM CHLORIDE IN 0.9 % NACL 1 G/100 ML
1 INTRAVENOUS SOLUTION, PIGGYBACK (ML) INTRAVENOUS ONCE
Status: COMPLETED | OUTPATIENT
Start: 2022-05-05 | End: 2022-05-05

## 2022-05-05 RX ORDER — DIPHENHYDRAMINE HCL 25 MG
25 CAPSULE ORAL EVERY 6 HOURS PRN
Status: DISCONTINUED | OUTPATIENT
Start: 2022-05-05 | End: 2022-05-08 | Stop reason: HOSPADM

## 2022-05-05 RX ADMIN — ONDANSETRON 8 MG: 2 INJECTION INTRAMUSCULAR; INTRAVENOUS at 06:05

## 2022-05-05 RX ADMIN — ALUMINUM HYDROXIDE, MAGNESIUM HYDROXIDE, AND DIMETHICONE 10 ML: 400; 400; 40 SUSPENSION ORAL at 09:05

## 2022-05-05 RX ADMIN — HEPARIN SODIUM 1600 UNITS: 1000 INJECTION, SOLUTION INTRAVENOUS; SUBCUTANEOUS at 12:05

## 2022-05-05 RX ADMIN — Medication 1 DOSE: at 09:05

## 2022-05-05 RX ADMIN — LOPERAMIDE HYDROCHLORIDE 2 MG: 2 CAPSULE ORAL at 01:05

## 2022-05-05 RX ADMIN — ACYCLOVIR SODIUM 400 MG: 1000 INJECTION, SOLUTION INTRAVENOUS at 10:05

## 2022-05-05 RX ADMIN — POTASSIUM & SODIUM PHOSPHATES POWDER PACK 280-160-250 MG 1 PACKET: 280-160-250 PACK at 05:05

## 2022-05-05 RX ADMIN — Medication 400 MG: at 09:05

## 2022-05-05 RX ADMIN — LOPERAMIDE HYDROCHLORIDE 2 MG: 2 CAPSULE ORAL at 09:05

## 2022-05-05 RX ADMIN — HYDROCORTISONE: 25 CREAM TOPICAL at 09:05

## 2022-05-05 RX ADMIN — Medication 1 DOSE: at 06:05

## 2022-05-05 RX ADMIN — PANTOPRAZOLE SODIUM 40 MG: 40 TABLET, DELAYED RELEASE ORAL at 09:05

## 2022-05-05 RX ADMIN — ONDANSETRON 1 G: 2 INJECTION INTRAMUSCULAR; INTRAVENOUS at 10:05

## 2022-05-05 RX ADMIN — Medication 1 DOSE: at 12:05

## 2022-05-05 RX ADMIN — Medication 400 MG: at 01:05

## 2022-05-05 RX ADMIN — FILGRASTIM 480 MCG: 480 INJECTION, SOLUTION INTRAVENOUS; SUBCUTANEOUS at 09:05

## 2022-05-05 RX ADMIN — HEPARIN SODIUM 1600 UNITS: 1000 INJECTION, SOLUTION INTRAVENOUS; SUBCUTANEOUS at 01:05

## 2022-05-05 RX ADMIN — LOPERAMIDE HYDROCHLORIDE 2 MG: 2 CAPSULE ORAL at 06:05

## 2022-05-05 RX ADMIN — POTASSIUM & SODIUM PHOSPHATES POWDER PACK 280-160-250 MG 1 PACKET: 280-160-250 PACK at 09:05

## 2022-05-05 RX ADMIN — LOPERAMIDE HYDROCHLORIDE 2 MG: 2 CAPSULE ORAL at 10:05

## 2022-05-05 RX ADMIN — FLUTICASONE PROPIONATE 50 MCG: 50 SPRAY, METERED NASAL at 09:05

## 2022-05-05 RX ADMIN — OLANZAPINE 7.5 MG: 2.5 TABLET, FILM COATED ORAL at 09:05

## 2022-05-05 RX ADMIN — ONDANSETRON 8 MG: 2 INJECTION INTRAMUSCULAR; INTRAVENOUS at 01:05

## 2022-05-05 RX ADMIN — ALUMINUM HYDROXIDE, MAGNESIUM HYDROXIDE, AND DIMETHICONE 10 ML: 400; 400; 40 SUSPENSION ORAL at 06:05

## 2022-05-05 RX ADMIN — POTASSIUM & SODIUM PHOSPHATES POWDER PACK 280-160-250 MG 1 PACKET: 280-160-250 PACK at 01:05

## 2022-05-05 RX ADMIN — ONDANSETRON 8 MG: 2 INJECTION INTRAMUSCULAR; INTRAVENOUS at 09:05

## 2022-05-05 RX ADMIN — CITALOPRAM HYDROBROMIDE 10 MG: 10 TABLET ORAL at 06:05

## 2022-05-05 RX ADMIN — DIPHENHYDRAMINE HYDROCHLORIDE 25 MG: 25 CAPSULE ORAL at 09:05

## 2022-05-05 RX ADMIN — ALUMINUM HYDROXIDE, MAGNESIUM HYDROXIDE, AND DIMETHICONE 10 ML: 400; 400; 40 SUSPENSION ORAL at 12:05

## 2022-05-05 RX ADMIN — DIPHENHYDRAMINE HYDROCHLORIDE 25 MG: 25 CAPSULE ORAL at 10:05

## 2022-05-05 RX ADMIN — FLUCONAZOLE 400 MG: 2 INJECTION, SOLUTION INTRAVENOUS at 10:05

## 2022-05-05 RX ADMIN — HYDROCORTISONE: 25 CREAM TOPICAL at 03:05

## 2022-05-05 RX ADMIN — LEVOFLOXACIN 500 MG: 500 INJECTION, SOLUTION INTRAVENOUS at 10:05

## 2022-05-05 RX ADMIN — POTASSIUM CHLORIDE 20 MEQ: 20 TABLET, EXTENDED RELEASE ORAL at 09:05

## 2022-05-05 NOTE — SUBJECTIVE & OBJECTIVE
Subjective:     Interval History: D+10 Ju AUTO. Nausea/diarrhea controlled. Had some rash after calcium supplements PO. Will switch to IV. ANC still 0.     Objective:     Vital Signs (Most Recent):  Temp: 98.6 °F (37 °C) (05/05/22 0801)  Pulse: 99 (05/05/22 0801)  Resp: 18 (05/05/22 0801)  BP: (!) 96/54 (05/05/22 0801)  SpO2: 95 % (05/05/22 0801)   Vital Signs (24h Range):  Temp:  [98.1 °F (36.7 °C)-99.8 °F (37.7 °C)] 98.6 °F (37 °C)  Pulse:  [84-99] 99  Resp:  [17-19] 18  SpO2:  [91 %-98 %] 95 %  BP: ()/(54-84) 96/54     Weight: 66.7 kg (147 lb 0.8 oz)  Body mass index is 23.03 kg/m².  Body surface area is 1.78 meters squared.    ECOG SCORE           2    Intake/Output - Last 3 Shifts         05/03 0700  05/04 0659 05/04 0700  05/05 0659 05/05 0700  05/06 0659    P.O. 550 900     I.V. (mL/kg) 1339.8 (20.3) 1471.2 (22.1)     Blood  602     IV Piggyback 248.1      Total Intake(mL/kg) 2137.9 (32.3) 2973.2 (44.6)     Urine (mL/kg/hr) 650 (0.4) 1440 (0.9) 300 (2.4)    Stool  0     Total Output 650 1440 300    Net +1487.9 +1533.2 -300           Urine Occurrence 4 x      Stool Occurrence  5 x             Physical Exam  Alert awake oriented x3  Regular rate and rhythm  Oral mucosa moist and clear, no ulcers   Lungs clear to auscultation bilaterally, no crackles no wheezing   Abdomen soft nontender  No lower extremity edema      Significant Labs:   All pertinent labs from the last 24 hours have been reviewed.    Diagnostic Results:  I have reviewed all pertinent imaging results/findings within the past 24 hours.

## 2022-05-05 NOTE — PLAN OF CARE
Plan of care reviewed with patient. Pt is day +10 of an Ju Auto SCT. Afebrile. Free from falls or injury. No complaints of pain. NS20K infusing at 75. IV Zofran given as scheduled. No complaints of nausea. Bed locked in lowest position, non skid socks on, call light within reach. Pt instructed to call if any assistance is needed. Vitals stable.  at bedside. Will cont to cosme pt.

## 2022-05-05 NOTE — PROGRESS NOTES
[facility] - Oncology ([facility])  Hematology  Bone Marrow Transplant  Progress Note    Patient Name: Caty Diaz  Admission Date: 4/23/2022  Hospital Length of Stay: 12 days  Code Status: Full Code    Subjective:     Interval History: D+10 Ju AUTO. Nausea/diarrhea controlled. Had some rash after calcium supplements PO. Will switch to IV. ANC still 0.     Objective:     Vital Signs (Most Recent):  Temp: 98.6 °F (37 °C) (05/05/22 0801)  Pulse: 99 (05/05/22 0801)  Resp: 18 (05/05/22 0801)  BP: (!) 96/54 (05/05/22 0801)  SpO2: 95 % (05/05/22 0801)   Vital Signs (24h Range):  Temp:  [98.1 °F (36.7 °C)-99.8 °F (37.7 °C)] 98.6 °F (37 °C)  Pulse:  [84-99] 99  Resp:  [17-19] 18  SpO2:  [91 %-98 %] 95 %  BP: ()/(54-84) 96/54     Weight: 66.7 kg (147 lb 0.8 oz)  Body mass index is 23.03 kg/m².  Body surface area is 1.78 meters squared.    ECOG SCORE           2    Intake/Output - Last 3 Shifts         05/03 0700  05/04 0659 05/04 0700  05/05 0659 05/05 0700  05/06 0659    P.O. 550 900     I.V. (mL/kg) 1339.8 (20.3) 1471.2 (22.1)     Blood  602     IV Piggyback 248.1      Total Intake(mL/kg) 2137.9 (32.3) 2973.2 (44.6)     Urine (mL/kg/hr) 650 (0.4) 1440 (0.9) 300 (2.4)    Stool  0     Total Output 650 1440 300    Net +1487.9 +1533.2 -300           Urine Occurrence 4 x      Stool Occurrence  5 x             Physical Exam  Alert awake oriented x3  Regular rate and rhythm  Oral mucosa moist and clear, no ulcers   Lungs clear to auscultation bilaterally, no crackles no wheezing   Abdomen soft nontender  No lower extremity edema      Significant Labs:   All pertinent labs from the last 24 hours have been reviewed.    Diagnostic Results:  I have reviewed all pertinent imaging results/findings within the past 24 hours.    Assessment/Plan:     * Status post autologous bone marrow transplant  Disease status: [place]  Type of transplant: Auto  Conditioning regimen: Melphalan  Karnofsky Score: 90%  Post transplant maintenance plans:  Not Yet  Today is D +10  She received 8 bags of CD34 cells with total dose of 3.97x10^6.     Planned conditioning regimen:  Melphalan on Day -1     Antimicrobial Prophylaxis:  Acyclovir starting on Day -1  Levofloxacin starting on Day -1  Fluconazole starting on Day -1     Growth Factor Support:  Neupogen starting on Day +7      Caregiver:   Post-transplant discharge plans: [facility]       Hypocalcemia  DC PO Calcium due to rash  1 gm IV calcium 5/4/22  1 gm IV calcium 5/5/22  Repeat cmp in am    Nausea  Scheduled her Zofran IV  Compazine and ativan prn  - zyprexa nightly increased to 7.5 mg 5/3/22    Gastroesophageal reflux disease without esophagitis  - continue Protonix nightly    Thrombocytopenia  - due to chemo and stem cell collection  - daily CBC  - transfuse for platelets count <10 or <50 pre-procedure or bleeding  - hold Lovenox when platelets <50k    Anemia associated with chemotherapy  - daily CBC  - transfuse for hgb <7    Multiple myeloma  - R-ISS II IgG lambda multiple myeloma, She had increased 1q21 copy number and has t(4,14), both high risk markers in multiple myeloma. She also has monosomy 13 on FISH. She did not have baseline PET CT.   - She has received 6 cycles of RVd, followed by 2 cycles of velcade-dexamethasone. She has tolerated the treatments well.    - PET CT on 3/31/22 did not demonstrate any hypermetabolic lytic or soft tissue lesions. BM biopsy on 3/21/22 showed 10% plasma cells ( versus 50% at diagnosis).  - Admitting for transplant as above.    Anxiety  - The patient states that she takes half of a clonazepam pill at night to help her sleep but would like to consider stopping this medication.           VTE Risk Mitigation (From admission, onward)         Ordered     heparin (porcine) injection 1,600 Units  As needed (PRN)         04/23/22 1520     IP VTE HIGH RISK PATIENT  Once         04/23/22 1311     Place sequential compression device  Until discontinued          04/23/22 1311                Disposition: remains inpatient     Mitra Siu MD  Bone Marrow Transplant  [facility] - Oncology ([facility])

## 2022-05-05 NOTE — PLAN OF CARE
Pt. Is day +10 of AutoSCT.  Pt. with nonskid footwear on with bed in lowest position and locked with bed rails up x2.  Pt. ambulates independently and instructed to call if assistance is needed.  Pt. with call light within reach.  Pt. Received electrolyte replacements PRN today.  Pt. No complaints today.  IVF's D/C'd.  Pt. With a good appetite.  Pt. With  at bedside.   Will continue to monitor pt.

## 2022-05-05 NOTE — ASSESSMENT & PLAN NOTE
Disease status: ID  Type of transplant: Auto  Conditioning regimen: Melphalan  Karnofsky Score: 90%  Post transplant maintenance plans: Not Yet  Today is D +10  She received 8 bags of CD34 cells with total dose of 3.97x10^6.     Planned conditioning regimen:  Melphalan on Day -1     Antimicrobial Prophylaxis:  Acyclovir starting on Day -1  Levofloxacin starting on Day -1  Fluconazole starting on Day -1     Growth Factor Support:  Neupogen starting on Day +7      Caregiver:   Post-transplant discharge plans: [facility]

## 2022-05-06 PROBLEM — E83.51 HYPOCALCEMIA: Status: RESOLVED | Noted: 2022-05-02 | Resolved: 2022-05-06

## 2022-05-06 LAB
ABO + RH BLD: NORMAL
ALBUMIN SERPL BCP-MCNC: 2.5 G/DL (ref 3.5–5.2)
ALP SERPL-CCNC: 48 U/L (ref 55–135)
ALT SERPL W/O P-5'-P-CCNC: 19 U/L (ref 10–44)
ANION GAP SERPL CALC-SCNC: 5 MMOL/L (ref 8–16)
ANISOCYTOSIS BLD QL SMEAR: SLIGHT
AST SERPL-CCNC: 12 U/L (ref 10–40)
BASOPHILS # BLD AUTO: 0 K/UL (ref 0–0.2)
BASOPHILS NFR BLD: 0 % (ref 0–1.9)
BILIRUB SERPL-MCNC: 0.5 MG/DL (ref 0.1–1)
BLD GP AB SCN CELLS X3 SERPL QL: NORMAL
BLD PROD TYP BPU: NORMAL
BLOOD UNIT EXPIRATION DATE: NORMAL
BLOOD UNIT TYPE CODE: 7300
BLOOD UNIT TYPE: NORMAL
BUN SERPL-MCNC: 3 MG/DL (ref 8–23)
CALCIUM SERPL-MCNC: 7.7 MG/DL (ref 8.7–10.5)
CHLORIDE SERPL-SCNC: 107 MMOL/L (ref 95–110)
CO2 SERPL-SCNC: 27 MMOL/L (ref 23–29)
CODING SYSTEM: NORMAL
CREAT SERPL-MCNC: 0.7 MG/DL (ref 0.5–1.4)
DIFFERENTIAL METHOD: ABNORMAL
DISPENSE STATUS: NORMAL
EOSINOPHIL # BLD AUTO: 0 K/UL (ref 0–0.5)
EOSINOPHIL NFR BLD: 0 % (ref 0–8)
ERYTHROCYTE [DISTWIDTH] IN BLOOD BY AUTOMATED COUNT: 13.1 % (ref 11.5–14.5)
EST. GFR  (AFRICAN AMERICAN): >60 ML/MIN/1.73 M^2
EST. GFR  (NON AFRICAN AMERICAN): >60 ML/MIN/1.73 M^2
GLUCOSE SERPL-MCNC: 82 MG/DL (ref 70–110)
HCT VFR BLD AUTO: 20.9 % (ref 37–48.5)
HGB BLD-MCNC: 7.2 G/DL (ref 12–16)
HYPOCHROMIA BLD QL SMEAR: ABNORMAL
IMM GRANULOCYTES # BLD AUTO: 0 K/UL (ref 0–0.04)
IMM GRANULOCYTES NFR BLD AUTO: 0 % (ref 0–0.5)
LYMPHOCYTES # BLD AUTO: 0.4 K/UL (ref 1–4.8)
LYMPHOCYTES NFR BLD: 63.3 % (ref 18–48)
MAGNESIUM SERPL-MCNC: 1.6 MG/DL (ref 1.6–2.6)
MCH RBC QN AUTO: 32.4 PG (ref 27–31)
MCHC RBC AUTO-ENTMCNC: 34.4 G/DL (ref 32–36)
MCV RBC AUTO: 94 FL (ref 82–98)
MONOCYTES # BLD AUTO: 0.2 K/UL (ref 0.3–1)
MONOCYTES NFR BLD: 26.7 % (ref 4–15)
NEUTROPHILS # BLD AUTO: 0.1 K/UL (ref 1.8–7.7)
NEUTROPHILS NFR BLD: 10 % (ref 38–73)
NRBC BLD-RTO: 0 /100 WBC
OVALOCYTES BLD QL SMEAR: ABNORMAL
PHOSPHATE SERPL-MCNC: 3 MG/DL (ref 2.7–4.5)
PLATELET # BLD AUTO: 8 K/UL (ref 150–450)
PLATELET BLD QL SMEAR: ABNORMAL
PMV BLD AUTO: 12.1 FL (ref 9.2–12.9)
POIKILOCYTOSIS BLD QL SMEAR: SLIGHT
POLYCHROMASIA BLD QL SMEAR: ABNORMAL
POTASSIUM SERPL-SCNC: 3.7 MMOL/L (ref 3.5–5.1)
PROT SERPL-MCNC: 4.6 G/DL (ref 6–8.4)
RBC # BLD AUTO: 2.22 M/UL (ref 4–5.4)
SODIUM SERPL-SCNC: 139 MMOL/L (ref 136–145)
UNIT NUMBER: NORMAL
WBC # BLD AUTO: 0.6 K/UL (ref 3.9–12.7)

## 2022-05-06 PROCEDURE — 99233 PR SUBSEQUENT HOSPITAL CARE,LEVL III: ICD-10-PCS | Mod: ,,, | Performed by: INTERNAL MEDICINE

## 2022-05-06 PROCEDURE — P9037 PLATE PHERES LEUKOREDU IRRAD: HCPCS | Performed by: INTERNAL MEDICINE

## 2022-05-06 PROCEDURE — 84100 ASSAY OF PHOSPHORUS: CPT | Performed by: NURSE PRACTITIONER

## 2022-05-06 PROCEDURE — 25000003 PHARM REV CODE 250: Performed by: INTERNAL MEDICINE

## 2022-05-06 PROCEDURE — 25000003 PHARM REV CODE 250: Performed by: STUDENT IN AN ORGANIZED HEALTH CARE EDUCATION/TRAINING PROGRAM

## 2022-05-06 PROCEDURE — 85025 COMPLETE CBC W/AUTO DIFF WBC: CPT | Performed by: NURSE PRACTITIONER

## 2022-05-06 PROCEDURE — 25000003 PHARM REV CODE 250: Performed by: NURSE PRACTITIONER

## 2022-05-06 PROCEDURE — 20600001 HC STEP DOWN PRIVATE ROOM

## 2022-05-06 PROCEDURE — 99233 SBSQ HOSP IP/OBS HIGH 50: CPT | Mod: ,,, | Performed by: INTERNAL MEDICINE

## 2022-05-06 PROCEDURE — 80053 COMPREHEN METABOLIC PANEL: CPT | Performed by: NURSE PRACTITIONER

## 2022-05-06 PROCEDURE — 86850 RBC ANTIBODY SCREEN: CPT | Performed by: INTERNAL MEDICINE

## 2022-05-06 PROCEDURE — 83735 ASSAY OF MAGNESIUM: CPT | Performed by: NURSE PRACTITIONER

## 2022-05-06 PROCEDURE — 63600175 PHARM REV CODE 636 W HCPCS: Mod: JG | Performed by: INTERNAL MEDICINE

## 2022-05-06 PROCEDURE — 63600175 PHARM REV CODE 636 W HCPCS: Performed by: INTERNAL MEDICINE

## 2022-05-06 RX ORDER — ACYCLOVIR 800 MG/1
800 TABLET ORAL 2 TIMES DAILY
Status: DISCONTINUED | OUTPATIENT
Start: 2022-05-06 | End: 2022-05-08 | Stop reason: HOSPADM

## 2022-05-06 RX ORDER — HYDROCODONE BITARTRATE AND ACETAMINOPHEN 500; 5 MG/1; MG/1
TABLET ORAL
Status: DISCONTINUED | OUTPATIENT
Start: 2022-05-06 | End: 2022-05-08 | Stop reason: HOSPADM

## 2022-05-06 RX ORDER — ONDANSETRON 8 MG/1
8 TABLET, ORALLY DISINTEGRATING ORAL EVERY 8 HOURS PRN
Status: DISCONTINUED | OUTPATIENT
Start: 2022-05-06 | End: 2022-05-08 | Stop reason: HOSPADM

## 2022-05-06 RX ORDER — FLUCONAZOLE 200 MG/1
400 TABLET ORAL DAILY
Status: DISCONTINUED | OUTPATIENT
Start: 2022-05-06 | End: 2022-05-07

## 2022-05-06 RX ORDER — LOPERAMIDE HYDROCHLORIDE 2 MG/1
2 CAPSULE ORAL 4 TIMES DAILY PRN
Qty: 30 CAPSULE | Refills: 0 | Status: CANCELLED | OUTPATIENT
Start: 2022-05-06

## 2022-05-06 RX ORDER — LEVOFLOXACIN 500 MG/1
500 TABLET, FILM COATED ORAL DAILY
Status: DISCONTINUED | OUTPATIENT
Start: 2022-05-06 | End: 2022-05-07

## 2022-05-06 RX ORDER — PROCHLORPERAZINE EDISYLATE 5 MG/ML
10 INJECTION INTRAMUSCULAR; INTRAVENOUS EVERY 6 HOURS PRN
Status: DISCONTINUED | OUTPATIENT
Start: 2022-05-06 | End: 2022-05-08 | Stop reason: HOSPADM

## 2022-05-06 RX ORDER — LOPERAMIDE HYDROCHLORIDE 2 MG/1
2 CAPSULE ORAL 4 TIMES DAILY PRN
Status: DISCONTINUED | OUTPATIENT
Start: 2022-05-06 | End: 2022-05-08 | Stop reason: HOSPADM

## 2022-05-06 RX ADMIN — ALUMINUM HYDROXIDE, MAGNESIUM HYDROXIDE, AND DIMETHICONE 10 ML: 400; 400; 40 SUSPENSION ORAL at 08:05

## 2022-05-06 RX ADMIN — HYDROCORTISONE: 25 CREAM TOPICAL at 08:05

## 2022-05-06 RX ADMIN — LEVOFLOXACIN 500 MG: 500 TABLET, FILM COATED ORAL at 08:05

## 2022-05-06 RX ADMIN — Medication 400 MG: at 02:05

## 2022-05-06 RX ADMIN — FLUTICASONE PROPIONATE 50 MCG: 50 SPRAY, METERED NASAL at 08:05

## 2022-05-06 RX ADMIN — FILGRASTIM 480 MCG: 480 INJECTION, SOLUTION INTRAVENOUS; SUBCUTANEOUS at 08:05

## 2022-05-06 RX ADMIN — Medication 1 DOSE: at 08:05

## 2022-05-06 RX ADMIN — POTASSIUM CHLORIDE 20 MEQ: 20 TABLET, EXTENDED RELEASE ORAL at 06:05

## 2022-05-06 RX ADMIN — LOPERAMIDE HYDROCHLORIDE 2 MG: 2 CAPSULE ORAL at 08:05

## 2022-05-06 RX ADMIN — FLUCONAZOLE 400 MG: 200 TABLET ORAL at 08:05

## 2022-05-06 RX ADMIN — OLANZAPINE 7.5 MG: 2.5 TABLET, FILM COATED ORAL at 08:05

## 2022-05-06 RX ADMIN — Medication 1 DOSE: at 02:05

## 2022-05-06 RX ADMIN — PANTOPRAZOLE SODIUM 40 MG: 40 TABLET, DELAYED RELEASE ORAL at 08:05

## 2022-05-06 RX ADMIN — ONDANSETRON 8 MG: 2 INJECTION INTRAMUSCULAR; INTRAVENOUS at 06:05

## 2022-05-06 RX ADMIN — Medication 400 MG: at 10:05

## 2022-05-06 RX ADMIN — CITALOPRAM HYDROBROMIDE 10 MG: 10 TABLET ORAL at 06:05

## 2022-05-06 RX ADMIN — ACYCLOVIR 800 MG: 800 TABLET ORAL at 08:05

## 2022-05-06 RX ADMIN — Medication 400 MG: at 06:05

## 2022-05-06 NOTE — PLAN OF CARE
Patient AAOX4, up independently to bathroom, and ambulating in hallway this shift.  at bedside and involved in care. Day + 11 AUTO SCT. Medications switched to PO today. Magnesium replaced. Discharge teaching completed by pharmacy and NP for possible weekend discharge. Patient stable at this time.

## 2022-05-06 NOTE — ASSESSMENT & PLAN NOTE
Disease status: DE  Type of transplant: Auto  Conditioning regimen: Melphalan  Karnofsky Score: 90%  Post transplant maintenance plans: Not Yet  Today is D +11  She received 8 bags of CD34 cells with total dose of 3.97x10^6.     Planned conditioning regimen:  Melphalan on Day -1     Antimicrobial Prophylaxis:  Acyclovir starting on Day -1  Levofloxacin starting on Day -1  Fluconazole starting on Day -1     Growth Factor Support:  Neupogen starting on Day +7      Caregiver:   Post-transplant discharge plans: [facility]

## 2022-05-06 NOTE — SUBJECTIVE & OBJECTIVE
Subjective:     Interval History: D+11 PIPPA AUTO, vitals are within normal range, weight is stable. ANC 60, plts 8k receiving 1 unit plts this morning. calcium normalized. switching antimicrobials to PO.     Objective:     Vital Signs (Most Recent):  Temp: 98 °F (36.7 °C) (05/06/22 0706)  Pulse: 94 (05/06/22 0706)  Resp: 16 (05/06/22 0706)  BP: 112/61 (05/06/22 0706)  SpO2: (!) 94 % (05/06/22 0706)   Vital Signs (24h Range):  Temp:  [98 °F (36.7 °C)-99.7 °F (37.6 °C)] 98 °F (36.7 °C)  Pulse:  [] 94  Resp:  [16-20] 16  SpO2:  [93 %-96 %] 94 %  BP: ()/(50-62) 112/61     Weight: 66.3 kg (146 lb 0.9 oz)  Body mass index is 22.88 kg/m².  Body surface area is 1.77 meters squared.    ECOG SCORE           2    Intake/Output - Last 3 Shifts         05/04 0700  05/05 0659 05/05 0700  05/06 0659 05/06 0700  05/07 0659    P.O. 900 840     I.V. (mL/kg) 1471.2 (22.1) 495.3 (7.5)     Blood 602 288     IV Piggyback  542.2     Total Intake(mL/kg) 2973.2 (44.6) 2165.5 (32.7)     Urine (mL/kg/hr) 1440 (0.9) 1600 (1)     Stool 0 0     Total Output 1440 1600     Net +1533.2 +565.5            Urine Occurrence  4 x     Stool Occurrence 5 x 5 x             Physical Exam  Alert awake oriented x3  Regular rate and rhythm  Oral mucosa moist and clear, no ulcers   Lungs clear to auscultation bilaterally, no crackles no wheezing   Abdomen soft nontender  No lower extremity edema      Significant Labs:   All pertinent labs from the last 24 hours have been reviewed.    Diagnostic Results:  I have reviewed all pertinent imaging results/findings within the past 24 hours.

## 2022-05-06 NOTE — PROGRESS NOTES
[facility] - Oncology ([facility])  Hematology  Bone Marrow Transplant  Progress Note    Patient Name: Caty Diaz  Admission Date: 4/23/2022  Hospital Length of Stay: 13 days  Code Status: Full Code    Subjective:     Interval History: D+11 PIPPA AUTO, vitals are within normal range, weight is stable. ANC 60, plts 8k receiving 1 unit plts this morning. calcium normalized. switching antimicrobials to PO.     Objective:     Vital Signs (Most Recent):  Temp: 98 °F (36.7 °C) (05/06/22 0706)  Pulse: 94 (05/06/22 0706)  Resp: 16 (05/06/22 0706)  BP: 112/61 (05/06/22 0706)  SpO2: (!) 94 % (05/06/22 0706)   Vital Signs (24h Range):  Temp:  [98 °F (36.7 °C)-99.7 °F (37.6 °C)] 98 °F (36.7 °C)  Pulse:  [] 94  Resp:  [16-20] 16  SpO2:  [93 %-96 %] 94 %  BP: ()/(50-62) 112/61     Weight: 66.3 kg (146 lb 0.9 oz)  Body mass index is 22.88 kg/m².  Body surface area is 1.77 meters squared.    ECOG SCORE           2    Intake/Output - Last 3 Shifts         05/04 0700  05/05 0659 05/05 0700  05/06 0659 05/06 0700  05/07 0659    P.O. 900 840     I.V. (mL/kg) 1471.2 (22.1) 495.3 (7.5)     Blood 602 288     IV Piggyback  542.2     Total Intake(mL/kg) 2973.2 (44.6) 2165.5 (32.7)     Urine (mL/kg/hr) 1440 (0.9) 1600 (1)     Stool 0 0     Total Output 1440 1600     Net +1533.2 +565.5            Urine Occurrence  4 x     Stool Occurrence 5 x 5 x             Physical Exam  Alert awake oriented x3  Regular rate and rhythm  Oral mucosa moist and clear, no ulcers   Lungs clear to auscultation bilaterally, no crackles no wheezing   Abdomen soft nontender  No lower extremity edema      Significant Labs:   All pertinent labs from the last 24 hours have been reviewed.    Diagnostic Results:  I have reviewed all pertinent imaging results/findings within the past 24 hours.    Assessment/Plan:     * Status post autologous bone marrow transplant  Disease status: DC  Type of transplant: Auto  Conditioning regimen: Melphalan  Karnofsky Score:  90%  Post transplant maintenance plans: Not Yet  Today is D +11  She received 8 bags of CD34 cells with total dose of 3.97x10^6.     Planned conditioning regimen:  Melphalan on Day -1     Antimicrobial Prophylaxis:  Acyclovir starting on Day -1  Levofloxacin starting on Day -1  Fluconazole starting on Day -1     Growth Factor Support:  Neupogen starting on Day +7      Caregiver:   Post-transplant discharge plans: [facility]       Nausea  Scheduled her Zofran IV  Compazine and ativan prn  - zyprexa nightly increased to 7.5 mg 5/3/22    Gastroesophageal reflux disease without esophagitis  - continue Protonix nightly    Thrombocytopenia  - due to chemo and stem cell collection  - daily CBC  - transfuse for platelets count <10 or <50 pre-procedure or bleeding  - hold Lovenox when platelets <50k    Anemia associated with chemotherapy  - daily CBC  - transfuse for hgb <7    Multiple myeloma  - R-ISS II IgG lambda multiple myeloma, She had increased 1q21 copy number and has t(4,14), both high risk markers in multiple myeloma. She also has monosomy 13 on FISH. She did not have baseline PET CT.   - She has received 6 cycles of RVd, followed by 2 cycles of velcade-dexamethasone. She has tolerated the treatments well.    - PET CT on 3/31/22 did not demonstrate any hypermetabolic lytic or soft tissue lesions. BM biopsy on 3/21/22 showed 10% plasma cells ( versus 50% at diagnosis).  - Admitting for transplant as above.    Anxiety  - The patient states that she takes half of a clonazepam pill at night to help her sleep but would like to consider stopping this medication.           VTE Risk Mitigation (From admission, onward)         Ordered     heparin (porcine) injection 1,600 Units  As needed (PRN)         04/23/22 1520     IP VTE HIGH RISK PATIENT  Once         04/23/22 1311     Place sequential compression device  Until discontinued         04/23/22 1311                Disposition: inpatient     Mitra Kang  MD [place]  Bone Marrow Transplant  [facility] - Oncology ([facility])

## 2022-05-06 NOTE — PLAN OF CARE
Plan of care reviewed with patient and  this shift. Pt c/o itching and had Benadryl at bedtime. VS stable. Maintaining saturations on room air. 1u Plts to transfuse. IV Acyclovir, IV Zofran. Electrolyte replacement initiated. Fluids d/c'ed, both lumen of vas cath heparin locked. Pt had good output, 1 episode of diarrhea overnight. All questions and concerns addressed. Will continue to monitor.

## 2022-05-06 NOTE — PROGRESS NOTES
BMT Pharmacist Discharge Note     All discharge medications were reviewed with the patient and . 2 medications were sent to the [facility] pharmacy for bedside delivery: acyclovir, loperamide.     Medication Indication Morning Afternoon Evening/Night   **Acyclovir 800mg  Viral infection prevention 1 tablet  1 tablet   Ascorbic acid 500mg Supplement 1 tablet     Calcium carbonate (Oscal) Supplement 1 tablet     Citalopram 10mg Depression 1 tablet     Multivitamin Vitamin 1 tablet     Pantoprazole Stomach acid reducer 1 tablet     Vitamin D3 1000 units Supplement 1 tablet     Zinc gluconate 50mg Supplement  1 tablet     **new medication or change in medication at discharge    AS NEEDED MEDICATIONS:  **Loperamide (Imodium) 2mg four times a day as needed for diarrhea  Ondansetron (Zofran) 8mg every 8 hours as needed for nausea  Promethazine (Phenergan) 25mg every 6 hours as needed for nausea  Clonazepam 0.5mg two times daily as needed for anxiety  Calcium carbonate (TUMS) - 1 tablet daily as needed for heartburn  Sucralfate 1gm four times daily as needed for heartburn  Fluticasone nasal spray as needed for allergies      NO LONGER TAKE:   Chlorhexidine  Codeine-butalbital-aspirin-caffeine  Docusate sodium  GI cocktail  Revlimid  Polyethylene glycol      Notes:   I discussed the importance of taking acyclovir up until day +365 to prevent viral infections. Patient rarely has migraines, but was wondering what to do if she does develop one. Recommended she call clinic and they would most likely okay a one-time dose. Explained issues of aspirin and platelets and masking fever; however, a one-time dose will probably by okay.         All questions were answered.      Alley Beach, Pharm.D., [facility]  Clinical Pharmacy Specialist, Bone Marrow Transplant  [facility]  SpectraLink: [phone]

## 2022-05-07 PROBLEM — R11.0 NAUSEA: Status: RESOLVED | Noted: 2022-04-26 | Resolved: 2022-05-07

## 2022-05-07 LAB
ALBUMIN SERPL BCP-MCNC: 2.6 G/DL (ref 3.5–5.2)
ALP SERPL-CCNC: 52 U/L (ref 55–135)
ALT SERPL W/O P-5'-P-CCNC: 19 U/L (ref 10–44)
ANION GAP SERPL CALC-SCNC: 3 MMOL/L (ref 8–16)
ANISOCYTOSIS BLD QL SMEAR: SLIGHT
AST SERPL-CCNC: 11 U/L (ref 10–40)
BASOPHILS # BLD AUTO: 0.01 K/UL (ref 0–0.2)
BASOPHILS NFR BLD: 0.7 % (ref 0–1.9)
BILIRUB SERPL-MCNC: 0.5 MG/DL (ref 0.1–1)
BLD PROD TYP BPU: NORMAL
BLOOD UNIT EXPIRATION DATE: NORMAL
BLOOD UNIT TYPE CODE: 6200
BLOOD UNIT TYPE: NORMAL
BUN SERPL-MCNC: 3 MG/DL (ref 8–23)
CALCIUM SERPL-MCNC: 7.2 MG/DL (ref 8.7–10.5)
CHLORIDE SERPL-SCNC: 108 MMOL/L (ref 95–110)
CO2 SERPL-SCNC: 28 MMOL/L (ref 23–29)
CODING SYSTEM: NORMAL
CREAT SERPL-MCNC: 0.7 MG/DL (ref 0.5–1.4)
DIFFERENTIAL METHOD: ABNORMAL
DISPENSE STATUS: NORMAL
EOSINOPHIL # BLD AUTO: 0 K/UL (ref 0–0.5)
EOSINOPHIL NFR BLD: 0 % (ref 0–8)
ERYTHROCYTE [DISTWIDTH] IN BLOOD BY AUTOMATED COUNT: 13.2 % (ref 11.5–14.5)
EST. GFR  (AFRICAN AMERICAN): >60 ML/MIN/1.73 M^2
EST. GFR  (NON AFRICAN AMERICAN): >60 ML/MIN/1.73 M^2
GLUCOSE SERPL-MCNC: 79 MG/DL (ref 70–110)
HCT VFR BLD AUTO: 21.3 % (ref 37–48.5)
HGB BLD-MCNC: 7.2 G/DL (ref 12–16)
HYPOCHROMIA BLD QL SMEAR: ABNORMAL
IMM GRANULOCYTES # BLD AUTO: 0.01 K/UL (ref 0–0.04)
IMM GRANULOCYTES NFR BLD AUTO: 0.7 % (ref 0–0.5)
LYMPHOCYTES # BLD AUTO: 0.5 K/UL (ref 1–4.8)
LYMPHOCYTES NFR BLD: 35.5 % (ref 18–48)
MAGNESIUM SERPL-MCNC: 1.7 MG/DL (ref 1.6–2.6)
MCH RBC QN AUTO: 32.1 PG (ref 27–31)
MCHC RBC AUTO-ENTMCNC: 33.8 G/DL (ref 32–36)
MCV RBC AUTO: 95 FL (ref 82–98)
MONOCYTES # BLD AUTO: 0.4 K/UL (ref 0.3–1)
MONOCYTES NFR BLD: 24.3 % (ref 4–15)
NEUTROPHILS # BLD AUTO: 0.6 K/UL (ref 1.8–7.7)
NEUTROPHILS NFR BLD: 38.8 % (ref 38–73)
NRBC BLD-RTO: 0 /100 WBC
PHOSPHATE SERPL-MCNC: 2.8 MG/DL (ref 2.7–4.5)
PLATELET # BLD AUTO: 13 K/UL (ref 150–450)
PLATELET BLD QL SMEAR: ABNORMAL
PMV BLD AUTO: 12.7 FL (ref 9.2–12.9)
POTASSIUM SERPL-SCNC: 3.8 MMOL/L (ref 3.5–5.1)
PROT SERPL-MCNC: 4.5 G/DL (ref 6–8.4)
RBC # BLD AUTO: 2.24 M/UL (ref 4–5.4)
SODIUM SERPL-SCNC: 139 MMOL/L (ref 136–145)
UNIT NUMBER: NORMAL
WBC # BLD AUTO: 1.52 K/UL (ref 3.9–12.7)

## 2022-05-07 PROCEDURE — 20600001 HC STEP DOWN PRIVATE ROOM

## 2022-05-07 PROCEDURE — 85025 COMPLETE CBC W/AUTO DIFF WBC: CPT | Performed by: NURSE PRACTITIONER

## 2022-05-07 PROCEDURE — 99233 PR SUBSEQUENT HOSPITAL CARE,LEVL III: ICD-10-PCS | Mod: ,,, | Performed by: INTERNAL MEDICINE

## 2022-05-07 PROCEDURE — 63600175 PHARM REV CODE 636 W HCPCS: Mod: JG | Performed by: INTERNAL MEDICINE

## 2022-05-07 PROCEDURE — 84100 ASSAY OF PHOSPHORUS: CPT | Performed by: NURSE PRACTITIONER

## 2022-05-07 PROCEDURE — 36430 TRANSFUSION BLD/BLD COMPNT: CPT

## 2022-05-07 PROCEDURE — 99233 SBSQ HOSP IP/OBS HIGH 50: CPT | Mod: ,,, | Performed by: INTERNAL MEDICINE

## 2022-05-07 PROCEDURE — P9037 PLATE PHERES LEUKOREDU IRRAD: HCPCS | Performed by: INTERNAL MEDICINE

## 2022-05-07 PROCEDURE — 83735 ASSAY OF MAGNESIUM: CPT | Performed by: NURSE PRACTITIONER

## 2022-05-07 PROCEDURE — 25000003 PHARM REV CODE 250: Performed by: NURSE PRACTITIONER

## 2022-05-07 PROCEDURE — 25000003 PHARM REV CODE 250: Performed by: STUDENT IN AN ORGANIZED HEALTH CARE EDUCATION/TRAINING PROGRAM

## 2022-05-07 PROCEDURE — 80053 COMPREHEN METABOLIC PANEL: CPT | Performed by: NURSE PRACTITIONER

## 2022-05-07 PROCEDURE — 25000003 PHARM REV CODE 250: Performed by: INTERNAL MEDICINE

## 2022-05-07 RX ADMIN — Medication 1 DOSE: at 08:05

## 2022-05-07 RX ADMIN — FILGRASTIM 480 MCG: 480 INJECTION, SOLUTION INTRAVENOUS; SUBCUTANEOUS at 08:05

## 2022-05-07 RX ADMIN — POTASSIUM CHLORIDE 20 MEQ: 20 TABLET, EXTENDED RELEASE ORAL at 06:05

## 2022-05-07 RX ADMIN — Medication 1 DOSE: at 12:05

## 2022-05-07 RX ADMIN — OLANZAPINE 7.5 MG: 2.5 TABLET, FILM COATED ORAL at 08:05

## 2022-05-07 RX ADMIN — ACYCLOVIR 800 MG: 800 TABLET ORAL at 08:05

## 2022-05-07 RX ADMIN — Medication 400 MG: at 06:05

## 2022-05-07 RX ADMIN — Medication 400 MG: at 10:05

## 2022-05-07 RX ADMIN — ALUMINUM HYDROXIDE, MAGNESIUM HYDROXIDE, AND DIMETHICONE 10 ML: 400; 400; 40 SUSPENSION ORAL at 08:05

## 2022-05-07 RX ADMIN — DIPHENOXYLATE HYDROCHLORIDE AND ATROPINE SULFATE 1 TABLET: 2.5; .025 TABLET ORAL at 08:05

## 2022-05-07 RX ADMIN — CITALOPRAM HYDROBROMIDE 10 MG: 10 TABLET ORAL at 06:05

## 2022-05-07 RX ADMIN — DIPHENHYDRAMINE HYDROCHLORIDE 25 MG: 25 CAPSULE ORAL at 11:05

## 2022-05-07 RX ADMIN — PANTOPRAZOLE SODIUM 40 MG: 40 TABLET, DELAYED RELEASE ORAL at 08:05

## 2022-05-07 RX ADMIN — FLUTICASONE PROPIONATE 50 MCG: 50 SPRAY, METERED NASAL at 08:05

## 2022-05-07 NOTE — SUBJECTIVE & OBJECTIVE
Subjective:     Interval History: D+12 PIPPA AUTO. Counts recovered. ANC >500 today. Doing well. Will give 1 unit plts and plan to remove line tomorrow.    Objective:     Vital Signs (Most Recent):  Temp: 98.7 °F (37.1 °C) (05/07/22 1203)  Pulse: 94 (05/07/22 1203)  Resp: 16 (05/07/22 1203)  BP: (!) 107/54 (05/07/22 1203)  SpO2: 96 % (05/07/22 1203)   Vital Signs (24h Range):  Temp:  [98.3 °F (36.8 °C)-99.1 °F (37.3 °C)] 98.7 °F (37.1 °C)  Pulse:  [] 94  Resp:  [15-18] 16  SpO2:  [93 %-98 %] 96 %  BP: ()/(50-60) 107/54     Weight: 65.2 kg (143 lb 11.8 oz)  Body mass index is 22.51 kg/m².  Body surface area is 1.76 meters squared.    ECOG SCORE           2    Intake/Output - Last 3 Shifts         05/05 0700  05/06 0659 05/06 0700  05/07 0659 05/07 0700  05/08 0659    P.O. 840 1370 500    I.V. (mL/kg) 495.3 (7.5)      Blood 288  209    IV Piggyback 542.2      Total Intake(mL/kg) 2165.5 (32.7) 1370 (21) 709 (10.9)    Urine (mL/kg/hr) 1600 (1) 1500 (1) 450 (1)    Stool 0  0    Total Output 1600 1500 450    Net +565.5 -130 +259           Urine Occurrence 4 x      Stool Occurrence 5 x  1 x            Physical Exam  Alert awake oriented x3  Regular rate and rhythm  Oral mucosa moist and clear, no ulcers   Lungs clear to auscultation bilaterally, no crackles no wheezing   Abdomen soft nontender  No lower extremity edema      Significant Labs:   All pertinent labs from the last 24 hours have been reviewed.    Diagnostic Results:  I have reviewed all pertinent imaging results/findings within the past 24 hours.

## 2022-05-07 NOTE — PROGRESS NOTES
[facility] - Oncology ([facility])  Hematology  Bone Marrow Transplant  Progress Note    Patient Name: Caty Diaz  Admission Date: 4/23/2022  Hospital Length of Stay: 14 days  Code Status: Full Code    Subjective:     Interval History: D+12 PIPPA AUTO. Counts recovered. ANC >500 today. Doing well. Will give 1 unit plts and plan to remove line tomorrow.    Objective:     Vital Signs (Most Recent):  Temp: 98.7 °F (37.1 °C) (05/07/22 1203)  Pulse: 94 (05/07/22 1203)  Resp: 16 (05/07/22 1203)  BP: (!) 107/54 (05/07/22 1203)  SpO2: 96 % (05/07/22 1203)   Vital Signs (24h Range):  Temp:  [98.3 °F (36.8 °C)-99.1 °F (37.3 °C)] 98.7 °F (37.1 °C)  Pulse:  [] 94  Resp:  [15-18] 16  SpO2:  [93 %-98 %] 96 %  BP: ()/(50-60) 107/54     Weight: 65.2 kg (143 lb 11.8 oz)  Body mass index is 22.51 kg/m².  Body surface area is 1.76 meters squared.    ECOG SCORE           2    Intake/Output - Last 3 Shifts         05/05 0700  05/06 0659 05/06 0700  05/07 0659 05/07 0700  05/08 0659    P.O. 840 1370 500    I.V. (mL/kg) 495.3 (7.5)      Blood 288  209    IV Piggyback 542.2      Total Intake(mL/kg) 2165.5 (32.7) 1370 (21) 709 (10.9)    Urine (mL/kg/hr) 1600 (1) 1500 (1) 450 (1)    Stool 0  0    Total Output 1600 1500 450    Net +565.5 -130 +259           Urine Occurrence 4 x      Stool Occurrence 5 x  1 x            Physical Exam  Alert awake oriented x3  Regular rate and rhythm  Oral mucosa moist and clear, no ulcers   Lungs clear to auscultation bilaterally, no crackles no wheezing   Abdomen soft nontender  No lower extremity edema      Significant Labs:   All pertinent labs from the last 24 hours have been reviewed.    Diagnostic Results:  I have reviewed all pertinent imaging results/findings within the past 24 hours.    Assessment/Plan:     * Status post autologous bone marrow transplant  Disease status: OR  Type of transplant: Auto  Conditioning regimen: Melphalan  Karnofsky Score: 90%  Post transplant maintenance plans:  Not Yet  Today is D +12  She received 8 bags of CD34 cells with total dose of 3.97x10^6.     Planned conditioning regimen:  Melphalan on Day -1     Antimicrobial Prophylaxis:  Acyclovir starting on Day -1  Levofloxacin starting on Day -1 stopped D+12  Fluconazole starting on Day -1 stopped D +12     Growth Factor Support:  Neupogen starting on Day +7      Caregiver:   Post-transplant discharge plans: [facility]       Gastroesophageal reflux disease without esophagitis  - continue Protonix nightly    Thrombocytopenia  - due to chemo and stem cell collection  - daily CBC  - transfuse for platelets count <10 or <50 pre-procedure or bleeding  - hold Lovenox when platelets <50k    Anemia associated with chemotherapy  - daily CBC  - transfuse for hgb <7    Multiple myeloma  - R-ISS II IgG lambda multiple myeloma, She had increased 1q21 copy number and has t(4,14), both high risk markers in multiple myeloma. She also has monosomy 13 on FISH. She did not have baseline PET CT.   - She has received 6 cycles of RVd, followed by 2 cycles of velcade-dexamethasone. She has tolerated the treatments well.    - PET CT on 3/31/22 did not demonstrate any hypermetabolic lytic or soft tissue lesions. BM biopsy on 3/21/22 showed 10% plasma cells ( versus 50% at diagnosis).  - Admitting for transplant as above.    Anxiety  - The patient states that she takes half of a clonazepam pill at night to help her sleep but would like to consider stopping this medication.           VTE Risk Mitigation (From admission, onward)         Ordered     heparin (porcine) injection 1,600 Units  As needed (PRN)         04/23/22 1520     IP VTE HIGH RISK PATIENT  Once         04/23/22 1311     Place sequential compression device  Until discontinued         04/23/22 1311                Mitra Siu MD  Bone Marrow Transplant  [facility] - Oncology ([facility])

## 2022-05-07 NOTE — PLAN OF CARE
Patient AAOX4, up independently to the bathroom, and ambulating in the hallway. Day +12 AUTO SCT. 1U PLT infused today, no premedications given. Magnesium replaced. Oral hydration encouraged. Patient stable at this time, planning for discharge tomorrow.

## 2022-05-07 NOTE — ASSESSMENT & PLAN NOTE
Disease status: ID  Type of transplant: Auto  Conditioning regimen: Melphalan  Karnofsky Score: 90%  Post transplant maintenance plans: Not Yet  Today is D +12  She received 8 bags of CD34 cells with total dose of 3.97x10^6.     Planned conditioning regimen:  Melphalan on Day -1     Antimicrobial Prophylaxis:  Acyclovir starting on Day -1  Levofloxacin starting on Day -1 stopped D+12  Fluconazole starting on Day -1 stopped D +12     Growth Factor Support:  Neupogen starting on Day +7      Caregiver:   Post-transplant discharge plans: [facility]

## 2022-05-07 NOTE — PLAN OF CARE
Plan of care reviewed with patient and  this shift. Pt is day +12 Ju Auto. Pt had no new complaints. Pt had symptomatic hypotension at midnight, Dr. Siu aware and no further intervention was needed. Maintaining saturations on room air. Potassium and Magnesium replacement initiated. Vas cath heparin locked x2. Pt had good output, no b.m. overnight. All questions and concerns addressed. Will continue to monitor.

## 2022-05-07 NOTE — PROGRESS NOTES
05/07/22 0702   Vital Signs   Temp 98.5 °F (36.9 °C)   Temp src Oral   Pulse 106   Heart Rate Source Monitor   Resp 16   SpO2 95 %   Pulse Oximetry Type Intermittent   O2 Device (Oxygen Therapy) room air   BP (!) 83/52   MAP (mmHg) 63   BP Location Left arm   BP Method Automatic   Patient Position Sitting   Assessments (Pre/Post)   Level of Consciousness (AVPU) alert   MD notified of patient's asymptomatic BP, fluids encouraged and slow changes in body position. Notify nurse if experiencing dizziness or lightheadedness.

## 2022-05-08 VITALS
TEMPERATURE: 98 F | SYSTOLIC BLOOD PRESSURE: 125 MMHG | OXYGEN SATURATION: 100 % | DIASTOLIC BLOOD PRESSURE: 57 MMHG | BODY MASS INDEX: 22.72 KG/M2 | HEART RATE: 86 BPM | HEIGHT: 67 IN | RESPIRATION RATE: 16 BRPM | WEIGHT: 144.75 LBS

## 2022-05-08 LAB
ALBUMIN SERPL BCP-MCNC: 2.7 G/DL (ref 3.5–5.2)
ALP SERPL-CCNC: 53 U/L (ref 55–135)
ALT SERPL W/O P-5'-P-CCNC: 13 U/L (ref 10–44)
ANION GAP SERPL CALC-SCNC: 8 MMOL/L (ref 8–16)
ANISOCYTOSIS BLD QL SMEAR: SLIGHT
AST SERPL-CCNC: 10 U/L (ref 10–40)
BASO STIPL BLD QL SMEAR: ABNORMAL
BASOPHILS # BLD AUTO: 0.01 K/UL (ref 0–0.2)
BASOPHILS NFR BLD: 0.3 % (ref 0–1.9)
BILIRUB SERPL-MCNC: 0.4 MG/DL (ref 0.1–1)
BLD PROD TYP BPU: NORMAL
BLOOD UNIT EXPIRATION DATE: NORMAL
BLOOD UNIT TYPE CODE: 8400
BLOOD UNIT TYPE: NORMAL
BUN SERPL-MCNC: 3 MG/DL (ref 8–23)
CALCIUM SERPL-MCNC: 7.2 MG/DL (ref 8.7–10.5)
CHLORIDE SERPL-SCNC: 106 MMOL/L (ref 95–110)
CO2 SERPL-SCNC: 25 MMOL/L (ref 23–29)
CODING SYSTEM: NORMAL
CREAT SERPL-MCNC: 0.7 MG/DL (ref 0.5–1.4)
DIFFERENTIAL METHOD: ABNORMAL
DISPENSE STATUS: NORMAL
EOSINOPHIL # BLD AUTO: 0 K/UL (ref 0–0.5)
EOSINOPHIL NFR BLD: 0 % (ref 0–8)
ERYTHROCYTE [DISTWIDTH] IN BLOOD BY AUTOMATED COUNT: 13.2 % (ref 11.5–14.5)
EST. GFR  (AFRICAN AMERICAN): >60 ML/MIN/1.73 M^2
EST. GFR  (NON AFRICAN AMERICAN): >60 ML/MIN/1.73 M^2
GLUCOSE SERPL-MCNC: 81 MG/DL (ref 70–110)
HCT VFR BLD AUTO: 21.4 % (ref 37–48.5)
HGB BLD-MCNC: 7.1 G/DL (ref 12–16)
HYPOCHROMIA BLD QL SMEAR: ABNORMAL
IMM GRANULOCYTES # BLD AUTO: 0.1 K/UL (ref 0–0.04)
IMM GRANULOCYTES NFR BLD AUTO: 2.8 % (ref 0–0.5)
LYMPHOCYTES # BLD AUTO: 0.7 K/UL (ref 1–4.8)
LYMPHOCYTES NFR BLD: 21 % (ref 18–48)
MAGNESIUM SERPL-MCNC: 1.6 MG/DL (ref 1.6–2.6)
MCH RBC QN AUTO: 32.9 PG (ref 27–31)
MCHC RBC AUTO-ENTMCNC: 33.2 G/DL (ref 32–36)
MCV RBC AUTO: 99 FL (ref 82–98)
MONOCYTES # BLD AUTO: 0.7 K/UL (ref 0.3–1)
MONOCYTES NFR BLD: 18.7 % (ref 4–15)
NEUTROPHILS # BLD AUTO: 2 K/UL (ref 1.8–7.7)
NEUTROPHILS NFR BLD: 57.2 % (ref 38–73)
NRBC BLD-RTO: 0 /100 WBC
PHOSPHATE SERPL-MCNC: 2.4 MG/DL (ref 2.7–4.5)
PLATELET # BLD AUTO: 25 K/UL (ref 150–450)
PLATELET BLD QL SMEAR: ABNORMAL
PMV BLD AUTO: 11.9 FL (ref 9.2–12.9)
POLYCHROMASIA BLD QL SMEAR: ABNORMAL
POTASSIUM SERPL-SCNC: 3.4 MMOL/L (ref 3.5–5.1)
PROT SERPL-MCNC: 4.7 G/DL (ref 6–8.4)
RBC # BLD AUTO: 2.16 M/UL (ref 4–5.4)
SODIUM SERPL-SCNC: 139 MMOL/L (ref 136–145)
TOXIC GRANULES BLD QL SMEAR: PRESENT
UNIT NUMBER: NORMAL
WBC # BLD AUTO: 3.53 K/UL (ref 3.9–12.7)

## 2022-05-08 PROCEDURE — 85025 COMPLETE CBC W/AUTO DIFF WBC: CPT | Performed by: NURSE PRACTITIONER

## 2022-05-08 PROCEDURE — P9037 PLATE PHERES LEUKOREDU IRRAD: HCPCS | Performed by: INTERNAL MEDICINE

## 2022-05-08 PROCEDURE — 84100 ASSAY OF PHOSPHORUS: CPT | Performed by: NURSE PRACTITIONER

## 2022-05-08 PROCEDURE — 63600175 PHARM REV CODE 636 W HCPCS: Performed by: INTERNAL MEDICINE

## 2022-05-08 PROCEDURE — 99233 PR SUBSEQUENT HOSPITAL CARE,LEVL III: ICD-10-PCS | Mod: ,,, | Performed by: INTERNAL MEDICINE

## 2022-05-08 PROCEDURE — 83735 ASSAY OF MAGNESIUM: CPT | Performed by: NURSE PRACTITIONER

## 2022-05-08 PROCEDURE — 25000003 PHARM REV CODE 250: Performed by: NURSE PRACTITIONER

## 2022-05-08 PROCEDURE — 25000003 PHARM REV CODE 250: Performed by: INTERNAL MEDICINE

## 2022-05-08 PROCEDURE — 80053 COMPREHEN METABOLIC PANEL: CPT | Performed by: NURSE PRACTITIONER

## 2022-05-08 PROCEDURE — 36430 TRANSFUSION BLD/BLD COMPNT: CPT

## 2022-05-08 PROCEDURE — 99233 SBSQ HOSP IP/OBS HIGH 50: CPT | Mod: ,,, | Performed by: INTERNAL MEDICINE

## 2022-05-08 RX ORDER — HYDROXYZINE HYDROCHLORIDE 25 MG/1
25 TABLET, FILM COATED ORAL 2 TIMES DAILY PRN
Status: DISCONTINUED | OUTPATIENT
Start: 2022-05-08 | End: 2022-05-08 | Stop reason: HOSPADM

## 2022-05-08 RX ORDER — ACYCLOVIR 800 MG/1
800 TABLET ORAL 2 TIMES DAILY
Qty: 60 TABLET | Refills: 11 | Status: SHIPPED | OUTPATIENT
Start: 2022-05-08 | End: 2022-05-27

## 2022-05-08 RX ORDER — SUCRALFATE 1 G/1
1 TABLET ORAL 4 TIMES DAILY PRN
Start: 2022-05-08

## 2022-05-08 RX ORDER — LOPERAMIDE HYDROCHLORIDE 2 MG/1
2 CAPSULE ORAL 4 TIMES DAILY PRN
Qty: 40 CAPSULE | Refills: 0 | Status: SHIPPED | OUTPATIENT
Start: 2022-05-08

## 2022-05-08 RX ORDER — FLUTICASONE PROPIONATE 50 MCG
1 SPRAY, SUSPENSION (ML) NASAL NIGHTLY PRN
Refills: 0
Start: 2022-05-08

## 2022-05-08 RX ORDER — ASCORBIC ACID 500 MG
500 TABLET ORAL DAILY
Start: 2022-05-08

## 2022-05-08 RX ORDER — HYDROXYZINE HYDROCHLORIDE 25 MG/1
25 TABLET, FILM COATED ORAL 3 TIMES DAILY PRN
Qty: 30 TABLET | Refills: 0 | Status: SHIPPED | OUTPATIENT
Start: 2022-05-08 | End: 2022-05-27

## 2022-05-08 RX ORDER — CHOLECALCIFEROL (VITAMIN D3) 25 MCG
1000 TABLET ORAL DAILY
Start: 2022-05-08

## 2022-05-08 RX ORDER — PANTOPRAZOLE SODIUM 40 MG/1
40 TABLET, DELAYED RELEASE ORAL DAILY
Start: 2022-05-08

## 2022-05-08 RX ADMIN — FILGRASTIM 480 MCG: 480 INJECTION, SOLUTION INTRAVENOUS; SUBCUTANEOUS at 08:05

## 2022-05-08 RX ADMIN — Medication 400 MG: at 04:05

## 2022-05-08 RX ADMIN — HEPARIN SODIUM 1600 UNITS: 1000 INJECTION, SOLUTION INTRAVENOUS; SUBCUTANEOUS at 03:05

## 2022-05-08 RX ADMIN — POTASSIUM CHLORIDE 20 MEQ: 20 TABLET, EXTENDED RELEASE ORAL at 06:05

## 2022-05-08 RX ADMIN — POTASSIUM & SODIUM PHOSPHATES POWDER PACK 280-160-250 MG 1 PACKET: 280-160-250 PACK at 04:05

## 2022-05-08 RX ADMIN — HYDROXYZINE HYDROCHLORIDE 25 MG: 25 TABLET, FILM COATED ORAL at 04:05

## 2022-05-08 RX ADMIN — ACYCLOVIR 800 MG: 800 TABLET ORAL at 08:05

## 2022-05-08 RX ADMIN — DIPHENHYDRAMINE HYDROCHLORIDE 25 MG: 25 CAPSULE ORAL at 06:05

## 2022-05-08 RX ADMIN — CITALOPRAM HYDROBROMIDE 10 MG: 10 TABLET ORAL at 06:05

## 2022-05-08 RX ADMIN — POTASSIUM & SODIUM PHOSPHATES POWDER PACK 280-160-250 MG 1 PACKET: 280-160-250 PACK at 08:05

## 2022-05-08 RX ADMIN — Medication 400 MG: at 08:05

## 2022-05-08 RX ADMIN — POTASSIUM CHLORIDE 20 MEQ: 20 TABLET, EXTENDED RELEASE ORAL at 08:05

## 2022-05-08 NOTE — PROCEDURES
Operative Note Bedside Procedure:     General surgery called back about patient regarding removal of tunneled line. Received BMT and now needs line out. Platelets 20k. Given pack of platelets today. No contraindications to remove line.     Patient consented to line removal via verbal consent. Her right chest and neck were prepped in the usual fashion. Sutures were cut from the line. 1% lidocaine was administered around the catheter exit site. Gentle dissection was used to free the cuff from subcutaneous tissue and the line was removed. Pressure was held over the neck for 5 minutes. Tolerated well. No complications.     Reji Frank, PGY IV  Surgery

## 2022-05-08 NOTE — PLAN OF CARE
Plan of care reviewed with patient and  this shift. Pt is day +13 Ju Auto. Pt c/o generalized itching, Benadryl and Atarax given w/some relief. VSS, w/soft pressures. Maintaining saturations on room air. Phosphorus, Potassium and Magnesium replacement initiated. Vas cath heparin locked x2. Pt output WDL. All questions and concerns addressed. Expected discharge today after vas cath removal. Will continue to monitor.

## 2022-05-08 NOTE — NURSING
Pt c/o of increased itching, even after Benadryl. Dr. Siu contacted and ordered Atarax. Pt still itching but with some relief. Will continue to monitor.

## 2022-05-08 NOTE — DISCHARGE SUMMARY
[facility] - Oncology ([facility]  Hematology  Bone Marrow Transplant  Discharge Summary      Patient Name: Caty Diaz  MRN: [id number]  Admission Date: 4/23/2022  Hospital Length of Stay: 15 days  Discharge Date and Time:  05/08/2022 1:04 PM  Attending Physician: No att. providers found   Discharging Provider: Mitra Siu MD  Primary Care Provider: Ming Beck    HPI: Ms.Debra Diaz presents as a direct admission for high dose emi autologous stem cell transplant. Collection completed without any issues. Seen in BMT clinic yesterday. Mild soreness at right Soto, no signs of infection. Denies any N/V/D/ chest pain/fever. Covid negative in clinic.       * No surgery found *     Hospital Course: 4/24/2022 Day -1 of [id number] ASCT. Tolerated chemotherapy today with no issues. She has no complaints today  4/25/2022 Day 0 of [id number] ASCT. She had mild headache in the morning that resolved on its own. She also had nausea the was relieved with zofran. Otherwise, her VSS, afebrile. She will receive 8 bags of CD34 cell total dose of 3.97 x10^6 at 1330  4/26/2022 Day +1 of rco830 ASCT. She received her cells yesterday with no issues. She is still with nausea and had 1x vomiting episode today. Will schedule her zofran ATC. She is ambulatory, has no mouth sores, no diarrhea.   4/27/2022 Day +2 [id number] ASCT. Remains nauseated specially in the morning despite scheduled Zofran. Will add nightly zyprexa. Has low PO intake, and low appetite. Had soft BM, but she was on miralax and colace yesterday.    4/28/2022 Day +3 thv174 ASCT. Feeling better this morning, and nausea is better controlled. No other complaints. VSS, afebrile   4/29/2022 Day +4 xco224 ASCT. NAEON. Nausea is controlled. No headaches. Had fair oral intake.   04/30/2022 Day +5 kui578 ASCT.  Patient reports nausea daughter is improved with antiemetics.  Has had multiple episodes of diarrhea, C diff negative, will start Imodium.  Tolerating solid foods  but does not like the food here, will obtain a salad from the cafeteria.  05/01/2022 Day +6 vjx376 ASCT.  Patient reports nausea improved.  No further episodes of diarrhea since starting Imodium.  Tolerating solid foods but does not like the food here, will obtain a salad from the cafeteria.  05/02/2022 D+7 emi auto. Doing well. No concerns today. Continue supportive care. Starting filgrastim today.   05/03/2022 D+8 Emi AUTO. Uncontrolled nausea, not tolerating PO zofran. Will switch to IV zofran Q8H and increase zyprexa to 7.5 mg HS  05/04/2022 D+9 Emi AUTO. Uncontrolled nausea, not tolerating PO medications. C/w with zofran scheduled. IV ativan/compazine as needed. Zyprexa 7.5 mg HS. And switching antimicrobials to IV. Platelet transfusion today.   05/05/2022 D+10 Emi AUTO. Nausea/diarrhea controlled. Had some rash after calcium supplements PO. Will switch to IV. ANC still 0.   05/06/2022 D+11 EMI AUTO, vitals are within normal range, weight is stable. ANC 60, plts 8k receiving 1 unit plts this morning. calcium normalized. switching antimicrobials to PO.   05/07/2022 D+12 EMI AUTO. Counts recovered. ANC >500 today. Doing well. Will give 1 unit plts and plan to remove line tomorrow.  05/08/2022  received 1 unit of plts. Hickmann line removed. Discharge today.                 Goals of Care Treatment Preferences:  Code Status: Full Code    Living Will: Yes              Consults (From admission, onward)        Status Ordering Provider     Inpatient consult to General Surgery  Once        Provider:  (Not yet assigned)    Acknowledged OLEGARIO DOS SANTOS     Inpatient consult to Registered Dietitian/Nutritionist  Once        Provider:  (Not yet assigned)    Completed CHLOE CAVAZOS          Pending Diagnostic Studies:     None        Final Active Diagnoses:    Diagnosis Date Noted POA    PRINCIPAL PROBLEM:  Status post autologous bone marrow transplant [Z94.81] 12/02/2021 Not Applicable    Anemia associated with  chemotherapy [D64.81, T45.1X5A] 04/22/2022 Yes    Thrombocytopenia [D69.6] 04/22/2022 Yes    Gastroesophageal reflux disease without esophagitis [K21.9] 04/22/2022 Yes    Multiple myeloma [C90.00]  Yes    Anxiety [F41.9] 03/14/2022 Yes      Problems Resolved During this Admission:    Diagnosis Date Noted Date Resolved POA    Hypocalcemia [E83.51] 05/02/2022 05/06/2022 No    Nausea [R11.0] 04/26/2022 05/07/2022 No    Transaminasemia [R74.01] 04/23/2022 04/26/2022 Yes    Constipation [K59.00] 04/23/2022 04/27/2022 Yes      Discharged Condition: good    Disposition: Home or Self Care    Follow Up:    Patient Instructions:      CBC auto differential   Standing Status: Standing Number of Occurrences: 6 Standing Exp. Date: 07/05/23     Comprehensive Metabolic Panel   Standing Status: Standing Number of Occurrences: 6 Standing Exp. Date: 07/05/23     Magnesium   Standing Status: Standing Number of Occurrences: 6 Standing Exp. Date: 07/05/23     Phosphorus   Standing Status: Standing Number of Occurrences: 6 Standing Exp. Date: 07/05/23     Type & Screen   Standing Status: Standing Number of Occurrences: 6 Standing Exp. Date: 07/05/23     Medications:  Reconciled Home Medications:      Medication List      START taking these medications    hydrOXYzine HCL 25 MG tablet  Commonly known as: ATARAX  Take 1 tablet (25 mg total) by mouth 3 (three) times daily as needed (itching).     loperamide 2 mg capsule  Commonly known as: IMODIUM  Take 1 capsule (2 mg total) by mouth 4 (four) times daily as needed for Diarrhea.        CHANGE how you take these medications    acyclovir 800 MG Tab  Commonly known as: ZOVIRAX  Take 1 tablet (800 mg total) by mouth 2 (two) times daily.  What changed:   · medication strength  · how much to take     ascorbic acid (vitamin C) 500 MG tablet  Commonly known as: VITAMIN C  Take 1 tablet (500 mg total) by mouth once daily.  What changed:   · when to take this  · Another medication with the same  name was removed. Continue taking this medication, and follow the directions you see here.     fluticasone propionate 50 mcg/actuation nasal spray  Commonly known as: FLONASE  1 spray (50 mcg total) by Each Nostril route nightly as needed for Rhinitis or Allergies.  What changed:   · when to take this  · reasons to take this  · Another medication with the same name was removed. Continue taking this medication, and follow the directions you see here.     pantoprazole 40 MG tablet  Commonly known as: PROTONIX  Take 1 tablet (40 mg total) by mouth once daily.  What changed:   · when to take this  · Another medication with the same name was removed. Continue taking this medication, and follow the directions you see here.     sucralfate 1 gram tablet  Commonly known as: CARAFATE  Take 1 tablet (1 g total) by mouth 4 (four) times daily as needed (heartburn).  What changed:   · when to take this  · reasons to take this     vitamin D 1000 units Tab  Commonly known as: VITAMIN D3  Take 1 tablet (1,000 Units total) by mouth once daily.  What changed: when to take this        CONTINUE taking these medications    calcium carbonate 600 mg calcium (1,500 mg) Tab  Commonly known as: OS-LAILA  Take 1 tablet by mouth once daily.     citalopram 10 MG tablet  Commonly known as: CeleXA  Take 10 mg by mouth every morning.     clonazePAM 0.5 MG tablet  Commonly known as: KlonoPIN  Take 0.5 mg by mouth 2 (two) times daily as needed for Anxiety.     multivitamin per tablet  Commonly known as: THERAGRAN  Take 1 tablet by mouth once daily.     ondansetron 8 MG tablet  Commonly known as: ZOFRAN  Take 8 mg by mouth every 8 (eight) hours as needed for Nausea.     promethazine 25 MG tablet  Commonly known as: PHENERGAN  Take 25 mg by mouth every 6 (six) hours as needed (nuasea).     TUMS ULTRA ORAL  Take 500 mg by mouth daily as needed (heart burn).     zinc gluconate 50 mg tablet  Take 50 mg by mouth once daily.        STOP taking these  medications    chlorhexidine 0.12 % solution  Commonly known as: PERIDEX     codeine-butalbital-ASA-caffeine 76--40 mg Cap  Commonly known as: BUTALBITAL COMPOUND W/CODEINE     docusate sodium 100 MG capsule  Commonly known as: COLACE     GI cocktail antac/dicyc/lidoc     polyethylene glycol 17 gram Pwpk  Commonly known as: GLYCOLAX     polyethylene glycol 17 gram/dose powder  Commonly known as: GLYCOLAX     REVLIMID 25 mg Cap  Generic drug: lenalidomide            Mitra Siu MD  Bone Marrow Transplant  [facility] - Oncology ([facility])

## 2022-05-08 NOTE — PLAN OF CARE
Discharge instructions and prescriptions given and explained to pt.  Pt. verbalized understanding with no further questions.  Vas cath removed by gen sx.  VS WDL.  Patient is leaving with .      ROAD TEST  O=SpO2 98% on RA  A=Ambulating around room and hallway  D=vas cath removed  T=Tolerating regular diet  E=Voids  S=Performs self care independently  T=Teaching on wounds care complete

## 2022-05-15 ENCOUNTER — HOSPITAL ENCOUNTER (EMERGENCY)
Facility: HOSPITAL | Age: 67
Discharge: HOME | End: 2022-05-15
Attending: EMERGENCY MEDICINE
Payer: COMMERCIAL

## 2022-05-15 VITALS
DIASTOLIC BLOOD PRESSURE: 72 MMHG | TEMPERATURE: 98 F | HEART RATE: 88 BPM | OXYGEN SATURATION: 99 % | BODY MASS INDEX: 22.87 KG/M2 | RESPIRATION RATE: 20 BRPM | WEIGHT: 146 LBS | SYSTOLIC BLOOD PRESSURE: 133 MMHG

## 2022-05-15 DIAGNOSIS — R00.0 TACHYCARDIA: ICD-10-CM

## 2022-05-15 DIAGNOSIS — R50.9 FEVER, UNSPECIFIED FEVER CAUSE: ICD-10-CM

## 2022-05-15 DIAGNOSIS — Z94.81 STATUS POST AUTOLOGOUS BONE MARROW TRANSPLANT: Primary | ICD-10-CM

## 2022-05-15 DIAGNOSIS — B34.8 PARAINFLUENZA INFECTION: ICD-10-CM

## 2022-05-15 LAB
ADENOVIRUS: NOT DETECTED
ALBUMIN SERPL BCP-MCNC: 3.4 G/DL (ref 3.5–5.2)
ALP SERPL-CCNC: 69 U/L (ref 55–135)
ALT SERPL W/O P-5'-P-CCNC: 19 U/L (ref 10–44)
ANION GAP SERPL CALC-SCNC: 9 MMOL/L (ref 8–16)
AST SERPL-CCNC: 28 U/L (ref 10–40)
BASOPHILS # BLD AUTO: 0.04 K/UL (ref 0–0.2)
BASOPHILS NFR BLD: 0.9 % (ref 0–1.9)
BILIRUB SERPL-MCNC: 0.3 MG/DL (ref 0.1–1)
BILIRUB UR QL STRIP: NEGATIVE
BORDETELLA PARAPERTUSSIS (IS1001): NOT DETECTED
BORDETELLA PERTUSSIS (PTXP): NOT DETECTED
BUN SERPL-MCNC: 7 MG/DL (ref 8–23)
CALCIUM SERPL-MCNC: 8.9 MG/DL (ref 8.7–10.5)
CHLAMYDIA PNEUMONIAE: NOT DETECTED
CHLORIDE SERPL-SCNC: 102 MMOL/L (ref 95–110)
CLARITY UR REFRACT.AUTO: CLEAR
CO2 SERPL-SCNC: 24 MMOL/L (ref 23–29)
COLOR UR AUTO: YELLOW
CORONAVIRUS 229E, COMMON COLD VIRUS: NOT DETECTED
CORONAVIRUS HKU1, COMMON COLD VIRUS: NOT DETECTED
CORONAVIRUS NL63, COMMON COLD VIRUS: NOT DETECTED
CORONAVIRUS OC43, COMMON COLD VIRUS: NOT DETECTED
CREAT SERPL-MCNC: 0.7 MG/DL (ref 0.5–1.4)
CTP QC/QA: YES
CTP QC/QA: YES
DIFFERENTIAL METHOD: ABNORMAL
EOSINOPHIL # BLD AUTO: 0 K/UL (ref 0–0.5)
EOSINOPHIL NFR BLD: 0 % (ref 0–8)
ERYTHROCYTE [DISTWIDTH] IN BLOOD BY AUTOMATED COUNT: 15.2 % (ref 11.5–14.5)
EST. GFR  (AFRICAN AMERICAN): >60 ML/MIN/1.73 M^2
EST. GFR  (NON AFRICAN AMERICAN): >60 ML/MIN/1.73 M^2
FLUBV RNA NPH QL NAA+NON-PROBE: NOT DETECTED
GLUCOSE SERPL-MCNC: 98 MG/DL (ref 70–110)
GLUCOSE UR QL STRIP: NEGATIVE
HCT VFR BLD AUTO: 29.2 % (ref 37–48.5)
HGB BLD-MCNC: 9.4 G/DL (ref 12–16)
HGB UR QL STRIP: NEGATIVE
HPIV1 RNA NPH QL NAA+NON-PROBE: NOT DETECTED
HPIV2 RNA NPH QL NAA+NON-PROBE: NOT DETECTED
HPIV3 RNA NPH QL NAA+NON-PROBE: DETECTED
HPIV4 RNA NPH QL NAA+NON-PROBE: NOT DETECTED
HUMAN METAPNEUMOVIRUS: NOT DETECTED
IMM GRANULOCYTES # BLD AUTO: 0.11 K/UL (ref 0–0.04)
IMM GRANULOCYTES NFR BLD AUTO: 2.5 % (ref 0–0.5)
INFLUENZA A (SUBTYPES H1,H1-2009,H3): NOT DETECTED
KETONES UR QL STRIP: NEGATIVE
LACTATE SERPL-SCNC: 0.9 MMOL/L (ref 0.5–2.2)
LEUKOCYTE ESTERASE UR QL STRIP: NEGATIVE
LYMPHOCYTES # BLD AUTO: 1.9 K/UL (ref 1–4.8)
LYMPHOCYTES NFR BLD: 41.8 % (ref 18–48)
MCH RBC QN AUTO: 33.1 PG (ref 27–31)
MCHC RBC AUTO-ENTMCNC: 32.2 G/DL (ref 32–36)
MCV RBC AUTO: 103 FL (ref 82–98)
MICROSCOPIC COMMENT: NORMAL
MONOCYTES # BLD AUTO: 1.1 K/UL (ref 0.3–1)
MONOCYTES NFR BLD: 25.5 % (ref 4–15)
MYCOPLASMA PNEUMONIAE: NOT DETECTED
NEUTROPHILS # BLD AUTO: 1.3 K/UL (ref 1.8–7.7)
NEUTROPHILS NFR BLD: 29.3 % (ref 38–73)
NITRITE UR QL STRIP: NEGATIVE
NRBC BLD-RTO: 0 /100 WBC
PH UR STRIP: 8 [PH] (ref 5–8)
PLATELET # BLD AUTO: 197 K/UL (ref 150–450)
PMV BLD AUTO: 9.9 FL (ref 9.2–12.9)
POC MOLECULAR INFLUENZA A AGN: NEGATIVE
POC MOLECULAR INFLUENZA B AGN: NEGATIVE
POTASSIUM SERPL-SCNC: 3.8 MMOL/L (ref 3.5–5.1)
PROT SERPL-MCNC: 6.5 G/DL (ref 6–8.4)
PROT UR QL STRIP: NEGATIVE
RBC # BLD AUTO: 2.84 M/UL (ref 4–5.4)
RBC #/AREA URNS AUTO: 4 /HPF (ref 0–4)
RESPIRATORY INFECTION PANEL SOURCE: ABNORMAL
RSV RNA NPH QL NAA+NON-PROBE: NOT DETECTED
RV+EV RNA NPH QL NAA+NON-PROBE: NOT DETECTED
SARS-COV-2 RDRP RESP QL NAA+PROBE: NEGATIVE
SARS-COV-2 RNA RESP QL NAA+PROBE: NOT DETECTED
SODIUM SERPL-SCNC: 135 MMOL/L (ref 136–145)
SP GR UR STRIP: 1.01 (ref 1–1.03)
SQUAMOUS #/AREA URNS AUTO: 1 /HPF
URN SPEC COLLECT METH UR: NORMAL
WBC # BLD AUTO: 4.43 K/UL (ref 3.9–12.7)
WBC #/AREA URNS AUTO: 2 /HPF (ref 0–5)

## 2022-05-15 PROCEDURE — 99285 PR EMERGENCY DEPT VISIT,LEVEL V: ICD-10-PCS | Mod: ,,, | Performed by: EMERGENCY MEDICINE

## 2022-05-15 PROCEDURE — 99281 EMR DPT VST MAYX REQ PHY/QHP: CPT | Mod: ,,, | Performed by: INTERNAL MEDICINE

## 2022-05-15 PROCEDURE — 87798 DETECT AGENT NOS DNA AMP: CPT

## 2022-05-15 PROCEDURE — 99285 EMERGENCY DEPT VISIT HI MDM: CPT | Mod: ,,, | Performed by: EMERGENCY MEDICINE

## 2022-05-15 PROCEDURE — 93005 ELECTROCARDIOGRAM TRACING: CPT

## 2022-05-15 PROCEDURE — 93010 ELECTROCARDIOGRAM REPORT: CPT | Mod: ,,, | Performed by: INTERNAL MEDICINE

## 2022-05-15 PROCEDURE — 81001 URINALYSIS AUTO W/SCOPE: CPT

## 2022-05-15 PROCEDURE — U0002 COVID-19 LAB TEST NON-CDC: HCPCS

## 2022-05-15 PROCEDURE — 96360 HYDRATION IV INFUSION INIT: CPT

## 2022-05-15 PROCEDURE — 83605 ASSAY OF LACTIC ACID: CPT

## 2022-05-15 PROCEDURE — 87502 INFLUENZA DNA AMP PROBE: CPT

## 2022-05-15 PROCEDURE — 87040 BLOOD CULTURE FOR BACTERIA: CPT

## 2022-05-15 PROCEDURE — 99285 EMERGENCY DEPT VISIT HI MDM: CPT | Mod: 25

## 2022-05-15 PROCEDURE — 99281 PR EMERGENCY DEPT VISIT,LEVEL I: ICD-10-PCS | Mod: ,,, | Performed by: INTERNAL MEDICINE

## 2022-05-15 PROCEDURE — 93010 EKG 12-LEAD: ICD-10-PCS | Mod: ,,, | Performed by: INTERNAL MEDICINE

## 2022-05-15 PROCEDURE — 63600175 PHARM REV CODE 636 W HCPCS

## 2022-05-15 PROCEDURE — 85025 COMPLETE CBC W/AUTO DIFF WBC: CPT

## 2022-05-15 PROCEDURE — 80053 COMPREHEN METABOLIC PANEL: CPT

## 2022-05-15 PROCEDURE — 86803 HEPATITIS C AB TEST: CPT | Performed by: EMERGENCY MEDICINE

## 2022-05-15 PROCEDURE — 96361 HYDRATE IV INFUSION ADD-ON: CPT

## 2022-05-15 RX ORDER — GUAIFENESIN 600 MG/1
1200 TABLET, EXTENDED RELEASE ORAL 2 TIMES DAILY
Qty: 60 TABLET | Refills: 0 | Status: SHIPPED | OUTPATIENT
Start: 2022-05-15

## 2022-05-15 RX ORDER — AMOXICILLIN AND CLAVULANATE POTASSIUM 875; 125 MG/1; MG/1
1 TABLET, FILM COATED ORAL 2 TIMES DAILY
Qty: 14 TABLET | Refills: 0 | Status: SHIPPED | OUTPATIENT
Start: 2022-05-15 | End: 2022-05-22

## 2022-05-15 RX ADMIN — SODIUM CHLORIDE, SODIUM LACTATE, POTASSIUM CHLORIDE, AND CALCIUM CHLORIDE 1986 ML: .6; .31; .03; .02 INJECTION, SOLUTION INTRAVENOUS at 08:05

## 2022-05-15 NOTE — CONSULTS
Bone Marrow Transplant    Called by ED to discuss possible admission for Ms. Diaz. She is a 66 year old female who is day +20 from a melphalan 200 auto SCT for high risk IgG lambda multiple myeloma. She presented to the ED today after checking her temperature at home with a reading of 100.4. She took this temperature immediately after getting out of bed, where she had multiple covers including an electric blanket. She did not take any tylenol, and presented to the ED. She is afebrile in the ED and wishes to go home. CXR and UA were unremarkable. Flu and COVID negative. RVP and blood cultures sent and pending.     - sent prescription for Augmentin x 1 week and mucinex to their local pharmacy  - may go home with strict return instructions for fever   - she has an appointment scheduled with Dr. Tabor on 5/18    Discussed with Dr. Willi Mandujano,   PGY-IV  Hematology/Oncology Fellow

## 2022-05-15 NOTE — DISCHARGE INSTRUCTIONS
Follow-up with your heme/onc team as directed for follow up.     Return for persistent fever >100.4F, worsening cough, chest pain, shortness of breath or other concerns.     Take antibiotic as directed.

## 2022-05-15 NOTE — ED PROVIDER NOTES
Encounter Date: 5/15/2022       History     Chief Complaint   Patient presents with    Fever Post Chemo     Stem cell in April fever 100.4     67 y/o F w/ h/o multiple myeloma s/p high dose emi autologous stem cell transplant 4/24/2022 with discharge from the hospital 7 days ago.  Patient states that yesterday she started with cough, chest congestion, chest tightness, fever T-max 100.4° F. Patient was instructed to go to the ED for fever by heme/onc team. She did not take any medications for her symptoms prior to arrival. Denies known sick contacts. Denies shortness of breath, lower extremity swelling, abdominal pain, nausea, vomiting, diarrhea, dysuria, hematuria, frequency, urgency.    The history is provided by the patient. No  was used.     Review of patient's allergies indicates:   Allergen Reactions    Garlic     Milk Hives, Itching and Nausea Only     Dairy Products     Past Medical History:   Diagnosis Date    Anxiety     Fatigue     Multiple myeloma      Past Surgical History:   Procedure Laterality Date    breast implants Bilateral 04/01/2021    CHOLECYSTECTOMY      HYSTERECTOMY      INSERTION OF TUNNELED CENTRAL VENOUS HEMODIALYSIS CATHETER Right 4/18/2022    Procedure: INSERTION, CATHETER, HEMODIALYSIS, DUAL LUMEN Bard 14.5 Fr Hemosplit Catheter Model [id number], Right Possible Left Chest;  Surgeon: Hernan Plascencia MD;  Location: [facility] OR [address] [place] [address];  Service: General;  Laterality: Right;     Family History   Problem Relation Age of Onset    Hypertension Father      Social History     Tobacco Use    Smoking status: Never Smoker    Smokeless tobacco: Never Used   Substance Use Topics    Alcohol use: Not Currently    Drug use: Never     Review of Systems   Constitutional: Positive for chills, fatigue and fever.   HENT: Positive for congestion, postnasal drip and rhinorrhea. Negative for sore throat.    Respiratory: Positive for cough and chest tightness. Negative for shortness  of breath.    Cardiovascular: Negative for chest pain and leg swelling.   Gastrointestinal: Negative for abdominal pain, diarrhea, nausea and vomiting.   Genitourinary: Negative for dysuria and hematuria.   Musculoskeletal: Negative for back pain and neck pain.   Skin: Negative for rash and wound.   Neurological: Negative for seizures and headaches.   Psychiatric/Behavioral: Negative for agitation and behavioral problems.       Physical Exam     Initial Vitals [05/15/22 0728]   BP Pulse Resp Temp SpO2   130/61 107 (!) 24 99.6 °F (37.6 °C) 97 %      MAP       --         Physical Exam    Nursing note and vitals reviewed.  Constitutional: She appears well-developed and well-nourished.   Chronically ill-appearing female in no acute distress.   HENT:   Head: Normocephalic and atraumatic.   Eyes: Conjunctivae and EOM are normal.   Neck: Neck supple.   Normal range of motion.  Cardiovascular: Regular rhythm, normal heart sounds and intact distal pulses.   Tachycardic   Pulmonary/Chest: No respiratory distress. She has no wheezes. She has no rhonchi. She has no rales.   Abdominal: Abdomen is soft. Bowel sounds are normal. She exhibits no distension. There is no abdominal tenderness. There is no rebound and no guarding.   Musculoskeletal:         General: No tenderness or edema. Normal range of motion.      Cervical back: Normal range of motion and neck supple.     Neurological: She is alert and oriented to person, place, and time. She has normal strength.   Skin: Skin is warm and dry. Capillary refill takes less than 2 seconds.   Psychiatric: She has a normal mood and affect. Thought content normal.         ED Course   Procedures  Labs Reviewed   RESPIRATORY INFECTION PANEL (PCR), NASOPHARYNGEAL - Abnormal; Notable for the following components:       Result Value    Parainfluenza Virus 3 Detected (*)     All other components within normal limits    Narrative:     For all other respiratory sources, order [id number]  -  Respiratory Viral Panel by PCR   CBC W/ AUTO DIFFERENTIAL - Abnormal; Notable for the following components:    RBC 2.84 (*)     Hemoglobin 9.4 (*)     Hematocrit 29.2 (*)      (*)     MCH 33.1 (*)     RDW 15.2 (*)     Immature Granulocytes 2.5 (*)     Gran # (ANC) 1.3 (*)     Immature Grans (Abs) 0.11 (*)     Mono # 1.1 (*)     Gran % 29.3 (*)     Mono % 25.5 (*)     All other components within normal limits    Narrative:     Release to patient->Immediate   COMPREHENSIVE METABOLIC PANEL - Abnormal; Notable for the following components:    Sodium 135 (*)     BUN 7 (*)     Albumin 3.4 (*)     All other components within normal limits    Narrative:     Release to patient->Immediate   CULTURE, BLOOD   CULTURE, BLOOD   LACTIC ACID, PLASMA    Narrative:     Release to patient->Immediate   URINALYSIS, REFLEX TO URINE CULTURE    Narrative:     Specimen Source->Urine   URINALYSIS MICROSCOPIC    Narrative:     Specimen Source->Urine   HEPATITIS C ANTIBODY   SARS-COV-2 RDRP GENE   POCT INFLUENZA A/B MOLECULAR     EKG Readings: (Independently Interpreted)   Initial Reading: No STEMI.   7:56 a.m. normal sinus rhythm rate of 91 normal axis normal intervals     ECG Results          EKG 12-lead (Final result)  Result time 05/15/22 11:22:54    Final result by Interface, Lab In [facility] (05/15/22 11:22:54)                 Narrative:    Test Reason : R00.0,    Vent. Rate : 091 BPM     Atrial Rate : 091 BPM     P-R Int : 118 ms          QRS Dur : 068 ms      QT Int : 376 ms       P-R-T Axes : 062 075 064 degrees     QTc Int : 462 ms    Normal sinus rhythm  Normal ECG  When compared with ECG of 01-APR-2022 09:56,  Nonspecific T wave abnormality now evident in Anterior leads  Confirmed by FOZIA HOSKINS, HOMEYAR ([id number]) on 5/15/2022 11:22:40 AM    Referred By: [email or web address]   SELF           Confirmed By:ALPHONSE MARCELO MD                            Imaging Results          X-Ray Chest AP Portable (Final result)  Result time 05/15/22  08:43:42    Final result by Drew Braun DO (05/15/22 08:43:42)                 Impression:      Please see above      Electronically signed by: Drew Braun DO  Date:    05/15/2022  Time:    08:43             Narrative:    EXAMINATION:  XR CHEST AP PORTABLE    CLINICAL HISTORY:  Sepsis;    TECHNIQUE:  Single frontal view of the chest was performed.    COMPARISON:  04/18/2022    FINDINGS:  Interval removal right-sided PermCath.  There is no significant change from prior.  No new lung opacity.  No lung consolidation.  No large effusion or pneumothorax..  Clinical correlation and further evaluation as warranted clinically.                                 Medications   lactated ringers bolus 1,986 mL (0 mL/kg × 66.2 kg Intravenous Stopped 5/15/22 0936)     Medical Decision Making:   Initial Assessment:   66-year-old female with history of multiple myeloma status post stem cell transplant with discharge from the hospital 7 days ago presents to the emergency department with fever.  She is tachycardic and tachypneic on arrival, normal oxygen saturation on room air and normotensive.  Sepsis order set initiated.  Will discuss with heme/onc team.  Differential Diagnosis:   Sepsis, bacterial versus viral pneumonia, UTI, COVID, influenza  Clinical Tests:   Lab Tests: Ordered and Reviewed       <> Summary of Lab: White count improved compared to leukopenia previously.  Viral panel sent and parainfluenza detected.  Normal kidney function.  Urinalysis not consistent with infection.  ED Management:  Chest x-ray without infiltrate.  Patient remained stable while in the emergency department.  Seen by [facility] and deemed stable for discharge in outpatient follow-up, they sent antibiotic to pharmacy.  Discussed strict return precautions.  Stable for discharge.             ED Course as of 05/15/22 1217   Sun May 15, 2022   0901 WBC: 4.43 [KL]   0909 Discussed with [facility].  They will come evaluate the patient. [KL]   1106  Discussed with heme Onc.  They evaluated the patient in the emergency department.  She is stable for discharge home.  They will send a in antibiotic to her pharmacy in addition to Mucinex.  Strict return precautions were discussed with the patient. [KL]      ED Course User Index  [KL] Najma Patel MD             Clinical Impression:   Final diagnoses:  [R00.0] Tachycardia  [R50.9] Fever, unspecified fever cause  [B34.8] Parainfluenza infection          ED Disposition Condition    Discharge Stable        ED Prescriptions     Medication Sig Dispense Start Date End Date Auth. Provider    amoxicillin-clavulanate 875-125mg (AUGMENTIN) 875-125 mg per tablet Take 1 tablet by mouth 2 (two) times daily. for 7 days 14 tablet 5/15/2022 5/22/2022 Pippa Mandujano DO    guaiFENesin (MUCINEX) 600 mg 12 hr tablet Take 2 tablets (1,200 mg total) by mouth 2 (two) times daily. 60 tablet 5/15/2022  Pippa Mandujano DO        Follow-up Information     Follow up With Specialties Details Why Contact Info    [facility] - Emergency Dept Emergency Medicine Go to  As needed, If symptoms worsen [address] [facility] [address] [phone]    Ming Beck  Schedule an appointment as soon as possible for a visit in 1 day  [address] [place] [address] [phone]             Najma Patel MD  Resident  05/15/22 1216       Najma Patel MD  Resident  05/15/22 1217

## 2022-05-15 NOTE — ED TRIAGE NOTES
"Pt states "I had a stem cell transplant on April 25th, and I had a fever this morning so my doctor told me to come in"  "

## 2022-05-15 NOTE — ED NOTES
"Patient identifiers verified and correct for Caty Diaz   C/C: Pt states "I had a stem cell transplant on April 25th, and I had a fever this morning so my doctor told me to come in"  APPEARANCE: awake and alert in no acute distress.  SKIN: warm, dry and intact. No breakdown or bruising.  MUSCULOSKELETAL: Patient moving all extremities spontaneously, no obvious swelling or deformities noted. Ambulates independently.  RESPIRATORY: Denies shortness of breath. Respirations unlabored.   CARDIAC: Denies CP, 2+ distal pulses; no peripheral edema  ABDOMEN: soft, non-tender, and non-distended, Denies N/V  : voids spontaneously, denies difficulty  Neurologic: AAO x 4; follows commands equal strength in all extremities; denies numbness/tingling. Denies dizziness   "

## 2022-05-17 LAB — HCV AB SERPL QL IA: NEGATIVE

## 2022-05-18 ENCOUNTER — OFFICE VISIT (OUTPATIENT)
Dept: HEMATOLOGY/ONCOLOGY | Facility: CLINIC | Age: 67
End: 2022-05-18
Payer: COMMERCIAL

## 2022-05-18 ENCOUNTER — LAB VISIT (OUTPATIENT)
Dept: LAB | Facility: HOSPITAL | Age: 67
End: 2022-05-18
Payer: COMMERCIAL

## 2022-05-18 VITALS
RESPIRATION RATE: 16 BRPM | DIASTOLIC BLOOD PRESSURE: 61 MMHG | BODY MASS INDEX: 22.18 KG/M2 | HEART RATE: 84 BPM | SYSTOLIC BLOOD PRESSURE: 129 MMHG | OXYGEN SATURATION: 100 % | WEIGHT: 141.31 LBS | HEIGHT: 67 IN

## 2022-05-18 DIAGNOSIS — D84.9 IMMUNOCOMPROMISED STATE: ICD-10-CM

## 2022-05-18 DIAGNOSIS — Z94.84 HISTORY OF AUTO STEM CELL TRANSPLANT: Primary | ICD-10-CM

## 2022-05-18 DIAGNOSIS — Z94.81 STATUS POST AUTOLOGOUS BONE MARROW TRANSPLANT: ICD-10-CM

## 2022-05-18 DIAGNOSIS — R53.82 CHRONIC FATIGUE: ICD-10-CM

## 2022-05-18 DIAGNOSIS — T50.905A DRUG-INDUCED CYTOPENIA: ICD-10-CM

## 2022-05-18 DIAGNOSIS — D75.9 DRUG-INDUCED CYTOPENIA: ICD-10-CM

## 2022-05-18 DIAGNOSIS — C90.00 MULTIPLE MYELOMA, REMISSION STATUS UNSPECIFIED: ICD-10-CM

## 2022-05-18 LAB
ABO + RH BLD: NORMAL
ALBUMIN SERPL BCP-MCNC: 3.3 G/DL (ref 3.5–5.2)
ALP SERPL-CCNC: 62 U/L (ref 55–135)
ALT SERPL W/O P-5'-P-CCNC: 22 U/L (ref 10–44)
ANION GAP SERPL CALC-SCNC: 10 MMOL/L (ref 8–16)
ANISOCYTOSIS BLD QL SMEAR: SLIGHT
AST SERPL-CCNC: 27 U/L (ref 10–40)
BASOPHILS # BLD AUTO: 0.02 K/UL (ref 0–0.2)
BASOPHILS NFR BLD: 0.6 % (ref 0–1.9)
BILIRUB SERPL-MCNC: 0.4 MG/DL (ref 0.1–1)
BLD GP AB SCN CELLS X3 SERPL QL: NORMAL
BUN SERPL-MCNC: 8 MG/DL (ref 8–23)
CALCIUM SERPL-MCNC: 8.7 MG/DL (ref 8.7–10.5)
CHLORIDE SERPL-SCNC: 107 MMOL/L (ref 95–110)
CO2 SERPL-SCNC: 24 MMOL/L (ref 23–29)
CREAT SERPL-MCNC: 0.7 MG/DL (ref 0.5–1.4)
DIFFERENTIAL METHOD: ABNORMAL
EOSINOPHIL # BLD AUTO: 0 K/UL (ref 0–0.5)
EOSINOPHIL NFR BLD: 0.6 % (ref 0–8)
ERYTHROCYTE [DISTWIDTH] IN BLOOD BY AUTOMATED COUNT: 15.2 % (ref 11.5–14.5)
EST. GFR  (AFRICAN AMERICAN): >60 ML/MIN/1.73 M^2
EST. GFR  (NON AFRICAN AMERICAN): >60 ML/MIN/1.73 M^2
GLUCOSE SERPL-MCNC: 85 MG/DL (ref 70–110)
HCT VFR BLD AUTO: 28.2 % (ref 37–48.5)
HGB BLD-MCNC: 9.1 G/DL (ref 12–16)
IMM GRANULOCYTES # BLD AUTO: 0.01 K/UL (ref 0–0.04)
IMM GRANULOCYTES NFR BLD AUTO: 0.3 % (ref 0–0.5)
LYMPHOCYTES # BLD AUTO: 2 K/UL (ref 1–4.8)
LYMPHOCYTES NFR BLD: 58.8 % (ref 18–48)
MAGNESIUM SERPL-MCNC: 1.9 MG/DL (ref 1.6–2.6)
MCH RBC QN AUTO: 32.2 PG (ref 27–31)
MCHC RBC AUTO-ENTMCNC: 32.3 G/DL (ref 32–36)
MCV RBC AUTO: 100 FL (ref 82–98)
MONOCYTES # BLD AUTO: 0.6 K/UL (ref 0.3–1)
MONOCYTES NFR BLD: 16.9 % (ref 4–15)
NEUTROPHILS # BLD AUTO: 0.8 K/UL (ref 1.8–7.7)
NEUTROPHILS NFR BLD: 22.8 % (ref 38–73)
NRBC BLD-RTO: 0 /100 WBC
OVALOCYTES BLD QL SMEAR: ABNORMAL
PHOSPHATE SERPL-MCNC: 3.9 MG/DL (ref 2.7–4.5)
PLATELET # BLD AUTO: 211 K/UL (ref 150–450)
PMV BLD AUTO: 9.8 FL (ref 9.2–12.9)
POIKILOCYTOSIS BLD QL SMEAR: SLIGHT
POLYCHROMASIA BLD QL SMEAR: ABNORMAL
POTASSIUM SERPL-SCNC: 4.3 MMOL/L (ref 3.5–5.1)
PROT SERPL-MCNC: 5.9 G/DL (ref 6–8.4)
RBC # BLD AUTO: 2.83 M/UL (ref 4–5.4)
SODIUM SERPL-SCNC: 141 MMOL/L (ref 136–145)
WBC # BLD AUTO: 3.37 K/UL (ref 3.9–12.7)

## 2022-05-18 PROCEDURE — 99999 PR PBB SHADOW E&M-EST. PATIENT-LVL IV: CPT | Mod: PBBFAC,,, | Performed by: NURSE PRACTITIONER

## 2022-05-18 PROCEDURE — 99999 PR PBB SHADOW E&M-EST. PATIENT-LVL IV: ICD-10-PCS | Mod: PBBFAC,,, | Performed by: NURSE PRACTITIONER

## 2022-05-18 PROCEDURE — 85025 COMPLETE CBC W/AUTO DIFF WBC: CPT | Performed by: PHYSICIAN ASSISTANT

## 2022-05-18 PROCEDURE — 83735 ASSAY OF MAGNESIUM: CPT | Performed by: PHYSICIAN ASSISTANT

## 2022-05-18 PROCEDURE — 99215 OFFICE O/P EST HI 40 MIN: CPT | Mod: S$GLB,,, | Performed by: NURSE PRACTITIONER

## 2022-05-18 PROCEDURE — 86850 RBC ANTIBODY SCREEN: CPT | Performed by: PHYSICIAN ASSISTANT

## 2022-05-18 PROCEDURE — 84100 ASSAY OF PHOSPHORUS: CPT | Performed by: PHYSICIAN ASSISTANT

## 2022-05-18 PROCEDURE — 99215 PR OFFICE/OUTPT VISIT, EST, LEVL V, 40-54 MIN: ICD-10-PCS | Mod: S$GLB,,, | Performed by: NURSE PRACTITIONER

## 2022-05-18 PROCEDURE — 36415 COLL VENOUS BLD VENIPUNCTURE: CPT | Performed by: PHYSICIAN ASSISTANT

## 2022-05-18 PROCEDURE — 80053 COMPREHEN METABOLIC PANEL: CPT | Performed by: PHYSICIAN ASSISTANT

## 2022-05-18 NOTE — PROGRESS NOTES
Chief Complaint   Patient presents with    Multiple Myeloma     S/p asct         HPI : , 66, is here for hematology/ stem cell transplant follow up and signing consent . She is a stem cell transplant candidate. She has IgG lambda multiple myeloma. Multiple myeloma was diagnosed after work up for anemia showed a paraprotein  She has anemia, GERD, esophageal erosions. On 10/8/21 she had a bone marrow biopsy. It showed nearly 50%  positive plasma cells on core biopsy by IHC.  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  Congo red on bone marrow negative.She has chronic anemia. Cytogenetics was normal. FISH showed increase in 1q copies, t(4,14) and monosomy 13.  She had no lytic lesions on skeletal survey. She was treated with RVd.     Interval History: Ms.Richards Byrne presents at BMT clinic following transplant. Today is Day +23. Received Ju ASCT for MM.  Post transplant course was uneventful with expected chemo toxicities. All issues resolved except fatigue. Right durant catheter removed prior discharge, site healed well.Patient discharged from hospital on 05/08/23, no f/u scheduled until today. ED visit on 05/15 with low grade fever. D/cd with po antibiotics. Resp. Panel with para influenza positive. Symptoms resolving now. Denies any N/V/D/ chest pain/fever. Restarted OTC supplements.       Review of patient's allergies indicates:   Allergen Reactions    Garlic     Milk Hives, Itching and Nausea Only     Dairy Products       Current Outpatient Medications   Medication Sig    acyclovir (ZOVIRAX) 800 MG Tab Take 1 tablet (800 mg total) by mouth 2 (two) times daily.    amoxicillin-clavulanate 875-125mg (AUGMENTIN) 875-125 mg per tablet Take 1 tablet by mouth 2 (two) times daily. for 7 days    ascorbic acid, vitamin C, (VITAMIN C) 500 MG tablet Take 1 tablet (500 mg total) by mouth once daily.    calcium carbonate (OS-LAILA) 600 mg calcium (1,500 mg) Tab Take 1 tablet by mouth once daily.    calcium  carbonate (TUMS ULTRA ORAL) Take 500 mg by mouth daily as needed (heart burn).    citalopram (CELEXA) 10 MG tablet Take 10 mg by mouth every morning.    clonazePAM (KLONOPIN) 0.5 MG tablet Take 0.5 mg by mouth 2 (two) times daily as needed for Anxiety.    fluticasone propionate (FLONASE) 50 mcg/actuation nasal spray 1 spray (50 mcg total) by Each Nostril route nightly as needed for Rhinitis or Allergies.    guaiFENesin (MUCINEX) 600 mg 12 hr tablet Take 2 tablets (1,200 mg total) by mouth 2 (two) times daily.    hydrOXYzine HCL (ATARAX) 25 MG tablet Take 1 tablet (25 mg total) by mouth 3 (three) times daily as needed (itching).    loperamide (IMODIUM) 2 mg capsule Take 1 capsule (2 mg total) by mouth 4 (four) times daily as needed for Diarrhea.    multivitamin (THERAGRAN) per tablet Take 1 tablet by mouth once daily.    ondansetron (ZOFRAN) 8 MG tablet Take 8 mg by mouth every 8 (eight) hours as needed for Nausea.    pantoprazole (PROTONIX) 40 MG tablet Take 1 tablet (40 mg total) by mouth once daily.    promethazine (PHENERGAN) 25 MG tablet Take 25 mg by mouth every 6 (six) hours as needed (nuasea).    sucralfate (CARAFATE) 1 gram tablet Take 1 tablet (1 g total) by mouth 4 (four) times daily as needed (heartburn).    vitamin D (VITAMIN D3) 1000 units Tab Take 1 tablet (1,000 Units total) by mouth once daily.    zinc gluconate 50 mg tablet Take 50 mg by mouth once daily.     No current facility-administered medications for this visit.         Review of Systems   Constitutional: Positive for malaise/fatigue. Negative for chills, fever and weight loss.   HENT: Negative for congestion, ear discharge, ear pain, hearing loss and nosebleeds.    Eyes: Negative for blurred vision, double vision, photophobia and pain.   Respiratory: Negative for hemoptysis, sputum production and shortness of breath.    Cardiovascular: Negative for orthopnea, leg swelling and PND.   Gastrointestinal: Negative for abdominal pain,  blood in stool, diarrhea, heartburn, melena and vomiting.   Genitourinary: Negative for dysuria and frequency.   Musculoskeletal: Negative for back pain, myalgias and neck pain.   Neurological: Negative for dizziness, tingling, tremors, sensory change, focal weakness, weakness and headaches.   Endo/Heme/Allergies: Negative for environmental allergies. Does not bruise/bleed easily.   Psychiatric/Behavioral: Negative for depression, hallucinations and substance abuse.       Vitals:    05/18/22 1019   BP: 129/61   Pulse: 84   Resp: 16     PS: ECOG 1  Vitals:    05/18/22 1019   BP: 129/61   Pulse: 84   Resp: 16       Physical Exam  Constitutional:       Appearance: Normal appearance.   HENT:      Head: Normocephalic and atraumatic.      Mouth/Throat:      Pharynx: No posterior oropharyngeal erythema.   Eyes:      General: No scleral icterus.  Cardiovascular:      Rate and Rhythm: Normal rate and regular rhythm.      Pulses: Normal pulses.      Heart sounds: Normal heart sounds. No murmur heard.  Pulmonary:      Effort: Pulmonary effort is normal. No respiratory distress.      Breath sounds: Normal breath sounds.   Abdominal:      General: There is no distension.      Palpations: There is no mass.      Tenderness: There is no abdominal tenderness.   Musculoskeletal:         General: No swelling.   Lymphadenopathy:      Cervical: No cervical adenopathy.   Skin:     Coloration: Skin is not jaundiced or pale.      Comments: Right chest Soto removed, site healing   Neurological:      General: No focal deficit present.      Mental Status: She is alert and oriented to person, place, and time.      Cranial Nerves: No cranial nerve deficit.   Psychiatric:         Mood and Affect: Mood normal.           Lab Results   Component Value Date    WBC 3.37 (L) 05/18/2022    HGB 9.1 (L) 05/18/2022    HCT 28.2 (L) 05/18/2022     (H) 05/18/2022     05/18/2022       CMP  Sodium   Date Value Ref Range Status   05/18/2022 141  136 - 145 mmol/L Final     Potassium   Date Value Ref Range Status   05/18/2022 4.3 3.5 - 5.1 mmol/L Final     Chloride   Date Value Ref Range Status   05/18/2022 107 95 - 110 mmol/L Final     CO2   Date Value Ref Range Status   05/18/2022 24 23 - 29 mmol/L Final     Glucose   Date Value Ref Range Status   05/18/2022 85 70 - 110 mg/dL Final     BUN   Date Value Ref Range Status   05/18/2022 8 8 - 23 mg/dL Final     Creatinine   Date Value Ref Range Status   05/18/2022 0.7 0.5 - 1.4 mg/dL Final     Calcium   Date Value Ref Range Status   05/18/2022 8.7 8.7 - 10.5 mg/dL Final     Total Protein   Date Value Ref Range Status   05/18/2022 5.9 (L) 6.0 - 8.4 g/dL Final     Albumin   Date Value Ref Range Status   05/18/2022 3.3 (L) 3.5 - 5.2 g/dL Final     Total Bilirubin   Date Value Ref Range Status   05/18/2022 0.4 0.1 - 1.0 mg/dL Final     Comment:     For infants and newborns, interpretation of results should be based  on gestational age, weight and in agreement with clinical  observations.    Premature Infant recommended reference ranges:  Up to 24 hours.............<8.0 mg/dL  Up to 48 hours............<12.0 mg/dL  3-5 days..................<15.0 mg/dL  6-29 days.................<15.0 mg/dL       Alkaline Phosphatase   Date Value Ref Range Status   05/18/2022 62 55 - 135 U/L Final     AST   Date Value Ref Range Status   05/18/2022 27 10 - 40 U/L Final     ALT   Date Value Ref Range Status   05/18/2022 22 10 - 44 U/L Final     Anion Gap   Date Value Ref Range Status   05/18/2022 10 8 - 16 mmol/L Final     eGFR if    Date Value Ref Range Status   05/18/2022 >60.0 >60 mL/min/1.73 m^2 Final     eGFR if non    Date Value Ref Range Status   05/18/2022 >60.0 >60 mL/min/1.73 m^2 Final     Comment:     Calculation used to obtain the estimated glomerular filtration  rate (eGFR) is the CKD-EPI equation.          1. History of auto stem cell transplant  CBC Auto Differential    Comprehensive  Metabolic Panel    Magnesium    PHOSPHORUS    CBC Auto Differential    Comprehensive Metabolic Panel    Magnesium    PHOSPHORUS   2. Multiple myeloma, remission status unspecified  CBC Auto Differential    Comprehensive Metabolic Panel    Magnesium    PHOSPHORUS   3. Drug-induced cytopenia     4. Chronic fatigue     5. Immunocompromised state       Assessment and Plan:  History of Auto transplant  Received Ju ASCT on 04/25/22. Received CD34 cells with total dose of 3.97x10^6.  Engrafted on Day +12  Transplant was uncomplicated. Today is Day +23    Multiple Myeloma     R-ISS II IgG lambda multiple myeloma, treated with  RVd .  She had increased 1q21 copy number and has t(4,14), both high risk markers in multiple myeloma. She also has monosomy 13 on FISH. She did not have baseline PET CT.   Prior to C5 her M spike on SPEP was 0.52g/dl( was 5.36g/dl at diagnosis). She has received 6 cycles of RVd, followed by 2 cycles of velcade-dexamethasone. She has tolerated the treatments well.    PET CT on 3/31/22 did not demonstrate any hypermetabolic lytic or soft tissue lesions. BM biopsy on 3/21/22 showed 10% plasma cells ( versus 50% at diagnosis).  M spike on 3/3/22 SPEP was 0.85g/dl, suggesting she is in partial response. 4/1/22 PFTs showed mild decrease in DLCO. EKG did not show any abnormality. 2D ECHO on 4/1/22 showed moderate left atrial enlargement, mild mitral regurgitation but normal LVEF. HBV, HIV, HCV, Chagas Ab, RPR, HTLV I/II all non-reactive. Varicella negative. CMV Ab was reactive. HSV 1 IgG was reactive. HSV 2 IgG negative.   Colonoscopy normal. No abnormality of mammogram or PAP smear. Dental clearance, social work, onco-psychology evaluation all appropriate to proceed to auto SCT.  Received transplant as above.  Neutropenic Fever  - ED visit on 05/15 with neutropenic fever  - bc- ngtd, resp  panel with para influenza  - D/c d with augmentin. EOT on 05/22  - Symptoms resolved except congestion     Cytopenia  related to chemo  - Transfuse if hgb<7, plt<10  -  wbc, hgb recovering  - plt wnl    GERD  - No issues now     Insomnia  - taking clonopin PRN     Migraine History  - She uses Florinol for migrane headaches and states that she only takes it 2-3 times per year   No issues now        BMT Chart Routing  Urgent    Follow up with physician 1 week.    Follow up with [facility]    Labs CBC, CMP, phosphorus and magnesium   Lab interval:     Imaging    Please change next week visit from  to  or me. 's patient- now scheduled with    Pharmacy appointment    Other referrals        Hortencia Dubose NP  Hematology/Oncology/BMT

## 2022-05-20 LAB
BACTERIA BLD CULT: NORMAL
BACTERIA BLD CULT: NORMAL

## 2022-05-26 ENCOUNTER — TELEPHONE (OUTPATIENT)
Dept: HEMATOLOGY/ONCOLOGY | Facility: CLINIC | Age: 67
End: 2022-05-26
Payer: COMMERCIAL

## 2022-05-26 NOTE — PROGRESS NOTES
CC: Multiple myeloma in remission, follow up after autologous stem cell transplant, day+32      HPI : , 66, is here for hematology/ stem cell transplant follow up .  She is here post HDT/ auto SCT, day+32.   She has IgG lambda multiple myeloma. Multiple myeloma was diagnosed after work up for anemia showed a paraprotein  She has anemia, GERD, esophageal erosions. On 10/8/21 she had a bone marrow biopsy. It showed nearly 50%  positive plasma cells on core biopsy by IHC.  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  Congo red on bone marrow negative.She has chronic anemia. Cytogenetics was normal. FISH showed increase in 1q copies, t(4,14) and monosomy 13.  She had no lytic lesions on skeletal survey. She was treated with RVd.   She underwent Ju 200 ASCT. Day 0 was 4/25/22.  Post transplant course was uneventful with expected chemotherapy related toxicities.She went to ED on 05/15/22 with low grade fever. She was discharged with PO antibiotics. Respiratory panel was positive for para influenza virus.      Interval History: Ms.Richards Byrne presents at BMT clinic following transplant. Today is Day +32 . She has fatigue. She has nausea especial;ly when she takes the 800 mg acyclovir. Denies emesis, fever, cough, or dyspnea.     Review of patient's allergies indicates:   Allergen Reactions    Garlic     Milk Hives, Itching and Nausea Only     Dairy Products       Current Outpatient Medications   Medication Sig    acyclovir (ZOVIRAX) 800 MG Tab Take 1 tablet (800 mg total) by mouth 2 (two) times daily.    ascorbic acid, vitamin C, (VITAMIN C) 500 MG tablet Take 1 tablet (500 mg total) by mouth once daily.    calcium carbonate (OS-LAILA) 600 mg calcium (1,500 mg) Tab Take 1 tablet by mouth once daily.    calcium carbonate (TUMS ULTRA ORAL) Take 500 mg by mouth daily as needed (heart burn).    citalopram (CELEXA) 10 MG tablet Take 10 mg by mouth every morning.    clonazePAM (KLONOPIN) 0.5 MG tablet Take 0.5  mg by mouth 2 (two) times daily as needed for Anxiety.    fluticasone propionate (FLONASE) 50 mcg/actuation nasal spray 1 spray (50 mcg total) by Each Nostril route nightly as needed for Rhinitis or Allergies.    guaiFENesin (MUCINEX) 600 mg 12 hr tablet Take 2 tablets (1,200 mg total) by mouth 2 (two) times daily.    hydrOXYzine HCL (ATARAX) 25 MG tablet Take 1 tablet (25 mg total) by mouth 3 (three) times daily as needed (itching).    loperamide (IMODIUM) 2 mg capsule Take 1 capsule (2 mg total) by mouth 4 (four) times daily as needed for Diarrhea.    multivitamin (THERAGRAN) per tablet Take 1 tablet by mouth once daily.    ondansetron (ZOFRAN) 8 MG tablet Take 8 mg by mouth every 8 (eight) hours as needed for Nausea.    pantoprazole (PROTONIX) 40 MG tablet Take 1 tablet (40 mg total) by mouth once daily.    promethazine (PHENERGAN) 25 MG tablet Take 25 mg by mouth every 6 (six) hours as needed (nuasea).    sucralfate (CARAFATE) 1 gram tablet Take 1 tablet (1 g total) by mouth 4 (four) times daily as needed (heartburn).    vitamin D (VITAMIN D3) 1000 units Tab Take 1 tablet (1,000 Units total) by mouth once daily.    zinc gluconate 50 mg tablet Take 50 mg by mouth once daily.     No current facility-administered medications for this visit.         Review of Systems   Constitutional: Positive for malaise/fatigue. Negative for chills, fever and weight loss.   HENT: Negative for congestion, ear discharge, ear pain, hearing loss and nosebleeds.    Eyes: Negative for blurred vision, double vision, photophobia and pain.   Respiratory: Negative for hemoptysis, sputum production and shortness of breath.    Cardiovascular: Negative for orthopnea, leg swelling and PND.   Gastrointestinal: Negative for abdominal pain, blood in stool, diarrhea, heartburn, melena and vomiting.   Genitourinary: Negative for dysuria and frequency.   Musculoskeletal: Negative for back pain, myalgias and neck pain.   Neurological:  Negative for dizziness, tingling, tremors, sensory change, focal weakness, weakness and headaches.   Endo/Heme/Allergies: Negative for environmental allergies. Does not bruise/bleed easily.   Psychiatric/Behavioral: Negative for depression, hallucinations and substance abuse.         Vitals:    05/27/22 0829   BP: (!) 127/59   Pulse: 75   Resp: 16   Temp: 98 °F (36.7 °C)       Physical Exam  Constitutional:       Appearance: Normal appearance.   HENT:      Head: Normocephalic and atraumatic.      Mouth/Throat:      Pharynx: No posterior oropharyngeal erythema.   Eyes:      General: No scleral icterus.  Cardiovascular:      Rate and Rhythm: Normal rate and regular rhythm.      Pulses: Normal pulses.      Heart sounds: Normal heart sounds. No murmur heard.  Pulmonary:      Effort: Pulmonary effort is normal. No respiratory distress.      Breath sounds: Normal breath sounds.   Abdominal:      General: There is no distension.      Palpations: There is no mass.      Tenderness: There is no abdominal tenderness.   Musculoskeletal:         General: No swelling.   Lymphadenopathy:      Cervical: No cervical adenopathy.   Skin:     Coloration: Skin is not jaundiced or pale.      Comments: Right chest Soto removed, site healing   Neurological:      General: No focal deficit present.      Mental Status: She is alert and oriented to person, place, and time.      Cranial Nerves: No cranial nerve deficit.   Psychiatric:         Mood and Affect: Mood normal.       Component      Latest Ref Rng & Units 5/27/2022   WBC      3.90 - 12.70 K/uL 4.12   RBC      4.00 - 5.40 M/uL 2.80 (L)   Hemoglobin      12.0 - 16.0 g/dL 9.3 (L)   Hematocrit      37.0 - 48.5 % 28.5 (L)   MCV      82 - 98 fL 102 (H)   MCH      27.0 - 31.0 pg 33.2 (H)   MCHC      32.0 - 36.0 g/dL 32.6   RDW      11.5 - 14.5 % 15.7 (H)   Platelets      150 - 450 K/uL 194   MPV      9.2 - 12.9 fL 10.0   Immature Granulocytes      0.0 - 0.5 % 0.5   Gran # (ANC)      1.8 -  7.7 K/uL 1.4 (L)   Immature Grans (Abs)      0.00 - 0.04 K/uL 0.02   Lymph #      1.0 - 4.8 K/uL 1.5   Mono #      0.3 - 1.0 K/uL 0.8   Eos #      0.0 - 0.5 K/uL 0.3   Baso #      0.00 - 0.20 K/uL 0.06   nRBC      0 /100 WBC 0   Gran %      38.0 - 73.0 % 34.4 (L)   Lymph %      18.0 - 48.0 % 37.4   Mono %      4.0 - 15.0 % 19.4 (H)   Eosinophil %      0.0 - 8.0 % 6.8   Basophil %      0.0 - 1.9 % 1.5   Differential Method       Automated   Sodium      136 - 145 mmol/L 140   Potassium      3.5 - 5.1 mmol/L 3.7   Chloride      95 - 110 mmol/L 108   CO2      23 - 29 mmol/L 25   Glucose      70 - 110 mg/dL 77   BUN      8 - 23 mg/dL 9   Creatinine      0.5 - 1.4 mg/dL 0.8   Calcium      8.7 - 10.5 mg/dL 8.7   PROTEIN TOTAL      6.0 - 8.4 g/dL 5.7 (L)   Albumin      3.5 - 5.2 g/dL 3.3 (L)   BILIRUBIN TOTAL      0.1 - 1.0 mg/dL 0.5   Alkaline Phosphatase      55 - 135 U/L 50 (L)   AST      10 - 40 U/L 18   ALT      10 - 44 U/L 13   Anion Gap      8 - 16 mmol/L 7 (L)   eGFR if African American      >60 mL/min/1.73 m:2 >60.0   eGFR if non African American      >60 mL/min/1.73 m:2 >60.0   Magnesium      1.6 - 2.6 mg/dL 1.9   Phosphorus      2.7 - 4.5 mg/dL 3.7         Assessment and Plan:    1. History of autologous peripheral blood sten cell transplant    -Received Melphalan 200 ASCT on 04/25/22. Received CD34 cells with total dose of 3.97x10^6.  -Engrafted on Day +12  -Transplant was uncomplicated. Today is Day +32  --she will have day +100 re staging in early Aug 2022    2. Multiple myeloma in remission     --R-ISS II IgG lambda multiple myeloma, treated with  RVd .    --She had increased 1q21 copy number and has t(4,14), both high risk markers in multiple myeloma. She also has monosomy 13 on FISH.   --She did not have baseline PET CT.   --Prior to C5 her M spike on SPEP was 0.52g/dl( was 5.36g/dl at diagnosis).   --She received 6 cycles of RVd, followed by 2 cycles of velcade-dexamethasone. She tolerated the treatments  well.    --PET CT on 3/31/22 did not demonstrate any hypermetabolic lytic or soft tissue lesions.  -- BM biopsy on 3/21/22 showed 10% plasma cells ( versus 50% at diagnosis).  --M spike on 3/3/22 SPEP was 0.85g/dl, suggesting she partial response.   --4/1/22 PFTs showed mild decrease in DLCO. EKG did not show any abnormality. 2D ECHO on 4/1/22 showed moderate left atrial enlargement, mild mitral regurgitation but normal LVEF. HBV, HIV, HCV, Chagas Ab, RPR, HTLV I/II all non-reactive. Varicella negative. CMV Ab was reactive. HSV 1 IgG was reactive. HSV 2 IgG negative.   --Colonoscopy normal. No abnormality of mammogram or PAP smear.   --recommend monthly CBC, CMP    3.  Anemia secondary to chemotherapy    --Hgb 9.3 g/dl today   - Transfuse if hgb<7      4. Neutropenia    --mild, asymptomatic  --ANC 1.4 today    5. GERD  - No issues now, on PPI     6. Insomnia  - takes clonopin PRN     7. Migraine History  - She uses Florinol for migrane headaches and states that she only takes it 2-3 times per year     8. Nausea    --She has persistent nausea, especially when she takes acylcovir 800 mg  Tablets  --she will switch to valacyclovir 500 mg BID from 5/27/22          BMT Chart Routing      Follow up with physician . After bone marrow , pet ct on 8/3/22   Follow up with [facility] . Bone marrow biopsy on 8/3/22 or later, f/u after   Labs CBC, CMP, immunofixation, SPEP, immunoglobulins, free light chains and other   Lab interval:  24 hr upep, urine immunofixation, beta2 microglobulin    Imaging PET scan      Pharmacy appointment    Other referrals

## 2022-05-26 NOTE — TELEPHONE ENCOUNTER
Left message regarding the confirmation of appointments scheduled for 5/27/22 as well as the clinic callback number.

## 2022-05-27 ENCOUNTER — OFFICE VISIT (OUTPATIENT)
Dept: HEMATOLOGY/ONCOLOGY | Facility: CLINIC | Age: 67
End: 2022-05-27
Payer: COMMERCIAL

## 2022-05-27 ENCOUNTER — LAB VISIT (OUTPATIENT)
Dept: LAB | Facility: HOSPITAL | Age: 67
End: 2022-05-27
Payer: COMMERCIAL

## 2022-05-27 VITALS
SYSTOLIC BLOOD PRESSURE: 127 MMHG | BODY MASS INDEX: 22.37 KG/M2 | WEIGHT: 142.5 LBS | TEMPERATURE: 98 F | HEIGHT: 67 IN | HEART RATE: 75 BPM | OXYGEN SATURATION: 99 % | RESPIRATION RATE: 16 BRPM | DIASTOLIC BLOOD PRESSURE: 59 MMHG

## 2022-05-27 DIAGNOSIS — T45.1X5A ANEMIA ASSOCIATED WITH CHEMOTHERAPY: ICD-10-CM

## 2022-05-27 DIAGNOSIS — D63.0 ANEMIA IN NEOPLASTIC DISEASE: ICD-10-CM

## 2022-05-27 DIAGNOSIS — R53.82 CHRONIC FATIGUE: ICD-10-CM

## 2022-05-27 DIAGNOSIS — Z94.81 STATUS POST AUTOLOGOUS BONE MARROW TRANSPLANT: Primary | ICD-10-CM

## 2022-05-27 DIAGNOSIS — D70.1 CHEMOTHERAPY-INDUCED NEUTROPENIA: ICD-10-CM

## 2022-05-27 DIAGNOSIS — K21.9 GASTROESOPHAGEAL REFLUX DISEASE WITHOUT ESOPHAGITIS: ICD-10-CM

## 2022-05-27 DIAGNOSIS — Z94.81 STATUS POST AUTOLOGOUS BONE MARROW TRANSPLANT: ICD-10-CM

## 2022-05-27 DIAGNOSIS — C90.01 MULTIPLE MYELOMA IN REMISSION: ICD-10-CM

## 2022-05-27 DIAGNOSIS — T45.1X5A CHEMOTHERAPY-INDUCED NEUTROPENIA: ICD-10-CM

## 2022-05-27 DIAGNOSIS — D64.81 ANEMIA ASSOCIATED WITH CHEMOTHERAPY: ICD-10-CM

## 2022-05-27 PROBLEM — D69.6 THROMBOCYTOPENIA: Status: RESOLVED | Noted: 2022-04-22 | Resolved: 2022-05-27

## 2022-05-27 LAB
ALBUMIN SERPL BCP-MCNC: 3.3 G/DL (ref 3.5–5.2)
ALP SERPL-CCNC: 50 U/L (ref 55–135)
ALT SERPL W/O P-5'-P-CCNC: 13 U/L (ref 10–44)
ANION GAP SERPL CALC-SCNC: 7 MMOL/L (ref 8–16)
AST SERPL-CCNC: 18 U/L (ref 10–40)
BASOPHILS # BLD AUTO: 0.06 K/UL (ref 0–0.2)
BASOPHILS NFR BLD: 1.5 % (ref 0–1.9)
BILIRUB SERPL-MCNC: 0.5 MG/DL (ref 0.1–1)
BUN SERPL-MCNC: 9 MG/DL (ref 8–23)
CALCIUM SERPL-MCNC: 8.7 MG/DL (ref 8.7–10.5)
CHLORIDE SERPL-SCNC: 108 MMOL/L (ref 95–110)
CO2 SERPL-SCNC: 25 MMOL/L (ref 23–29)
CREAT SERPL-MCNC: 0.8 MG/DL (ref 0.5–1.4)
DIFFERENTIAL METHOD: ABNORMAL
EOSINOPHIL # BLD AUTO: 0.3 K/UL (ref 0–0.5)
EOSINOPHIL NFR BLD: 6.8 % (ref 0–8)
ERYTHROCYTE [DISTWIDTH] IN BLOOD BY AUTOMATED COUNT: 15.7 % (ref 11.5–14.5)
EST. GFR  (AFRICAN AMERICAN): >60 ML/MIN/1.73 M^2
EST. GFR  (NON AFRICAN AMERICAN): >60 ML/MIN/1.73 M^2
GLUCOSE SERPL-MCNC: 77 MG/DL (ref 70–110)
HCT VFR BLD AUTO: 28.5 % (ref 37–48.5)
HGB BLD-MCNC: 9.3 G/DL (ref 12–16)
IMM GRANULOCYTES # BLD AUTO: 0.02 K/UL (ref 0–0.04)
IMM GRANULOCYTES NFR BLD AUTO: 0.5 % (ref 0–0.5)
LYMPHOCYTES # BLD AUTO: 1.5 K/UL (ref 1–4.8)
LYMPHOCYTES NFR BLD: 37.4 % (ref 18–48)
MAGNESIUM SERPL-MCNC: 1.9 MG/DL (ref 1.6–2.6)
MCH RBC QN AUTO: 33.2 PG (ref 27–31)
MCHC RBC AUTO-ENTMCNC: 32.6 G/DL (ref 32–36)
MCV RBC AUTO: 102 FL (ref 82–98)
MONOCYTES # BLD AUTO: 0.8 K/UL (ref 0.3–1)
MONOCYTES NFR BLD: 19.4 % (ref 4–15)
NEUTROPHILS # BLD AUTO: 1.4 K/UL (ref 1.8–7.7)
NEUTROPHILS NFR BLD: 34.4 % (ref 38–73)
NRBC BLD-RTO: 0 /100 WBC
PHOSPHATE SERPL-MCNC: 3.7 MG/DL (ref 2.7–4.5)
PLATELET # BLD AUTO: 194 K/UL (ref 150–450)
PMV BLD AUTO: 10 FL (ref 9.2–12.9)
POTASSIUM SERPL-SCNC: 3.7 MMOL/L (ref 3.5–5.1)
PROT SERPL-MCNC: 5.7 G/DL (ref 6–8.4)
RBC # BLD AUTO: 2.8 M/UL (ref 4–5.4)
SODIUM SERPL-SCNC: 140 MMOL/L (ref 136–145)
WBC # BLD AUTO: 4.12 K/UL (ref 3.9–12.7)

## 2022-05-27 PROCEDURE — 99999 PR PBB SHADOW E&M-EST. PATIENT-LVL IV: ICD-10-PCS | Mod: PBBFAC,,, | Performed by: INTERNAL MEDICINE

## 2022-05-27 PROCEDURE — 83735 ASSAY OF MAGNESIUM: CPT | Performed by: PHYSICIAN ASSISTANT

## 2022-05-27 PROCEDURE — 85025 COMPLETE CBC W/AUTO DIFF WBC: CPT | Performed by: PHYSICIAN ASSISTANT

## 2022-05-27 PROCEDURE — 36415 COLL VENOUS BLD VENIPUNCTURE: CPT | Performed by: PHYSICIAN ASSISTANT

## 2022-05-27 PROCEDURE — 80053 COMPREHEN METABOLIC PANEL: CPT | Performed by: PHYSICIAN ASSISTANT

## 2022-05-27 PROCEDURE — 99215 OFFICE O/P EST HI 40 MIN: CPT | Mod: S$GLB,,, | Performed by: INTERNAL MEDICINE

## 2022-05-27 PROCEDURE — 99215 PR OFFICE/OUTPT VISIT, EST, LEVL V, 40-54 MIN: ICD-10-PCS | Mod: S$GLB,,, | Performed by: INTERNAL MEDICINE

## 2022-05-27 PROCEDURE — 99999 PR PBB SHADOW E&M-EST. PATIENT-LVL IV: CPT | Mod: PBBFAC,,, | Performed by: INTERNAL MEDICINE

## 2022-05-27 PROCEDURE — 84100 ASSAY OF PHOSPHORUS: CPT | Performed by: PHYSICIAN ASSISTANT

## 2022-05-27 RX ORDER — VALACYCLOVIR HYDROCHLORIDE 500 MG/1
500 TABLET, FILM COATED ORAL 2 TIMES DAILY
Qty: 30 TABLET | Refills: 11 | Status: SHIPPED | OUTPATIENT
Start: 2022-05-27 | End: 2022-06-09 | Stop reason: SDUPTHER

## 2022-05-27 RX ORDER — ONDANSETRON 4 MG/1
4 TABLET, ORALLY DISINTEGRATING ORAL EVERY 8 HOURS PRN
COMMUNITY
Start: 2022-05-20

## 2022-06-09 DIAGNOSIS — C90.01 MULTIPLE MYELOMA IN REMISSION: ICD-10-CM

## 2022-06-09 RX ORDER — VALACYCLOVIR HYDROCHLORIDE 500 MG/1
500 TABLET, FILM COATED ORAL 2 TIMES DAILY
Qty: 60 TABLET | Refills: 6 | Status: SHIPPED | OUTPATIENT
Start: 2022-06-09 | End: 2022-07-11

## 2022-06-09 NOTE — TELEPHONE ENCOUNTER
----- Message from Luis Hilliard sent at 6/9/2022  8:12 AM CDT -----  Regarding: Rx Refill  Contact: Caty  Consult/Advisory:           Name Of Caller: Caty  Contact Preference?: [phone]           What is the nature of the call?:    Pt is calling to get a 60 count tablet script sent over for the following rx:    valACYclovir (VALTREX) 500 MG tablet 30 tablet       Sent to:      [address] [address]  Phone: [phone] Fax: [phone]      As always, thank you for all that you do for our patients!

## 2022-06-20 ENCOUNTER — TELEPHONE (OUTPATIENT)
Dept: HEMATOLOGY/ONCOLOGY | Facility: CLINIC | Age: 67
End: 2022-06-20
Payer: COMMERCIAL

## 2022-06-20 NOTE — TELEPHONE ENCOUNTER
----- Message from Britt Ryan sent at 6/20/2022  8:10 AM CDT -----  Regarding: Medical Assistance  Contact: Dr. Taylor Vazquez is requesting a call back from physician directly in reference to patient     Dr. Vazquez can be reached at [phone] (this is physician's cell)   Please Assist

## 2022-06-23 ENCOUNTER — TELEPHONE (OUTPATIENT)
Dept: HEMATOLOGY/ONCOLOGY | Facility: CLINIC | Age: 67
End: 2022-06-23
Payer: COMMERCIAL

## 2022-06-23 NOTE — TELEPHONE ENCOUNTER
----- Message from Constance Chaidez sent at 6/23/2022  9:07 AM CDT -----  Name Of Caller: Lin with Dr. Vazquez    Provider Name: Chad Tabor,    Contact Preference?: [phone]    What is the nature of the call?:Lin said  would like to speak to you directly regarding a rash pt has his direct cell phone is [phone]. She said she left message yesterday and no one ever called back the Doctor

## 2022-07-06 ENCOUNTER — TELEPHONE (OUTPATIENT)
Dept: HEMATOLOGY/ONCOLOGY | Facility: CLINIC | Age: 67
End: 2022-07-06
Payer: COMMERCIAL

## 2022-07-06 NOTE — TELEPHONE ENCOUNTER
----- Message from Unique Nic sent at 7/6/2022  8:46 AM CDT -----  Regarding: Pt called to speak to the nurse regarding a reaction she is having to her medication and pt would like a call back this morning asap  Patient Advice:    Pt called to speak to the nurse regarding a reaction she is having to her medication and pt would like a call back this morning asa    Pt can be reached at [phone]

## 2022-07-06 NOTE — TELEPHONE ENCOUNTER
Spoke to patient she will try to take zofran one hour prior to acyclovir, she will call with any issues.

## 2022-07-06 NOTE — TELEPHONE ENCOUNTER
Patient has developed a rash since starting valacyclovir. Finished steroid last night and resumed valacyclovir last night and woke up with a rash and itching. She has not had an issue with the 400mg dose when she took it pre transplant.

## 2022-07-11 ENCOUNTER — TELEPHONE (OUTPATIENT)
Dept: HEMATOLOGY/ONCOLOGY | Facility: CLINIC | Age: 67
End: 2022-07-11
Payer: COMMERCIAL

## 2022-07-11 NOTE — TELEPHONE ENCOUNTER
----- Message from Bora Rouse sent at 7/11/2022  8:20 AM CDT -----  Name Of Caller: Caty        Provider Name: Chad Juniorchristian        Does patient feel the need to be seen today? no        Relationship to the Pt?: patient        Contact Preference?: [phone]        What is the nature of the call?:  Patient states that she needs to speak with someone in the office regarding a reaction she has been having to her medication, acyclovir (ZOVIRAX) 800 MG Tab. Patient states that the office has been adjusting that medication

## 2022-07-11 NOTE — TELEPHONE ENCOUNTER
Pt c/o itching with acyclovir with zofran. Taking Atarax 25mg TID with no relief. Discussed with Dr. Márquez who is advising pt hold acyclovir until next follow up appt with  on 8/3. Pt verbalized understanding and will hold medication. Precautions reviewed such as avoiding crowds, frequent handwashing, and wearing a mask in public.

## 2022-08-02 ENCOUNTER — TELEPHONE (OUTPATIENT)
Dept: HEMATOLOGY/ONCOLOGY | Facility: CLINIC | Age: 67
End: 2022-08-02
Payer: COMMERCIAL

## 2022-08-02 NOTE — TELEPHONE ENCOUNTER
Pt verbalized understanding of the following: she may eat a small meal before the procedure, take all medications ,as well as wear comfortable clothing.   The procedure takes between 15-20 minutes. Pt will have any questions answered during the consent process prior to the procedure. Pt will be asked to lay on her back for an additional 10 minutes to put pressure on the site before her vitals and bandage are checked upon release. Site is to be covered and dry for twenty four hours post procedure. Pt verbalized understanding that family members are not permitted in the procedure room during the procedure. Pt advised that procedure is not under sedation but will be given a local anesthetic.    No

## 2022-08-02 NOTE — PROGRESS NOTES
CC: Multiple myeloma in remission, follow up after autologous stem cell transplant, day+ 100      HPI : , 67, is here for hematology/ stem cell transplant follow up .  She is here post HDT/ auto SCT, day+ 100 .   She has IgG lambda multiple myeloma. Multiple myeloma was diagnosed after work up for anemia showed a paraprotein  She has anemia, GERD, esophageal erosions. On 10/8/21 she had a bone marrow biopsy. It showed nearly 50%  positive plasma cells on core biopsy by IHC.  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  [place] red on bone marrow negative.She has chronic anemia. Cytogenetics was normal. FISH showed increase in 1q copies, t(4,14) and monosomy 13.  She had no lytic lesions on skeletal survey. She was treated with RVd.   She underwent Ju 200 ASCT. Day 0 was 4/25/22.  Post transplant course was uneventful with expected chemotherapy related toxicities.She went to ED on 05/15/22 with low grade fever. She was discharged with PO antibiotics. Respiratory panel was positive for para influenza virus.      Interval History: Ms.Richards Byrne presents for follow up. Today is day + 100 post auto SCT for multiple myeloma  . She has fatigue. She had intolerance to 800mg dose of acyclovir. It was switched to valacyclovir, but she had reaction ( rash) to it. Denies emesis, fever, cough, or dyspnea.     Review of patient's allergies indicates:   Allergen Reactions    Garlic     Milk Hives, Itching and Nausea Only     Dairy Products       Current Outpatient Medications   Medication Sig    ascorbic acid, vitamin C, (VITAMIN C) 500 MG tablet Take 1 tablet (500 mg total) by mouth once daily.    calcium carbonate (OS-LAILA) 600 mg calcium (1,500 mg) Tab Take 1 tablet by mouth once daily.    calcium carbonate (TUMS ULTRA ORAL) Take 500 mg by mouth daily as needed (heart burn).    citalopram (CELEXA) 10 MG tablet Take 10 mg by mouth every morning.    clonazePAM (KLONOPIN) 0.5 MG tablet Take 0.5 mg by mouth 2  (two) times daily as needed for Anxiety.    fluticasone propionate (FLONASE) 50 mcg/actuation nasal spray 1 spray (50 mcg total) by Each Nostril route nightly as needed for Rhinitis or Allergies.    guaiFENesin (MUCINEX) 600 mg 12 hr tablet Take 2 tablets (1,200 mg total) by mouth 2 (two) times daily.    loperamide (IMODIUM) 2 mg capsule Take 1 capsule (2 mg total) by mouth 4 (four) times daily as needed for Diarrhea.    multivitamin (THERAGRAN) per tablet Take 1 tablet by mouth once daily.    ondansetron (ZOFRAN) 8 MG tablet Take 8 mg by mouth every 8 (eight) hours as needed for Nausea.    ondansetron (ZOFRAN-ODT) 4 MG TbDL Take 4 mg by mouth every 8 (eight) hours as needed.    pantoprazole (PROTONIX) 40 MG tablet Take 1 tablet (40 mg total) by mouth once daily.    promethazine (PHENERGAN) 25 MG tablet Take 25 mg by mouth every 6 (six) hours as needed (nuasea).    sucralfate (CARAFATE) 1 gram tablet Take 1 tablet (1 g total) by mouth 4 (four) times daily as needed (heartburn).    vitamin D (VITAMIN D3) 1000 units Tab Take 1 tablet (1,000 Units total) by mouth once daily.    zinc gluconate 50 mg tablet Take 50 mg by mouth once daily.     No current facility-administered medications for this visit.         Review of Systems   Constitutional: Positive for malaise/fatigue. Negative for chills, fever and weight loss.   HENT: Negative for congestion, ear discharge, ear pain, hearing loss and nosebleeds.    Eyes: Negative for blurred vision, double vision, photophobia and pain.   Respiratory: Negative for hemoptysis, sputum production and shortness of breath.    Cardiovascular: Negative for orthopnea, leg swelling and PND.   Gastrointestinal: Negative for abdominal pain, blood in stool, diarrhea, heartburn, melena and vomiting.   Genitourinary: Negative for dysuria and frequency.   Musculoskeletal: Negative for back pain, myalgias and neck pain.   Neurological: Negative for dizziness, tingling, tremors, sensory  change, focal weakness, weakness and headaches.   Endo/Heme/Allergies: Negative for environmental allergies. Does not bruise/bleed easily.   Psychiatric/Behavioral: Negative for depression, hallucinations and substance abuse.       Vitals:    08/03/22 1256   BP: 126/64   Pulse: 89   Resp: 16         PS: ECOG 1      Physical Exam  Constitutional:       Appearance: Normal appearance.   HENT:      Head: Normocephalic and atraumatic.      Mouth/Throat:      Pharynx: No posterior oropharyngeal erythema.   Eyes:      General: No scleral icterus.  Cardiovascular:      Rate and Rhythm: Normal rate and regular rhythm.      Pulses: Normal pulses.      Heart sounds: Normal heart sounds. No murmur heard.  Pulmonary:      Effort: Pulmonary effort is normal. No respiratory distress.      Breath sounds: Normal breath sounds.   Abdominal:      General: There is no distension.      Palpations: There is no mass.      Tenderness: There is no abdominal tenderness.   Musculoskeletal:         General: No swelling.   Lymphadenopathy:      Cervical: No cervical adenopathy.   Skin:     Coloration: Skin is not jaundiced or pale.   Neurological:      General: No focal deficit present.      Mental Status: She is alert and oriented to person, place, and time.      Cranial Nerves: No cranial nerve deficit.   Psychiatric:         Mood and Affect: Mood normal.       Component      Latest Ref Rng & Units 8/3/2022   WBC      3.90 - 12.70 K/uL 6.34   RBC      4.00 - 5.40 M/uL 3.63 (L)   Hemoglobin      12.0 - 16.0 g/dL 12.2   Hematocrit      37.0 - 48.5 % 36.1 (L)   MCV      82 - 98 fL 99 (H)   MCH      27.0 - 31.0 pg 33.6 (H)   MCHC      32.0 - 36.0 g/dL 33.8   RDW      11.5 - 14.5 % 11.3 (L)   Platelets      150 - 450 K/uL 215   MPV      9.2 - 12.9 fL 9.5   Immature Granulocytes      0.0 - 0.5 % 0.2   Gran # (ANC)      1.8 - 7.7 K/uL 3.2   Immature Grans (Abs)      0.00 - 0.04 K/uL 0.01   Lymph #      1.0 - 4.8 K/uL 1.5   Mono #      0.3 - 1.0 K/uL  0.6   Eos #      0.0 - 0.5 K/uL 1.0 (H)   Baso #      0.00 - 0.20 K/uL 0.05   nRBC      0 /100 WBC 0   Gran %      38.0 - 73.0 % 50.7   Lymph %      18.0 - 48.0 % 24.1   Mono %      4.0 - 15.0 % 9.1   Eosinophil %      0.0 - 8.0 % 15.1 (H)   Basophil %      0.0 - 1.9 % 0.8   Differential Method       Automated   Sodium      136 - 145 mmol/L 141   Potassium      3.5 - 5.1 mmol/L 3.9   Chloride      95 - 110 mmol/L 104   CO2      23 - 29 mmol/L 27   Glucose      70 - 110 mg/dL 91   BUN      8 - 23 mg/dL 13   Creatinine      0.5 - 1.4 mg/dL 0.9   Calcium      8.7 - 10.5 mg/dL 9.6   PROTEIN TOTAL      6.0 - 8.4 g/dL 7.4   Albumin      3.5 - 5.2 g/dL 3.9   BILIRUBIN TOTAL      0.1 - 1.0 mg/dL 0.4   Alkaline Phosphatase      55 - 135 U/L 91   AST      10 - 40 U/L 17   ALT      10 - 44 U/L 13   Anion Gap      8 - 16 mmol/L 10   eGFR      >60 mL/min/1.73 m:2 >60.0   IgG      650 - 1600 mg/dL 1006   IgA      40 - 350 mg/dL 150   IgM      50 - 300 mg/dL 48 (L)   Beta-2 Microglobulin      0.0 - 2.5 ug/mL 2.7 (H)       8/3/22 PET CT    COMPARISON:  [facility] PET-CT 03/21/2022     FINDINGS:  Quality of the study: Adequate.     In the head and neck, there are no hypermetabolic lesions worrisome for malignancy. There are no hypermetabolic mucosal lesions, and there are no pathologically enlarged or hypermetabolic lymph nodes.     In the chest, there are no hypermetabolic lesions worrisome for malignancy.  There are no concerning pulmonary nodules or masses, and there are no pathologically enlarged or hypermetabolic lymph nodes.     In the abdomen and pelvis, there is physiologic tracer distribution within the abdominal organs and excretion into the genitourinary system.     In the bones, there are no hypermetabolic lesions worrisome for malignancy.     In the extremities, there are no hypermetabolic lesions worrisome for malignancy.     Additional findings: Bilateral breast implants.  Cholecystectomy.     Impression:     No evidence of  hypermetabolic foci to indicate malignancy.       Assessment and Plan:    1. History of autologous peripheral blood sten cell transplant    -Received Melphalan 200 ASCT on 04/25/22. Received CD34 cells with total dose of 3.97x10^6.  -Engrafted on Day +12  -Transplant was uncomplicated. Today is Day +100   --Day+100 re-staging in process  --No evidence of hypermetabolic bony lytic or soft tissue lesions on PET CT done on day+100 .      2. Multiple myeloma in remission     --R-ISS II IgG lambda multiple myeloma, treated with  RVd .    --She had increased 1q21 copy number and has t(4,14), both high risk markers in multiple myeloma. She also has monosomy 13 on FISH.   --She did not have baseline PET CT.   --Prior to C5 her M spike on SPEP was 0.52g/dl( was 5.36g/dl at diagnosis).   --She received 6 cycles of RVd, followed by 2 cycles of velcade-dexamethasone. She tolerated the treatments well.    --PET CT on 3/31/22 did not demonstrate any hypermetabolic lytic or soft tissue lesions.  -- BM biopsy on 3/21/22 showed 10% plasma cells ( versus 50% at diagnosis).  --M spike on 3/3/22 SPEP was 0.85g/dl, suggesting she partial response.   --4/1/22 PFTs showed mild decrease in DLCO. EKG did not show any abnormality. 2D ECHO on 4/1/22 showed moderate left atrial enlargement, mild mitral regurgitation but normal LVEF. HBV, HIV, HCV, Chagas Ab, RPR, HTLV I/II all non-reactive. Varicella negative. CMV Ab was reactive. HSV 1 IgG was reactive. HSV 2 IgG negative.   --Colonoscopy normal. No abnormality of mammogram or PAP smear.   --I discussed the maintenance therapy consisted of lenalidomide (10?mg/day orally) on days 1-21 of a 28-day cycle in combination with bortezomib (1.3?mg/m2 per week subcutaneously/intravenously) and low-dose dexamethasone (40?mg per week orally). [ Leukemia volume 28, [id number] (2014) ]. This was administered for up to 3 years, followed by single-agent lenalidomide maintenance thereafter.     --she will  resume maintenance under   --she will have follow up in 3 months       3.  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 GERD  - She is currently asymptomatic  --, on PPI     5. Migraine History  - She uses Florinol for migrane headaches and states that she only takes it 2-3 times per year     6. Eosinophilia    --Absolute eosinophil count 1000 today    6. Nausea    --She had persistent nausea, especially when she takes acylcovir 800 mg tablets  --she was switched to valacyclovir 500 mg BID from 5/27/22  --she did not tolerate valacyclovir as well; she will resume acyclovir 400mg BID  --she will have varicella IgG titer checked , and receive shingrix vaccine if positive  --she will also receive COVID 19 vaccination and has the schedule for other immunizations        BMT Chart Routing      Follow up with physician 3 months.   Follow up with LILIAN    Infusion scheduling note    Injection scheduling note    Labs CBC, CMP, free light chains, SPEP, immunofixation, immunoglobulins and phosphorus   Lab interval:     Imaging    Pharmacy appointment    Other referrals            I reviewed the complete blood counts and comprehensive metabolic profile, serum quantitative immunoglobulins, beta2 microglobulin from 8/3/22 and discussed with the patient. I reviewed the images from PET CT done on 8/3/22, and discussed with the patient.

## 2022-08-03 ENCOUNTER — OFFICE VISIT (OUTPATIENT)
Dept: HEMATOLOGY/ONCOLOGY | Facility: CLINIC | Age: 67
End: 2022-08-03
Payer: COMMERCIAL

## 2022-08-03 ENCOUNTER — OFFICE VISIT (OUTPATIENT)
Dept: INFECTIOUS DISEASES | Facility: CLINIC | Age: 67
End: 2022-08-03
Payer: COMMERCIAL

## 2022-08-03 ENCOUNTER — PROCEDURE VISIT (OUTPATIENT)
Dept: HEMATOLOGY/ONCOLOGY | Facility: CLINIC | Age: 67
End: 2022-08-03
Payer: COMMERCIAL

## 2022-08-03 ENCOUNTER — HOSPITAL ENCOUNTER (OUTPATIENT)
Dept: RADIOLOGY | Facility: HOSPITAL | Age: 67
Discharge: HOME | End: 2022-08-03
Attending: INTERNAL MEDICINE
Payer: COMMERCIAL

## 2022-08-03 ENCOUNTER — CLINICAL SUPPORT (OUTPATIENT)
Dept: INFECTIOUS DISEASES | Facility: CLINIC | Age: 67
End: 2022-08-03
Payer: COMMERCIAL

## 2022-08-03 VITALS
RESPIRATION RATE: 16 BRPM | BODY MASS INDEX: 23.38 KG/M2 | HEART RATE: 89 BPM | SYSTOLIC BLOOD PRESSURE: 126 MMHG | DIASTOLIC BLOOD PRESSURE: 64 MMHG | HEIGHT: 67 IN | WEIGHT: 148.94 LBS | OXYGEN SATURATION: 99 %

## 2022-08-03 VITALS
SYSTOLIC BLOOD PRESSURE: 109 MMHG | RESPIRATION RATE: 20 BRPM | SYSTOLIC BLOOD PRESSURE: 128 MMHG | HEART RATE: 89 BPM | WEIGHT: 149.69 LBS | HEIGHT: 67 IN | DIASTOLIC BLOOD PRESSURE: 71 MMHG | OXYGEN SATURATION: 95 % | DIASTOLIC BLOOD PRESSURE: 59 MMHG | BODY MASS INDEX: 23.39 KG/M2 | BODY MASS INDEX: 23.49 KG/M2 | TEMPERATURE: 98 F | WEIGHT: 149.06 LBS | HEART RATE: 77 BPM | HEIGHT: 67 IN | TEMPERATURE: 98 F

## 2022-08-03 DIAGNOSIS — C90.01 MULTIPLE MYELOMA IN REMISSION: Primary | ICD-10-CM

## 2022-08-03 DIAGNOSIS — Z94.81 STATUS POST AUTOLOGOUS BONE MARROW TRANSPLANT: ICD-10-CM

## 2022-08-03 DIAGNOSIS — T45.1X5A ANEMIA ASSOCIATED WITH CHEMOTHERAPY: ICD-10-CM

## 2022-08-03 DIAGNOSIS — D63.0 ANEMIA IN NEOPLASTIC DISEASE: ICD-10-CM

## 2022-08-03 DIAGNOSIS — Z71.85 VACCINE COUNSELING: ICD-10-CM

## 2022-08-03 DIAGNOSIS — C90.01 MULTIPLE MYELOMA IN REMISSION: ICD-10-CM

## 2022-08-03 DIAGNOSIS — R53.82 CHRONIC FATIGUE: ICD-10-CM

## 2022-08-03 DIAGNOSIS — Z94.81 STATUS POST AUTOLOGOUS BONE MARROW TRANSPLANT: Primary | ICD-10-CM

## 2022-08-03 DIAGNOSIS — D64.81 ANEMIA ASSOCIATED WITH CHEMOTHERAPY: ICD-10-CM

## 2022-08-03 LAB — POCT GLUCOSE: 110 MG/DL (ref 70–110)

## 2022-08-03 PROCEDURE — 88184 FLOWCYTOMETRY/ TC 1 MARKER: CPT | Performed by: PATHOLOGY

## 2022-08-03 PROCEDURE — 99204 OFFICE O/P NEW MOD 45 MIN: CPT | Mod: 25,S$GLB,, | Performed by: INTERNAL MEDICINE

## 2022-08-03 PROCEDURE — 88311 DECALCIFY TISSUE: CPT | Mod: 26,,, | Performed by: PATHOLOGY

## 2022-08-03 PROCEDURE — 88305 TISSUE EXAM BY PATHOLOGIST: CPT | Mod: 26,,, | Performed by: PATHOLOGY

## 2022-08-03 PROCEDURE — 99999 PR PBB SHADOW E&M-EST. PATIENT-LVL III: CPT | Mod: PBBFAC,,, | Performed by: INTERNAL MEDICINE

## 2022-08-03 PROCEDURE — 88311 PR  DECALCIFY TISSUE: ICD-10-PCS | Mod: 26,,, | Performed by: PATHOLOGY

## 2022-08-03 PROCEDURE — G0009 ADMIN PNEUMOCOCCAL VACCINE: HCPCS | Mod: S$GLB,,, | Performed by: INTERNAL MEDICINE

## 2022-08-03 PROCEDURE — 88364 INSITU HYBRIDIZATION (FISH): CPT | Performed by: PATHOLOGY

## 2022-08-03 PROCEDURE — 88313 SPECIAL STAINS GROUP 2: CPT | Mod: 59 | Performed by: PATHOLOGY

## 2022-08-03 PROCEDURE — G0009 PNEUMOCOCCAL CONJUGATE VACCINE 13-VALENT LESS THAN 5YO & GREATER THAN: ICD-10-PCS | Mod: S$GLB,,, | Performed by: INTERNAL MEDICINE

## 2022-08-03 PROCEDURE — 88311 DECALCIFY TISSUE: CPT | Performed by: PATHOLOGY

## 2022-08-03 PROCEDURE — 88341 IMHCHEM/IMCYTCHM EA ADD ANTB: CPT | Performed by: PATHOLOGY

## 2022-08-03 PROCEDURE — 88305 TISSUE EXAM BY PATHOLOGIST: ICD-10-PCS | Mod: 26,,, | Performed by: PATHOLOGY

## 2022-08-03 PROCEDURE — 88364 INSITU HYBRIDIZATION (FISH): CPT | Mod: 26,,, | Performed by: PATHOLOGY

## 2022-08-03 PROCEDURE — 38222 PR BONE MARROW BIOPSY(IES) W/ASPIRATION(S); DIAGNOSTIC: ICD-10-PCS | Mod: LT,S$GLB,, | Performed by: PHYSICIAN ASSISTANT

## 2022-08-03 PROCEDURE — 88185 FLOWCYTOMETRY/TC ADD-ON: CPT | Performed by: PHYSICIAN ASSISTANT

## 2022-08-03 PROCEDURE — 99999 PR PBB SHADOW E&M-EST. PATIENT-LVL III: ICD-10-PCS | Mod: PBBFAC,,, | Performed by: INTERNAL MEDICINE

## 2022-08-03 PROCEDURE — 99215 PR OFFICE/OUTPT VISIT, EST, LEVL V, 40-54 MIN: ICD-10-PCS | Mod: S$GLB,,, | Performed by: INTERNAL MEDICINE

## 2022-08-03 PROCEDURE — 99204 PR OFFICE/OUTPT VISIT, NEW, LEVL IV, 45-59 MIN: ICD-10-PCS | Mod: 25,S$GLB,, | Performed by: INTERNAL MEDICINE

## 2022-08-03 PROCEDURE — 78816 PET IMAGE W/CT FULL BODY: CPT | Mod: PS,TC

## 2022-08-03 PROCEDURE — 88305 TISSUE EXAM BY PATHOLOGIST: CPT | Mod: 59 | Performed by: PATHOLOGY

## 2022-08-03 PROCEDURE — 88365 INSITU HYBRIDIZATION (FISH): CPT | Performed by: PATHOLOGY

## 2022-08-03 PROCEDURE — 90670 PNEUMOCOCCAL CONJUGATE VACCINE 13-VALENT LESS THAN 5YO & GREATER THAN: ICD-10-PCS | Mod: S$GLB,,, | Performed by: INTERNAL MEDICINE

## 2022-08-03 PROCEDURE — 88364 CHG INSITU HYBRIDIZATION (FISH: ICD-10-PCS | Mod: 26,,, | Performed by: PATHOLOGY

## 2022-08-03 PROCEDURE — 88341 PR IHC OR ICC EACH ADD'L SINGLE ANTIBODY  STAINPR: ICD-10-PCS | Mod: 26,,, | Performed by: PATHOLOGY

## 2022-08-03 PROCEDURE — 88313 SPECIAL STAINS GROUP 2: CPT | Mod: 26,,, | Performed by: PATHOLOGY

## 2022-08-03 PROCEDURE — 88342 IMHCHEM/IMCYTCHM 1ST ANTB: CPT | Performed by: PATHOLOGY

## 2022-08-03 PROCEDURE — 99999 PR PBB SHADOW E&M-EST. PATIENT-LVL V: ICD-10-PCS | Mod: PBBFAC,,, | Performed by: INTERNAL MEDICINE

## 2022-08-03 PROCEDURE — 88274 CYTOGENETICS 25-99: CPT | Performed by: PHYSICIAN ASSISTANT

## 2022-08-03 PROCEDURE — 88313 PR  SPECIAL STAINS,GROUP II: ICD-10-PCS | Mod: 26,,, | Performed by: PATHOLOGY

## 2022-08-03 PROCEDURE — 99215 OFFICE O/P EST HI 40 MIN: CPT | Mod: S$GLB,,, | Performed by: INTERNAL MEDICINE

## 2022-08-03 PROCEDURE — 88365 INSITU HYBRIDIZATION (FISH): CPT | Mod: 26,,, | Performed by: PATHOLOGY

## 2022-08-03 PROCEDURE — 90670 PCV13 VACCINE IM: CPT | Mod: S$GLB,,, | Performed by: INTERNAL MEDICINE

## 2022-08-03 PROCEDURE — 78816 PET IMAGE W/CT FULL BODY: CPT | Mod: 26,PS,, | Performed by: RADIOLOGY

## 2022-08-03 PROCEDURE — 38222 DX BONE MARROW BX & ASPIR: CPT | Mod: LT,S$GLB,, | Performed by: PHYSICIAN ASSISTANT

## 2022-08-03 PROCEDURE — 88189 PR  FLOWCYTOMETRY/READ, 16 & > MARKERS: ICD-10-PCS | Mod: ,,, | Performed by: PATHOLOGY

## 2022-08-03 PROCEDURE — 78816 NM PET CT WHOLE BODY: ICD-10-PCS | Mod: 26,PS,, | Performed by: RADIOLOGY

## 2022-08-03 PROCEDURE — 85097 BONE MARROW INTERPRETATION: CPT | Mod: ,,, | Performed by: PATHOLOGY

## 2022-08-03 PROCEDURE — 88185 FLOWCYTOMETRY/TC ADD-ON: CPT | Performed by: PATHOLOGY

## 2022-08-03 PROCEDURE — 85097 PR  BONE MARROW,SMEAR INTERPRETATION: ICD-10-PCS | Mod: ,,, | Performed by: PATHOLOGY

## 2022-08-03 PROCEDURE — 88341 IMHCHEM/IMCYTCHM EA ADD ANTB: CPT | Mod: 26,,, | Performed by: PATHOLOGY

## 2022-08-03 PROCEDURE — 88365 PR  TISSUE HYBRIDIZATION: ICD-10-PCS | Mod: 26,,, | Performed by: PATHOLOGY

## 2022-08-03 PROCEDURE — 88237 TISSUE CULTURE BONE MARROW: CPT | Performed by: INTERNAL MEDICINE

## 2022-08-03 PROCEDURE — 88189 FLOWCYTOMETRY/READ 16 & >: CPT | Mod: ,,, | Performed by: PATHOLOGY

## 2022-08-03 PROCEDURE — 88342 CHG IMMUNOCYTOCHEMISTRY: ICD-10-PCS | Mod: 26,59,, | Performed by: PATHOLOGY

## 2022-08-03 PROCEDURE — 99999 PR PBB SHADOW E&M-EST. PATIENT-LVL V: CPT | Mod: PBBFAC,,, | Performed by: INTERNAL MEDICINE

## 2022-08-03 PROCEDURE — 88342 IMHCHEM/IMCYTCHM 1ST ANTB: CPT | Mod: 26,59,, | Performed by: PATHOLOGY

## 2022-08-03 RX ORDER — LIDOCAINE HYDROCHLORIDE 20 MG/ML
10 INJECTION, SOLUTION INFILTRATION; PERINEURAL
Status: COMPLETED | OUTPATIENT
Start: 2022-08-03 | End: 2022-08-03

## 2022-08-03 RX ADMIN — LIDOCAINE HYDROCHLORIDE 10 ML: 20 INJECTION, SOLUTION INFILTRATION; PERINEURAL at 11:08

## 2022-08-03 NOTE — PROCEDURES
Caty Diaz is a 67 y.o. female with history of IgG lambda MM s/p auto SCT who presented to clinic for bone marrow biopsy and aspiration. See Dr. Tabor's recent clinic note for full history. I have reviewed medications, allergies and labs. Procedure explained and informed consent obtained. The patient tolerated procedure well and hemostasis was achieved, no signs of distress upon completion. Patient instructed to keep bandaid intact and dry for 24 hours. Please see procedure note for full details.    Alisa Hurtado MD   Hematology and Oncology Fellow  [facility]

## 2022-08-03 NOTE — PROCEDURES
PROCEDURE NOTE:  Date of Procedure: 08/03/2022  Procedure: Bone Marrow Biopsy and Aspiration  Indication: Day +100 Ju Auto  Consent: Informed consent was obtained from patient.  Timeout: Done and documented.  Position: Prone  Site: Left posterior illiac crest.  Prep: Betadine.  Needle used: 11 gauge Jamshidi needle.  Anesthetic: 2% lidocaine 10 cc.  Biopsy: The biopsy needle was introduced into the marrow cavity and an aspirate was obtained without complications and sent for flow cytometry and cytogenetics, PCPD FISH. Core biopsy obtained without difficulty and sent for routine histologic examination.  Complications: None.  Disposition: The patient was placed supine for 15min following procedure. RN to assess bandaid for bleeding prior to discharge home.  Blood loss: Minimal.     Alisa Hurtado MD  Hematology and Oncology Fellow  Hematology & Bone Marrow Transplant

## 2022-08-03 NOTE — PROGRESS NOTES
Subjective:      Patient ID: Caty Diaz is a 67 y.o. female.    Chief Complaint: Vaccine counseling after autoSCT    History of Present Illness  67-year-old female with history of MM s/p autoSCT 4/25/2022 presents for vaccine counseling.    Patient was taking acyclovir 800 mg PO BID but developed nausea. She was transitioned to valacyclovir, but subsequently developed a diffuse rash.  She discontinued valacyclovir with resolution of rash. When she restarted the valacyclovir, the rash reappeared. She is not taking anything currently for viral prophylaxis.    She received 3 COVID-19 vaccines prior to her transplant.    Review of Systems   Constitutional: Negative for chills, decreased appetite, fever, malaise/fatigue, night sweats, weight gain and weight loss.   HENT: Negative for congestion, ear pain, hearing loss, hoarse voice, sore throat and tinnitus.    Eyes: Negative for blurred vision, redness and visual disturbance.   Cardiovascular: Negative for chest pain, leg swelling and palpitations.   Respiratory: Negative for cough, hemoptysis, shortness of breath, sputum production and wheezing.    Hematologic/Lymphatic: Negative for adenopathy. Does not bruise/bleed easily.   Skin: Negative for dry skin, itching, rash and suspicious lesions.   Musculoskeletal: Negative for back pain, joint pain, myalgias and neck pain.   Gastrointestinal: Negative for abdominal pain, constipation, diarrhea, heartburn, nausea and vomiting.   Genitourinary: Negative for dysuria, flank pain, frequency, hematuria, hesitancy and urgency.   Neurological: Negative for dizziness, headaches, numbness, paresthesias and weakness.   Psychiatric/Behavioral: Negative for depression and memory loss. The patient does not have insomnia and is not nervous/anxious.      Objective:   Physical Exam  Constitutional:       General: She is not in acute distress.     Appearance: She is well-developed. She is not diaphoretic.   HENT:      Head:  Normocephalic and atraumatic.   Eyes:      Conjunctiva/sclera: Conjunctivae normal.   Pulmonary:      Effort: Pulmonary effort is normal. No respiratory distress.   Abdominal:      General: There is no distension.      Palpations: Abdomen is soft.   Musculoskeletal:         General: Normal range of motion.   Skin:     General: Skin is warm and dry.      Findings: No erythema or rash.   Neurological:      Mental Status: She is alert and oriented to person, place, and time.   Psychiatric:         Behavior: Behavior normal.           Sodium   Date Value Ref Range Status   08/03/2022 141 136 - 145 mmol/L Final   06/28/2022 140 136 - 147 mmol/L Final   06/14/2022 143 136 - 147 mmol/L Final   05/27/2022 140 136 - 145 mmol/L Final   05/18/2022 141 136 - 145 mmol/L Final      Potassium   Date Value Ref Range Status   08/03/2022 3.9 3.5 - 5.1 mmol/L Final   06/28/2022 3.9 3.5 - 5.1 mmol/L Final   06/14/2022 4.0 3.5 - 5.1 mmol/L Final   05/27/2022 3.7 3.5 - 5.1 mmol/L Final   05/18/2022 4.3 3.5 - 5.1 mmol/L Final      Chloride   Date Value Ref Range Status   08/03/2022 104 95 - 110 mmol/L Final   06/28/2022 105 98 - 107 mmol/L Final   06/14/2022 107 98 - 107 mmol/L Final   05/27/2022 108 95 - 110 mmol/L Final   05/18/2022 107 95 - 110 mmol/L Final      CO2   Date Value Ref Range Status   08/03/2022 27 23 - 29 mmol/L Final   06/28/2022 26 20 - 31 mmol/L Final   06/14/2022 29 20 - 31 mmol/L Final   05/27/2022 25 23 - 29 mmol/L Final   05/18/2022 24 23 - 29 mmol/L Final      BUN   Date Value Ref Range Status   08/03/2022 13 8 - 23 mg/dL Final   06/28/2022 12 9 - 23 mg/dL Final   06/14/2022 14 9 - 23 mg/dL Final   05/27/2022 9 8 - 23 mg/dL Final   05/18/2022 8 8 - 23 mg/dL Final      Creatinine   Date Value Ref Range Status   08/03/2022 0.9 0.5 - 1.4 mg/dL Final   06/28/2022 0.81 0.55 - 1.02 mg/dL Final   06/14/2022 0.89 0.55 - 1.02 mg/dL Final   05/27/2022 0.8 0.5 - 1.4 mg/dL Final   05/18/2022 0.7 0.5 - 1.4 mg/dL Final       Glucose   Date Value Ref Range Status   08/03/2022 91 70 - 110 mg/dL Final   05/27/2022 77 70 - 110 mg/dL Final   05/18/2022 85 70 - 110 mg/dL Final       ALT   Date Value Ref Range Status   08/03/2022 13 10 - 44 U/L Final   05/27/2022 13 10 - 44 U/L Final   05/18/2022 22 10 - 44 U/L Final     Alanine Aminotransferase   Date Value Ref Range Status   06/28/2022 31 10 - 49 IU/L Final   06/14/2022 39 10 - 49 IU/L Final      AST   Date Value Ref Range Status   08/03/2022 17 10 - 40 U/L Final   05/27/2022 18 10 - 40 U/L Final   05/18/2022 27 10 - 40 U/L Final     Aspartate Aminotransferase   Date Value Ref Range Status   06/28/2022 28 <34 IU/L Final   06/14/2022 27 <34 IU/L Final      Total Bilirubin   Date Value Ref Range Status   08/03/2022 0.4 0.1 - 1.0 mg/dL Final     Comment:     For infants and newborns, interpretation of results should be based  on gestational age, weight and in agreement with clinical  observations.    Premature Infant recommended reference ranges:  Up to 24 hours.............<8.0 mg/dL  Up to 48 hours............<12.0 mg/dL  3-5 days..................<15.0 mg/dL  6-29 days.................<15.0 mg/dL     05/27/2022 0.5 0.1 - 1.0 mg/dL Final     Comment:     For infants and newborns, interpretation of results should be based  on gestational age, weight and in agreement with clinical  observations.    Premature Infant recommended reference ranges:  Up to 24 hours.............<8.0 mg/dL  Up to 48 hours............<12.0 mg/dL  3-5 days..................<15.0 mg/dL  6-29 days.................<15.0 mg/dL     05/18/2022 0.4 0.1 - 1.0 mg/dL Final     Comment:     For infants and newborns, interpretation of results should be based  on gestational age, weight and in agreement with clinical  observations.    Premature Infant recommended reference ranges:  Up to 24 hours.............<8.0 mg/dL  Up to 48 hours............<12.0 mg/dL  3-5 days..................<15.0 mg/dL  6-29 days.................<15.0  mg/dL        Albumin   Date Value Ref Range Status   08/03/2022 3.9 3.5 - 5.2 g/dL Final   05/27/2022 3.3 (L) 3.5 - 5.2 g/dL Final   05/18/2022 3.3 (L) 3.5 - 5.2 g/dL Final     Albumin Level   Date Value Ref Range Status   06/28/2022 4.0 3.2 - 4.8 g/dL Final   06/14/2022 4.1 3.2 - 4.8 g/dL Final      Total Protein   Date Value Ref Range Status   08/03/2022 7.4 6.0 - 8.4 g/dL Final   05/27/2022 5.7 (L) 6.0 - 8.4 g/dL Final   05/18/2022 5.9 (L) 6.0 - 8.4 g/dL Final      Alkaline Phosphatase   Date Value Ref Range Status   08/03/2022 91 55 - 135 U/L Final   05/27/2022 50 (L) 55 - 135 U/L Final   05/18/2022 62 55 - 135 U/L Final     Alk Phos   Date Value Ref Range Status   06/28/2022 67 46 - 116 IU/L Final   06/14/2022 58 46 - 116 IU/L Final        WBC   Date Value Ref Range Status   08/03/2022 6.34 3.90 - 12.70 K/uL Final   06/28/2022 6.3 4.4 - 10.1 K/UL Final   06/14/2022 5.4 4.4 - 10.1 K/UL Final   05/27/2022 4.12 3.90 - 12.70 K/uL Final   05/18/2022 3.37 (L) 3.90 - 12.70 K/uL Final      Hemoglobin   Date Value Ref Range Status   08/03/2022 12.2 12.0 - 16.0 g/dL Final   06/28/2022 11.2 (L) 11.3 - 15.4 g/dL Final   06/14/2022 10.7 (L) 11.3 - 15.4 g/dL Final   05/27/2022 9.3 (L) 12.0 - 16.0 g/dL Final   05/18/2022 9.1 (L) 12.0 - 16.0 g/dL Final      Hematocrit   Date Value Ref Range Status   08/03/2022 36.1 (L) 37.0 - 48.5 % Final   05/27/2022 28.5 (L) 37.0 - 48.5 % Final   05/18/2022 28.2 (L) 37.0 - 48.5 % Final     Hct   Date Value Ref Range Status   06/28/2022 34.1 (L) 34.3 - 45.7 % Final   06/14/2022 33.4 (L) 34.3 - 45.7 % Final      Platelets   Date Value Ref Range Status   08/03/2022 215 150 - 450 K/uL Final   06/28/2022 159 117 - 369 K/UL Final   06/14/2022 182 117 - 369 K/UL Final   05/27/2022 194 150 - 450 K/uL Final   05/18/2022 211 150 - 450 K/uL Final          Assessment:   67-year-old female with history of MM s/p autoSCT 4/25/2022 presents for vaccine counseling.    Plan:   Patient will plan to have  vaccines done locally    Date of Transplant: 4/25/2022      Table I: Schedule of COVID-19 Vaccines     COVID-19 Vaccine Primary Series Booster 1a Booster 2a   Pfizer-BioNTech ?90d +21d +28d +3 months +4 months   Moderna ?90d +28d +28d +3 months +4 months   aBooster dose with any mRNA COVID-19 vaccine      EVUSHELD - Due 14 days after COVID-19 Primary Series      Table II: Timing of Inactivated Influenza Vaccine after Transplant  Month Influenza Vaccine   September through March ?4m +30d Annually    ?6m Annually       TABLE III: Adult Inactivated Vaccine Schedule  Inactivated Vaccine ?3m ?6m ?8m ?10m ?12m ?14m   Haemophilus influenzae type B   HiB HiB HiB  H.flu IgG   Meningococcal ACWY (Menveo or Menactra)  MCV4 MCV4      Pneumococcal-conjugate (Prevnar 13)*  Pneumococcal-polysaccharide (Pneumovax 23) PCV13 PCV13 PCV13  PPSV23  If cGVHD,  PCV13 S.pneumo IgG 23 serotypes   Inactivated Polio  IPV IPV IPV     Diphtheria-Tetanus-acellular Pertussis  DTaP DTaP DTaP  Anti-tetanus toxoid titers   Twinrix (Hepatitis A/B)  HAV/HBV HAV/HBV  HAV/HBV HepA IgG  HepBsAb   Meningococcal Group B (Bexero)  (Anatomic or functional asplenia - cGVHD)  Bexero Bexero      HPV (Gardasil-9)   (Optional based on MD consult, 9-45 years)  HPV HPV  HPV        Adult Live Vaccine Schedule After 24 Months - All Stem Cell Transplants     2-1-5 Rule - Not until:   2 years post-HSCT   >1 year off all immunosuppressive therapy   >5 months since last dose of IVIg, VZIg, plasma transfusion    Live Vaccine <24m ?24m ?25m ?27m   Measles/Mumps/Rubella (MMR)  MMR MMR    Varicella Zoster (Varivax) VZV IgG titers  If NEGATIVE, vaccinate Varivax Varivax VZV IgG titers         Adult Non-Live Adjuvant Vaccine Schedule - Shingrix    Shingles Non-Live Adjuvant Vaccine (Shingrix)  (Insurance may not cover if age <50 years)   HSCT    Autologous    Not until:   Immunotherapy (PD-1 inhibitors) completed   VZV IgG titers  If POSITIVE, vaccinate ?Day 100   Shingrix  #1 >Day 100   Shingrix #2 (2-6 months after #1) >Day 160        Monica Rico MD MPH  Infectious Diseases [facility]      45 minutes of total time spent on the encounter, which includes face to face time and non-face to face time preparing to see the patient (eg, review of tests), Obtaining and/or reviewing separately obtained history, Documenting clinical information in the electronic or other health record, Independently interpreting results (not separately reported) and communicating results to the patient/family/caregiver, or Care coordination (not separately reported).

## 2022-08-03 NOTE — PROGRESS NOTES
Caty Diaz   presented to BMT clinic today for BMBx due to history of emi auto SCT, today is Day+100. Dr. Hurtado performed the bone marrow biopsy under my direct supervision and guidance/assistance. The patient tolerated the procedure well, hemostasis was achieved, with no immediate complications apparent. He was discharged to home in stable condition. Please see procedure/prgoress notes for further details.     Ghislaine Albrecht PA-C  Malignant Hematology & Bone Marrow Transplant

## 2022-08-04 LAB
CHROM BANDING METHOD: NORMAL
CHROMOSOME ANALYSIS BM ADDITIONAL INFORMATION: NORMAL
CHROMOSOME ANALYSIS BM RELEASED BY: NORMAL
CHROMOSOME ANALYSIS BM RESULT SUMMARY: NORMAL
CLINICAL CYTOGENETICIST REVIEW: NORMAL
GENETICIST REVIEW: NORMAL
KARYOTYP MAR: NORMAL
PCPDS FINAL DIAGNOSIS: NORMAL
PCPDS PRE-ANALYSIS PRE-SORT: NORMAL
PLASMA CELL PROLIF RELEASED BY: NORMAL
PLASMA CELL PROLIF RESULT SUMMARY: NORMAL
PLASMA CELL PROLIF RESULT TABLE: NORMAL
REASON FOR REFERRAL (NARRATIVE): NORMAL
REASON FOR REFERRAL, PLASMA CELL PROLIF (PCPD), FISH: NORMAL
REF LAB TEST METHOD: NORMAL
REF LAB TEST METHOD: NORMAL
RESULTS, PLASMA CELL PROLIF (PCPD), FISH: NORMAL
SERVICE CMNT-IMP: NORMAL
SERVICE CMNT-IMP: NORMAL
SPECIMEN SOURCE: NORMAL
SPECIMEN SOURCE: NORMAL
SPECIMEN, PLASMA CELL PROLIF (PCPD), FISH: NORMAL
SPECIMEN: NORMAL

## 2022-08-05 LAB
BODY SITE - BONE MARROW: NORMAL
CLINICAL DIAGNOSIS - BONE MARROW: NORMAL
COMMENT: NORMAL
FINAL PATHOLOGIC DIAGNOSIS: NORMAL
FLOW CYTOMETRY ANTIBODIES ANALYZED - BONE MARROW: NORMAL
FLOW CYTOMETRY COMMENT - BONE MARROW: NORMAL
FLOW CYTOMETRY INTERPRETATION - BONE MARROW: NORMAL
GROSS: NORMAL
Lab: NORMAL
MICROSCOPIC EXAM: NORMAL

## 2022-08-09 ENCOUNTER — TELEPHONE (OUTPATIENT)
Dept: INFECTIOUS DISEASES | Facility: CLINIC | Age: 67
End: 2022-08-09
Payer: COMMERCIAL

## 2022-08-09 NOTE — TELEPHONE ENCOUNTER
8/9/22    Returned call and provided necessary information.     ----- Message from Opal Gomes sent at 8/9/2022 11:05 AM CDT -----  Contact: Asya - [facility]  Asya with [facility] requesting call back re: Varicella antibody test orders sent over do not have a dx code on them.     Confirmed contact below:  Contact Name:Asya  Phone Number: [phone]

## 2022-08-10 ENCOUNTER — PATIENT MESSAGE (OUTPATIENT)
Dept: INFECTIOUS DISEASES | Facility: CLINIC | Age: 67
End: 2022-08-10
Payer: COMMERCIAL

## 2022-08-11 ENCOUNTER — TELEPHONE (OUTPATIENT)
Dept: INFUSION THERAPY | Facility: HOSPITAL | Age: 67
End: 2022-08-11
Payer: COMMERCIAL

## 2022-08-11 NOTE — TELEPHONE ENCOUNTER
Spoke to pt about receiving evusheld injection. Per ID note and, pt is to not receive evusheld until covid vaccine series are completed. Pt would like to be set up at [facility] for her evusheld once she is ready to receive. I instructed if she isn't to receive in [place], she could give us a call at [phone].

## 2022-10-10 ENCOUNTER — TELEPHONE (OUTPATIENT)
Dept: INFECTIOUS DISEASES | Facility: CLINIC | Age: 67
End: 2022-10-10
Payer: COMMERCIAL

## 2022-10-10 NOTE — TELEPHONE ENCOUNTER
----- Message from Monica Rico MD sent at 10/10/2022  2:26 PM CDT -----  Regarding: FW: Evushelv Prescription  Contact: Doyle  Can you tell them this is the dose?    Tixagevimab 300 mg and cilgavimab 300 mg as a single dose     Thank you!    ----- Message -----  From: Bienvenido Geller LPN  Sent: 10/6/2022   3:43 PM CDT  To: Monica Rico MD  Subject: FW: Evushelv Prescription                          ----- Message -----  From: Ivonne Lopez MA  Sent: 10/6/2022   3:38 PM CDT  To: Trisha Anderson Staff  Subject: Evushelv Prescription                            Doyle with [facility] in calling in regards to the pt having a written prescription for : Evushelv but it does not have a dosage on it. Please call and advise.              Confirmed Contact below:  Contact Name: Doyle  Phone Number: [phone]  Fax Number: [phone]

## 2022-10-12 ENCOUNTER — TELEPHONE (OUTPATIENT)
Dept: HEMATOLOGY/ONCOLOGY | Facility: CLINIC | Age: 67
End: 2022-10-12
Payer: COMMERCIAL

## 2022-10-12 NOTE — TELEPHONE ENCOUNTER
----- Message from Jacqui Garcia sent at 10/12/2022  4:02 PM CDT -----  Regarding: Vaccine Appointment  Ms. Joe Rodgers called to inform Dr. Tabor that she has taken her vaccines in [place] and she would not need to be scheduled.    Thanks

## 2022-10-31 NOTE — PROGRESS NOTES
CC: Multiple myeloma in remission, follow up after autologous stem cell transplant, day + 190      HPI : , 67, is here for hematology/ stem cell transplant follow up .  She is here post HDT/ auto SCT, day+ 190 .   She has IgG lambda multiple myeloma. Multiple myeloma was diagnosed after work up for anemia showed a paraprotein  She has anemia, GERD, esophageal erosions. On 10/8/21 she had a bone marrow biopsy. It showed nearly 50%  positive plasma cells on core biopsy by IHC.  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  [place] red on bone marrow negative.She has chronic anemia. Cytogenetics was normal. FISH showed increase in 1q copies, t(4,14) and monosomy 13.  She had no lytic lesions on skeletal survey. She was treated with RVd.   She underwent Ju 200 ASCT. Day 0 was 4/25/22.  Post transplant course was uneventful with expected chemotherapy related toxicities.She went to ED on 05/15/22 with low grade fever. She was discharged with PO antibiotics. Respiratory panel was positive for para influenza virus.      Interval History: Ms.Richards Byrne presents for follow up. Today is day + 190 post auto SCT for multiple myeloma  . She had intolerance to 800mg dose of acyclovir. It was switched to valacyclovir, but she had reaction ( rash) to it. Denies emesis, fever, cough, or dyspnea.     Review of patient's allergies indicates:   Allergen Reactions    Garlic     Milk Hives, Itching and Nausea Only     Dairy Products       Current Outpatient Medications   Medication Sig    ascorbic acid, vitamin C, (VITAMIN C) 500 MG tablet Take 1 tablet (500 mg total) by mouth once daily.    calcium carbonate (OS-LAILA) 600 mg calcium (1,500 mg) Tab Take 1 tablet by mouth once daily.    calcium carbonate (TUMS ULTRA ORAL) Take 500 mg by mouth daily as needed (heart burn).    citalopram (CELEXA) 10 MG tablet Take 10 mg by mouth every morning.    clonazePAM (KLONOPIN) 0.5 MG tablet Take 0.5 mg by mouth 2 (two) times daily as needed  Appt scheduled. On wait list. Pt is aware    for Anxiety.    fluticasone propionate (FLONASE) 50 mcg/actuation nasal spray 1 spray (50 mcg total) by Each Nostril route nightly as needed for Rhinitis or Allergies.    guaiFENesin (MUCINEX) 600 mg 12 hr tablet Take 2 tablets (1,200 mg total) by mouth 2 (two) times daily.    loperamide (IMODIUM) 2 mg capsule Take 1 capsule (2 mg total) by mouth 4 (four) times daily as needed for Diarrhea.    multivitamin (THERAGRAN) per tablet Take 1 tablet by mouth once daily.    ondansetron (ZOFRAN) 8 MG tablet Take 8 mg by mouth every 8 (eight) hours as needed for Nausea.    ondansetron (ZOFRAN-ODT) 4 MG TbDL Take 4 mg by mouth every 8 (eight) hours as needed.    pantoprazole (PROTONIX) 40 MG tablet Take 1 tablet (40 mg total) by mouth once daily.    promethazine (PHENERGAN) 25 MG tablet Take 25 mg by mouth every 6 (six) hours as needed (nuasea).    sucralfate (CARAFATE) 1 gram tablet Take 1 tablet (1 g total) by mouth 4 (four) times daily as needed (heartburn).    vitamin D (VITAMIN D3) 1000 units Tab Take 1 tablet (1,000 Units total) by mouth once daily.    zinc gluconate 50 mg tablet Take 50 mg by mouth once daily.     No current facility-administered medications for this visit.         Review of Systems   Constitutional:  Positive for malaise/fatigue. Negative for chills, fever and weight loss.   HENT:  Negative for congestion, ear discharge, ear pain, hearing loss and nosebleeds.    Eyes:  Negative for blurred vision, double vision, photophobia and pain.   Respiratory:  Negative for hemoptysis, sputum production and shortness of breath.    Cardiovascular:  Negative for orthopnea, leg swelling and PND.   Gastrointestinal:  Negative for abdominal pain, blood in stool, diarrhea, heartburn, melena and vomiting.   Genitourinary:  Negative for dysuria and frequency.   Musculoskeletal:  Negative for back pain, myalgias and neck pain.   Neurological:  Negative for dizziness, tingling, tremors, sensory change, focal weakness,  weakness and headaches.   Endo/Heme/Allergies:  Negative for environmental allergies. Does not bruise/bleed easily.   Psychiatric/Behavioral:  Negative for depression, hallucinations and substance abuse.      Vitals:    11/01/22 0911   BP: 124/82   Pulse: 72   Resp: 17   Temp: 98.4 °F (36.9 °C)           PS: ECOG 1      Physical Exam  Constitutional:       Appearance: Normal appearance.   HENT:      Head: Normocephalic and atraumatic.      Mouth/Throat:      Pharynx: No posterior oropharyngeal erythema.   Eyes:      General: No scleral icterus.  Cardiovascular:      Rate and Rhythm: Normal rate and regular rhythm.      Pulses: Normal pulses.      Heart sounds: Normal heart sounds. No murmur heard.  Pulmonary:      Effort: Pulmonary effort is normal. No respiratory distress.      Breath sounds: Normal breath sounds.   Abdominal:      General: There is no distension.      Palpations: There is no mass.      Tenderness: There is no abdominal tenderness.   Musculoskeletal:         General: No swelling.   Lymphadenopathy:      Cervical: No cervical adenopathy.   Skin:     Coloration: Skin is not jaundiced or pale.   Neurological:      General: No focal deficit present.      Mental Status: She is alert and oriented to person, place, and time.      Cranial Nerves: No cranial nerve deficit.   Psychiatric:         Mood and Affect: Mood normal.         8/3/22 PET CT    COMPARISON:  [facility] PET-CT 03/21/2022     FINDINGS:  Quality of the study: Adequate.     In the head and neck, there are no hypermetabolic lesions worrisome for malignancy. There are no hypermetabolic mucosal lesions, and there are no pathologically enlarged or hypermetabolic lymph nodes.     In the chest, there are no hypermetabolic lesions worrisome for malignancy.  There are no concerning pulmonary nodules or masses, and there are no pathologically enlarged or hypermetabolic lymph nodes.     In the abdomen and pelvis, there is physiologic tracer distribution  within the abdominal organs and excretion into the genitourinary system.     In the bones, there are no hypermetabolic lesions worrisome for malignancy.     In the extremities, there are no hypermetabolic lesions worrisome for malignancy.     Additional findings: Bilateral breast implants.  Cholecystectomy.     Impression:     No evidence of hypermetabolic foci to indicate malignancy.      8/3/22 BM biopsy    LEFT ILIAC CREST BONE MARROW ASPIRATE, BONE MARROW CLOT, AND BONE MARROW CORE BIOPSY WITH:     CELLULARITY=30%, TRILINEAGE HEMATOPOIETIC ACTIVITY (M/E= 1.1:1).   LAMBDA LIGHT CHAIN PREDOMINANT PLASMA POPULATION (6-8%).  SEE COMMENT.   MARKEDLY DECREASED STORAGE IRON.   ADEQUATE NUMBER OF MEGAKARYOCYTES.      Flow cytometric analysis of  bone marrow shows populations of polyclonal B lymphocytes and T lymphocytes that are immunophenotypically unremarkable   except reversed CD4 to CD8 ratio.  The blast gate is not increased. No clonal plasma cell population is detected.   Flow differential:  Lymphocytes 6.6%, Monocytes 2.5%, Granulocytes  87.7%, Blast  1.2%, Debris/nRBC 2.0%,  Viability 97.2%.   Immunohistochemical studies were performed on the clot and core biopsy for greater sensitivity and further architecture evaluation with adequate   positive and negative controls.   About 6-8% plasma cells ( positive, occasional CD56 positive) are noted.  In situ hybridization for kappa and lambda light chain shows focal lambda light chain predominant plasma cell   population. Findings are favor for minimal residual plasma cell neoplasm.  Correlate clinically.     Component      Latest Ref Rng & Units 8/3/2022   Keys Free Light Chains      0.33 - 1.94 mg/dL 1.17   Lambda Free Light Chains      0.57 - 2.63 mg/dL 0.92   Kappa/Lambda FLC Ratio      0.26 - 1.65 1.27   Beta-2 Microglobulin      0.0 - 2.5 ug/mL 2.7 (H)     8/3/22 SPEP: Normal total protein. Normal gamma globulins are decreased. Paraprotein band in  near-gamma = 0.30 g/dL, previously 0.85 g/dL.   8/3/22 sIFE: IgG lambda specific monoclonal band present.     10/11/22 serum free light chains    Delta Quant Free Lt Chain result: 0.33 - 1.94 mg/dL 1.12    Lambda Quant Free Lt Chain result 0.57 - 2.63 mg/dL 1.06    Kappa/Lambda Ratio 0.26 - 1.65 1.06      10/11/22 sIFE: No monoclonal immunoglobulin chains detected.   10/11/22 SPEP: Hypoproteinemia with normal pattern suggests hemodilution.     Component      Latest Ref Rng & Units 11/1/2022   WBC      3.90 - 12.70 K/uL 3.91   RBC      4.00 - 5.40 M/uL 3.70 (L)   Hemoglobin      12.0 - 16.0 g/dL 12.3   Hematocrit      37.0 - 48.5 % 36.3 (L)   MCV      82 - 98 fL 98   MCH      27.0 - 31.0 pg 33.2 (H)   MCHC      32.0 - 36.0 g/dL 33.9   RDW      11.5 - 14.5 % 14.6 (H)   Platelets      150 - 450 K/uL 150   MPV      9.2 - 12.9 fL 10.4   Immature Granulocytes      0.0 - 0.5 % 0.0   Gran # (ANC)      1.8 - 7.7 K/uL 1.6 (L)   Immature Grans (Abs)      0.00 - 0.04 K/uL 0.00   Lymph #      1.0 - 4.8 K/uL 1.3   Mono #      0.3 - 1.0 K/uL 0.5   Eos #      0.0 - 0.5 K/uL 0.5   Baso #      0.00 - 0.20 K/uL 0.04   nRBC      0 /100 WBC 0   Gran %      38.0 - 73.0 % 41.5   Lymph %      18.0 - 48.0 % 32.2   Mono %      4.0 - 15.0 % 12.5   Eosinophil %      0.0 - 8.0 % 12.8 (H)   Basophil %      0.0 - 1.9 % 1.0   Differential Method       Automated   Sodium      136 - 145 mmol/L 138   Potassium      3.5 - 5.1 mmol/L 4.1   Chloride      95 - 110 mmol/L 105   CO2      23 - 29 mmol/L 23   Glucose      70 - 110 mg/dL 85   BUN      8 - 23 mg/dL 8   Creatinine      0.5 - 1.4 mg/dL 0.8   Calcium      8.7 - 10.5 mg/dL 8.2 (L)   PROTEIN TOTAL      6.0 - 8.4 g/dL 6.2   Albumin      3.5 - 5.2 g/dL 3.6   BILIRUBIN TOTAL      0.1 - 1.0 mg/dL 0.4   Alkaline Phosphatase      55 - 135 U/L 53 (L)   AST      10 - 40 U/L 17   ALT      10 - 44 U/L 19   Anion Gap      8 - 16 mmol/L 10   eGFR      >60 mL/min/1.73 m:2 >60.0   Phosphorus      2.7 - 4.5 mg/dL  3.2     Assessment and Plan:    1. History of autologous peripheral blood sten cell transplant    -Received Melphalan 200 ASCT on 04/25/22. Received CD34 cells with total dose of 3.97x10^6.  -Engrafted on Day +12  -Transplant was uncomplicated. Today is Day + 190   --Day+100 BM biopsy done on 8/3/22 demonstrated a lambda restricted light chain population, 6-8%   --No evidence of hypermetabolic bony lytic or soft tissue lesions on PET CT done on day +100 .      2. Multiple myeloma in remission     --R-ISS II IgG lambda multiple myeloma, treated with  RVd .    --She had increased 1q21 copy number and has t(4,14), both high risk markers in multiple myeloma. She also has monosomy 13 on FISH.   --She did not have baseline PET CT.   --Prior to C5 her M spike on SPEP was 0.52g/dl( was 5.36g/dl at diagnosis).   --She received 6 cycles of RVd, followed by 2 cycles of velcade-dexamethasone. She tolerated the treatments well.    --PET CT on 3/31/22 did not demonstrate any hypermetabolic lytic or soft tissue lesions.  -- BM biopsy on 3/21/22 showed 10% plasma cells ( versus 50% at diagnosis).  --M spike on 3/3/22 SPEP was 0.85g/dl, suggesting she partial response.   --4/1/22 PFTs showed mild decrease in DLCO. EKG did not show any abnormality. 2D ECHO on 4/1/22 showed moderate left atrial enlargement, mild mitral regurgitation but normal LVEF. HBV, HIV, HCV, Chag[place] Ab, RPR, HTLV I/II all non-reactive. Varicella negative. CMV Ab was reactive. HSV 1 IgG was reactive. HSV 2 IgG negative.   --Colonoscopy normal. No abnormality of mammogram or PAP smear.   --I discussed the maintenance therapy consisted of lenalidomide (10?mg/day orally) on days 1-21 of a 28-day cycle in combination with bortezomib (1.3?mg/m2 per week subcutaneously/intravenously) and low-dose dexamethasone (40?mg per week orally). [ Leukemia volume 28, [id number] (2014) ]. This was administered for up to 3 years, followed by single-agent lenalidomide maintenance  thereafter.     --she has resumed maintenance under   -she is tolerating the maintenance RVd well; no neuropathy/infections  --she will decrease decadron to 20mg PO weekly  -sFLC ratio normal on 10/11/22; No monoclonal protein on SPEP/sIFE done on 10/11/22  --she will have follow up in 3 months       3.  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 GERD  - She is currently asymptomatic  --, on PPI     5. Migraine History  - She uses Florinol for migrane headaches and states that she only takes it 2-3 times per year     6. Eosinophilia    --Absolute eosinophil count 500 today    6. Nausea    --She had persistent nausea, especially when she takes acylcovir 800 mg tablets  --she was switched to valacyclovir 500 mg BID from 5/27/22  --she did not tolerate valacyclovir as well; she will resume acyclovir 400mg BID  --she will have varicella IgG titer checked , and receive shingrix vaccine if positive  --she will also receive COVID 19 vaccination and has the schedule for other immunizations        BMT Chart Routing      Follow up with physician 3 months.   Follow up with LILIAN    Provider visit type    Infusion scheduling note    Injection scheduling note    Labs CBC, CMP, SPEP, immunofixation, free light chains and immunoglobulins   Lab interval:     Imaging    Pharmacy appointment    Other referrals

## 2022-11-01 ENCOUNTER — LAB VISIT (OUTPATIENT)
Dept: LAB | Facility: HOSPITAL | Age: 67
End: 2022-11-01
Payer: COMMERCIAL

## 2022-11-01 ENCOUNTER — OFFICE VISIT (OUTPATIENT)
Dept: HEMATOLOGY/ONCOLOGY | Facility: CLINIC | Age: 67
End: 2022-11-01
Payer: COMMERCIAL

## 2022-11-01 VITALS
SYSTOLIC BLOOD PRESSURE: 124 MMHG | HEART RATE: 72 BPM | DIASTOLIC BLOOD PRESSURE: 82 MMHG | TEMPERATURE: 98 F | WEIGHT: 153.69 LBS | RESPIRATION RATE: 17 BRPM | BODY MASS INDEX: 24.12 KG/M2 | HEIGHT: 67 IN | OXYGEN SATURATION: 99 %

## 2022-11-01 DIAGNOSIS — T45.1X5A CHEMOTHERAPY-INDUCED NEUTROPENIA: ICD-10-CM

## 2022-11-01 DIAGNOSIS — T45.1X5A ANEMIA ASSOCIATED WITH CHEMOTHERAPY: ICD-10-CM

## 2022-11-01 DIAGNOSIS — Z76.82 STEM CELL TRANSPLANT CANDIDATE: ICD-10-CM

## 2022-11-01 DIAGNOSIS — C90.01 MULTIPLE MYELOMA IN REMISSION: ICD-10-CM

## 2022-11-01 DIAGNOSIS — Z94.81 STATUS POST AUTOLOGOUS BONE MARROW TRANSPLANT: Primary | ICD-10-CM

## 2022-11-01 DIAGNOSIS — D64.81 ANEMIA ASSOCIATED WITH CHEMOTHERAPY: ICD-10-CM

## 2022-11-01 DIAGNOSIS — R53.82 CHRONIC FATIGUE: ICD-10-CM

## 2022-11-01 DIAGNOSIS — D70.1 CHEMOTHERAPY-INDUCED NEUTROPENIA: ICD-10-CM

## 2022-11-01 DIAGNOSIS — D63.0 ANEMIA IN NEOPLASTIC DISEASE: ICD-10-CM

## 2022-11-01 LAB
ALBUMIN SERPL BCP-MCNC: 3.6 G/DL (ref 3.5–5.2)
ALP SERPL-CCNC: 53 U/L (ref 55–135)
ALT SERPL W/O P-5'-P-CCNC: 19 U/L (ref 10–44)
ANION GAP SERPL CALC-SCNC: 10 MMOL/L (ref 8–16)
AST SERPL-CCNC: 17 U/L (ref 10–40)
BASOPHILS # BLD AUTO: 0.04 K/UL (ref 0–0.2)
BASOPHILS NFR BLD: 1 % (ref 0–1.9)
BILIRUB SERPL-MCNC: 0.4 MG/DL (ref 0.1–1)
BUN SERPL-MCNC: 8 MG/DL (ref 8–23)
CALCIUM SERPL-MCNC: 8.2 MG/DL (ref 8.7–10.5)
CHLORIDE SERPL-SCNC: 105 MMOL/L (ref 95–110)
CO2 SERPL-SCNC: 23 MMOL/L (ref 23–29)
CREAT SERPL-MCNC: 0.8 MG/DL (ref 0.5–1.4)
DIFFERENTIAL METHOD: ABNORMAL
EOSINOPHIL # BLD AUTO: 0.5 K/UL (ref 0–0.5)
EOSINOPHIL NFR BLD: 12.8 % (ref 0–8)
ERYTHROCYTE [DISTWIDTH] IN BLOOD BY AUTOMATED COUNT: 14.6 % (ref 11.5–14.5)
EST. GFR  (NO RACE VARIABLE): >60 ML/MIN/1.73 M^2
GLUCOSE SERPL-MCNC: 85 MG/DL (ref 70–110)
HCT VFR BLD AUTO: 36.3 % (ref 37–48.5)
HGB BLD-MCNC: 12.3 G/DL (ref 12–16)
IMM GRANULOCYTES # BLD AUTO: 0 K/UL (ref 0–0.04)
IMM GRANULOCYTES NFR BLD AUTO: 0 % (ref 0–0.5)
LYMPHOCYTES # BLD AUTO: 1.3 K/UL (ref 1–4.8)
LYMPHOCYTES NFR BLD: 32.2 % (ref 18–48)
MCH RBC QN AUTO: 33.2 PG (ref 27–31)
MCHC RBC AUTO-ENTMCNC: 33.9 G/DL (ref 32–36)
MCV RBC AUTO: 98 FL (ref 82–98)
MONOCYTES # BLD AUTO: 0.5 K/UL (ref 0.3–1)
MONOCYTES NFR BLD: 12.5 % (ref 4–15)
NEUTROPHILS # BLD AUTO: 1.6 K/UL (ref 1.8–7.7)
NEUTROPHILS NFR BLD: 41.5 % (ref 38–73)
NRBC BLD-RTO: 0 /100 WBC
PHOSPHATE SERPL-MCNC: 3.2 MG/DL (ref 2.7–4.5)
PLATELET # BLD AUTO: 150 K/UL (ref 150–450)
PMV BLD AUTO: 10.4 FL (ref 9.2–12.9)
POTASSIUM SERPL-SCNC: 4.1 MMOL/L (ref 3.5–5.1)
PROT SERPL-MCNC: 6.2 G/DL (ref 6–8.4)
RBC # BLD AUTO: 3.7 M/UL (ref 4–5.4)
SODIUM SERPL-SCNC: 138 MMOL/L (ref 136–145)
WBC # BLD AUTO: 3.91 K/UL (ref 3.9–12.7)

## 2022-11-01 PROCEDURE — 99999 PR PBB SHADOW E&M-EST. PATIENT-LVL III: ICD-10-PCS | Mod: PBBFAC,,, | Performed by: INTERNAL MEDICINE

## 2022-11-01 PROCEDURE — 99215 OFFICE O/P EST HI 40 MIN: CPT | Mod: S$GLB,,, | Performed by: INTERNAL MEDICINE

## 2022-11-01 PROCEDURE — 85025 COMPLETE CBC W/AUTO DIFF WBC: CPT | Performed by: INTERNAL MEDICINE

## 2022-11-01 PROCEDURE — 99215 PR OFFICE/OUTPT VISIT, EST, LEVL V, 40-54 MIN: ICD-10-PCS | Mod: S$GLB,,, | Performed by: INTERNAL MEDICINE

## 2022-11-01 PROCEDURE — 99999 PR PBB SHADOW E&M-EST. PATIENT-LVL III: CPT | Mod: PBBFAC,,, | Performed by: INTERNAL MEDICINE

## 2022-11-01 PROCEDURE — 36415 COLL VENOUS BLD VENIPUNCTURE: CPT | Performed by: INTERNAL MEDICINE

## 2022-11-01 PROCEDURE — 80053 COMPREHEN METABOLIC PANEL: CPT | Performed by: INTERNAL MEDICINE

## 2022-11-01 PROCEDURE — 84100 ASSAY OF PHOSPHORUS: CPT | Performed by: INTERNAL MEDICINE

## 2022-11-01 RX ORDER — DEXAMETHASONE 4 MG/1
TABLET ORAL
COMMUNITY
Start: 2022-09-07

## 2022-11-01 RX ORDER — ACYCLOVIR 400 MG/1
400 TABLET ORAL 2 TIMES DAILY
COMMUNITY
Start: 2022-10-11

## 2022-11-01 RX ORDER — LENALIDOMIDE 10 MG/1
CAPSULE ORAL
COMMUNITY
Start: 2022-10-31

## 2022-11-01 RX ORDER — HYDROXYZINE HYDROCHLORIDE 25 MG/1
25 TABLET, FILM COATED ORAL 2 TIMES DAILY PRN
COMMUNITY
Start: 2022-09-04

## 2022-11-16 ENCOUNTER — PATIENT MESSAGE (OUTPATIENT)
Dept: HEMATOLOGY/ONCOLOGY | Facility: CLINIC | Age: 67
End: 2022-11-16
Payer: COMMERCIAL

## 2023-01-25 ENCOUNTER — PATIENT MESSAGE (OUTPATIENT)
Dept: INFECTIOUS DISEASES | Facility: CLINIC | Age: 68
End: 2023-01-25
Payer: COMMERCIAL

## 2023-02-01 ENCOUNTER — OFFICE VISIT (OUTPATIENT)
Dept: HEMATOLOGY/ONCOLOGY | Facility: CLINIC | Age: 68
End: 2023-02-01
Payer: COMMERCIAL

## 2023-02-01 ENCOUNTER — LAB VISIT (OUTPATIENT)
Dept: LAB | Facility: HOSPITAL | Age: 68
End: 2023-02-01
Attending: INTERNAL MEDICINE
Payer: COMMERCIAL

## 2023-02-01 VITALS
HEIGHT: 67 IN | RESPIRATION RATE: 16 BRPM | SYSTOLIC BLOOD PRESSURE: 141 MMHG | BODY MASS INDEX: 24.13 KG/M2 | TEMPERATURE: 99 F | WEIGHT: 153.75 LBS | HEART RATE: 67 BPM | OXYGEN SATURATION: 99 % | DIASTOLIC BLOOD PRESSURE: 69 MMHG

## 2023-02-01 DIAGNOSIS — D84.9 IMMUNOCOMPROMISED STATE: ICD-10-CM

## 2023-02-01 DIAGNOSIS — T45.1X5A ANEMIA ASSOCIATED WITH CHEMOTHERAPY: ICD-10-CM

## 2023-02-01 DIAGNOSIS — Z94.81 STATUS POST AUTOLOGOUS BONE MARROW TRANSPLANT: ICD-10-CM

## 2023-02-01 DIAGNOSIS — C90.01 MULTIPLE MYELOMA IN REMISSION: ICD-10-CM

## 2023-02-01 DIAGNOSIS — D64.81 ANEMIA ASSOCIATED WITH CHEMOTHERAPY: ICD-10-CM

## 2023-02-01 DIAGNOSIS — R53.82 CHRONIC FATIGUE: ICD-10-CM

## 2023-02-01 DIAGNOSIS — K21.9 GASTROESOPHAGEAL REFLUX DISEASE WITHOUT ESOPHAGITIS: ICD-10-CM

## 2023-02-01 DIAGNOSIS — D63.0 ANEMIA IN NEOPLASTIC DISEASE: ICD-10-CM

## 2023-02-01 DIAGNOSIS — Z94.81 STATUS POST AUTOLOGOUS BONE MARROW TRANSPLANT: Primary | ICD-10-CM

## 2023-02-01 LAB
ALBUMIN SERPL BCP-MCNC: 4 G/DL (ref 3.5–5.2)
ALP SERPL-CCNC: 50 U/L (ref 55–135)
ALT SERPL W/O P-5'-P-CCNC: 17 U/L (ref 10–44)
ANION GAP SERPL CALC-SCNC: 9 MMOL/L (ref 8–16)
AST SERPL-CCNC: 17 U/L (ref 10–40)
BASOPHILS # BLD AUTO: 0.06 K/UL (ref 0–0.2)
BASOPHILS NFR BLD: 1 % (ref 0–1.9)
BILIRUB SERPL-MCNC: 0.5 MG/DL (ref 0.1–1)
BUN SERPL-MCNC: 12 MG/DL (ref 8–23)
CALCIUM SERPL-MCNC: 9.3 MG/DL (ref 8.7–10.5)
CHLORIDE SERPL-SCNC: 109 MMOL/L (ref 95–110)
CO2 SERPL-SCNC: 25 MMOL/L (ref 23–29)
CREAT SERPL-MCNC: 0.9 MG/DL (ref 0.5–1.4)
DIFFERENTIAL METHOD: ABNORMAL
EOSINOPHIL # BLD AUTO: 0 K/UL (ref 0–0.5)
EOSINOPHIL NFR BLD: 0.2 % (ref 0–8)
ERYTHROCYTE [DISTWIDTH] IN BLOOD BY AUTOMATED COUNT: 14.2 % (ref 11.5–14.5)
EST. GFR  (NO RACE VARIABLE): >60 ML/MIN/1.73 M^2
GLUCOSE SERPL-MCNC: 81 MG/DL (ref 70–110)
HCT VFR BLD AUTO: 34.1 % (ref 37–48.5)
HGB BLD-MCNC: 11.1 G/DL (ref 12–16)
IGA SERPL-MCNC: 121 MG/DL (ref 40–350)
IGG SERPL-MCNC: 728 MG/DL (ref 650–1600)
IGM SERPL-MCNC: 23 MG/DL (ref 50–300)
IMM GRANULOCYTES # BLD AUTO: 0.01 K/UL (ref 0–0.04)
IMM GRANULOCYTES NFR BLD AUTO: 0.2 % (ref 0–0.5)
LYMPHOCYTES # BLD AUTO: 1.9 K/UL (ref 1–4.8)
LYMPHOCYTES NFR BLD: 32.4 % (ref 18–48)
MCH RBC QN AUTO: 32.9 PG (ref 27–31)
MCHC RBC AUTO-ENTMCNC: 32.6 G/DL (ref 32–36)
MCV RBC AUTO: 101 FL (ref 82–98)
MONOCYTES # BLD AUTO: 0.7 K/UL (ref 0.3–1)
MONOCYTES NFR BLD: 12.5 % (ref 4–15)
NEUTROPHILS # BLD AUTO: 3.2 K/UL (ref 1.8–7.7)
NEUTROPHILS NFR BLD: 53.7 % (ref 38–73)
NRBC BLD-RTO: 0 /100 WBC
PLATELET # BLD AUTO: 186 K/UL (ref 150–450)
PMV BLD AUTO: 9.6 FL (ref 9.2–12.9)
POTASSIUM SERPL-SCNC: 3.5 MMOL/L (ref 3.5–5.1)
PROT SERPL-MCNC: 6.9 G/DL (ref 6–8.4)
RBC # BLD AUTO: 3.37 M/UL (ref 4–5.4)
SODIUM SERPL-SCNC: 143 MMOL/L (ref 136–145)
WBC # BLD AUTO: 5.9 K/UL (ref 3.9–12.7)

## 2023-02-01 PROCEDURE — 86334 PATHOLOGIST INTERPRETATION IFE: ICD-10-PCS | Mod: 26,,, | Performed by: PATHOLOGY

## 2023-02-01 PROCEDURE — 99215 PR OFFICE/OUTPT VISIT, EST, LEVL V, 40-54 MIN: ICD-10-PCS | Mod: S$GLB,,, | Performed by: NURSE PRACTITIONER

## 2023-02-01 PROCEDURE — 80053 COMPREHEN METABOLIC PANEL: CPT | Performed by: INTERNAL MEDICINE

## 2023-02-01 PROCEDURE — 84165 PATHOLOGIST INTERPRETATION SPE: ICD-10-PCS | Mod: 26,,, | Performed by: PATHOLOGY

## 2023-02-01 PROCEDURE — 86334 IMMUNOFIX E-PHORESIS SERUM: CPT | Mod: 26,,, | Performed by: PATHOLOGY

## 2023-02-01 PROCEDURE — 82784 ASSAY IGA/IGD/IGG/IGM EACH: CPT | Performed by: INTERNAL MEDICINE

## 2023-02-01 PROCEDURE — 86334 IMMUNOFIX E-PHORESIS SERUM: CPT | Performed by: INTERNAL MEDICINE

## 2023-02-01 PROCEDURE — 84165 PROTEIN E-PHORESIS SERUM: CPT | Mod: 26,,, | Performed by: PATHOLOGY

## 2023-02-01 PROCEDURE — 99215 OFFICE O/P EST HI 40 MIN: CPT | Mod: S$GLB,,, | Performed by: NURSE PRACTITIONER

## 2023-02-01 PROCEDURE — 84165 PROTEIN E-PHORESIS SERUM: CPT | Performed by: INTERNAL MEDICINE

## 2023-02-01 PROCEDURE — 85025 COMPLETE CBC W/AUTO DIFF WBC: CPT | Performed by: INTERNAL MEDICINE

## 2023-02-01 PROCEDURE — 36415 COLL VENOUS BLD VENIPUNCTURE: CPT | Performed by: INTERNAL MEDICINE

## 2023-02-01 PROCEDURE — 83521 IG LIGHT CHAINS FREE EACH: CPT | Mod: 59 | Performed by: INTERNAL MEDICINE

## 2023-02-01 NOTE — PROGRESS NOTES
CC: Multiple myeloma in remission, follow up after autologous stem cell transplant, day + 190      HPI : , 67, is here for hematology/ stem cell transplant follow up .  She is here post HDT/ auto SCT, day+ 190 .   She has IgG lambda multiple myeloma. Multiple myeloma was diagnosed after work up for anemia showed a paraprotein  She has anemia, GERD, esophageal erosions. On 10/8/21 she had a bone marrow biopsy. It showed nearly 50%  positive plasma cells on core biopsy by IHC.  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  [place] red on bone marrow negative.She has chronic anemia. Cytogenetics was normal. FISH showed increase in 1q copies, t(4,14) and monosomy 13.  She had no lytic lesions on skeletal survey. She was treated with RVd.   She underwent Ju 200 ASCT. Day 0 was 4/25/22.  Post transplant course was uneventful with expected chemotherapy related toxicities.She went to ED on 05/15/22 with low grade fever. She was discharged with PO antibiotics. Respiratory panel was positive for para influenza virus.      Interval History: Ms.Richards Byrne presents for follow up. Today is day + 190 post auto SCT for multiple myeloma  . She had intolerance to 800mg dose of acyclovir. It was switched to valacyclovir, but she had reaction ( rash) to it. Denies emesis, fever, cough, or dyspnea.     Review of patient's allergies indicates:   Allergen Reactions    Garlic     Milk Hives, Itching and Nausea Only     Dairy Products       Current Outpatient Medications   Medication Sig    acyclovir (ZOVIRAX) 400 MG tablet Take 400 mg by mouth 2 (two) times daily.    ascorbic acid, vitamin C, (VITAMIN C) 500 MG tablet Take 1 tablet (500 mg total) by mouth once daily.    calcium carbonate (OS-LAILA) 600 mg calcium (1,500 mg) Tab Take 1 tablet by mouth once daily.    citalopram (CELEXA) 10 MG tablet Take 10 mg by mouth every morning.    dexAMETHasone (DECADRON) 4 MG Tab Take by mouth.    guaiFENesin (MUCINEX) 600 mg 12 hr tablet  Take 2 tablets (1,200 mg total) by mouth 2 (two) times daily.    loperamide (IMODIUM) 2 mg capsule Take 1 capsule (2 mg total) by mouth 4 (four) times daily as needed for Diarrhea.    multivitamin (THERAGRAN) per tablet Take 1 tablet by mouth once daily.    ondansetron (ZOFRAN-ODT) 4 MG TbDL Take 4 mg by mouth every 8 (eight) hours as needed.    pantoprazole (PROTONIX) 40 MG tablet Take 1 tablet (40 mg total) by mouth once daily.    promethazine (PHENERGAN) 25 MG tablet Take 25 mg by mouth every 6 (six) hours as needed (nuasea).    REVLIMID 10 mg Cap Take by mouth.    vitamin D (VITAMIN D3) 1000 units Tab Take 1 tablet (1,000 Units total) by mouth once daily.    zinc gluconate 50 mg tablet Take 50 mg by mouth once daily.    calcium carbonate (TUMS ULTRA ORAL) Take 500 mg by mouth daily as needed (heart burn).    clonazePAM (KLONOPIN) 0.5 MG tablet Take 0.5 mg by mouth 2 (two) times daily as needed for Anxiety.    fluticasone propionate (FLONASE) 50 mcg/actuation nasal spray 1 spray (50 mcg total) by Each Nostril route nightly as needed for Rhinitis or Allergies. (Patient not taking: Reported on 2/1/2023)    hydrOXYzine HCL (ATARAX) 25 MG tablet Take 25 mg by mouth 2 (two) times daily as needed.    ondansetron (ZOFRAN) 8 MG tablet Take 8 mg by mouth every 8 (eight) hours as needed for Nausea.    sucralfate (CARAFATE) 1 gram tablet Take 1 tablet (1 g total) by mouth 4 (four) times daily as needed (heartburn). (Patient not taking: Reported on 2/1/2023)     No current facility-administered medications for this visit.         Review of Systems   Constitutional:  Positive for malaise/fatigue. Negative for chills, fever and weight loss.   HENT:  Negative for congestion, ear discharge, ear pain, hearing loss and nosebleeds.    Eyes:  Negative for blurred vision, double vision, photophobia and pain.   Respiratory:  Negative for hemoptysis, sputum production and shortness of breath.    Cardiovascular:  Negative for orthopnea,  leg swelling and PND.   Gastrointestinal:  Negative for abdominal pain, blood in stool, diarrhea, heartburn, melena and vomiting.   Genitourinary:  Negative for dysuria and frequency.   Musculoskeletal:  Negative for back pain, myalgias and neck pain.   Neurological:  Negative for dizziness, tingling, tremors, sensory change, focal weakness, weakness and headaches.   Endo/Heme/Allergies:  Negative for environmental allergies. Does not bruise/bleed easily.   Psychiatric/Behavioral:  Negative for depression, hallucinations and substance abuse.      Vitals:    02/01/23 0727   BP: (!) 141/69   Pulse: 67   Resp: 16   Temp: 98.5 °F (36.9 °C)           PS: ECOG 1      Physical Exam  Constitutional:       Appearance: Normal appearance.   HENT:      Head: Normocephalic and atraumatic.      Mouth/Throat:      Pharynx: No posterior oropharyngeal erythema.   Eyes:      General: No scleral icterus.  Cardiovascular:      Rate and Rhythm: Normal rate and regular rhythm.      Pulses: Normal pulses.      Heart sounds: Normal heart sounds. No murmur heard.  Pulmonary:      Effort: Pulmonary effort is normal. No respiratory distress.      Breath sounds: Normal breath sounds.   Abdominal:      General: There is no distension.      Palpations: There is no mass.      Tenderness: There is no abdominal tenderness.   Musculoskeletal:         General: No swelling.   Lymphadenopathy:      Cervical: No cervical adenopathy.   Skin:     Coloration: Skin is not jaundiced or pale.   Neurological:      General: No focal deficit present.      Mental Status: She is alert and oriented to person, place, and time.      Cranial Nerves: No cranial nerve deficit.   Psychiatric:         Mood and Affect: Mood normal.         8/3/22 PET CT    COMPARISON:  [facility] PET-CT 03/21/2022     FINDINGS:  Quality of the study: Adequate.     In the head and neck, there are no hypermetabolic lesions worrisome for malignancy. There are no hypermetabolic mucosal lesions, and  there are no pathologically enlarged or hypermetabolic lymph nodes.     In the chest, there are no hypermetabolic lesions worrisome for malignancy.  There are no concerning pulmonary nodules or masses, and there are no pathologically enlarged or hypermetabolic lymph nodes.     In the abdomen and pelvis, there is physiologic tracer distribution within the abdominal organs and excretion into the genitourinary system.     In the bones, there are no hypermetabolic lesions worrisome for malignancy.     In the extremities, there are no hypermetabolic lesions worrisome for malignancy.     Additional findings: Bilateral breast implants.  Cholecystectomy.     Impression:     No evidence of hypermetabolic foci to indicate malignancy.      8/3/22 BM biopsy    LEFT ILIAC CREST BONE MARROW ASPIRATE, BONE MARROW CLOT, AND BONE MARROW CORE BIOPSY WITH:     CELLULARITY=30%, TRILINEAGE HEMATOPOIETIC ACTIVITY (M/E= 1.1:1).   LAMBDA LIGHT CHAIN PREDOMINANT PLASMA POPULATION (6-8%).  SEE COMMENT.   MARKEDLY DECREASED STORAGE IRON.   ADEQUATE NUMBER OF MEGAKARYOCYTES.      Flow cytometric analysis of  bone marrow shows populations of polyclonal B lymphocytes and T lymphocytes that are immunophenotypically unremarkable   except reversed CD4 to CD8 ratio.  The blast gate is not increased. No clonal plasma cell population is detected.   Flow differential:  Lymphocytes 6.6%, Monocytes 2.5%, Granulocytes  87.7%, Blast  1.2%, Debris/nRBC 2.0%,  Viability 97.2%.   Immunohistochemical studies were performed on the clot and core biopsy for greater sensitivity and further architecture evaluation with adequate   positive and negative controls.   About 6-8% plasma cells ( positive, occasional CD56 positive) are noted.  In situ hybridization for kappa and lambda light chain shows focal lambda light chain predominant plasma cell   population. Findings are favor for minimal residual plasma cell neoplasm.  Correlate clinically.     Component       Latest Ref Rng & Units 8/3/2022   [place] Free Light Chains      0.33 - 1.94 mg/dL 1.17   Lambda Free Light Chains      0.57 - 2.63 mg/dL 0.92   Kappa/Lambda FLC Ratio      0.26 - 1.65 1.27   Beta-2 Microglobulin      0.0 - 2.5 ug/mL 2.7 (H)     8/3/22 SPEP: Normal total protein. Normal gamma globulins are decreased. Paraprotein band in near-gamma = 0.30 g/dL, previously 0.85 g/dL.   8/3/22 sIFE: IgG lambda specific monoclonal band present.     10/11/22 serum free light chains    [place] Quant Free Lt Chain result: 0.33 - 1.94 mg/dL 1.12    Lambda Quant Free Lt Chain result 0.57 - 2.63 mg/dL 1.06    Kappa/Lambda Ratio 0.26 - 1.65 1.06      10/11/22 sIFE: No monoclonal immunoglobulin chains detected.   10/11/22 SPEP: Hypoproteinemia with normal pattern suggests hemodilution.     Component      Latest Ref Rng & Units 11/1/2022   WBC      3.90 - 12.70 K/uL 3.91   RBC      4.00 - 5.40 M/uL 3.70 (L)   Hemoglobin      12.0 - 16.0 g/dL 12.3   Hematocrit      37.0 - 48.5 % 36.3 (L)   MCV      82 - 98 fL 98   MCH      27.0 - 31.0 pg 33.2 (H)   MCHC      32.0 - 36.0 g/dL 33.9   RDW      11.5 - 14.5 % 14.6 (H)   Platelets      150 - 450 K/uL 150   MPV      9.2 - 12.9 fL 10.4   Immature Granulocytes      0.0 - 0.5 % 0.0   Gran # (ANC)      1.8 - 7.7 K/uL 1.6 (L)   Immature Grans (Abs)      0.00 - 0.04 K/uL 0.00   Lymph #      1.0 - 4.8 K/uL 1.3   Mono #      0.3 - 1.0 K/uL 0.5   Eos #      0.0 - 0.5 K/uL 0.5   Baso #      0.00 - 0.20 K/uL 0.04   nRBC      0 /100 WBC 0   Gran %      38.0 - 73.0 % 41.5   Lymph %      18.0 - 48.0 % 32.2   Mono %      4.0 - 15.0 % 12.5   Eosinophil %      0.0 - 8.0 % 12.8 (H)   Basophil %      0.0 - 1.9 % 1.0   Differential Method       Automated   Sodium      136 - 145 mmol/L 138   Potassium      3.5 - 5.1 mmol/L 4.1   Chloride      95 - 110 mmol/L 105   CO2      23 - 29 mmol/L 23   Glucose      70 - 110 mg/dL 85   BUN      8 - 23 mg/dL 8   Creatinine      0.5 - 1.4 mg/dL 0.8   Calcium      8.7 - 10.5  mg/dL 8.2 (L)   PROTEIN TOTAL      6.0 - 8.4 g/dL 6.2   Albumin      3.5 - 5.2 g/dL 3.6   BILIRUBIN TOTAL      0.1 - 1.0 mg/dL 0.4   Alkaline Phosphatase      55 - 135 U/L 53 (L)   AST      10 - 40 U/L 17   ALT      10 - 44 U/L 19   Anion Gap      8 - 16 mmol/L 10   eGFR      >60 mL/min/1.73 m:2 >60.0   Phosphorus      2.7 - 4.5 mg/dL 3.2     Assessment and Plan:    1. History of autologous peripheral blood sten cell transplant    -Received Melphalan 200 ASCT on 04/25/22. Received CD34 cells with total dose of 3.97x10^6.  -Engrafted on Day +12  -Transplant was uncomplicated. Today is Day + 190   --Day+100 BM biopsy done on 8/3/22 demonstrated a lambda restricted light chain population, 6-8%   --No evidence of hypermetabolic bony lytic or soft tissue lesions on PET CT done on day +100 .      2. Multiple myeloma in remission     --R-ISS II IgG lambda multiple myeloma, treated with  RVd .    --She had increased 1q21 copy number and has t(4,14), both high risk markers in multiple myeloma. She also has monosomy 13 on FISH.   --She did not have baseline PET CT.   --Prior to C5 her M spike on SPEP was 0.52g/dl( was 5.36g/dl at diagnosis).   --She received 6 cycles of RVd, followed by 2 cycles of velcade-dexamethasone. She tolerated the treatments well.    --PET CT on 3/31/22 did not demonstrate any hypermetabolic lytic or soft tissue lesions.  -- BM biopsy on 3/21/22 showed 10% plasma cells ( versus 50% at diagnosis).  --M spike on 3/3/22 SPEP was 0.85g/dl, suggesting she partial response.   --4/1/22 PFTs showed mild decrease in DLCO. EKG did not show any abnormality. 2D ECHO on 4/1/22 showed moderate left atrial enlargement, mild mitral regurgitation but normal LVEF. HBV, HIV, HCV, [place] Ab, RPR, HTLV I/II all non-reactive. Varicella negative. CMV Ab was reactive. HSV 1 IgG was reactive. HSV 2 IgG negative.   --Colonoscopy normal. No abnormality of mammogram or PAP smear.   --I discussed the maintenance therapy consisted  of lenalidomide (10?mg/day orally) on days 1-21 of a 28-day cycle in combination with bortezomib (1.3?mg/m2 per week subcutaneously/intravenously) and low-dose dexamethasone (40?mg per week orally). [ Leukemia volume 28, [id number] (2014) ]. This was administered for up to 3 years, followed by single-agent lenalidomide maintenance thereafter.     --she has resumed maintenance under   -she is tolerating the maintenance RVd well; no neuropathy/infections  --she will decrease decadron to 20mg PO weekly  -sFLC ratio normal on 10/11/22; No monoclonal protein on SPEP/sIFE done on 10/11/22  --she will have follow up in 3 months       3.  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 GERD  - She is currently asymptomatic  --, on PPI     5. Migraine History  - She uses Florinol for migrane headaches and states that she only takes it 2-3 times per year     6. Eosinophilia    --Absolute eosinophil count 500 today    6. Nausea    --She had persistent nausea, especially when she takes acylcovir 800 mg tablets  --she was switched to valacyclovir 500 mg BID from 5/27/22  --she did not tolerate valacyclovir as well; she will resume acyclovir 400mg BID  --she will have varicella IgG titer checked , and receive shingrix vaccine if positive  --she will also receive COVID 19 vaccination and has the schedule for other immunizations    Last day of velcade - 2 weeks before. CYTOPENIA   BMT Route Chart for Scheduling

## 2023-02-02 LAB
ALBUMIN SERPL ELPH-MCNC: 4.23 G/DL (ref 3.35–5.55)
ALPHA1 GLOB SERPL ELPH-MCNC: 0.32 G/DL (ref 0.17–0.41)
ALPHA2 GLOB SERPL ELPH-MCNC: 0.63 G/DL (ref 0.43–0.99)
B-GLOBULIN SERPL ELPH-MCNC: 0.68 G/DL (ref 0.5–1.1)
GAMMA GLOB SERPL ELPH-MCNC: 0.64 G/DL (ref 0.67–1.58)
INTERPRETATION SERPL IFE-IMP: NORMAL
KAPPA LC SER QL IA: 0.79 MG/DL (ref 0.33–1.94)
KAPPA LC/LAMBDA SER IA: 1.18 (ref 0.26–1.65)
LAMBDA LC SER QL IA: 0.67 MG/DL (ref 0.57–2.63)
PATHOLOGIST INTERPRETATION IFE: NORMAL
PATHOLOGIST INTERPRETATION SPE: NORMAL
PROT SERPL-MCNC: 6.5 G/DL (ref 6–8.4)

## 2023-02-02 NOTE — PROGRESS NOTES
CC: Multiple myeloma in remission, follow up after autologous stem cell transplant, day + 190        HPI : , 67, is here for hematology/ stem cell transplant follow up .  She is here post HDT/ auto SCT, day+ 190 .   She has IgG lambda multiple myeloma. Multiple myeloma was diagnosed after work up for anemia showed a paraprotein  She has anemia, GERD, esophageal erosions. On 10/8/21 she had a bone marrow biopsy. It showed nearly 50%  positive plasma cells on core biopsy by IHC.  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  [place] red on bone marrow negative.She has chronic anemia. Cytogenetics was normal. FISH showed increase in 1q copies, t(4,14) and monosomy 13.  She had no lytic lesions on skeletal survey. She was treated with RVd.   She underwent Ju 200 ASCT. Day 0 was 4/25/22.  Post transplant course was uneventful with expected chemotherapy related toxicities.She went to ED on 05/15/22 with low grade fever. She was discharged with PO antibiotics. Respiratory panel was positive for para influenza virus.        Interval History: Ms.Richards Byrne presents for follow up. Today is day + 282 post auto SCT for multiple myeloma. Following [facility] oncologist Dr. Nino(replaced ). Overall doing well, no acute complaints today. Denies weight loss, bone pain, fatigue or any other acute events. Presents with her spouse.            Review of patient's allergies indicates:   Allergen Reactions    Garlic      Milk Hives, Itching and Nausea Only       Dairy Products              Current Outpatient Medications   Medication Sig    acyclovir (ZOVIRAX) 400 MG tablet Take 400 mg by mouth 2 (two) times daily.    ascorbic acid, vitamin C, (VITAMIN C) 500 MG tablet Take 1 tablet (500 mg total) by mouth once daily.    calcium carbonate (OS-LAILA) 600 mg calcium (1,500 mg) Tab Take 1 tablet by mouth once daily.    citalopram (CELEXA) 10 MG tablet Take 10 mg by mouth every morning.    dexAMETHasone (DECADRON) 4 MG Tab  Take by mouth.    guaiFENesin (MUCINEX) 600 mg 12 hr tablet Take 2 tablets (1,200 mg total) by mouth 2 (two) times daily.    loperamide (IMODIUM) 2 mg capsule Take 1 capsule (2 mg total) by mouth 4 (four) times daily as needed for Diarrhea.    multivitamin (THERAGRAN) per tablet Take 1 tablet by mouth once daily.    ondansetron (ZOFRAN-ODT) 4 MG TbDL Take 4 mg by mouth every 8 (eight) hours as needed.    pantoprazole (PROTONIX) 40 MG tablet Take 1 tablet (40 mg total) by mouth once daily.    promethazine (PHENERGAN) 25 MG tablet Take 25 mg by mouth every 6 (six) hours as needed (nuasea).    REVLIMID 10 mg Cap Take by mouth.    vitamin D (VITAMIN D3) 1000 units Tab Take 1 tablet (1,000 Units total) by mouth once daily.    zinc gluconate 50 mg tablet Take 50 mg by mouth once daily.    calcium carbonate (TUMS ULTRA ORAL) Take 500 mg by mouth daily as needed (heart burn).    clonazePAM (KLONOPIN) 0.5 MG tablet Take 0.5 mg by mouth 2 (two) times daily as needed for Anxiety.    fluticasone propionate (FLONASE) 50 mcg/actuation nasal spray 1 spray (50 mcg total) by Each Nostril route nightly as needed for Rhinitis or Allergies. (Patient not taking: Reported on 2/1/2023)    hydrOXYzine HCL (ATARAX) 25 MG tablet Take 25 mg by mouth 2 (two) times daily as needed.    ondansetron (ZOFRAN) 8 MG tablet Take 8 mg by mouth every 8 (eight) hours as needed for Nausea.    sucralfate (CARAFATE) 1 gram tablet Take 1 tablet (1 g total) by mouth 4 (four) times daily as needed (heartburn). (Patient not taking: Reported on 2/1/2023)      No current facility-administered medications for this visit.            Review of Systems   Constitutional:  Negative for chills, fever, malaise/fatigue and weight loss.   HENT:  Negative for congestion, ear discharge, ear pain, hearing loss and nosebleeds.    Eyes:  Negative for blurred vision, double vision, photophobia and pain.   Respiratory:  Negative for hemoptysis, sputum production and shortness of  breath.    Cardiovascular:  Negative for orthopnea, leg swelling and PND.   Gastrointestinal:  Negative for abdominal pain, blood in stool, diarrhea, heartburn, melena and vomiting.   Genitourinary:  Negative for dysuria and frequency.   Musculoskeletal:  Negative for back pain, myalgias and neck pain.   Neurological:  Negative for dizziness, tingling, tremors, sensory change, focal weakness, weakness and headaches.   Endo/Heme/Allergies:  Negative for environmental allergies. Does not bruise/bleed easily.   Psychiatric/Behavioral:  Negative for depression, hallucinations and substance abuse.           Vitals:     02/01/23 0727   BP: (!) 141/69   Pulse: 67   Resp: 16   Temp: 98.5 °F (36.9 °C)               PS: ECOG 1        Physical Exam  Constitutional:       Appearance: Normal appearance.   HENT:      Head: Normocephalic and atraumatic.      Mouth/Throat:      Pharynx: No posterior oropharyngeal erythema.   Eyes:      General: No scleral icterus.  Cardiovascular:      Rate and Rhythm: Normal rate and regular rhythm.      Pulses: Normal pulses.      Heart sounds: Normal heart sounds. No murmur heard.  Pulmonary:      Effort: Pulmonary effort is normal. No respiratory distress.      Breath sounds: Normal breath sounds.   Abdominal:      General: There is no distension.      Palpations: There is no mass.      Tenderness: There is no abdominal tenderness.   Musculoskeletal:         General: No swelling.   Lymphadenopathy:      Cervical: No cervical adenopathy.   Skin:     Coloration: Skin is not jaundiced or pale.   Neurological:      General: No focal deficit present.      Mental Status: She is alert and oriented to person, place, and time.      Cranial Nerves: No cranial nerve deficit.   Psychiatric:         Mood and Affect: Mood normal.            8/3/22 PET CT     COMPARISON:  [facility] PET-CT 03/21/2022     FINDINGS:  Quality of the study: Adequate.     In the head and neck, there are no hypermetabolic lesions  worrisome for malignancy. There are no hypermetabolic mucosal lesions, and there are no pathologically enlarged or hypermetabolic lymph nodes.     In the chest, there are no hypermetabolic lesions worrisome for malignancy.  There are no concerning pulmonary nodules or masses, and there are no pathologically enlarged or hypermetabolic lymph nodes.     In the abdomen and pelvis, there is physiologic tracer distribution within the abdominal organs and excretion into the genitourinary system.     In the bones, there are no hypermetabolic lesions worrisome for malignancy.     In the extremities, there are no hypermetabolic lesions worrisome for malignancy.     Additional findings: Bilateral breast implants.  Cholecystectomy.     Impression:     No evidence of hypermetabolic foci to indicate malignancy.        8/3/22 BM biopsy     LEFT ILIAC CREST BONE MARROW ASPIRATE, BONE MARROW CLOT, AND BONE MARROW CORE BIOPSY WITH:     CELLULARITY=30%, TRILINEAGE HEMATOPOIETIC ACTIVITY (M/E= 1.1:1).   LAMBDA LIGHT CHAIN PREDOMINANT PLASMA POPULATION (6-8%).  SEE COMMENT.   MARKEDLY DECREASED STORAGE IRON.   ADEQUATE NUMBER OF MEGAKARYOCYTES.       Flow cytometric analysis of  bone marrow shows populations of polyclonal B lymphocytes and T lymphocytes that are immunophenotypically unremarkable   except reversed CD4 to CD8 ratio.  The blast gate is not increased. No clonal plasma cell population is detected.   Flow differential:  Lymphocytes 6.6%, Monocytes 2.5%, Granulocytes  87.7%, Blast  1.2%, Debris/nRBC 2.0%,  Viability 97.2%.   Immunohistochemical studies were performed on the clot and core biopsy for greater sensitivity and further architecture evaluation with adequate   positive and negative controls.   About 6-8% plasma cells ( positive, occasional CD56 positive) are noted.  In situ hybridization for kappa and lambda light chain shows focal lambda light chain predominant plasma cell   population. Findings are favor for  minimal residual plasma cell neoplasm.  Correlate clinically.      Component      Latest Ref Rng & Units 8/3/2022   [place] Free Light Chains      0.33 - 1.94 mg/dL 1.17   Lambda Free Light Chains      0.57 - 2.63 mg/dL 0.92   Kappa/Lambda FLC Ratio      0.26 - 1.65 1.27   Beta-2 Microglobulin      0.0 - 2.5 ug/mL 2.7 (H)      8/3/22 SPEP: Normal total protein. Normal gamma globulins are decreased. Paraprotein band in near-gamma = 0.30 g/dL, previously 0.85 g/dL.   8/3/22 sIFE: IgG lambda specific monoclonal band present.      10/11/22 serum free light chains     Kappa Quant Free Lt Chain result: 0.33 - 1.94 mg/dL 1.12    Lambda Quant Free Lt Chain result 0.57 - 2.63 mg/dL 1.06    Kappa/Lambda Ratio 0.26 - 1.65 1.06       10/11/22 sIFE: No monoclonal immunoglobulin chains detected.   10/11/22 SPEP: Hypoproteinemia with normal pattern suggests hemodilution.            Lab Results   Component Value Date     WBC 5.90 02/01/2023     HGB 11.1 (L) 02/01/2023     HCT 34.1 (L) 02/01/2023      (H) 02/01/2023      02/01/2023         CMP        Sodium   Date Value Ref Range Status   02/01/2023 143 136 - 145 mmol/L Final            Potassium   Date Value Ref Range Status   02/01/2023 3.5 3.5 - 5.1 mmol/L Final            Chloride   Date Value Ref Range Status   02/01/2023 109 95 - 110 mmol/L Final            CO2   Date Value Ref Range Status   02/01/2023 25 23 - 29 mmol/L Final            Glucose   Date Value Ref Range Status   02/01/2023 81 70 - 110 mg/dL Final            BUN   Date Value Ref Range Status   02/01/2023 12 8 - 23 mg/dL Final            Creatinine   Date Value Ref Range Status   02/01/2023 0.9 0.5 - 1.4 mg/dL Final            Calcium   Date Value Ref Range Status   02/01/2023 9.3 8.7 - 10.5 mg/dL Final            Total Protein   Date Value Ref Range Status   02/01/2023 6.9 6.0 - 8.4 g/dL Final            Albumin   Date Value Ref Range Status   02/01/2023 4.0 3.5 - 5.2 g/dL Final              Total  Bilirubin   Date Value Ref Range Status   02/01/2023 0.5 0.1 - 1.0 mg/dL Final       Comment:       For infants and newborns, interpretation of results should be based  on gestational age, weight and in agreement with clinical  observations.     Premature Infant recommended reference ranges:  Up to 24 hours.............<8.0 mg/dL  Up to 48 hours............<12.0 mg/dL  3-5 days..................<15.0 mg/dL  6-29 days.................<15.0 mg/dL               Alkaline Phosphatase   Date Value Ref Range Status   02/01/2023 50 (L) 55 - 135 U/L Final            AST   Date Value Ref Range Status   02/01/2023 17 10 - 40 U/L Final            ALT   Date Value Ref Range Status   02/01/2023 17 10 - 44 U/L Final            Anion Gap   Date Value Ref Range Status   02/01/2023 9 8 - 16 mmol/L Final            eGFR   Date Value Ref Range Status   02/01/2023 >60.0 >60 mL/min/1.73 m^2 Final            Assessment and Plan:     1. History of autologous peripheral blood sten cell transplant     -Received Melphalan 200 ASCT on 04/25/22. Received CD34 cells with total dose of 3.97x10^6.  -Engrafted on Day +12  -Transplant was uncomplicated. Today is Day + 282     --Day+100 BM biopsy done on 8/3/22 demonstrated a lambda restricted light chain population, 6-8%   --No evidence of hypermetabolic bony lytic or soft tissue lesions on PET CT done on day +100 .  - Will plan for 1 year restaging PET CT, marrow, complete MM labs including urine studies        2. Multiple myeloma in remission      --R-ISS II IgG lambda multiple myeloma, treated with  RVd .    --She had increased 1q21 copy number and has t(4,14), both high risk markers in multiple myeloma. She also has monosomy 13 on FISH.   --She did not have baseline PET CT.   --Prior to C5 her M spike on SPEP was 0.52g/dl( was 5.36g/dl at diagnosis).   --She received 6 cycles of RVd, followed by 2 cycles of velcade-dexamethasone. She tolerated the treatments well.    --PET CT on 3/31/22 did not  demonstrate any hypermetabolic lytic or soft tissue lesions.  -- BM biopsy on 3/21/22 showed 10% plasma cells ( versus 50% at diagnosis).  --M spike on 3/3/22 SPEP was 0.85g/dl, suggesting she partial response.   --4/1/22 PFTs showed mild decrease in DLCO. EKG did not show any abnormality. 2D ECHO on 4/1/22 showed moderate left atrial enlargement, mild mitral regurgitation but normal LVEF. HBV, HIV, HCV, [place] Ab, RPR, HTLV I/II all non-reactive. Varicella negative. CMV Ab was reactive. HSV 1 IgG was reactive. HSV 2 IgG negative.   --Colonoscopy normal. No abnormality of mammogram or PAP smear.   --I discussed the maintenance therapy consisted of lenalidomide (10?mg/day orally) on days 1-21 of a 28-day cycle in combination with bortezomib (1.3?mg/m2 per week subcutaneously/intravenously) and low-dose dexamethasone (40?mg per week orally). [ Leukemia volume 28, [id number] (2014) ]. This was administered for up to 3 years, followed by single-agent lenalidomide maintenance thereafter.   --she has resumed maintenance under   -she is tolerating the maintenance RVd well; no neuropathy/infections. Per patient plan to change treatment from Rvd to josé rev dex regimen due to recently noticed low M spike and cytopenia. Recommended to continue current regimen. Will monitor today's MM studies and update with patient. Adding josé if MM labs progressing or patient not tolerating current regimen. Cytopenia may related to combination of therapy. If persistent cytopenia consider rev dose adjustment. . Today's cbc within normal limits, no cytopenia noticed. MM labs pending at this time. Plan for 1 year restaging in 3 month   --she is taking decadron to 20mg PO weekly. Reported insomnia related to dex. If ongoing issue further dose adjustment to 12 mg weekly.   -sFLC ratio normal on 10/11/22; Today's pending, will f/u   --she will have follow up in 3 months        3.  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 GERD  - She is currently asymptomatic  --, on PPI      5. Migraine History  - She uses Florinol for migrane headaches and states that she only takes it 2-3 times per year      6. Eosinophilia     --Absolute eosinophil count wnl     6. Nausea     --She had persistent nausea, especially when she takes acylcovir 800 mg tablets  --she was switched to valacyclovir 500 mg BID from 5/27/22  --she did not tolerate valacyclovir as well; she will resume acyclovir 400mg BID  -- Her varicella IgG titer is negaive  --she received COVID 19 vaccination and has the schedule for other immunizations           BMT Chart Routing      Follow up with physician . Visit with  week after restaging   Follow up with LILIAN    Provider visit type    Infusion scheduling note    Injection scheduling note    Labs   Lab interval:  Cbc, cmp, Myeloma labs, urine studies on biopsy day   Imaging   PET CT, bone biopsy on week of 04/24/23   Pharmacy appointment    Other referrals      Patient was seen in conjunction with , who agreed with the plan.      Advance Care Planning     Date: 02/02/2023       We previously had discussions regarding her underlying diagnosis, natural history, prognosis, and treatment options. She was interested in pursuing any and all treatment options available to her including chemo treatment. She remains interested in pursuing all treatment needed.  Will continue chemo therapy/ posttransplant monitoring. Plan for 1 year restaging in 3 month.   Total time of this visit was 45 minutes, including time spent face to face with patient and/or via video/audio, and also in preparing for today's visit for MDM and documentation. (Medical Decision Making, including consideration of possible diagnoses, management options, complex medical record review, review of diagnostic tests and information, consideration and discussion of significant complications based on comorbidities, and  discussion with providers involved with the care of the patient). Greater than 50% was spent face to face with the patient counseling and coordinating care.    Hortencia Dubose NP  Hematology/Oncology/BMT

## 2023-02-02 NOTE — PROGRESS NOTES
CC: Multiple myeloma in remission, follow up after autologous stem cell transplant, day + 190      HPI : , 67, is here for hematology/ stem cell transplant follow up .  She is here post HDT/ auto SCT, day+ 190 .   She has IgG lambda multiple myeloma. Multiple myeloma was diagnosed after work up for anemia showed a paraprotein  She has anemia, GERD, esophageal erosions. On 10/8/21 she had a bone marrow biopsy. It showed nearly 50%  positive plasma cells on core biopsy by IHC.  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  [place] red on bone marrow negative.She has chronic anemia. Cytogenetics was normal. FISH showed increase in 1q copies, t(4,14) and monosomy 13.  She had no lytic lesions on skeletal survey. She was treated with RVd.   She underwent Ju 200 ASCT. Day 0 was 4/25/22.  Post transplant course was uneventful with expected chemotherapy related toxicities.She went to ED on 05/15/22 with low grade fever. She was discharged with PO antibiotics. Respiratory panel was positive for para influenza virus.      Interval History: Ms.Richards Byrne presents for follow up. Today is day + 190 post auto SCT for multiple myeloma  . She had intolerance to 800mg dose of acyclovir. It was switched to valacyclovir, but she had reaction ( rash) to it. Denies emesis, fever, cough, or dyspnea.     Review of patient's allergies indicates:   Allergen Reactions    Garlic     Milk Hives, Itching and Nausea Only     Dairy Products       Current Outpatient Medications   Medication Sig    acyclovir (ZOVIRAX) 400 MG tablet Take 400 mg by mouth 2 (two) times daily.    ascorbic acid, vitamin C, (VITAMIN C) 500 MG tablet Take 1 tablet (500 mg total) by mouth once daily.    calcium carbonate (OS-LAILA) 600 mg calcium (1,500 mg) Tab Take 1 tablet by mouth once daily.    citalopram (CELEXA) 10 MG tablet Take 10 mg by mouth every morning.    dexAMETHasone (DECADRON) 4 MG Tab Take by mouth.    guaiFENesin (MUCINEX) 600 mg 12 hr tablet  Take 2 tablets (1,200 mg total) by mouth 2 (two) times daily.    loperamide (IMODIUM) 2 mg capsule Take 1 capsule (2 mg total) by mouth 4 (four) times daily as needed for Diarrhea.    multivitamin (THERAGRAN) per tablet Take 1 tablet by mouth once daily.    ondansetron (ZOFRAN-ODT) 4 MG TbDL Take 4 mg by mouth every 8 (eight) hours as needed.    pantoprazole (PROTONIX) 40 MG tablet Take 1 tablet (40 mg total) by mouth once daily.    promethazine (PHENERGAN) 25 MG tablet Take 25 mg by mouth every 6 (six) hours as needed (nuasea).    REVLIMID 10 mg Cap Take by mouth.    vitamin D (VITAMIN D3) 1000 units Tab Take 1 tablet (1,000 Units total) by mouth once daily.    zinc gluconate 50 mg tablet Take 50 mg by mouth once daily.    calcium carbonate (TUMS ULTRA ORAL) Take 500 mg by mouth daily as needed (heart burn).    clonazePAM (KLONOPIN) 0.5 MG tablet Take 0.5 mg by mouth 2 (two) times daily as needed for Anxiety.    fluticasone propionate (FLONASE) 50 mcg/actuation nasal spray 1 spray (50 mcg total) by Each Nostril route nightly as needed for Rhinitis or Allergies. (Patient not taking: Reported on 2/1/2023)    hydrOXYzine HCL (ATARAX) 25 MG tablet Take 25 mg by mouth 2 (two) times daily as needed.    ondansetron (ZOFRAN) 8 MG tablet Take 8 mg by mouth every 8 (eight) hours as needed for Nausea.    sucralfate (CARAFATE) 1 gram tablet Take 1 tablet (1 g total) by mouth 4 (four) times daily as needed (heartburn). (Patient not taking: Reported on 2/1/2023)     No current facility-administered medications for this visit.         Review of Systems   Constitutional:  Positive for malaise/fatigue. Negative for chills, fever and weight loss.   HENT:  Negative for congestion, ear discharge, ear pain, hearing loss and nosebleeds.    Eyes:  Negative for blurred vision, double vision, photophobia and pain.   Respiratory:  Negative for hemoptysis, sputum production and shortness of breath.    Cardiovascular:  Negative for orthopnea,  leg swelling and PND.   Gastrointestinal:  Negative for abdominal pain, blood in stool, diarrhea, heartburn, melena and vomiting.   Genitourinary:  Negative for dysuria and frequency.   Musculoskeletal:  Negative for back pain, myalgias and neck pain.   Neurological:  Negative for dizziness, tingling, tremors, sensory change, focal weakness, weakness and headaches.   Endo/Heme/Allergies:  Negative for environmental allergies. Does not bruise/bleed easily.   Psychiatric/Behavioral:  Negative for depression, hallucinations and substance abuse.      Vitals:    02/01/23 0727   BP: (!) 141/69   Pulse: 67   Resp: 16   Temp: 98.5 °F (36.9 °C)           PS: ECOG 1      Physical Exam  Constitutional:       Appearance: Normal appearance.   HENT:      Head: Normocephalic and atraumatic.      Mouth/Throat:      Pharynx: No posterior oropharyngeal erythema.   Eyes:      General: No scleral icterus.  Cardiovascular:      Rate and Rhythm: Normal rate and regular rhythm.      Pulses: Normal pulses.      Heart sounds: Normal heart sounds. No murmur heard.  Pulmonary:      Effort: Pulmonary effort is normal. No respiratory distress.      Breath sounds: Normal breath sounds.   Abdominal:      General: There is no distension.      Palpations: There is no mass.      Tenderness: There is no abdominal tenderness.   Musculoskeletal:         General: No swelling.   Lymphadenopathy:      Cervical: No cervical adenopathy.   Skin:     Coloration: Skin is not jaundiced or pale.   Neurological:      General: No focal deficit present.      Mental Status: She is alert and oriented to person, place, and time.      Cranial Nerves: No cranial nerve deficit.   Psychiatric:         Mood and Affect: Mood normal.         8/3/22 PET CT    COMPARISON:  [facility] PET-CT 03/21/2022     FINDINGS:  Quality of the study: Adequate.     In the head and neck, there are no hypermetabolic lesions worrisome for malignancy. There are no hypermetabolic mucosal lesions, and  there are no pathologically enlarged or hypermetabolic lymph nodes.     In the chest, there are no hypermetabolic lesions worrisome for malignancy.  There are no concerning pulmonary nodules or masses, and there are no pathologically enlarged or hypermetabolic lymph nodes.     In the abdomen and pelvis, there is physiologic tracer distribution within the abdominal organs and excretion into the genitourinary system.     In the bones, there are no hypermetabolic lesions worrisome for malignancy.     In the extremities, there are no hypermetabolic lesions worrisome for malignancy.     Additional findings: Bilateral breast implants.  Cholecystectomy.     Impression:     No evidence of hypermetabolic foci to indicate malignancy.      8/3/22 BM biopsy    LEFT ILIAC CREST BONE MARROW ASPIRATE, BONE MARROW CLOT, AND BONE MARROW CORE BIOPSY WITH:     CELLULARITY=30%, TRILINEAGE HEMATOPOIETIC ACTIVITY (M/E= 1.1:1).   LAMBDA LIGHT CHAIN PREDOMINANT PLASMA POPULATION (6-8%).  SEE COMMENT.   MARKEDLY DECREASED STORAGE IRON.   ADEQUATE NUMBER OF MEGAKARYOCYTES.      Flow cytometric analysis of  bone marrow shows populations of polyclonal B lymphocytes and T lymphocytes that are immunophenotypically unremarkable   except reversed CD4 to CD8 ratio.  The blast gate is not increased. No clonal plasma cell population is detected.   Flow differential:  Lymphocytes 6.6%, Monocytes 2.5%, Granulocytes  87.7%, Blast  1.2%, Debris/nRBC 2.0%,  Viability 97.2%.   Immunohistochemical studies were performed on the clot and core biopsy for greater sensitivity and further architecture evaluation with adequate   positive and negative controls.   About 6-8% plasma cells ( positive, occasional CD56 positive) are noted.  In situ hybridization for kappa and lambda light chain shows focal lambda light chain predominant plasma cell   population. Findings are favor for minimal residual plasma cell neoplasm.  Correlate clinically.     Component       Latest Ref Rng & Units 8/3/2022   [place] Free Light Chains      0.33 - 1.94 mg/dL 1.17   Lambda Free Light Chains      0.57 - 2.63 mg/dL 0.92   Kappa/Lambda FLC Ratio      0.26 - 1.65 1.27   Beta-2 Microglobulin      0.0 - 2.5 ug/mL 2.7 (H)     8/3/22 SPEP: Normal total protein. Normal gamma globulins are decreased. Paraprotein band in near-gamma = 0.30 g/dL, previously 0.85 g/dL.   8/3/22 sIFE: IgG lambda specific monoclonal band present.     10/11/22 serum free light chains    [place] Quant Free Lt Chain result: 0.33 - 1.94 mg/dL 1.12    Lambda Quant Free Lt Chain result 0.57 - 2.63 mg/dL 1.06    Kappa/Lambda Ratio 0.26 - 1.65 1.06      10/11/22 sIFE: No monoclonal immunoglobulin chains detected.   10/11/22 SPEP: Hypoproteinemia with normal pattern suggests hemodilution.     Component      Latest Ref Rng & Units 11/1/2022   WBC      3.90 - 12.70 K/uL 3.91   RBC      4.00 - 5.40 M/uL 3.70 (L)   Hemoglobin      12.0 - 16.0 g/dL 12.3   Hematocrit      37.0 - 48.5 % 36.3 (L)   MCV      82 - 98 fL 98   MCH      27.0 - 31.0 pg 33.2 (H)   MCHC      32.0 - 36.0 g/dL 33.9   RDW      11.5 - 14.5 % 14.6 (H)   Platelets      150 - 450 K/uL 150   MPV      9.2 - 12.9 fL 10.4   Immature Granulocytes      0.0 - 0.5 % 0.0   Gran # (ANC)      1.8 - 7.7 K/uL 1.6 (L)   Immature Grans (Abs)      0.00 - 0.04 K/uL 0.00   Lymph #      1.0 - 4.8 K/uL 1.3   Mono #      0.3 - 1.0 K/uL 0.5   Eos #      0.0 - 0.5 K/uL 0.5   Baso #      0.00 - 0.20 K/uL 0.04   nRBC      0 /100 WBC 0   Gran %      38.0 - 73.0 % 41.5   Lymph %      18.0 - 48.0 % 32.2   Mono %      4.0 - 15.0 % 12.5   Eosinophil %      0.0 - 8.0 % 12.8 (H)   Basophil %      0.0 - 1.9 % 1.0   Differential Method       Automated   Sodium      136 - 145 mmol/L 138   Potassium      3.5 - 5.1 mmol/L 4.1   Chloride      95 - 110 mmol/L 105   CO2      23 - 29 mmol/L 23   Glucose      70 - 110 mg/dL 85   BUN      8 - 23 mg/dL 8   Creatinine      0.5 - 1.4 mg/dL 0.8   Calcium      8.7 - 10.5  mg/dL 8.2 (L)   PROTEIN TOTAL      6.0 - 8.4 g/dL 6.2   Albumin      3.5 - 5.2 g/dL 3.6   BILIRUBIN TOTAL      0.1 - 1.0 mg/dL 0.4   Alkaline Phosphatase      55 - 135 U/L 53 (L)   AST      10 - 40 U/L 17   ALT      10 - 44 U/L 19   Anion Gap      8 - 16 mmol/L 10   eGFR      >60 mL/min/1.73 m:2 >60.0   Phosphorus      2.7 - 4.5 mg/dL 3.2     Assessment and Plan:    1. History of autologous peripheral blood sten cell transplant    -Received Melphalan 200 ASCT on 04/25/22. Received CD34 cells with total dose of 3.97x10^6.  -Engrafted on Day +12  -Transplant was uncomplicated. Today is Day + 190   --Day+100 BM biopsy done on 8/3/22 demonstrated a lambda restricted light chain population, 6-8%   --No evidence of hypermetabolic bony lytic or soft tissue lesions on PET CT done on day +100 .      2. Multiple myeloma in remission     --R-ISS II IgG lambda multiple myeloma, treated with  RVd .    --She had increased 1q21 copy number and has t(4,14), both high risk markers in multiple myeloma. She also has monosomy 13 on FISH.   --She did not have baseline PET CT.   --Prior to C5 her M spike on SPEP was 0.52g/dl( was 5.36g/dl at diagnosis).   --She received 6 cycles of RVd, followed by 2 cycles of velcade-dexamethasone. She tolerated the treatments well.    --PET CT on 3/31/22 did not demonstrate any hypermetabolic lytic or soft tissue lesions.  -- BM biopsy on 3/21/22 showed 10% plasma cells ( versus 50% at diagnosis).  --M spike on 3/3/22 SPEP was 0.85g/dl, suggesting she partial response.   --4/1/22 PFTs showed mild decrease in DLCO. EKG did not show any abnormality. 2D ECHO on 4/1/22 showed moderate left atrial enlargement, mild mitral regurgitation but normal LVEF. HBV, HIV, HCV, Chag[place] Ab, RPR, HTLV I/II all non-reactive. Varicella negative. CMV Ab was reactive. HSV 1 IgG was reactive. HSV 2 IgG negative.   --Colonoscopy normal. No abnormality of mammogram or PAP smear.   --I discussed the maintenance therapy consisted  of lenalidomide (10?mg/day orally) on days 1-21 of a 28-day cycle in combination with bortezomib (1.3?mg/m2 per week subcutaneously/intravenously) and low-dose dexamethasone (40?mg per week orally). [ Leukemia volume 28, [id number] (2014) ]. This was administered for up to 3 years, followed by single-agent lenalidomide maintenance thereafter.     --she has resumed maintenance under   -she is tolerating the maintenance RVd well; no neuropathy/infections  --she will decrease decadron to 20mg PO weekly  -sFLC ratio normal on 10/11/22; No monoclonal protein on SPEP/sIFE done on 10/11/22  --she will have follow up in 3 months       3.  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 GERD  - She is currently asymptomatic  --, on PPI     5. Migraine History  - She uses Florinol for migrane headaches and states that she only takes it 2-3 times per year     6. Eosinophilia    --Absolute eosinophil count 500 today    6. Nausea    --She had persistent nausea, especially when she takes acylcovir 800 mg tablets  --she was switched to valacyclovir 500 mg BID from 5/27/22  --she did not tolerate valacyclovir as well; she will resume acyclovir 400mg BID  --she will have varicella IgG titer checked , and receive shingrix vaccine if positive  --she will also receive COVID 19 vaccination and has the schedule for other immunizations    Last day of velcade - 2 weeks before. CYTOPENIA   BMT Route Chart for Scheduling

## 2023-04-04 ENCOUNTER — PATIENT MESSAGE (OUTPATIENT)
Dept: HEMATOLOGY/ONCOLOGY | Facility: CLINIC | Age: 68
End: 2023-04-04
Payer: COMMERCIAL

## 2023-04-04 DIAGNOSIS — Z94.81 STATUS POST AUTOLOGOUS BONE MARROW TRANSPLANT: ICD-10-CM

## 2023-04-04 DIAGNOSIS — C90.01 MULTIPLE MYELOMA IN REMISSION: Primary | ICD-10-CM

## 2023-04-05 ENCOUNTER — TELEPHONE (OUTPATIENT)
Dept: HEMATOLOGY/ONCOLOGY | Facility: CLINIC | Age: 68
End: 2023-04-05
Payer: COMMERCIAL

## 2023-04-05 DIAGNOSIS — F41.9 ANXIETY: Primary | ICD-10-CM

## 2023-04-05 RX ORDER — LORAZEPAM 0.5 MG/1
0.5 TABLET ORAL
Qty: 2 TABLET | Refills: 0 | Status: SHIPPED | OUTPATIENT
Start: 2023-04-05

## 2023-04-24 ENCOUNTER — HOSPITAL ENCOUNTER (OUTPATIENT)
Dept: RADIOLOGY | Facility: HOSPITAL | Age: 68
Discharge: HOME | End: 2023-04-24
Attending: NURSE PRACTITIONER
Payer: COMMERCIAL

## 2023-04-24 ENCOUNTER — PROCEDURE VISIT (OUTPATIENT)
Dept: HEMATOLOGY/ONCOLOGY | Facility: CLINIC | Age: 68
End: 2023-04-24
Payer: COMMERCIAL

## 2023-04-24 VITALS
SYSTOLIC BLOOD PRESSURE: 155 MMHG | DIASTOLIC BLOOD PRESSURE: 74 MMHG | OXYGEN SATURATION: 99 % | RESPIRATION RATE: 16 BRPM | HEART RATE: 79 BPM

## 2023-04-24 DIAGNOSIS — C90.01 MULTIPLE MYELOMA IN REMISSION: Primary | ICD-10-CM

## 2023-04-24 DIAGNOSIS — Z94.81 STATUS POST AUTOLOGOUS BONE MARROW TRANSPLANT: ICD-10-CM

## 2023-04-24 DIAGNOSIS — C90.01 MULTIPLE MYELOMA IN REMISSION: ICD-10-CM

## 2023-04-24 LAB — POCT GLUCOSE: 104 MG/DL (ref 70–110)

## 2023-04-24 PROCEDURE — 88311 DECALCIFY TISSUE: CPT | Performed by: PATHOLOGY

## 2023-04-24 PROCEDURE — 88274 CYTOGENETICS 25-99: CPT | Performed by: NURSE PRACTITIONER

## 2023-04-24 PROCEDURE — 85097 PR  BONE MARROW,SMEAR INTERPRETATION: ICD-10-PCS | Mod: ,,, | Performed by: PATHOLOGY

## 2023-04-24 PROCEDURE — 88305 TISSUE EXAM BY PATHOLOGIST: CPT | Mod: 26,,, | Performed by: PATHOLOGY

## 2023-04-24 PROCEDURE — A9552 F18 FDG: HCPCS

## 2023-04-24 PROCEDURE — 38222 DX BONE MARROW BX & ASPIR: CPT | Mod: LT,S$GLB,, | Performed by: NURSE PRACTITIONER

## 2023-04-24 PROCEDURE — 88184 FLOWCYTOMETRY/ TC 1 MARKER: CPT | Mod: 59 | Performed by: PATHOLOGY

## 2023-04-24 PROCEDURE — 88184 FLOWCYTOMETRY/ TC 1 MARKER: CPT | Mod: 91 | Performed by: NURSE PRACTITIONER

## 2023-04-24 PROCEDURE — 78816 NM PET CT WHOLE BODY: ICD-10-PCS | Mod: 26,PS,, | Performed by: RADIOLOGY

## 2023-04-24 PROCEDURE — 88342 CHG IMMUNOCYTOCHEMISTRY: ICD-10-PCS | Mod: 26,59,, | Performed by: PATHOLOGY

## 2023-04-24 PROCEDURE — 88189 FLOWCYTOMETRY/READ 16 & >: CPT | Mod: ,,, | Performed by: PATHOLOGY

## 2023-04-24 PROCEDURE — 88185 FLOWCYTOMETRY/TC ADD-ON: CPT | Mod: 59 | Performed by: PATHOLOGY

## 2023-04-24 PROCEDURE — 88313 SPECIAL STAINS GROUP 2: CPT | Mod: 26,,, | Performed by: PATHOLOGY

## 2023-04-24 PROCEDURE — 38222 BONE MARROW: ICD-10-PCS | Mod: LT,S$GLB,, | Performed by: NURSE PRACTITIONER

## 2023-04-24 PROCEDURE — 88365 INSITU HYBRIDIZATION (FISH): CPT | Performed by: PATHOLOGY

## 2023-04-24 PROCEDURE — 88271 CYTOGENETICS DNA PROBE: CPT | Performed by: NURSE PRACTITIONER

## 2023-04-24 PROCEDURE — 85097 BONE MARROW INTERPRETATION: CPT | Mod: ,,, | Performed by: PATHOLOGY

## 2023-04-24 PROCEDURE — 88313 SPECIAL STAINS GROUP 2: CPT | Mod: 59 | Performed by: PATHOLOGY

## 2023-04-24 PROCEDURE — 88185 FLOWCYTOMETRY/TC ADD-ON: CPT | Performed by: NURSE PRACTITIONER

## 2023-04-24 PROCEDURE — 88305 TISSUE EXAM BY PATHOLOGIST: CPT | Performed by: PATHOLOGY

## 2023-04-24 PROCEDURE — 88237 TISSUE CULTURE BONE MARROW: CPT | Performed by: NURSE PRACTITIONER

## 2023-04-24 PROCEDURE — 88342 IMHCHEM/IMCYTCHM 1ST ANTB: CPT | Mod: 26,59,, | Performed by: PATHOLOGY

## 2023-04-24 PROCEDURE — 88311 PR  DECALCIFY TISSUE: ICD-10-PCS | Mod: 26,,, | Performed by: PATHOLOGY

## 2023-04-24 PROCEDURE — 88305 TISSUE EXAM BY PATHOLOGIST: ICD-10-PCS | Mod: 26,,, | Performed by: PATHOLOGY

## 2023-04-24 PROCEDURE — 78816 PET IMAGE W/CT FULL BODY: CPT | Mod: 26,PS,, | Performed by: RADIOLOGY

## 2023-04-24 PROCEDURE — 88365 PR  TISSUE HYBRIDIZATION: ICD-10-PCS | Mod: 26,,, | Performed by: PATHOLOGY

## 2023-04-24 PROCEDURE — 88271 CYTOGENETICS DNA PROBE: CPT | Mod: 59 | Performed by: NURSE PRACTITIONER

## 2023-04-24 PROCEDURE — 88364 INSITU HYBRIDIZATION (FISH): CPT | Mod: 26,,, | Performed by: PATHOLOGY

## 2023-04-24 PROCEDURE — 88189 PR  FLOWCYTOMETRY/READ, 16 & > MARKERS: ICD-10-PCS | Mod: ,,, | Performed by: PATHOLOGY

## 2023-04-24 PROCEDURE — 78816 PET IMAGE W/CT FULL BODY: CPT | Mod: TC

## 2023-04-24 PROCEDURE — 88364 CHG INSITU HYBRIDIZATION (FISH: ICD-10-PCS | Mod: 26,,, | Performed by: PATHOLOGY

## 2023-04-24 PROCEDURE — 88313 PR  SPECIAL STAINS,GROUP II: ICD-10-PCS | Mod: 26,,, | Performed by: PATHOLOGY

## 2023-04-24 PROCEDURE — 88364 INSITU HYBRIDIZATION (FISH): CPT | Performed by: PATHOLOGY

## 2023-04-24 PROCEDURE — 88365 INSITU HYBRIDIZATION (FISH): CPT | Mod: 26,,, | Performed by: PATHOLOGY

## 2023-04-24 PROCEDURE — 88342 IMHCHEM/IMCYTCHM 1ST ANTB: CPT | Performed by: PATHOLOGY

## 2023-04-24 PROCEDURE — 88311 DECALCIFY TISSUE: CPT | Mod: 26,,, | Performed by: PATHOLOGY

## 2023-04-24 PROCEDURE — 88185 FLOWCYTOMETRY/TC ADD-ON: CPT | Mod: 91 | Performed by: NURSE PRACTITIONER

## 2023-04-24 PROCEDURE — 88274 CYTOGENETICS 25-99: CPT | Mod: 59 | Performed by: NURSE PRACTITIONER

## 2023-04-24 RX ORDER — LIDOCAINE HYDROCHLORIDE 20 MG/ML
10 INJECTION, SOLUTION EPIDURAL; INFILTRATION; INTRACAUDAL; PERINEURAL ONCE
Status: COMPLETED | OUTPATIENT
Start: 2023-04-24 | End: 2023-04-24

## 2023-04-24 RX ADMIN — LIDOCAINE HYDROCHLORIDE 7 ML: 20 INJECTION, SOLUTION EPIDURAL; INFILTRATION; INTRACAUDAL; PERINEURAL at 01:04

## 2023-04-24 NOTE — PROCEDURES
Patient with Multiple Myeloma presented at BMT clinic for restaging bone marrow biopsy. Tolerated procedure well. Not in any distress.  See 's note for full history. Reviewed medications, allergies and labs.     Hortencia Dubose NP  Hematology/Oncology/BMT

## 2023-04-24 NOTE — PROCEDURES
Bone marrow    Date/Time: 4/24/2023 1:30 PM  Performed by: Hortencia Dubose NP  Authorized by: Hortencia Dubose NP     Aspiration?: Yes   Biopsy?: Yes    PROCEDURE NOTE:  Date of Procedure: 04/24/2023   Bone Marrow Biopsy and Aspiration  Indication: MM, s/p asct 1 year resrtaging  Consent: Informed consent was obtained from patient.  Timeout: Done and documented.  Position: Prone  Site: Left posterior illiac crest.  Prep: Betadine.  Needle used: 11 gauge Jamshidi needle.  Anesthetic: 2% lidocaine 5 cc.  Biopsy: The biopsy needle was introduced into the marrow cavity and an aspirate was obtained without complications and sent for flow cytometry and cytogenetics. Core biopsy obtained without difficulty and sent for routine histologic examination.  Complications: None.  Disposition: Left patient with primary nurse,instructed to lay on back for 20 minutes. Advised not to take shower and keep dressing clean, dry & intact for 24 hours.  Blood loss: Minimal.     Hortencia Dubose NP  Hematology/Oncology/BMT

## 2023-04-26 LAB
% B-CELL PRECURSORS: 6.2 %
% MAST CELLS: 0.21 %
ABNORMAL PLASMA CELL EVENTS: NORMAL
LOWER LIMIT OF QUANTITATION (LLOQ): 1 X10(-5)
MINIMAL RESIDUAL DISEASE DETECTION (MRD), BONE MARROW: 0.15 %
PATIENT / SAMPLE THEORETICAL LOQ: 2 X10(-6)
PCPDS FINAL DIAGNOSIS: NORMAL
PCPDS PRE-ANALYSIS PRE-SORT: NORMAL
PERCENT NORMAL PLASMA CELLS (OF TOTAL PC): 72.5 %
PLASMA CELLS ASSESS MAR: NORMAL
POLY PLASMA CELL EVENTS: NORMAL
TOTAL CELLS COUNTED MAR: NORMAL
TOTAL PLASMA CELL EVENTS: NORMAL
VALIDATED ASSAY SENSITIVITY: 2.24 X10(-6)

## 2023-05-01 LAB
BODY SITE - BONE MARROW: NORMAL
CLINICAL DIAGNOSIS - BONE MARROW: NORMAL
FLOW CYTOMETRY ANTIBODIES ANALYZED - BONE MARROW: NORMAL
FLOW CYTOMETRY COMMENT - BONE MARROW: NORMAL
FLOW CYTOMETRY INTERPRETATION - BONE MARROW: NORMAL

## 2023-05-03 ENCOUNTER — OFFICE VISIT (OUTPATIENT)
Dept: HEMATOLOGY/ONCOLOGY | Facility: CLINIC | Age: 68
End: 2023-05-03
Payer: COMMERCIAL

## 2023-05-03 DIAGNOSIS — R53.82 CHRONIC FATIGUE: ICD-10-CM

## 2023-05-03 DIAGNOSIS — Z94.81 STATUS POST AUTOLOGOUS BONE MARROW TRANSPLANT: ICD-10-CM

## 2023-05-03 DIAGNOSIS — C90.01 MULTIPLE MYELOMA IN REMISSION: ICD-10-CM

## 2023-05-03 DIAGNOSIS — T45.1X5A ANEMIA ASSOCIATED WITH CHEMOTHERAPY: Primary | ICD-10-CM

## 2023-05-03 DIAGNOSIS — D63.0 ANEMIA IN NEOPLASTIC DISEASE: ICD-10-CM

## 2023-05-03 DIAGNOSIS — D64.81 ANEMIA ASSOCIATED WITH CHEMOTHERAPY: Primary | ICD-10-CM

## 2023-05-03 PROBLEM — D70.1 CHEMOTHERAPY-INDUCED NEUTROPENIA: Status: RESOLVED | Noted: 2022-05-27 | Resolved: 2023-05-03

## 2023-05-03 PROCEDURE — 99214 OFFICE O/P EST MOD 30 MIN: CPT | Mod: 95,,, | Performed by: INTERNAL MEDICINE

## 2023-05-03 PROCEDURE — 99214 PR OFFICE/OUTPT VISIT, EST, LEVL IV, 30-39 MIN: ICD-10-PCS | Mod: 95,,, | Performed by: INTERNAL MEDICINE

## 2023-05-03 NOTE — PROGRESS NOTES
The patient location is: home  The chief complaint leading to consultation is: multiple myeloma, s/p ASCT,1 year restaging visit    Visit type: audiovisual    Face to Face time with patient: 20 minutes  30 minutes of total time spent on the encounter, which includes face to face time and non-face to face time preparing to see the patient (eg, review of tests), Obtaining and/or reviewing separately obtained history, Documenting clinical information in the electronic or other health record, Independently interpreting results (not separately reported) and communicating results to the patient/family/caregiver, or Care coordination (not separately reported).         Each patient to whom he or she provides medical services by telemedicine is:  (1) informed of the relationship between the physician and patient and the respective role of any other health care provider with respect to management of the patient; and (2) notified that he or she may decline to receive medical services by telemedicine and may withdraw from such care at any time.    Notes:    CC: Multiple myeloma in remission, follow up after autologous stem cell transplant, 1 year post ASCT      HPI : , 67, is here for hematology/ stem cell transplant follow up .  She is here 1 year post HDT/ auto SCT .   She has IgG lambda multiple myeloma. Multiple myeloma was diagnosed after work up for anemia showed a paraprotein  She has anemia, GERD, esophageal erosions. On 10/8/21 she had a bone marrow biopsy. It showed nearly 50%  positive plasma cells on core biopsy by IHC.  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  [place] red on bone marrow negative.She has chronic anemia. Cytogenetics was normal. FISH showed increase in 1q copies, t(4,14) and monosomy 13.  She had no lytic lesions on skeletal survey. She was treated with RVd.   She underwent Uj 200 ASCT. Day 0 was 4/25/22.  Post transplant course was uneventful with expected chemotherapy related  toxicities.She went to ED on 05/15/22 with low grade fever. She was discharged with PO antibiotics. Respiratory panel was positive for para influenza virus.        Interval History: Ms.Richards Byrne presents for virtual follow up. Today is 1 year post auto SCT for multiple myeloma. She follows with oncologist Dr. Nino(replaced ). at [facility] . She is doing well. Denies weight loss, bone pain, fatigue or any other acute events. .            Vitals:     02/01/23 0727   BP: (!) 141/69   Pulse: 67   Resp: 16   Temp: 98.5 °F (36.9 °C)               PS: ECOG 1          Physical exam deferred due to nature of today's visit    8/3/22 PET CT     COMPARISON:  [facility] PET-CT 03/21/2022     FINDINGS:  Quality of the study: Adequate.     In the head and neck, there are no hypermetabolic lesions worrisome for malignancy. There are no hypermetabolic mucosal lesions, and there are no pathologically enlarged or hypermetabolic lymph nodes.     In the chest, there are no hypermetabolic lesions worrisome for malignancy.  There are no concerning pulmonary nodules or masses, and there are no pathologically enlarged or hypermetabolic lymph nodes.     In the abdomen and pelvis, there is physiologic tracer distribution within the abdominal organs and excretion into the genitourinary system.     In the bones, there are no hypermetabolic lesions worrisome for malignancy.     In the extremities, there are no hypermetabolic lesions worrisome for malignancy.     Additional findings: Bilateral breast implants.  Cholecystectomy.     Impression:     No evidence of hypermetabolic foci to indicate malignancy.        8/3/22 BM biopsy     LEFT ILIAC CREST BONE MARROW ASPIRATE, BONE MARROW CLOT, AND BONE MARROW CORE BIOPSY WITH:     CELLULARITY=30%, TRILINEAGE HEMATOPOIETIC ACTIVITY (M/E= 1.1:1).   LAMBDA LIGHT CHAIN PREDOMINANT PLASMA POPULATION (6-8%).  SEE COMMENT.   MARKEDLY DECREASED STORAGE IRON.   ADEQUATE NUMBER OF MEGAKARYOCYTES.        Flow cytometric analysis of  bone marrow shows populations of polyclonal B lymphocytes and T lymphocytes that are immunophenotypically unremarkable   except reversed CD4 to CD8 ratio.  The blast gate is not increased. No clonal plasma cell population is detected.   Flow differential:  Lymphocytes 6.6%, Monocytes 2.5%, Granulocytes  87.7%, Blast  1.2%, Debris/nRBC 2.0%,  Viability 97.2%.   Immunohistochemical studies were performed on the clot and core biopsy for greater sensitivity and further architecture evaluation with adequate   positive and negative controls.   About 6-8% plasma cells ( positive, occasional CD56 positive) are noted.  In situ hybridization for kappa and lambda light chain shows focal lambda light chain predominant plasma cell   population. Findings are favor for minimal residual plasma cell neoplasm.  Correlate clinically.      Component      Latest Ref Rng & Units 8/3/2022   [address] Free Light Chains      0.33 - 1.94 mg/dL 1.17   Lambda Free Light Chains      0.57 - 2.63 mg/dL 0.92   Kappa/Lambda FLC Ratio      0.26 - 1.65 1.27   Beta-2 Microglobulin      0.0 - 2.5 ug/mL 2.7 (H)      8/3/22 SPEP: Normal total protein. Normal gamma globulins are decreased. Paraprotein band in near-gamma = 0.30 g/dL, previously 0.85 g/dL.   8/3/22 sIFE: IgG lambda specific monoclonal band present.      10/11/22 serum free light chains     Kappa Quant Free Lt Chain result: 0.33 - 1.94 mg/dL 1.12    Lambda Quant Free Lt Chain result 0.57 - 2.63 mg/dL 1.06    Kappa/Lambda Ratio 0.26 - 1.65 1.06       10/11/22 sIFE: No monoclonal immunoglobulin chains detected.   10/11/22 SPEP: Hypoproteinemia with normal pattern suggests hemodilution.         Component      Latest Ref Rng & Units 4/27/2023 4/27/2023 4/24/2023           8:07 AM  8:07 AM    WBC      4.4 - 10.1 K/UL  3.1 (L)    RBC      3.73 - 5.27 M/UL  3.49 (L)    Hemoglobin      11.3 - 15.4 g/dL  11.8    Hematocrit      34.3 - 45.7 %  36.0    MCV      82.2 -  95.9 fL  103.2 (H)    MCH      27.6 - 33.5 pg  33.8 (H)    MCHC      31.2 - 34.9 g/dL  32.8    RDW      38.5 - 54.4 fL  49.8    Platelets      117 - 369 K/UL  137    MPV      9.4 - 13.2 fL  9.3 (L)    Neutrophils Relative      43.70 - 84.90 %  66.50    Lymphocytes %      8.40 - 40.70 %  26.70    Mono %      3.40 - 12.00 %  3.90    Eosinophil %      0.60 - 6.00 % 0.06 (L) 1.90    Basophil %      0.20 - 1.20 %  1.00    Immature Granulocytes      <=0.5 %  0.0    Neutrophils, Abs      1.30 - 8.30 K/UL  2.07    Lymphocytes Absolute      1.4 - 2.9 K/UL  0.8 (L)    Mono #      0.20 - 0.80 K/UL  0.12 (L)    Baso #      0.00 - 0.10 K/UL  0.03    Immature Grans (Abs)      0.00 - 0.00 K/UL  0.00    IgG      650 - 1600 mg/dL   725   IgA      40 - 350 mg/dL   120   IgM      50 - 300 mg/dL   23 (L)   [place] Free Light Chains      0.33 - 1.94 mg/dL   0.97   Lambda Free Light Chains      0.57 - 2.63 mg/dL   0.94   Kappa/Lambda FLC Ratio      0.26 - 1.65   1.03        Component      Latest Ref Rng & Units 4/24/2023   Sodium      136 - 145 mmol/L 142   Potassium      3.5 - 5.1 mmol/L 4.5   Chloride      95 - 110 mmol/L 109   CO2      23 - 29 mmol/L 28   Glucose      70 - 110 mg/dL 122 (H)   BUN      8 - 23 mg/dL 11   Creatinine      0.5 - 1.4 mg/dL 1.1   Calcium      8.7 - 10.5 mg/dL 9.0   PROTEIN TOTAL      6.0 - 8.4 g/dL 6.1   Albumin      3.5 - 5.2 g/dL 3.6   BILIRUBIN TOTAL      0.1 - 1.0 mg/dL 0.3   Alkaline Phosphatase      55 - 135 U/L 45 (L)   AST      10 - 40 U/L 18   ALT      10 - 44 U/L 24   Anion Gap      8 - 16 mmol/L 5 (L)   eGFR      >60 mL/min/1.73 m:2 55.4 (A)     4/24/23 SPEP:Decreased total protein. Normal gamma globulins are decreased. Paraprotein band in near-gamma = 0.23 g/dL, previously 0.17 g/dL.   4/24/23 serum SAMI: IgG lambda specific monoclonal band present.     4/24/23 BONE MARROW, RIGHT ILIAC CREST (ASPIRATE SMEAR, TOUCH IMPRINT, CLOT SECTION, AND CORE BIOPSY):     -- RESIDUAL PLASMA CELL MYELOMA (2-3% OF  MARROW CELLULARITY).   -- NORMOCELLULAR MARROW (30%) WITH TRILINEAGE HEMATOPOIESIS.   -- DECREASED STORAGE IRON.   -- SEE COMMENT.   4/24/23 Bone marrow, flow cytometric immunophenotyping: Monotypic lambda plasma cells identified (MRD= 0.1540% or 1540 cells/million).     4/24/23 PET CT    COMPARISON:  [facility] PET CT 08/03/2022 and 03/21/2022     FINDINGS:  Quality of the study: Fused PET-CT images of the lower extremities are degraded by misregistration due to motion.     In the head and neck, there are no hypermetabolic lesions worrisome for malignancy. There are no hypermetabolic mucosal lesions, and there are no pathologically enlarged or hypermetabolic lymph nodes.     In the chest, there are no hypermetabolic lesions worrisome for malignancy.  There are no concerning pulmonary nodules or masses, and there are no pathologically enlarged or hypermetabolic lymph nodes.     In the abdomen and pelvis, there is physiologic tracer distribution within the abdominal organs and excretion into the genitourinary system.     In the bones, there are two new hypermetabolic lesions within the anterolateral portions of the right 4th and 5th ribs correlating with nondisplaced fractures with callus formation on CT.  The right 4th rib lesion has SUV max of 4.6 (fused image 140, and 5th rib lesion has SUV max 5 (fused image 152).     In the extremities, there are no hypermetabolic lesions worrisome for malignancy.  Misregistered focal activity in the right lateral femoral condyle is favored to represent benign uptake with the intercondylar notch from cruciate ligament injury.     Additional CT findings:     Bilateral breast implants.  Bilateral dependent atelectasis.  Status post cholecystectomy and hysterectomy.  There is a 4.2 x 2.5 cm fluid attenuating lesion adjacent to the sigmoid colon without wall surrounding fat stranding or evidence of inflammatory changes or hypermetabolic activity, stable dating back to PET-CT scan  03/21/2022.     Impression:     In this patient with multiple myeloma, there is no definite evidence of hypermetabolic tumor.     There are two new right 4th and 5th rib fractures, likely posttraumatic.  Recommend correlation with history.     Probable right cruciate ligament injury.  Recommend correlation with history and physical examination.    Assessment and Plan:     1. History of autologous peripheral blood sten cell transplant     -Received Melphalan 200 ASCT on 04/25/22. Received CD34 cells with total dose of 3.97x10^6.  -Engrafted on Day +12  -Transplant was uncomplicated. Today is 1 year post ASCT     --Day+100 BM biopsy done on 8/3/22 demonstrated a lambda restricted light chain population, 6-8%   --No evidence of hypermetabolic bony lytic or soft tissue lesions on PET CT done on day +100 .          2. Multiple myeloma in remission      --R-ISS II IgG lambda multiple myeloma, treated with  RVd .    --She had increased 1q21 copy number and has t(4,14), both high risk markers in multiple myeloma. She also has monosomy 13 on FISH.   --She did not have baseline PET CT.   --Prior to C5 her M spike on SPEP was 0.52g/dl( was 5.36g/dl at diagnosis).   --She received 6 cycles of RVd, followed by 2 cycles of velcade-dexamethasone. She tolerated the treatments well.    --PET CT on 3/31/22 did not demonstrate any hypermetabolic lytic or soft tissue lesions.  -- BM biopsy on 3/21/22 showed 10% plasma cells ( versus 50% at diagnosis).  --M spike on 3/3/22 SPEP was 0.85g/dl, suggesting she partial response.   --4/1/22 PFTs showed mild decrease in DLCO. EKG did not show any abnormality. 2D ECHO on 4/1/22 showed moderate left atrial enlargement, mild mitral regurgitation but normal LVEF. HBV, HIV, HCV, [place] Ab, RPR, HTLV I/II all non-reactive. Varicella negative. CMV Ab was reactive. HSV 1 IgG was reactive. HSV 2 IgG negative.   --Colonoscopy normal. No abnormality of mammogram or PAP smear.   --I discussed the  maintenance therapy consisted of lenalidomide (10?mg/day orally) on days 1-21 of a 28-day cycle in combination with bortezomib (1.3?mg/m2 per week subcutaneously/intravenously) and low-dose dexamethasone (40?mg per week orally). [ Leukemia volume 28, [id number] (2014) ]. This was administered for up to 3 years, followed by single-agent lenalidomide maintenance thereafter.   --she has resumed maintenance under /  -she is tolerating the maintenance RVd well; no neuropathy/infections.   --she is taking decadron to 20mg PO weekly. Reported insomnia related to dex. If ongoing issue further dose adjustment to 12 mg weekly.   -sFLC ratio normal on 4/24/23 ( 1 year re-staging)  -M spike on SPEP on 4/24/23  measuring 0.23g/dl ; sIFE shows IgG lambda (was 0.17g/dl previously, 0.85g/dl pre transplant)  -1 year restaging BM biopsy on 4/23/23 demonstrated plasma cells , now 2-3 % in the setting of a normocellular marrow   -4/24/23 PET CT did not demonstrate lytic/ hypermetabolic lesions. There are two new right 4th and 5th rib fractures, likely posttraumatic. ( She had a fall in March 2023)   -I discussed these findings in detail  -I explained that she continues to have partial / very good partial response, and the response has deepened post transplant  -she will continue maintenance VRd, prophylactic acyclovir, calcium-vitD, aspirin 81mg , zometa monthly upto 24 months  -monthly SPEP. SAMI, serum free light chains recommended     3.  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 GERD  - She is currently asymptomatic  -- on PPI      5. Migraine History  - She uses Florinol for migrane headaches and states that she only takes it 2-3 times per year      6. Immunizations   --she received COVID 19 vaccination and is completing all other scheduled vaccinations        BMT Chart Routing      Follow up with physician 6 months.   Follow up with LILIAN    Provider visit type     Infusion scheduling note    Injection scheduling note    Labs CBC, CMP, SPEP, free light chains, immunofixation and immunoglobulins   Scheduling:  Preferred lab:  Lab interval:     Imaging    Pharmacy appointment    Other referrals

## 2023-05-05 LAB
COMMENT: NORMAL
FINAL PATHOLOGIC DIAGNOSIS: NORMAL
GENETICIST REVIEW: NORMAL
GROSS: NORMAL
Lab: NORMAL
MICROSCOPIC EXAM: NORMAL
PLASMA CELL PROLIF RELEASED BY: NORMAL
PLASMA CELL PROLIF RESULT SUMMARY: NORMAL
PLASMA CELL PROLIF RESULT TABLE: NORMAL
REASON FOR REFERRAL, PLASMA CELL PROLIF (PCPD), FISH: NORMAL
REF LAB TEST METHOD: NORMAL
RESULTS, PLASMA CELL PROLIF (PCPD), FISH: NORMAL
SERVICE CMNT-IMP: NORMAL
SERVICE CMNT-IMP: NORMAL
SPECIMEN SOURCE: NORMAL
SPECIMEN, PLASMA CELL PROLIF (PCPD), FISH: NORMAL
SUPPLEMENTAL DIAGNOSIS: NORMAL

## 2023-10-16 ENCOUNTER — PATIENT MESSAGE (OUTPATIENT)
Dept: HEMATOLOGY/ONCOLOGY | Facility: CLINIC | Age: 68
End: 2023-10-16
Payer: COMMERCIAL

## 2023-10-23 ENCOUNTER — OFFICE VISIT (OUTPATIENT)
Dept: HEMATOLOGY/ONCOLOGY | Facility: CLINIC | Age: 68
End: 2023-10-23
Payer: COMMERCIAL

## 2023-10-23 DIAGNOSIS — C90.00 MULTIPLE MYELOMA NOT HAVING ACHIEVED REMISSION: ICD-10-CM

## 2023-10-23 DIAGNOSIS — R53.82 CHRONIC FATIGUE: ICD-10-CM

## 2023-10-23 DIAGNOSIS — K21.9 GASTROESOPHAGEAL REFLUX DISEASE WITHOUT ESOPHAGITIS: ICD-10-CM

## 2023-10-23 DIAGNOSIS — T45.1X5A ANEMIA ASSOCIATED WITH CHEMOTHERAPY: Primary | ICD-10-CM

## 2023-10-23 DIAGNOSIS — D64.81 ANEMIA ASSOCIATED WITH CHEMOTHERAPY: Primary | ICD-10-CM

## 2023-10-23 DIAGNOSIS — Z94.81 STATUS POST AUTOLOGOUS BONE MARROW TRANSPLANT: ICD-10-CM

## 2023-10-23 DIAGNOSIS — D63.0 ANEMIA IN NEOPLASTIC DISEASE: ICD-10-CM

## 2023-10-23 PROCEDURE — 99214 OFFICE O/P EST MOD 30 MIN: CPT | Mod: 95,,, | Performed by: INTERNAL MEDICINE

## 2023-10-23 PROCEDURE — 99214 PR OFFICE/OUTPT VISIT, EST, LEVL IV, 30-39 MIN: ICD-10-PCS | Mod: 95,,, | Performed by: INTERNAL MEDICINE

## 2023-10-23 NOTE — PROGRESS NOTES
The patient location is: home  The chief complaint leading to consultation is: multiple myeloma, s/p ASCT,1 year restaging visit    Visit type: audiovisual    Face to Face time with patient: 20 minutes  30 minutes of total time spent on the encounter, which includes face to face time and non-face to face time preparing to see the patient (eg, review of tests), Obtaining and/or reviewing separately obtained history, Documenting clinical information in the electronic or other health record, Independently interpreting results (not separately reported) and communicating results to the patient/family/caregiver, or Care coordination (not separately reported).         Each patient to whom he or she provides medical services by telemedicine is:  (1) informed of the relationship between the physician and patient and the respective role of any other health care provider with respect to management of the patient; and (2) notified that he or she may decline to receive medical services by telemedicine and may withdraw from such care at any time.    Notes:    CC: Multiple myeloma in remission, follow up after autologous stem cell transplant, 1 year post ASCT      HPI : , 68, is here for hematology/ stem cell transplant follow up .  She is here 1 year post HDT/ auto SCT .   She has IgG lambda multiple myeloma. Multiple myeloma was diagnosed after work up for anemia showed a paraprotein  She has anemia, GERD, esophageal erosions. On 10/8/21 she had a bone marrow biopsy. It showed nearly 50%  positive plasma cells on core biopsy by IHC.  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  [place] red on bone marrow negative.She has chronic anemia. Cytogenetics was normal. FISH showed increase in 1q copies, t(4,14) and monosomy 13.  She had no lytic lesions on skeletal survey. She was treated with RVd.   She underwent Ju 200 ASCT. Day 0 was 4/25/22.  Post transplant course was uneventful with expected chemotherapy related  toxicities.She went to ED on 05/15/22 with low grade fever. She was discharged with PO antibiotics. Respiratory panel was positive for para influenza virus.        Interval History: Ms.Richards Byrne presents for virtual follow up. Today is 1 year 5 months post auto SCT for multiple myeloma. She follows with oncologist Dr. Nino(replaced ). at [facility] . She is doing well. Denies weight loss, bone pain, fatigue or any other acute events. .            Vitals:     02/01/23 0727   BP: (!) 141/69   Pulse: 67   Resp: 16   Temp: 98.5 °F (36.9 °C)               PS: ECOG 1          Physical exam deferred due to nature of today's visit    8/3/22 PET CT     COMPARISON:  [facility] PET-CT 03/21/2022     FINDINGS:  Quality of the study: Adequate.     In the head and neck, there are no hypermetabolic lesions worrisome for malignancy. There are no hypermetabolic mucosal lesions, and there are no pathologically enlarged or hypermetabolic lymph nodes.     In the chest, there are no hypermetabolic lesions worrisome for malignancy.  There are no concerning pulmonary nodules or masses, and there are no pathologically enlarged or hypermetabolic lymph nodes.     In the abdomen and pelvis, there is physiologic tracer distribution within the abdominal organs and excretion into the genitourinary system.     In the bones, there are no hypermetabolic lesions worrisome for malignancy.     In the extremities, there are no hypermetabolic lesions worrisome for malignancy.     Additional findings: Bilateral breast implants.  Cholecystectomy.     Impression:     No evidence of hypermetabolic foci to indicate malignancy.        8/3/22 BM biopsy     LEFT ILIAC CREST BONE MARROW ASPIRATE, BONE MARROW CLOT, AND BONE MARROW CORE BIOPSY WITH:     CELLULARITY=30%, TRILINEAGE HEMATOPOIETIC ACTIVITY (M/E= 1.1:1).   LAMBDA LIGHT CHAIN PREDOMINANT PLASMA POPULATION (6-8%).  SEE COMMENT.   MARKEDLY DECREASED STORAGE IRON.   ADEQUATE NUMBER OF  MEGAKARYOCYTES.       Flow cytometric analysis of  bone marrow shows populations of polyclonal B lymphocytes and T lymphocytes that are immunophenotypically unremarkable   except reversed CD4 to CD8 ratio.  The blast gate is not increased. No clonal plasma cell population is detected.   Flow differential:  Lymphocytes 6.6%, Monocytes 2.5%, Granulocytes  87.7%, Blast  1.2%, Debris/nRBC 2.0%,  Viability 97.2%.   Immunohistochemical studies were performed on the clot and core biopsy for greater sensitivity and further architecture evaluation with adequate   positive and negative controls.   About 6-8% plasma cells ( positive, occasional CD56 positive) are noted.  In situ hybridization for kappa and lambda light chain shows focal lambda light chain predominant plasma cell   population. Findings are favor for minimal residual plasma cell neoplasm.  Correlate clinically.      Component      Latest Ref Rng & Units 8/3/2022   [place] Free Light Chains      0.33 - 1.94 mg/dL 1.17   Lambda Free Light Chains      0.57 - 2.63 mg/dL 0.92   Kappa/Lambda FLC Ratio      0.26 - 1.65 1.27   Beta-2 Microglobulin      0.0 - 2.5 ug/mL 2.7 (H)      8/3/22 SPEP: Normal total protein. Normal gamma globulins are decreased. Paraprotein band in near-gamma = 0.30 g/dL, previously 0.85 g/dL.   8/3/22 sIFE: IgG lambda specific monoclonal band present.      10/11/22 serum free light chains     Kappa Quant Free Lt Chain result: 0.33 - 1.94 mg/dL 1.12    Lambda Quant Free Lt Chain result 0.57 - 2.63 mg/dL 1.06    Kappa/Lambda Ratio 0.26 - 1.65 1.06       10/11/22 sIFE: No monoclonal immunoglobulin chains detected.   10/11/22 SPEP: Hypoproteinemia with normal pattern suggests hemodilution.             4/24/23 SPEP:Decreased total protein. Normal gamma globulins are decreased. Paraprotein band in near-gamma = 0.23 g/dL, previously 0.17 g/dL.   4/24/23 serum SAMI: IgG lambda specific monoclonal band present.     4/24/23 BONE MARROW, RIGHT ILIAC  CREST (ASPIRATE SMEAR, TOUCH IMPRINT, CLOT SECTION, AND CORE BIOPSY):     -- RESIDUAL PLASMA CELL MYELOMA (2-3% OF MARROW CELLULARITY).   -- NORMOCELLULAR MARROW (30%) WITH TRILINEAGE HEMATOPOIESIS.   -- DECREASED STORAGE IRON.   -- SEE COMMENT.   4/24/23 Bone marrow, flow cytometric immunophenotyping: Monotypic lambda plasma cells identified (MRD= 0.1540% or 1540 cells/million).     4/24/23 PET CT    COMPARISON:  [facility] PET CT 08/03/2022 and 03/21/2022     FINDINGS:  Quality of the study: Fused PET-CT images of the lower extremities are degraded by misregistration due to motion.     In the head and neck, there are no hypermetabolic lesions worrisome for malignancy. There are no hypermetabolic mucosal lesions, and there are no pathologically enlarged or hypermetabolic lymph nodes.     In the chest, there are no hypermetabolic lesions worrisome for malignancy.  There are no concerning pulmonary nodules or masses, and there are no pathologically enlarged or hypermetabolic lymph nodes.     In the abdomen and pelvis, there is physiologic tracer distribution within the abdominal organs and excretion into the genitourinary system.     In the bones, there are two new hypermetabolic lesions within the anterolateral portions of the right 4th and 5th ribs correlating with nondisplaced fractures with callus formation on CT.  The right 4th rib lesion has SUV max of 4.6 (fused image 140, and 5th rib lesion has SUV max 5 (fused image 152).     In the extremities, there are no hypermetabolic lesions worrisome for malignancy.  Misregistered focal activity in the right lateral femoral condyle is favored to represent benign uptake with the intercondylar notch from cruciate ligament injury.     Additional CT findings:     Bilateral breast implants.  Bilateral dependent atelectasis.  Status post cholecystectomy and hysterectomy.  There is a 4.2 x 2.5 cm fluid attenuating lesion adjacent to the sigmoid colon without wall surrounding fat  stranding or evidence of inflammatory changes or hypermetabolic activity, stable dating back to PET-CT scan 03/21/2022.     Impression:     In this patient with multiple myeloma, there is no definite evidence of hypermetabolic tumor.     There are two new right 4th and 5th rib fractures, likely posttraumatic.  Recommend correlation with history.     Probable right cruciate ligament injury.  Recommend correlation with history and physical examination.      9/7/23 SPEP      Protein Total 6.1 - 8.1 g/dL 6.3    Albumin 3.8 - 4.8 g/dL 3.7 Low     Alpha-1 grams/dl 0.2 - 0.3 g/dL 0.3    Alpha-2 0.5 - 0.9 g/dL 0.7    Beta 1 Globulin 0.4 - 0.6 g/dL 0.5    Beta 2 Globuin 0.2 - 0.5 g/dL 0.3    GAMMA 0.8 - 1.7 g/dL 0.8    Abnormal Protein Band 1 NONE DETECTED g/dL 0.5 Abnormal     Abnormal Protein Band 2 g/dL DNR    Abnormal Protein Band 3 g/dL DNR      Evaluation reveals ONE restricted band(s) migrating in the GAMMA region(s), respectively. Estimated concentration(s): 0.5 g/dL, respectively. Consider immunofixation analysis if indicated and not already   ordered.    10/19/23 CBC    WBC 4.4 - 10.1 K/UL 2.7 Low     RBC 3.73 - 5.27 M/UL 3.26 Low     Hemoglobin 11.3 - 15.4 g/dL 11.0 Low     Hct 34.3 - 45.7 % 34.0 Low     MCV 82.2 - 95.9 fL 104.3 High     MCH 27.6 - 33.5 pg 33.7 High     MCHC 31.2 - 34.9 g/dL 32.4    RDW 38.5 - 54.4 fL 52.4    Platelets 117 - 369 K/    MPV 9.4 - 13.2 fL 9.2 Low     Neutrophils Relative 43.70 - 84.90 % 40.90 Low     Lymphocytes % 8.40 - 40.70 % 34.60    Monocytes % 3.40 - 12.00 % 12.80 High     Eosinophil % 0.60 - 6.00 % 10.90 High     Basophil % 0.20 - 1.20 % 0.80    Immature Granulocytes % <=0.5 % 0.0    Neutrophils, Abs 1.30 - 8.30 K/UL 1.09 Low     Lymphocytes Absolute 1.4 - 2.9 K/UL 0.9 Low     Monocytes, Man Abs 0.20 - 0.80 K/UL 0.34    Eosinophil % 0.20 - 0.70 K/UL 0.29    Basophils, Man Abs 0.00 - 0.10 K/UL 0.02    Immature Granulocytes, Absolute 0.00 - 0.00 K/UL 0.00       10/19/23  CMP      Sodium 136 - 147 mmol/L 141    Potassium 3.5 - 5.1 mmol/L 4.1    Chloride 98 - 107 mmol/L 107    CO2 20 - 31 mmol/L 28    BUN 9 - 23 mg/dL 11    Creatinine 0.55 - 1.02 mg/dL 0.95    Glucose 70 - 99 mg/dL 67 Low     Calcium 8.3 - 10.6 mg/dL 8.7    Aspartate Aminotransferase <34 IU/L 14    Alanine Aminotransferase 10 - 49 IU/L 16    Alk Phos 46 - 116 IU/L 45 Low     Protein Total 5.7 - 8.2 g/dL 5.9    Albumin Level 3.2 - 4.8 g/dL 3.8    Bilirubin Total 0.3 - 1.2 mg/dL 0.5    Anion Gap 5.0 - 15.0 mmol/L 5.5    eGFR CKD-EPI >90 ml/min/1.73m2 66 Low            Assessment and Plan:     1. History of autologous peripheral blood sten cell transplant     -Received Melphalan 200 ASCT on 04/25/22. Received CD34 cells with total dose of 3.97x10^6.  -Engrafted on Day +12  -Transplant was uncomplicated.    --Day+100 BM biopsy done on 8/3/22 demonstrated a lambda restricted light chain population, 6-8%   --No evidence of hypermetabolic bony lytic or soft tissue lesions on PET CT done on day +100 .  -she is 1 year 5 months post auto SCT          2. Multiple myeloma in remission      --R-ISS II IgG lambda multiple myeloma, treated with  RVd .    --She had increased 1q21 copy number and has t(4,14), both high risk markers in multiple myeloma. She also has monosomy 13 on FISH.   --She did not have baseline PET CT.   --Prior to C5 her M spike on SPEP was 0.52g/dl( was 5.36g/dl at diagnosis).   --She received 6 cycles of RVd, followed by 2 cycles of velcade-dexamethasone. She tolerated the treatments well.    --PET CT on 3/31/22 did not demonstrate any hypermetabolic lytic or soft tissue lesions.  -- BM biopsy on 3/21/22 showed 10% plasma cells ( versus 50% at diagnosis).  --M spike on 3/3/22 SPEP was 0.85g/dl, suggesting she partial response.   --4/1/22 PFTs showed mild decrease in DLCO. EKG did not show any abnormality. 2D ECHO on 4/1/22 showed moderate left atrial enlargement, mild mitral regurgitation but normal LVEF. HBV,  HIV, HCV, [place] Ab, RPR, HTLV I/II all non-reactive. Varicella negative. CMV Ab was reactive. HSV 1 IgG was reactive. HSV 2 IgG negative.   --Colonoscopy normal. No abnormality of mammogram or PAP smear.   --I discussed the maintenance therapy consisted of lenalidomide (10?mg/day orally) on days 1-21 of a 28-day cycle in combination with bortezomib (1.3?mg/m2 per week subcutaneously/intravenously) and low-dose dexamethasone (40?mg per week orally). [ Leukemia volume 28, [id number] (2014) ]. This was administered for up to 3 years, followed by single-agent lenalidomide maintenance thereafter.   --she has resumed maintenance under /  -she is tolerating the maintenance RVd well; no neuropathy/infections.   --she is taking decadron to 20mg PO weekly. Reported insomnia related to dex. If ongoing issue further dose adjustment to 12 mg weekly.   -sFLC ratio normal on 4/24/23 ( 1 year re-staging)  -M spike on SPEP on 4/24/23  measuring 0.23g/dl ; sIFE shows IgG lambda (was 0.17g/dl previously, 0.85g/dl pre transplant)  -1 year restaging BM biopsy on 4/23/23 demonstrated plasma cells , now 2-3 % in the setting of a normocellular marrow   -4/24/23 PET CT did not demonstrate lytic/ hypermetabolic lesions. There are two new right 4th and 5th rib fractures, likely posttraumatic. ( She had a fall in March 2023)   -I discussed these findings in detail  -I explained that she continues to have partial / very good partial response, and the response has deepened post transplant  -she continues to be on maintenance VRd, prophylactic acyclovir, calcium-vitD, aspirin 81mg , zometa monthly upto 24 months  -However, lenalidomide dose was reduced to 5mg ( from 10mg ) from early Ocvt 2023 due to intolerance ( weakness, dizziness, fatigue)  -M spike has increased to 0.5g/dl on SPEP done on 9/7/23  -sFLC ratio normal  -she has mild anemia on 10/19/23; Cr, Ca normal  -she will have repeat SPEP, serum benny elight chains checked  this week  -If M spike is increasing further recommend change in treatment, to possibly josé pom dex or josé kyprolis dex ( she is CD 38 Ab naive)  -monthly SPEP. SAMI, serum free light chains recommended     3.  Anemia secondary to chemotherapy     --Hgb 11 g/dl on 10/19/23  - Transfusion for hgb <7    3. Leukopenia    -WBC count 2.7k on 10/19/23  -secondary to chemotherapy    4. Absolute neutropenia    --ANC 1.09 on 10/19/23  -secondary to chemotherapy    5. Thrombocytopenia    -Mild, asymptomatic      6. GERD  - She is currently asymptomatic  -- on PPI     7. Migraine History  - She uses Florinol for migrane headaches and states that she only takes it 2-3 times per year      8. Immunizations   --she received COVID 19 vaccination and has completed all other scheduled vaccinations        BMT Chart Routing      Follow up with physician 3 months.   Follow up with LILIAN    Provider visit type    Infusion scheduling note    Injection scheduling note    Labs    Imaging    Pharmacy appointment    Other referrals

## 2024-01-24 ENCOUNTER — OFFICE VISIT (OUTPATIENT)
Dept: HEMATOLOGY/ONCOLOGY | Facility: CLINIC | Age: 69
End: 2024-01-24
Payer: COMMERCIAL

## 2024-01-24 DIAGNOSIS — D69.6 THROMBOCYTOPENIA: ICD-10-CM

## 2024-01-24 DIAGNOSIS — K21.9 GASTROESOPHAGEAL REFLUX DISEASE WITHOUT ESOPHAGITIS: ICD-10-CM

## 2024-01-24 DIAGNOSIS — Z94.81 STATUS POST AUTOLOGOUS BONE MARROW TRANSPLANT: Primary | ICD-10-CM

## 2024-01-24 DIAGNOSIS — C90.00 MULTIPLE MYELOMA NOT HAVING ACHIEVED REMISSION: ICD-10-CM

## 2024-01-24 DIAGNOSIS — D64.81 ANEMIA ASSOCIATED WITH CHEMOTHERAPY: ICD-10-CM

## 2024-01-24 DIAGNOSIS — T45.1X5A ANEMIA ASSOCIATED WITH CHEMOTHERAPY: ICD-10-CM

## 2024-01-24 PROCEDURE — 99214 OFFICE O/P EST MOD 30 MIN: CPT | Mod: 95,,, | Performed by: INTERNAL MEDICINE

## 2024-01-24 NOTE — PROGRESS NOTES
The patient location is: home  The chief complaint leading to consultation is: multiple myeloma, s/p ASCT,1 year restaging visit    Visit type: audiovisual    Face to Face time with patient: 20 minutes  30 minutes of total time spent on the encounter, which includes face to face time and non-face to face time preparing to see the patient (eg, review of tests), Obtaining and/or reviewing separately obtained history, Documenting clinical information in the electronic or other health record, Independently interpreting results (not separately reported) and communicating results to the patient/family/caregiver, or Care coordination (not separately reported).         Each patient to whom he or she provides medical services by telemedicine is:  (1) informed of the relationship between the physician and patient and the respective role of any other health care provider with respect to management of the patient; and (2) notified that he or she may decline to receive medical services by telemedicine and may withdraw from such care at any time.    Notes:    CC: Multiple myeloma in remission, follow up after autologous stem cell transplant, 1 year post ASCT      HPI : , 68, is here for hematology/ stem cell transplant follow up .  She is here 1 year post HDT/ auto SCT .   She has IgG lambda multiple myeloma. Multiple myeloma was diagnosed after work up for anemia showed a paraprotein  She has anemia, GERD, esophageal erosions. On 10/8/21 she had a bone marrow biopsy. It showed nearly 50%  positive plasma cells on core biopsy by IHC.  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  [place] red on bone marrow negative.She has chronic anemia. Cytogenetics was normal. FISH showed increase in 1q copies, t(4,14) and monosomy 13.  She had no lytic lesions on skeletal survey. She was treated with RVd.   She underwent Ju 200 ASCT. Day 0 was 4/25/22.  Post transplant course was uneventful with expected chemotherapy related  toxicities.She went to ED on 05/15/22 with low grade fever. She was discharged with PO antibiotics. Respiratory panel was positive for para influenza virus.        Interval History: Ms.Richards Byrne presents for virtual follow up. Today is 1 year 8 months post auto SCT for multiple myeloma. She follows with oncologist Dr. Nino(replaced ). at [facility] . She is doing well. Denies weight loss, bone pain, fatigue or any other acute events. .       Review of Systems   Constitutional:  Positive for malaise/fatigue. Negative for chills, fever and weight loss.   HENT:  Negative for congestion, ear discharge, ear pain and hearing loss.    Eyes:  Negative for double vision and photophobia.   Respiratory:  Negative for hemoptysis, sputum production and stridor.    Cardiovascular:  Negative for chest pain, orthopnea and leg swelling.   Gastrointestinal:  Negative for abdominal pain, blood in stool, constipation, heartburn and nausea.   Genitourinary:  Negative for dysuria, frequency and hematuria.   Musculoskeletal:  Negative for back pain and myalgias.   Neurological:  Negative for dizziness, tingling, tremors and focal weakness.   Endo/Heme/Allergies:  Does not bruise/bleed easily.   Psychiatric/Behavioral:  Negative for depression, hallucinations and suicidal ideas. The patient is not nervous/anxious.                Vitals:     02/01/23 0727   BP: (!) 141/69   Pulse: 67   Resp: 16   Temp: 98.5 °F (36.9 °C)               PS: ECOG 1          Physical exam deferred due to nature of today's visit    8/3/22 PET CT     COMPARISON:  [facility] PET-CT 03/21/2022     FINDINGS:  Quality of the study: Adequate.     In the head and neck, there are no hypermetabolic lesions worrisome for malignancy. There are no hypermetabolic mucosal lesions, and there are no pathologically enlarged or hypermetabolic lymph nodes.     In the chest, there are no hypermetabolic lesions worrisome for malignancy.  There are no concerning pulmonary  nodules or masses, and there are no pathologically enlarged or hypermetabolic lymph nodes.     In the abdomen and pelvis, there is physiologic tracer distribution within the abdominal organs and excretion into the genitourinary system.     In the bones, there are no hypermetabolic lesions worrisome for malignancy.     In the extremities, there are no hypermetabolic lesions worrisome for malignancy.     Additional findings: Bilateral breast implants.  Cholecystectomy.     Impression:     No evidence of hypermetabolic foci to indicate malignancy.        8/3/22 BM biopsy     LEFT ILIAC CREST BONE MARROW ASPIRATE, BONE MARROW CLOT, AND BONE MARROW CORE BIOPSY WITH:     CELLULARITY=30%, TRILINEAGE HEMATOPOIETIC ACTIVITY (M/E= 1.1:1).   [place]DA LIGHT CHAIN PREDOMINANT PLASMA POPULATION (6-8%).  SEE COMMENT.   MARKEDLY DECREASED STORAGE IRON.   ADEQUATE NUMBER OF MEGAKARYOCYTES.       Flow cytometric analysis of  bone marrow shows populations of polyclonal B lymphocytes and T lymphocytes that are immunophenotypically unremarkable   except reversed CD4 to CD8 ratio.  The blast gate is not increased. No clonal plasma cell population is detected.   Flow differential:  Lymphocytes 6.6%, Monocytes 2.5%, Granulocytes  87.7%, Blast  1.2%, Debris/nRBC 2.0%,  Viability 97.2%.   Immunohistochemical studies were performed on the clot and core biopsy for greater sensitivity and further architecture evaluation with adequate   positive and negative controls.   About 6-8% plasma cells ( positive, occasional CD56 positive) are noted.  In situ hybridization for kappa and lambda light chain shows focal lambda light chain predominant plasma cell   population. Findings are favor for minimal residual plasma cell neoplasm.  Correlate clinically.      Component      Latest Ref Rng & Units 8/3/2022   [place] Free Light Chains      0.33 - 1.94 mg/dL 1.17   Lambda Free Light Chains      0.57 - 2.63 mg/dL 0.92   Kappa/Lambda FLC Ratio      0.26 -  1.65 1.27   Beta-2 Microglobulin      0.0 - 2.5 ug/mL 2.7 (H)      8/3/22 SPEP: Normal total protein. Normal gamma globulins are decreased. Paraprotein band in near-gamma = 0.30 g/dL, previously 0.85 g/dL.   8/3/22 sIFE: IgG lambda specific monoclonal band present.      10/11/22 serum free light chains     Kappa Quant Free Lt Chain result: 0.33 - 1.94 mg/dL 1.12    Lambda Quant Free Lt Chain result 0.57 - 2.63 mg/dL 1.06    Kappa/Lambda Ratio 0.26 - 1.65 1.06       10/11/22 sIFE: No monoclonal immunoglobulin chains detected.   10/11/22 SPEP: Hypoproteinemia with normal pattern suggests hemodilution.             4/24/23 SPEP:Decreased total protein. Normal gamma globulins are decreased. Paraprotein band in near-gamma = 0.23 g/dL, previously 0.17 g/dL.   4/24/23 serum SAMI: IgG lambda specific monoclonal band present.     4/24/23 BONE MARROW, RIGHT ILIAC CREST (ASPIRATE SMEAR, TOUCH IMPRINT, CLOT SECTION, AND CORE BIOPSY):     -- RESIDUAL PLASMA CELL MYELOMA (2-3% OF MARROW CELLULARITY).   -- NORMOCELLULAR MARROW (30%) WITH TRILINEAGE HEMATOPOIESIS.   -- DECREASED STORAGE IRON.   -- SEE COMMENT.   4/24/23 Bone marrow, flow cytometric immunophenotyping: Monotypic lambda plasma cells identified (MRD= 0.1540% or 1540 cells/million).     4/24/23 PET CT    COMPARISON:  [facility] PET CT 08/03/2022 and 03/21/2022     FINDINGS:  Quality of the study: Fused PET-CT images of the lower extremities are degraded by misregistration due to motion.     In the head and neck, there are no hypermetabolic lesions worrisome for malignancy. There are no hypermetabolic mucosal lesions, and there are no pathologically enlarged or hypermetabolic lymph nodes.     In the chest, there are no hypermetabolic lesions worrisome for malignancy.  There are no concerning pulmonary nodules or masses, and there are no pathologically enlarged or hypermetabolic lymph nodes.     In the abdomen and pelvis, there is physiologic tracer distribution within the  abdominal organs and excretion into the genitourinary system.     In the bones, there are two new hypermetabolic lesions within the anterolateral portions of the right 4th and 5th ribs correlating with nondisplaced fractures with callus formation on CT.  The right 4th rib lesion has SUV max of 4.6 (fused image 140, and 5th rib lesion has SUV max 5 (fused image 152).     In the extremities, there are no hypermetabolic lesions worrisome for malignancy.  Misregistered focal activity in the right lateral femoral condyle is favored to represent benign uptake with the intercondylar notch from cruciate ligament injury.     Additional CT findings:     Bilateral breast implants.  Bilateral dependent atelectasis.  Status post cholecystectomy and hysterectomy.  There is a 4.2 x 2.5 cm fluid attenuating lesion adjacent to the sigmoid colon without wall surrounding fat stranding or evidence of inflammatory changes or hypermetabolic activity, stable dating back to PET-CT scan 03/21/2022.     Impression:     In this patient with multiple myeloma, there is no definite evidence of hypermetabolic tumor.     There are two new right 4th and 5th rib fractures, likely posttraumatic.  Recommend correlation with history.     Probable right cruciate ligament injury.  Recommend correlation with history and physical examination.      9/7/23 SPEP      Protein Total 6.1 - 8.1 g/dL 6.3    Albumin 3.8 - 4.8 g/dL 3.7 Low     Alpha-1 grams/dl 0.2 - 0.3 g/dL 0.3    Alpha-2 0.5 - 0.9 g/dL 0.7    Beta 1 Globulin 0.4 - 0.6 g/dL 0.5    Beta 2 Globuin 0.2 - 0.5 g/dL 0.3    GAMMA 0.8 - 1.7 g/dL 0.8    Abnormal Protein Band 1 NONE DETECTED g/dL 0.5 Abnormal     Abnormal Protein Band 2 g/dL DNR    Abnormal Protein Band 3 g/dL DNR      Evaluation reveals ONE restricted band(s) migrating in the GAMMA region(s), respectively. Estimated concentration(s): 0.5 g/dL, respectively. Consider immunofixation analysis if indicated and not already   ordered.    1/9/24  SPEP: Evaluation reveals ONE restricted band(s) migrating in the GAMMA region(s), respectively. Estimated concentration(s): 0.3 g/dL, respectively. Consider immunofixation analysis if indicated and not already ordered.   1/9/24 sIFE: Monoclonal gammopathy present with IgG lambda light chains.     1/9/24     [place] Quant Free Lt Chain result: 0.33 - 1.94 mg/dL 1.34   Lambda Quant Free Lt Chain result 0.57 - 2.63 mg/dL 1.43   Kappa/Lambda Ratio 0.26 - 1.65 0.94       1/17/24    WBC 4.4 - 10.1 K/UL 3.3 Low    RBC 3.73 - 5.27 M/UL 3.25 Low    Hemoglobin 11.3 - 15.4 g/dL 10.9 Low    Hct 34.3 - 45.7 % 33.3 Low    MCV 82.2 - 95.9 fL 102.5 High    MCH 27.6 - 33.5 pg 33.5   MCHC 31.2 - 34.9 g/dL 32.7   RDW 38.5 - 54.4 fL 50.8   Platelets 117 - 369 K/   MPV 9.4 - 13.2 fL 9.2 Low    Neutrophils Relative 43.70 - 84.90 % 43.90   Lymphocytes % 8.40 - 40.70 % 33.30   Monocytes % 3.40 - 12.00 % 11.70   Eosinophil % 0.60 - 6.00 % 10.20 High    Basophil % 0.20 - 1.20 % 0.90   Immature Granulocytes % <=0.5 % 0.0   Neutrophils, Abs 1.30 - 8.30 K/UL 1.46   Lymphocytes Absolute 1.4 - 2.9 K/UL 1.1 Low    Monocytes, Man Abs 0.20 - 0.80 K/UL 0.39   Eosinophil % 0.20 - 0.70 K/UL 0.34   Basophils, Man Abs 0.00 - 0.10 K/UL 0.03   Immature Granulocytes, Absolute 0.00 - 0.00 K/UL 0.00     Sodium 136 - 147 mmol/L 142   Potassium 3.5 - 5.1 mmol/L 3.9   Chloride 98 - 107 mmol/L 106   CO2 20 - 31 mmol/L 29   BUN 9 - 23 mg/dL 12   Creatinine 0.55 - 1.02 mg/dL 0.97   Glucose 70 - 99 mg/dL 91   Calcium 8.3 - 10.6 mg/dL 9.2   Aspartate Aminotransferase <34 IU/L 17   Alanine Aminotransferase 10 - 49 IU/L 22   Alk Phos 46 - 116 IU/L 53   Protein Total 5.7 - 8.2 g/dL 6.3   Albumin Level 3.2 - 4.8 g/dL 4.1   Bilirubin Total 0.3 - 1.2 mg/dL 0.4   Anion Gap 5.0 - 15.0 mmol/L 7.1   eGFR CKD-EPI >90 ml/min/1.73m2 64 Low          Assessment and Plan:     1. History of autologous peripheral blood sten cell transplant     -Received Melphalan 200 ASCT on  04/25/22. Received CD34 cells with total dose of 3.97x10^6.  -Engrafted on Day +12  -Transplant was uncomplicated.    --Day+100 BM biopsy done on 8/3/22 demonstrated a lambda restricted light chain population, 6-8%   --No evidence of hypermetabolic bony lytic or soft tissue lesions on PET CT done on day +100 .  -she is 1 year 8 months post auto SCT          2. Multiple myeloma in remission      --R-ISS II IgG lambda multiple myeloma, treated with  RVd .    --She had increased 1q21 copy number and has t(4,14), both high risk markers in multiple myeloma. She also has monosomy 13 on FISH.   --She did not have baseline PET CT.   --Prior to C5 her M spike on SPEP was 0.52g/dl( was 5.36g/dl at diagnosis).   --She received 6 cycles of RVd, followed by 2 cycles of velcade-dexamethasone. She tolerated the treatments well.    --PET CT on 3/31/22 did not demonstrate any hypermetabolic lytic or soft tissue lesions.  -- BM biopsy on 3/21/22 showed 10% plasma cells ( versus 50% at diagnosis).  --M spike on 3/3/22 SPEP was 0.85g/dl, suggesting she partial response.   --4/1/22 PFTs showed mild decrease in DLCO. EKG did not show any abnormality. 2D ECHO on 4/1/22 showed moderate left atrial enlargement, mild mitral regurgitation but normal LVEF. HBV, HIV, HCV, [place] Ab, RPR, HTLV I/II all non-reactive. Varicella negative. CMV Ab was reactive. HSV 1 IgG was reactive. HSV 2 IgG negative.   --Colonoscopy normal. No abnormality of mammogram or PAP smear.   --I discussed the maintenance therapy consisted of lenalidomide (10?mg/day orally) on days 1-21 of a 28-day cycle in combination with bortezomib (1.3?mg/m2 per week subcutaneously/intravenously) and low-dose dexamethasone (40?mg per week orally). [ Leukemia volume 28, [id number] (2014) ]. This was administered for up to 3 years, followed by single-agent lenalidomide maintenance thereafter.   --she has resumed maintenance under /  -she is tolerating the maintenance  RVd well; no neuropathy/infections.   --she is taking decadron to 20mg PO weekly. Reported insomnia related to dex. If ongoing issue further dose adjustment to 12 mg weekly.   -sFLC ratio normal on 4/24/23 ( 1 year re-staging)  -M spike on SPEP on 4/24/23  measuring 0.23g/dl ; sIFE shows IgG lambda (was 0.17g/dl previously, 0.85g/dl pre transplant)  -1 year restaging BM biopsy on 4/23/23 demonstrated plasma cells , now 2-3 % in the setting of a normocellular marrow   -4/24/23 PET CT did not demonstrate lytic/ hypermetabolic lesions. There are two new right 4th and 5th rib fractures, likely posttraumatic. ( She had a fall in March 2023)   -I discussed these findings in detail  -I explained that she continues to have partial / very good partial response, and the response has deepened post transplant  -she continues to be on maintenance VRd, prophylactic acyclovir, calcium-vitD, aspirin 81mg , zometa monthly upto 24 months  -However, lenalidomide dose was reduced to 5mg ( from 10mg ) from early Oct 2023 due to intolerance ( weakness, dizziness, fatigue)  -M spike had increased to 0.5g/dl on SPEP done on 9/7/23  -sFLC ratio normal  -M spike on SPEP 0.3g/dl on 1/9/24; SAMI shows IgG lambda; sFLC ratio normal  -Cr, calcium normal  -she will continue the current RVd maintenance until April/May 2024 and then switch to maintenance lenalidomide   -she will increase lenalidomide to 10mg daily ( from the current 5mg daily) after she stops bortezomib   -If M spike is increasing further recommend change in treatment, to possibly josé pom dex or josé kyprolis dex ( she is CD 38 Ab naive)  -monthly SPEP. SAMI, serum free light chains recommended     3.  Anemia secondary to chemotherapy     --Hgb 10.9 g/dl on 11/17/24  - Transfusion for hgb <7    3. Leukopenia    -WBC count 3.3 k on 1/17/24  -secondary to chemotherapy    4. Absolute neutropenia    --ANC 1.1 on 1/17/24  -secondary to chemotherapy    5. Thrombocytopenia    -Mild,  asymptomatic  -125k on 1/17/24      6. GERD  - She is currently asymptomatic  -- on PPI     7. Migraine History  - She uses Florinol for migrane headaches and states that she only takes it 2-3 times per year      8. Immunizations   --she received COVID 19 vaccination and has completed all other scheduled vaccinations             BMT Chart Routing      Follow up with physician 4 months.   Follow up with LILIAN    Provider visit type    Infusion scheduling note    Injection scheduling note    Labs None   Scheduling:  Preferred lab:  Lab interval:     Imaging    Pharmacy appointment    Other referrals

## 2024-04-22 ENCOUNTER — TELEPHONE (OUTPATIENT)
Dept: HEMATOLOGY/ONCOLOGY | Facility: CLINIC | Age: 69
End: 2024-04-22
Payer: COMMERCIAL

## 2024-04-22 NOTE — TELEPHONE ENCOUNTER
----- Message from Viviana Bettencourt sent at 4/22/2024 12:48 PM CDT -----  Regarding: Appt  Contact: Pt  [phone]          Current Appt date:  05/24/24     Type of Appt:  Ep     Physician: Chad Tabor MD     Reason for rescheduling:  Would like appt changed to virtual if possible and cancel appt with ID will take vaccines locally      Caller: Caty     Contact Preference:  [phone]

## 2024-05-23 ENCOUNTER — OFFICE VISIT (OUTPATIENT)
Dept: HEMATOLOGY/ONCOLOGY | Facility: CLINIC | Age: 69
End: 2024-05-23
Payer: COMMERCIAL

## 2024-05-23 VITALS
WEIGHT: 159.5 LBS | SYSTOLIC BLOOD PRESSURE: 145 MMHG | RESPIRATION RATE: 18 BRPM | HEIGHT: 67 IN | DIASTOLIC BLOOD PRESSURE: 67 MMHG | HEART RATE: 56 BPM | OXYGEN SATURATION: 99 % | TEMPERATURE: 98 F | BODY MASS INDEX: 25.03 KG/M2

## 2024-05-23 DIAGNOSIS — Z94.81 STATUS POST AUTOLOGOUS BONE MARROW TRANSPLANT: Primary | ICD-10-CM

## 2024-05-23 DIAGNOSIS — T45.1X5A ANEMIA ASSOCIATED WITH CHEMOTHERAPY: ICD-10-CM

## 2024-05-23 DIAGNOSIS — C90.00 MULTIPLE MYELOMA NOT HAVING ACHIEVED REMISSION: ICD-10-CM

## 2024-05-23 DIAGNOSIS — D64.81 ANEMIA ASSOCIATED WITH CHEMOTHERAPY: ICD-10-CM

## 2024-05-23 PROCEDURE — 99999 PR PBB SHADOW E&M-EST. PATIENT-LVL IV: CPT | Mod: PBBFAC,,, | Performed by: INTERNAL MEDICINE

## 2024-05-23 PROCEDURE — 99214 OFFICE O/P EST MOD 30 MIN: CPT | Mod: S$GLB,,, | Performed by: INTERNAL MEDICINE

## 2024-05-23 NOTE — PROGRESS NOTES
CC: Multiple myeloma in remission, follow up after autologous stem cell transplant, 2 years post ASCT      HPI : , 68, is here for hematology/ stem cell transplant follow up .  She is here 2 years post HDT/ auto SCT .   She has IgG lambda multiple myeloma. Multiple myeloma was diagnosed after work up for anemia showed a paraprotein  She has anemia, GERD, esophageal erosions. On 10/8/21 she had a bone marrow biopsy. It showed nearly 50%  positive plasma cells on core biopsy by IHC.  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  Congo red on bone marrow negative.She has chronic anemia. Cytogenetics was normal. FISH showed increase in 1q copies, t(4,14) and monosomy 13.  She had no lytic lesions on skeletal survey. She was treated with RVd.   She underwent Ju 200 ASCT. Day 0 was 4/25/22.  Post transplant course was uneventful with expected chemotherapy related toxicities.She went to ED on 05/15/22 with low grade fever. She was discharged with PO antibiotics. Respiratory panel was positive for para influenza virus.        Interval History: Ms.Richards Byrne presents for virtual follow up. Today is 2 years post auto SCT for multiple myeloma. She follows with oncologist Dr. Bui (replaced ). at [facility] . She is doing well. Denies weight loss, bone pain, fatigue or any other acute events.Currenrtly on revlimid 10mg daily. Stopped velcade in April 2024.  .       Review of Systems   Constitutional:  Positive for malaise/fatigue. Negative for chills, fever and weight loss.   HENT:  Negative for congestion, ear discharge, ear pain and hearing loss.    Eyes:  Negative for double vision and photophobia.   Respiratory:  Negative for hemoptysis, sputum production and stridor.    Cardiovascular:  Negative for chest pain, orthopnea and leg swelling.   Gastrointestinal:  Negative for abdominal pain, blood in stool, constipation, heartburn and nausea.   Genitourinary:  Negative for dysuria, frequency and  hematuria.   Musculoskeletal:  Negative for back pain and myalgias.   Neurological:  Negative for dizziness, tingling, tremors and focal weakness.   Endo/Heme/Allergies:  Does not bruise/bleed easily.   Psychiatric/Behavioral:  Negative for depression, hallucinations and suicidal ideas. The patient is not nervous/anxious.                Vitals:     02/01/23 0727   BP: (!) 141/69   Pulse: 67   Resp: 16   Temp: 98.5 °F (36.9 °C)               PS: ECOG 1       Physical Exam  HENT:      Head: Normocephalic and atraumatic.   Eyes:      General: No scleral icterus.  Cardiovascular:      Rate and Rhythm: Normal rate and regular rhythm.   Pulmonary:      Effort: Pulmonary effort is normal. No respiratory distress.      Breath sounds: No stridor. No rhonchi.   Abdominal:      General: There is no distension.      Palpations: There is no mass.      Tenderness: There is no abdominal tenderness.   Lymphadenopathy:      Cervical: No cervical adenopathy.   Skin:     Coloration: Skin is not jaundiced.   Neurological:      General: No focal deficit present.      Mental Status: She is alert.      Cranial Nerves: No cranial nerve deficit.            8/3/22 PET CT     COMPARISON:  [facility] PET-CT 03/21/2022     FINDINGS:  Quality of the study: Adequate.     In the head and neck, there are no hypermetabolic lesions worrisome for malignancy. There are no hypermetabolic mucosal lesions, and there are no pathologically enlarged or hypermetabolic lymph nodes.     In the chest, there are no hypermetabolic lesions worrisome for malignancy.  There are no concerning pulmonary nodules or masses, and there are no pathologically enlarged or hypermetabolic lymph nodes.     In the abdomen and pelvis, there is physiologic tracer distribution within the abdominal organs and excretion into the genitourinary system.     In the bones, there are no hypermetabolic lesions worrisome for malignancy.     In the extremities, there are no hypermetabolic lesions  worrisome for malignancy.     Additional findings: Bilateral breast implants.  Cholecystectomy.     Impression:     No evidence of hypermetabolic foci to indicate malignancy.        8/3/22 BM biopsy     LEFT ILIAC CREST BONE MARROW ASPIRATE, BONE MARROW CLOT, AND BONE MARROW CORE BIOPSY WITH:     CELLULARITY=30%, TRILINEAGE HEMATOPOIETIC ACTIVITY (M/E= 1.1:1).   LAMBDA LIGHT CHAIN PREDOMINANT PLASMA POPULATION (6-8%).  SEE COMMENT.   MARKEDLY DECREASED STORAGE IRON.   ADEQUATE NUMBER OF MEGAKARYOCYTES.       Flow cytometric analysis of  bone marrow shows populations of polyclonal B lymphocytes and T lymphocytes that are immunophenotypically unremarkable   except reversed CD4 to CD8 ratio.  The blast gate is not increased. No clonal plasma cell population is detected.   Flow differential:  Lymphocytes 6.6%, Monocytes 2.5%, Granulocytes  87.7%, Blast  1.2%, Debris/nRBC 2.0%,  Viability 97.2%.   Immunohistochemical studies were performed on the clot and core biopsy for greater sensitivity and further architecture evaluation with adequate   positive and negative controls.   About 6-8% plasma cells ( positive, occasional CD56 positive) are noted.  In situ hybridization for kappa and lambda light chain shows focal lambda light chain predominant plasma cell   population. Findings are favor for minimal residual plasma cell neoplasm.  Correlate clinically.      Component      Latest Ref Rng & Units 8/3/2022   D'Hanis Free Light Chains      0.33 - 1.94 mg/dL 1.17   Lambda Free Light Chains      0.57 - 2.63 mg/dL 0.92   Kappa/Lambda FLC Ratio      0.26 - 1.65 1.27   Beta-2 Microglobulin      0.0 - 2.5 ug/mL 2.7 (H)      8/3/22 SPEP: Normal total protein. Normal gamma globulins are decreased. Paraprotein band in near-gamma = 0.30 g/dL, previously 0.85 g/dL.   8/3/22 sIFE: IgG lambda specific monoclonal band present.      10/11/22 serum free light chains     Kappa Quant Free Lt Chain result: 0.33 - 1.94 mg/dL 1.12    Lambda  Quant Free Lt Chain result 0.57 - 2.63 mg/dL 1.06    Kappa/Lambda Ratio 0.26 - 1.65 1.06       10/11/22 sIFE: No monoclonal immunoglobulin chains detected.   10/11/22 SPEP: Hypoproteinemia with normal pattern suggests hemodilution.             4/24/23 SPEP:Decreased total protein. Normal gamma globulins are decreased. Paraprotein band in near-gamma = 0.23 g/dL, previously 0.17 g/dL.   4/24/23 serum SAMI: IgG lambda specific monoclonal band present.     4/24/23 BONE MARROW, RIGHT ILIAC CREST (ASPIRATE SMEAR, TOUCH IMPRINT, CLOT SECTION, AND CORE BIOPSY):     -- RESIDUAL PLASMA CELL MYELOMA (2-3% OF MARROW CELLULARITY).   -- NORMOCELLULAR MARROW (30%) WITH TRILINEAGE HEMATOPOIESIS.   -- DECREASED STORAGE IRON.   -- SEE COMMENT.   4/24/23 Bone marrow, flow cytometric immunophenotyping: Monotypic lambda plasma cells identified (MRD= 0.1540% or 1540 cells/million).     4/24/23 PET CT    COMPARISON:  [facility] PET CT 08/03/2022 and 03/21/2022     FINDINGS:  Quality of the study: Fused PET-CT images of the lower extremities are degraded by misregistration due to motion.     In the head and neck, there are no hypermetabolic lesions worrisome for malignancy. There are no hypermetabolic mucosal lesions, and there are no pathologically enlarged or hypermetabolic lymph nodes.     In the chest, there are no hypermetabolic lesions worrisome for malignancy.  There are no concerning pulmonary nodules or masses, and there are no pathologically enlarged or hypermetabolic lymph nodes.     In the abdomen and pelvis, there is physiologic tracer distribution within the abdominal organs and excretion into the genitourinary system.     In the bones, there are two new hypermetabolic lesions within the anterolateral portions of the right 4th and 5th ribs correlating with nondisplaced fractures with callus formation on CT.  The right 4th rib lesion has SUV max of 4.6 (fused image 140, and 5th rib lesion has SUV max 5 (fused image 152).     In the  extremities, there are no hypermetabolic lesions worrisome for malignancy.  Misregistered focal activity in the right lateral femoral condyle is favored to represent benign uptake with the intercondylar notch from cruciate ligament injury.     Additional CT findings:     Bilateral breast implants.  Bilateral dependent atelectasis.  Status post cholecystectomy and hysterectomy.  There is a 4.2 x 2.5 cm fluid attenuating lesion adjacent to the sigmoid colon without wall surrounding fat stranding or evidence of inflammatory changes or hypermetabolic activity, stable dating back to PET-CT scan 03/21/2022.     Impression:     In this patient with multiple myeloma, there is no definite evidence of hypermetabolic tumor.     There are two new right 4th and 5th rib fractures, likely posttraumatic.  Recommend correlation with history.     Probable right cruciate ligament injury.  Recommend correlation with history and physical examination.      9/7/23 SPEP      Protein Total 6.1 - 8.1 g/dL 6.3    Albumin 3.8 - 4.8 g/dL 3.7 Low     Alpha-1 grams/dl 0.2 - 0.3 g/dL 0.3    Alpha-2 0.5 - 0.9 g/dL 0.7    Beta 1 Globulin 0.4 - 0.6 g/dL 0.5    Beta 2 Globuin 0.2 - 0.5 g/dL 0.3    GAMMA 0.8 - 1.7 g/dL 0.8    Abnormal Protein Band 1 NONE DETECTED g/dL 0.5 Abnormal     Abnormal Protein Band 2 g/dL DNR    Abnormal Protein Band 3 g/dL DNR      Evaluation reveals ONE restricted band(s) migrating in the GAMMA region(s), respectively. Estimated concentration(s): 0.5 g/dL, respectively. Consider immunofixation analysis if indicated and not already   ordered.    1/9/24 SPEP: Evaluation reveals ONE restricted band(s) migrating in the GAMMA region(s), respectively. Estimated concentration(s): 0.3 g/dL, respectively. Consider immunofixation analysis if indicated and not already ordered.   1/9/24 sIFE: Monoclonal gammopathy present with IgG lambda light chains.     1/9/24     Prien Quant Free Lt Chain result: 0.33 - 1.94 mg/dL 1.34   Lambda Quant  Free Lt Chain result 0.57 - 2.63 mg/dL 1.43   Kappa/Lambda Ratio 0.26 - 1.65 0.94         Component      Latest Ref Rng 5/1/2024   WBC      4.4 - 10.1 K/UL 2.8 (L) (E)   RBC      3.73 - 5.27 M/UL 3.16 (L) (E)   Hemoglobin      11.3 - 15.4 g/dL 10.5 (L) (E)   Hematocrit      34.3 - 45.7 % 32.8 (L) (E)   MCV      82.2 - 95.9 fL 103.8 (H) (E)   MCH      27.6 - 33.5 pg 33.2 (E)   MCHC      31.2 - 34.9 g/dL 32.0 (E)   RDW      38.5 - 54.4 fL 49.1 (E)   Platelet Count      117 - 369 K/ (L) (E)   MPV      9.4 - 13.2 fL 9.4 (E)   Neutrophils Relative      43.70 - 84.90 % 55.30 (E)   Lymphocytes %      8.40 - 40.70 % 20.70 (E)   Mono %      3.40 - 12.00 % 12.90 (H) (E)   Eos %      0.60 - 6.00 % 10.40 (H) (E)   Eos %      0.20 - 0.70 K/UL 0.29 (E)   Basophil %      0.20 - 1.20 % 0.70 (E)   Immature Granulocytes      <=0.5 % 0.0 (E)   Neutrophils, Abs      1.30 - 8.30 K/UL 1.55 (E)   Lymphocytes Absolute      1.4 - 2.9 K/UL 0.6 (L) (E)   Mono #      0.20 - 0.80 K/UL 0.36 (E)   Baso #      0.00 - 0.10 K/UL 0.02 (E)   Immature Grans (Abs)      0.00 - 0.00 K/UL 0.00 (E)      5/1/24 SIFE : Monoclonal gammopathy present with IgG lambda light chains.     5/1/24 SPEP: Evaluation reveals ONE restricted band(s) migrating in the GAMMA region(s), respectively. Estimated concentration(s): 0.5 g/dL, respectively. Consider immunofixation analysis if indicated and not already   ordered.     Kappa Quant Free Lt Chain result: 0.33 - 1.94 mg/dL 2.19 High    Lambda Quant Free Lt Chain result 0.57 - 2.63 mg/dL 2.11   Kappa/Lambda Ratio 0.26 - 1.65 1.04         Assessment and Plan:     1. History of autologous peripheral blood sten cell transplant     -Received Melphalan 200 ASCT on 04/25/22. Received CD34 cells with total dose of 3.97x10^6.  -Engrafted on Day +12  -Transplant was uncomplicated.    --Day+100 BM biopsy done on 8/3/22 demonstrated a lambda restricted light chain population, 6-8%   --No evidence of hypermetabolic bony lytic or  soft tissue lesions on PET CT done on day +100 .  -she is 2 years post auto SCT          2. Multiple myeloma in remission      --R-ISS II IgG lambda multiple myeloma, treated with  RVd .    --She had increased 1q21 copy number and has t(4,14), both high risk markers in multiple myeloma. She also has monosomy 13 on FISH.   --She did not have baseline PET CT.   --Prior to C5 her M spike on SPEP was 0.52g/dl( was 5.36g/dl at diagnosis).   --She received 6 cycles of RVd, followed by 2 cycles of velcade-dexamethasone. She tolerated the treatments well.    --PET CT on 3/31/22 did not demonstrate any hypermetabolic lytic or soft tissue lesions.  -- BM biopsy on 3/21/22 showed 10% plasma cells ( versus 50% at diagnosis).  --M spike on 3/3/22 SPEP was 0.85g/dl, suggesting she partial response.   --4/1/22 PFTs showed mild decrease in DLCO. EKG did not show any abnormality. 2D ECHO on 4/1/22 showed moderate left atrial enlargement, mild mitral regurgitation but normal LVEF. HBV, HIV, HCV, Chagas Ab, RPR, HTLV I/II all non-reactive. Varicella negative. CMV Ab was reactive. HSV 1 IgG was reactive. HSV 2 IgG negative.   --Colonoscopy normal. No abnormality of mammogram or PAP smear.   --I discussed the maintenance therapy consisted of lenalidomide (10?mg/day orally) on days 1-21 of a 28-day cycle in combination with bortezomib (1.3?mg/m2 per week subcutaneously/intravenously) and low-dose dexamethasone (40?mg per week orally). [ Leukemia volume 28, [id number] (2014) ]. This was administered for up to 3 years, followed by single-agent lenalidomide maintenance thereafter.   --she has resumed maintenance under /  -she is tolerating the maintenance RVd well; no neuropathy/infections.   --she is taking decadron to 20mg PO weekly. Reported insomnia related to dex. If ongoing issue further dose adjustment to 12 mg weekly.   -sFLC ratio normal on 4/24/23 ( 1 year re-staging)  -M spike on SPEP on 4/24/23  measuring  0.23g/dl ; sIFE shows IgG lambda (was 0.17g/dl previously, 0.85g/dl pre transplant)  -1 year restaging BM biopsy on 4/23/23 demonstrated plasma cells , now 2-3 % in the setting of a normocellular marrow   -4/24/23 PET CT did not demonstrate lytic/ hypermetabolic lesions. There are two new right 4th and 5th rib fractures, likely posttraumatic. ( She had a fall in March 2023)   -I discussed these findings in detail  -I explained that she continues to have partial / very good partial response, and the response has deepened post transplant  -she continues to be on maintenance VRd, prophylactic acyclovir, calcium-vitD, aspirin 81mg , zometa monthly upto 24 months  -However, lenalidomide dose was reduced to 5mg ( from 10mg ) from early Oct 2023 due to intolerance ( weakness, dizziness, fatigue)  -M spike had increased to 0.5g/dl on SPEP done on 9/7/23  -sFLC ratio normal  -M spike on SPEP 0.3g/dl on 1/9/24; SAMI shows IgG lambda; sFLC ratio normal  -Cr, calcium normal  -she will continue the current RVd maintenance until April/May 2024 and then switch to maintenance lenalidomide   -she will increase lenalidomide to 10mg daily ( from the current 5mg daily) after she stops bortezomib   -M spike 0.5G/DL on 5/1/24 SPEP , stable since April 2024( was 0.3g/dl in March 2024)  -currently on maintenance revlimid ( she completed 2 years of RVd)  -If M spike is increasing further recommend change in treatment, to possibly josé pom dex or josé kyprolis dex ( she is CD 38 Ab naive) / will be screened for [place] 3   -monthly SPEP. SAMI, serum free light chains recommended     3.  Anemia secondary to chemotherapy     --Hgb 10.5 g/dl on 5/1/24  - Transfusion for hgb <7    3. Leukopenia    -WBC count 3.3 k on 1/17/24  -secondary to chemotherapy    4. Absolute neutropenia    --ANC 1.55 on 5/1/24  -secondary to chemotherapy    5. Thrombocytopenia    -Mild, asymptomatic  -105k on 5/1/24      6. GERD  - She is currently asymptomatic  -- on  PPI     7. Migraine History  - She uses Florinol for migrane headaches and states that she only takes it 2-3 times per year      8. Immunizations   --she received COVID 19 vaccination and has completed all other scheduled vaccinations        BMT Chart Routing      Follow up with physician 6 months.   Follow up with LILIAN    Provider visit type    Infusion scheduling note    Injection scheduling note    Labs    Imaging    Pharmacy appointment    Other referrals

## 2024-09-10 ENCOUNTER — TELEPHONE (OUTPATIENT)
Dept: HEMATOLOGY/ONCOLOGY | Facility: CLINIC | Age: 69
End: 2024-09-10
Payer: COMMERCIAL

## 2024-09-10 NOTE — TELEPHONE ENCOUNTER
----- Message from Liat Ryan sent at 9/10/2024  8:54 AM CDT -----  Regarding: Consult/Advisory  Contact: [phone]  Consult/Advisory     Name Of Caller: Edmundo Ceja         Contact Preference: [phone] provider's line at [facility]      Nature of call: Edmundo calling to advise that appts on 5/27/2022 was denied due to no auth, he spoke with billing and they advise him to speak with the office. Please call to advise

## 2024-09-10 NOTE — TELEPHONE ENCOUNTER
Brenna was just calling to notify office that they are not covering visit from 2022 because no auth was submitted and it is now past the time that we are able to retro act the denial.

## 2024-11-25 ENCOUNTER — OFFICE VISIT (OUTPATIENT)
Dept: HEMATOLOGY/ONCOLOGY | Facility: CLINIC | Age: 69
End: 2024-11-25
Payer: COMMERCIAL

## 2024-11-25 DIAGNOSIS — C90.00 MULTIPLE MYELOMA, REMISSION STATUS UNSPECIFIED: Primary | ICD-10-CM

## 2024-11-25 DIAGNOSIS — Z94.84 HISTORY OF AUTO STEM CELL TRANSPLANT: ICD-10-CM

## 2024-11-25 DIAGNOSIS — T50.905A DRUG-INDUCED CYTOPENIA: ICD-10-CM

## 2024-11-25 DIAGNOSIS — D75.9 DRUG-INDUCED CYTOPENIA: ICD-10-CM

## 2024-11-25 PROCEDURE — G2211 COMPLEX E/M VISIT ADD ON: HCPCS | Mod: 95,,, | Performed by: INTERNAL MEDICINE

## 2024-11-25 PROCEDURE — 99215 OFFICE O/P EST HI 40 MIN: CPT | Mod: 95,,, | Performed by: INTERNAL MEDICINE

## 2024-11-25 NOTE — LETTER
December 3, 2024        Hiren Bui MD  [address] [place] [address]             [facility] - Hematology 5th Fl  [address]  [facility] [address]  Phone: [phone]   Patient: Caty Diaz   MR Number: [id number]   YOB: 1955   Date of Visit: 11/25/2024       Dear Dr. Bui:    Thank you for referring Caty Diaz to me for evaluation. Attached you will find relevant portions of my assessment and plan of care.    If you have questions, please do not hesitate to call me. I look forward to following Caty Diaz along with you.    Sincerely,      Ildefonso Márquez MD            CC  No Recipients    Enclosure

## 2024-11-25 NOTE — PROGRESS NOTES
The patient location is: home  The chief complaint leading to consultation is: MM sp SCT    Visit type: audiovisual    Face to Face time with patient: 15  25 minutes of total time spent on the encounter, which includes face to face time and non-face to face time preparing to see the patient (eg, review of tests), Obtaining and/or reviewing separately obtained history, Documenting clinical information in the electronic or other health record, Independently interpreting results (not separately reported) and communicating results to the patient/family/caregiver, or Care coordination (not separately reported).         Each patient to whom he or she provides medical services by telemedicine is:  (1) informed of the relationship between the physician and patient and the respective role of any other health care provider with respect to management of the patient; and (2) notified that he or she may decline to receive medical services by telemedicine and may withdraw from such care at any time.    Notes:     CC: Multiple myeloma in remission, follow up after autologous stem cell transplant, 2 years 7 months  post ASCT      HPI : , 68, is here for hematology/ stem cell transplant follow up .  She is here 2 years post HDT/ auto SCT .   She has IgG lambda multiple myeloma. Multiple myeloma was diagnosed after work up for anemia showed a paraprotein  She has anemia, GERD, esophageal erosions. On 10/8/21 she had a bone marrow biopsy. It showed nearly 50%  positive plasma cells on core biopsy by IHC.  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  [place]ongo red on bone marrow negative.She has chronic anemia. Cytogenetics was normal. FISH showed increase in 1q copies, t(4,14) and monosomy 13.  She had no lytic lesions on skeletal survey. She was treated with RVd.   She underwent Ju 200 ASCT. Day 0 was 4/25/22.  Post transplant course was uneventful with expected chemotherapy related toxicities.She went to ED on 05/15/22 with low  grade fever. She was discharged with PO antibiotics. Respiratory panel was positive for para influenza virus.        Interval History: Ms.Richards Byrne presents for virtual follow up. Today is 2 years 7 months  post auto SCT for multiple myeloma. She follows with oncologist Dr. Bui (replaced ). at [facility] . She is doing well. Denies weight loss, bone pain, fatigue or any other acute events.Currenrtly on revlimid 10mg daily. Stopped velcade in April 2024.  .       Review of Systems   Constitutional:  Positive for malaise/fatigue. Negative for chills, fever and weight loss.   HENT:  Negative for congestion, ear discharge, ear pain and hearing loss.    Eyes:  Negative for double vision and photophobia.   Respiratory:  Negative for hemoptysis, sputum production and stridor.    Cardiovascular:  Negative for chest pain, orthopnea and leg swelling.   Gastrointestinal:  Negative for abdominal pain, blood in stool, constipation, heartburn and nausea.   Genitourinary:  Negative for dysuria, frequency and hematuria.   Musculoskeletal:  Negative for back pain and myalgias.   Neurological:  Negative for dizziness, tingling, tremors and focal weakness.   Endo/Heme/Allergies:  Does not bruise/bleed easily.   Psychiatric/Behavioral:  Negative for depression, hallucinations and suicidal ideas. The patient is not nervous/anxious.         Assessment and Plan:     1. History of autologous peripheral blood sten cell transplant     -Received Melphalan 200 ASCT on 04/25/22. Received CD34 cells with total dose of 3.97x10^6.  -Engrafted on Day +12  -Transplant was uncomplicated.    --Day+100 BM biopsy done on 8/3/22 demonstrated a lambda restricted light chain population, 6-8% cw AL  --No evidence of hypermetabolic bony lytic or soft tissue lesions on PET CT done on day +100 .             2. Multiple myeloma in remission      --R-ISS II IgG lambda multiple myeloma, treated with  RVd .    --She had increased 1q21 copy number  and has t(4,14), both high risk markers in multiple myeloma. She also has monosomy 13 on FISH.   --She did not have baseline PET CT.   -She received 6 cycles of RVd, followed by 2 cycles of velcade-dexamethasone for induction  She tolerated the treatments well.    -Underwent emi 200 mg/m2 autoSCT on 4/25/24  -achieved IA to SCT   -she remains on anna maintenance 10mg day 1-14 of 28 day cycle; schedule reduced for tolerance  -her last restaging at [facility] on 10/31/24 (see care everywhere) cw with sustained IA so continue anna maintenance as above    -continue monthly labs and fu locally with Dr. Bui  -will continue to fu at [facility] q 6 months; recommend josé based therapy vs cart if/when relapses   -should have completed her post SCT vaccines   -she can stop acyclovir, has VZV vaccines and > 1 yr post SCT and no recurrent VZV, HSV history       3. Rev induced cytopenias  -at or above dose/schedule adjustment threshold      FU:  -With Dr. Bui as above  -Virtual appt with np in 6 months; no labs needed before

## 2025-07-02 ENCOUNTER — TELEPHONE (OUTPATIENT)
Dept: HEMATOLOGY/ONCOLOGY | Facility: CLINIC | Age: 70
End: 2025-07-02
Payer: COMMERCIAL

## 2025-07-02 NOTE — TELEPHONE ENCOUNTER
Copied from CRM #[id number]. Topic: Appointments - Appointment Access  >> Jul 2, 2025  9:02 AM Viviana wrote:  Appt Type:   Est pt      Date/Time Preference:    Next [facility]      Treating Provider:  Last seen by  Ildefonso Márquez MD    Caller Name: Caty      Contact Prefer: [phone]     Comments/Notes:   Called to schedule follow up visit

## 2025-09-03 ENCOUNTER — OFFICE VISIT (OUTPATIENT)
Dept: HEMATOLOGY/ONCOLOGY | Facility: CLINIC | Age: 70
End: 2025-09-03
Payer: MEDICARE

## 2025-09-03 DIAGNOSIS — C90.00 MULTIPLE MYELOMA, REMISSION STATUS UNSPECIFIED: Primary | ICD-10-CM

## 2025-09-03 RX ORDER — LENALIDOMIDE 5 MG/1
1 CAPSULE ORAL DAILY
COMMUNITY

## 2025-09-03 RX ORDER — CITALOPRAM 20 MG/1
20 TABLET ORAL DAILY
COMMUNITY

## (undated) DEVICE — DRAPE THYROID WITH ARMBOARD

## (undated) DEVICE — ADHESIVE DERMABOND ADVANCED

## (undated) DEVICE — SUT MCRYL PLUS 4-0 PS2 27IN

## (undated) DEVICE — DRAPE C ARM 42 X 120 10/BX

## (undated) DEVICE — SOL NACL 0.9% INJ PF/50151

## (undated) DEVICE — APPLICATOR CHLORAPREP ORN 26ML

## (undated) DEVICE — ELECTRODE REM PLYHSV RETURN 9

## (undated) DEVICE — SUT PROLENE 2-0 30 SH

## (undated) DEVICE — BLADE SURG CARBON STEEL SZ11

## (undated) DEVICE — TIP YANKAUERS BULB NO VENT

## (undated) DEVICE — DRESSING ANTIMICROBIAL 1 INCH

## (undated) DEVICE — SET DECANTER MEDICHOICE

## (undated) DEVICE — NDL HYPO REG 25G X 1 1/2

## (undated) DEVICE — SYR ONLY LUER LOCK 20CC

## (undated) DEVICE — COVERS PROBE NR-48 STERILE

## (undated) DEVICE — TRAY MINOR GEN SURG

## (undated) DEVICE — DRESSING TELFA STRL 4X3 LF

## (undated) DEVICE — DRESSING TRANS 4X4 TEGADERM